# Patient Record
Sex: FEMALE | Race: WHITE | NOT HISPANIC OR LATINO | Employment: UNEMPLOYED | URBAN - METROPOLITAN AREA
[De-identification: names, ages, dates, MRNs, and addresses within clinical notes are randomized per-mention and may not be internally consistent; named-entity substitution may affect disease eponyms.]

---

## 2017-02-06 RX ORDER — CITALOPRAM 20 MG/1
TABLET ORAL
Qty: 30 TABLET | Refills: 0 | Status: SHIPPED | OUTPATIENT
Start: 2017-02-06 | End: 2017-03-06 | Stop reason: SDUPTHER

## 2017-02-07 RX ORDER — CITALOPRAM 20 MG/1
TABLET ORAL
Qty: 15 TABLET | Refills: 0 | Status: SHIPPED | OUTPATIENT
Start: 2017-02-07 | End: 2017-03-10 | Stop reason: SDUPTHER

## 2017-03-03 ENCOUNTER — TELEPHONE (OUTPATIENT)
Dept: FAMILY MEDICINE CLINIC | Facility: CLINIC | Age: 47
End: 2017-03-03

## 2017-03-03 RX ORDER — OSELTAMIVIR PHOSPHATE 75 MG/1
75 CAPSULE ORAL 2 TIMES DAILY
Qty: 10 CAPSULE | Refills: 0 | Status: SHIPPED | OUTPATIENT
Start: 2017-03-03 | End: 2017-03-10

## 2017-03-03 NOTE — TELEPHONE ENCOUNTER
----- Message from Fang Meyers sent at 3/3/2017 12:56 PM EST -----  [phone]  [facility] [address]  [place]  DAUGHTER WAS SEEN BY PED. SHE HAS THE FLU AND SHE WANTS TO KNOW IF SHE CAN GET TAMIFLU FOR HERSELF         Script sent per Dr. BAIRD, pt informed

## 2017-03-06 RX ORDER — CITALOPRAM 20 MG/1
TABLET ORAL
Qty: 15 TABLET | Refills: 0 | Status: SHIPPED | OUTPATIENT
Start: 2017-03-06 | End: 2017-03-10

## 2017-03-10 ENCOUNTER — OFFICE VISIT (OUTPATIENT)
Dept: FAMILY MEDICINE CLINIC | Facility: CLINIC | Age: 47
End: 2017-03-10

## 2017-03-10 VITALS
OXYGEN SATURATION: 98 % | HEART RATE: 67 BPM | DIASTOLIC BLOOD PRESSURE: 78 MMHG | WEIGHT: 158 LBS | SYSTOLIC BLOOD PRESSURE: 122 MMHG | HEIGHT: 69 IN | TEMPERATURE: 98.1 F | BODY MASS INDEX: 23.4 KG/M2

## 2017-03-10 DIAGNOSIS — F32.9 REACTIVE DEPRESSION: ICD-10-CM

## 2017-03-10 DIAGNOSIS — R07.89 OTHER CHEST PAIN: ICD-10-CM

## 2017-03-10 DIAGNOSIS — J30.1 SEASONAL ALLERGIC RHINITIS DUE TO POLLEN: Primary | ICD-10-CM

## 2017-03-10 PROCEDURE — 99213 OFFICE O/P EST LOW 20 MIN: CPT | Performed by: FAMILY MEDICINE

## 2017-03-10 RX ORDER — ALBUTEROL SULFATE 90 UG/1
2 AEROSOL, METERED RESPIRATORY (INHALATION) EVERY 4 HOURS PRN
Qty: 18 G | Refills: 5 | Status: ON HOLD | OUTPATIENT
Start: 2017-03-10 | End: 2018-01-05

## 2017-03-10 RX ORDER — MONTELUKAST SODIUM 10 MG/1
10 TABLET ORAL DAILY
Qty: 30 TABLET | Refills: 5 | Status: SHIPPED | OUTPATIENT
Start: 2017-03-10 | End: 2017-10-25 | Stop reason: SDUPTHER

## 2017-03-10 RX ORDER — CITALOPRAM 20 MG/1
20 TABLET ORAL DAILY
Qty: 30 TABLET | Refills: 5 | Status: SHIPPED | OUTPATIENT
Start: 2017-03-10 | End: 2017-03-27 | Stop reason: SDUPTHER

## 2017-03-10 NOTE — PROGRESS NOTES
"  Chief Complaint   Patient presents with   • Depression     follow up,pt is doing wel,l no complaint       Subjective.../HPI  Patient present today with allergies and depression. Doing well on citalopram.    I have reviewed the patient's medical history in detail and updated the computerized patient record.    Family History   Problem Relation Age of Onset   • Depression Mother    • Diabetes Mother    • Heart disease Mother      +of mi at 68   • Hyperlipidemia Mother    • Hypertension Mother    • Heart disease Father      ptca x 5 and mi at 59   • Hyperlipidemia Father    • Hypertension Father    • Kidney disease Brother    • Birth defects Maternal Grandmother    • Heart disease Maternal Grandmother      multiple mi   • Birth defects Maternal Grandfather    • Heart disease Maternal Grandfather      + mi at 68   • Colon cancer Maternal Grandfather    • Heart disease Paternal Grandmother      weak heart   • Asthma Paternal Grandmother    • COPD Paternal Grandmother    • Kidney failure Paternal Grandfather        Social History     Social History   • Marital status:      Spouse name: N/A   • Number of children: N/A   • Years of education: N/A     Occupational History   • Not on file.     Social History Main Topics   • Smoking status: Never Smoker   • Smokeless tobacco: Never Used   • Alcohol use No   • Drug use: No   • Sexual activity: Defer     Other Topics Concern   • Not on file     Social History Narrative       Review of Systems:   Review of Systems   All other systems reviewed and are negative.      Objective:  Vital Signs     Vitals:    03/10/17 1220   BP: 122/78   BP Location: Right arm   Patient Position: Sitting   Pulse: 67   Temp: 98.1 °F (36.7 °C)   TempSrc: Oral   SpO2: 98%   Weight: 158 lb (71.7 kg)   Height: 69\" (175.3 cm)     Physical Exam   Constitutional: She is oriented to person, place, and time. She appears well-developed and well-nourished.   HENT:   Head: Normocephalic.   Eyes: Pupils are " equal, round, and reactive to light.   Neck: Normal range of motion.   Cardiovascular: Normal rate and regular rhythm.    Pulmonary/Chest: Effort normal.   Abdominal: Soft.   Musculoskeletal: Normal range of motion.   Neurological: She is alert and oriented to person, place, and time.   Skin: Skin is warm and dry.        Results Review:      REVIEWED AND DISCUSSED LAB RESULTS WITH PATIENT and REVIEWED AND DISCUSSED CLINICAL RESULTS WITH PATIENT                          Current Outpatient Prescriptions:   •  albuterol (VENTOLIN HFA) 108 (90 BASE) MCG/ACT inhaler, Inhale 2 puffs Every 4 (Four) Hours As Needed for Wheezing., Disp: 18 g, Rfl: 5  •  cetirizine (ZyrTEC) 10 MG tablet, Take 10 mg by mouth daily., Disp: , Rfl:   •  citalopram (CeleXA) 20 MG tablet, Take 1 tablet by mouth Daily., Disp: 30 tablet, Rfl: 5  •  MINASTRIN 24 FE 1-20 MG-MCG(24) chewable tablet, every night., Disp: , Rfl:   •  montelukast (SINGULAIR) 10 MG tablet, Take 1 tablet by mouth Daily., Disp: 30 tablet, Rfl: 5    Procedures    Assessment/Plan     Diagnoses and all orders for this visit:    Seasonal allergic rhinitis due to pollen  -     CBC & Differential  -     Comprehensive Metabolic Panel    Reactive depression  -     CBC & Differential  -     Comprehensive Metabolic Panel  -     Hemoglobin A1c  -     Lipid Panel With LDL / HDL Ratio  -     Thyroid Panel With TSH    Other chest pain  -     Comprehensive Metabolic Panel  -     Hemoglobin A1c  -     Lipid Panel With LDL / HDL Ratio  -     Thyroid Panel With TSH    Other orders  -     albuterol (VENTOLIN HFA) 108 (90 BASE) MCG/ACT inhaler; Inhale 2 puffs Every 4 (Four) Hours As Needed for Wheezing.  -     citalopram (CeleXA) 20 MG tablet; Take 1 tablet by mouth Daily.  -     montelukast (SINGULAIR) 10 MG tablet; Take 1 tablet by mouth Daily.         Benoit Mukherjee Jr., MD  03/10/17  12:59 PM

## 2017-03-11 LAB
ALBUMIN SERPL-MCNC: 3.9 G/DL (ref 3.5–5.2)
ALBUMIN/GLOB SERPL: 1.4 G/DL
ALP SERPL-CCNC: 65 U/L (ref 39–117)
ALT SERPL-CCNC: 17 U/L (ref 1–33)
AST SERPL-CCNC: 16 U/L (ref 1–32)
BASOPHILS # BLD AUTO: 0.04 10*3/MM3 (ref 0–0.2)
BASOPHILS NFR BLD AUTO: 0.5 % (ref 0–1.5)
BILIRUB SERPL-MCNC: 0.4 MG/DL (ref 0.1–1.2)
BUN SERPL-MCNC: 21 MG/DL (ref 6–20)
BUN/CREAT SERPL: 29.6 (ref 7–25)
CALCIUM SERPL-MCNC: 9.6 MG/DL (ref 8.6–10.5)
CHLORIDE SERPL-SCNC: 101 MMOL/L (ref 98–107)
CHOLEST SERPL-MCNC: 259 MG/DL (ref 0–200)
CO2 SERPL-SCNC: 25.5 MMOL/L (ref 22–29)
CREAT SERPL-MCNC: 0.71 MG/DL (ref 0.57–1)
EOSINOPHIL # BLD AUTO: 0.46 10*3/MM3 (ref 0–0.7)
EOSINOPHIL NFR BLD AUTO: 6 % (ref 0.3–6.2)
ERYTHROCYTE [DISTWIDTH] IN BLOOD BY AUTOMATED COUNT: 12.3 % (ref 11.7–13)
FT4I SERPL CALC-MCNC: 1.8 (ref 1.2–4.9)
GLOBULIN SER CALC-MCNC: 2.8 GM/DL
GLUCOSE SERPL-MCNC: 81 MG/DL (ref 65–99)
HBA1C MFR BLD: 4.87 % (ref 4.8–5.6)
HCT VFR BLD AUTO: 41.3 % (ref 35.6–45.5)
HDLC SERPL-MCNC: 48 MG/DL (ref 40–60)
HGB BLD-MCNC: 13.9 G/DL (ref 11.9–15.5)
IMM GRANULOCYTES # BLD: 0 10*3/MM3 (ref 0–0.03)
IMM GRANULOCYTES NFR BLD: 0 % (ref 0–0.5)
LDLC SERPL CALC-MCNC: 188 MG/DL (ref 0–100)
LDLC/HDLC SERPL: 3.91 {RATIO}
LYMPHOCYTES # BLD AUTO: 2.01 10*3/MM3 (ref 0.9–4.8)
LYMPHOCYTES NFR BLD AUTO: 26.1 % (ref 19.6–45.3)
MCH RBC QN AUTO: 32.3 PG (ref 26.9–32)
MCHC RBC AUTO-ENTMCNC: 33.7 G/DL (ref 32.4–36.3)
MCV RBC AUTO: 96 FL (ref 80.5–98.2)
MONOCYTES # BLD AUTO: 0.53 10*3/MM3 (ref 0.2–1.2)
MONOCYTES NFR BLD AUTO: 6.9 % (ref 5–12)
NEUTROPHILS # BLD AUTO: 4.67 10*3/MM3 (ref 1.9–8.1)
NEUTROPHILS NFR BLD AUTO: 60.5 % (ref 42.7–76)
PLATELET # BLD AUTO: 315 10*3/MM3 (ref 140–500)
POTASSIUM SERPL-SCNC: 4.2 MMOL/L (ref 3.5–5.2)
PROT SERPL-MCNC: 6.7 G/DL (ref 6–8.5)
RBC # BLD AUTO: 4.3 10*6/MM3 (ref 3.9–5.2)
SODIUM SERPL-SCNC: 142 MMOL/L (ref 136–145)
T3RU NFR SERPL: 23 % (ref 24–39)
T4 SERPL-MCNC: 7.9 UG/DL (ref 4.5–12)
TRIGL SERPL-MCNC: 117 MG/DL (ref 0–150)
TSH SERPL DL<=0.005 MIU/L-ACNC: 1.82 UIU/ML (ref 0.45–4.5)
VLDLC SERPL CALC-MCNC: 23.4 MG/DL (ref 5–40)
WBC # BLD AUTO: 7.71 10*3/MM3 (ref 4.5–10.7)

## 2017-03-23 RX ORDER — CITALOPRAM 20 MG/1
TABLET ORAL
Qty: 15 TABLET | Refills: 0 | Status: SHIPPED | OUTPATIENT
Start: 2017-03-23 | End: 2017-04-07 | Stop reason: SDUPTHER

## 2017-03-27 RX ORDER — CITALOPRAM 20 MG/1
20 TABLET ORAL DAILY
Qty: 30 TABLET | Refills: 5 | Status: SHIPPED | OUTPATIENT
Start: 2017-03-27 | End: 2017-10-08 | Stop reason: SDUPTHER

## 2017-03-31 ENCOUNTER — TELEPHONE (OUTPATIENT)
Dept: FAMILY MEDICINE CLINIC | Facility: CLINIC | Age: 47
End: 2017-03-31

## 2017-04-03 DIAGNOSIS — E78.01 FAMILIAL HYPERCHOLESTEROLEMIA: Primary | ICD-10-CM

## 2017-04-03 RX ORDER — ATORVASTATIN CALCIUM 10 MG/1
10 TABLET, FILM COATED ORAL DAILY
Qty: 30 TABLET | Refills: 5 | Status: SHIPPED | OUTPATIENT
Start: 2017-04-03 | End: 2017-10-19 | Stop reason: ALTCHOICE

## 2017-04-03 NOTE — PROGRESS NOTES
Discussed lab tests with pt . Has strong family hx of heart disease. Has lost weight and runny and still elevated lipids. Will start on lipitor. Do lab tests in 6/12 weeks and q 6 months

## 2017-04-07 RX ORDER — CITALOPRAM 20 MG/1
TABLET ORAL
Qty: 15 TABLET | Refills: 0 | Status: SHIPPED | OUTPATIENT
Start: 2017-04-07 | End: 2017-10-19 | Stop reason: SDUPTHER

## 2017-09-06 ENCOUNTER — APPOINTMENT (OUTPATIENT)
Dept: WOMENS IMAGING | Facility: HOSPITAL | Age: 47
End: 2017-09-06

## 2017-10-09 RX ORDER — CITALOPRAM 20 MG/1
TABLET ORAL
Qty: 30 TABLET | Refills: 4 | Status: SHIPPED | OUTPATIENT
Start: 2017-10-09 | End: 2017-10-19 | Stop reason: SDUPTHER

## 2017-10-19 ENCOUNTER — APPOINTMENT (OUTPATIENT)
Dept: PREADMISSION TESTING | Facility: HOSPITAL | Age: 47
End: 2017-10-19

## 2017-10-19 VITALS
DIASTOLIC BLOOD PRESSURE: 79 MMHG | HEIGHT: 68 IN | RESPIRATION RATE: 16 BRPM | SYSTOLIC BLOOD PRESSURE: 130 MMHG | BODY MASS INDEX: 28.64 KG/M2 | WEIGHT: 189 LBS | HEART RATE: 74 BPM | OXYGEN SATURATION: 98 % | TEMPERATURE: 98.3 F

## 2017-10-19 LAB
ANION GAP SERPL CALCULATED.3IONS-SCNC: 13.5 MMOL/L
BILIRUB UR QL STRIP: NEGATIVE
BUN BLD-MCNC: 10 MG/DL (ref 6–20)
BUN/CREAT SERPL: 12.5 (ref 7–25)
CALCIUM SPEC-SCNC: 9.3 MG/DL (ref 8.6–10.5)
CHLORIDE SERPL-SCNC: 101 MMOL/L (ref 98–107)
CLARITY UR: ABNORMAL
CO2 SERPL-SCNC: 26.5 MMOL/L (ref 22–29)
COLOR UR: YELLOW
CREAT BLD-MCNC: 0.8 MG/DL (ref 0.57–1)
DEPRECATED RDW RBC AUTO: 41.7 FL (ref 37–54)
ERYTHROCYTE [DISTWIDTH] IN BLOOD BY AUTOMATED COUNT: 12.3 % (ref 11.7–13)
GFR SERPL CREATININE-BSD FRML MDRD: 76 ML/MIN/1.73
GLUCOSE BLD-MCNC: 91 MG/DL (ref 65–99)
GLUCOSE UR STRIP-MCNC: NEGATIVE MG/DL
HCG SERPL QL: NEGATIVE
HCT VFR BLD AUTO: 42.3 % (ref 35.6–45.5)
HGB BLD-MCNC: 14.7 G/DL (ref 11.9–15.5)
HGB UR QL STRIP.AUTO: NEGATIVE
KETONES UR QL STRIP: NEGATIVE
LEUKOCYTE ESTERASE UR QL STRIP.AUTO: ABNORMAL
MCH RBC QN AUTO: 32.2 PG (ref 26.9–32)
MCHC RBC AUTO-ENTMCNC: 34.8 G/DL (ref 32.4–36.3)
MCV RBC AUTO: 92.8 FL (ref 80.5–98.2)
NITRITE UR QL STRIP: NEGATIVE
PH UR STRIP.AUTO: 6.5 [PH] (ref 5–8)
PLATELET # BLD AUTO: 312 10*3/MM3 (ref 140–500)
PMV BLD AUTO: 9.4 FL (ref 6–12)
POTASSIUM BLD-SCNC: 4.2 MMOL/L (ref 3.5–5.2)
PROT UR QL STRIP: NEGATIVE
RBC # BLD AUTO: 4.56 10*6/MM3 (ref 3.9–5.2)
SODIUM BLD-SCNC: 141 MMOL/L (ref 136–145)
SP GR UR STRIP: 1.02 (ref 1–1.03)
UROBILINOGEN UR QL STRIP: ABNORMAL
WBC NRBC COR # BLD: 7.84 10*3/MM3 (ref 4.5–10.7)

## 2017-10-19 PROCEDURE — 81003 URINALYSIS AUTO W/O SCOPE: CPT | Performed by: OBSTETRICS & GYNECOLOGY

## 2017-10-19 PROCEDURE — 80048 BASIC METABOLIC PNL TOTAL CA: CPT | Performed by: OBSTETRICS & GYNECOLOGY

## 2017-10-19 PROCEDURE — 84703 CHORIONIC GONADOTROPIN ASSAY: CPT | Performed by: OBSTETRICS & GYNECOLOGY

## 2017-10-19 PROCEDURE — 36415 COLL VENOUS BLD VENIPUNCTURE: CPT

## 2017-10-19 PROCEDURE — 85027 COMPLETE CBC AUTOMATED: CPT | Performed by: OBSTETRICS & GYNECOLOGY

## 2017-10-19 PROCEDURE — 93010 ELECTROCARDIOGRAM REPORT: CPT | Performed by: INTERNAL MEDICINE

## 2017-10-19 PROCEDURE — 93005 ELECTROCARDIOGRAM TRACING: CPT

## 2017-10-19 RX ORDER — CITALOPRAM 20 MG/1
20 TABLET ORAL DAILY
COMMUNITY

## 2017-10-19 NOTE — DISCHARGE INSTRUCTIONS
Take the following medications the morning of surgery with a small sip of water:    ALBUTEROL      General Instructions:  • Do not eat solid food after midnight the night before surgery.  • You may drink clear liquids day of surgery but must stop at least one hour before your hospital arrival time @ 0700 AM STOP DRINKING!!!  • It is beneficial for you to have a clear drink that contains carbohydrates the day of surgery.  We suggest a 12 ounce bottle of Gatorade or Powerade for non-diabetic patients or a 12 ounce bottle of G2 or Powerade Zero for diabetic patients. (Pediatric patients, are not advised to drink a 12 ounce carbohydrate drink)    Clear liquids are liquids you can see through.  Nothing red in color.     Plain water                               Sports drinks  Sodas                                   Gelatin (Jell-O)  Fruit juices without pulp such as white grape juice and apple juice  Popsicles that contain no fruit or yogurt  Tea or coffee (no cream or milk added)  Gatorade / Powerade  G2 / Powerade Zero     • Patients who avoid smoking, chewing tobacco and alcohol for 4 weeks prior to surgery have a reduced risk of post-operative complications.  Quit smoking as many days before surgery as you can.  • Do not smoke, use chewing tobacco or drink alcohol the day of surgery.   • Bring any papers given to you in the doctor’s office.  • Wear clean comfortable clothes and socks.  • Do not wear contact lenses or make-up.  Bring a case for your glasses.   • Bring crutches or walker if applicable.  • Remove all piercings.  Leave jewelry and any other valuables at home.  • The Pre-Admission Testing nurse will instruct you to bring medications if unable to obtain an accurate list in Pre-Admission Testing.        Preventing a Surgical Site Infection:  • For 2 to 3 days before surgery, avoid shaving with a razor because the razor can irritate skin and make it easier to develop an infection.  • The night prior to  surgery sleep in a clean bed with clean clothing.  Do not allow pets to sleep with you.  • Shower on the morning of surgery using a fresh bar of anti-bacterial soap (such as Dial) and clean washcloth.  Dry with a clean towel and dress in clean clothing.  • Ask your surgeon if you will be receiving antibiotics prior to surgery.  • Make sure you, your family, and all healthcare providers clean their hands with soap and water or an alcohol based hand  before caring for you or your wound.    Day of surgery: 11- ARRIVE @ 0800 AM REPORT TO THE MAIN OR   Upon arrival, a Pre-op nurse and Anesthesiologist will review your health history, obtain vital signs, and answer questions you may have.  The only belongings needed at this time will be your home medications.  If you are staying overnight your family can leave the rest of your belongings in the car and bring them to your room later.  A Pre-op nurse will start an IV and you may receive medication in preparation for surgery, including something to help you relax.  Your family will be able to see you in the Pre-op area.  While you are in surgery your family should notify the waiting room  if they leave the waiting room area and provide a contact phone number.    Please be aware that surgery does come with discomfort.  We want to make every effort to control your discomfort so please discuss any uncontrolled symptoms with your nurse.   Your doctor will most likely have prescribed pain medications.      If you are going home after surgery you will receive individualized written care instructions before being discharged.  A responsible adult must drive you to and from the hospital on the day of your surgery and stay with you for 24 hours.    If you are staying overnight following surgery, you will be transported to your hospital room following the recovery period.  [facility] has all private rooms.    If you have any questions please  call Pre-Admission Testing at [phone].  Deductibles and co-payments are collected on the day of service. Please be prepared to pay the required co-pay, deductible or deposit on the day of service as defined by your plan.

## 2017-10-25 RX ORDER — MONTELUKAST SODIUM 10 MG/1
TABLET ORAL
Qty: 30 TABLET | Refills: 4 | Status: SHIPPED | OUTPATIENT
Start: 2017-10-25

## 2017-11-03 ENCOUNTER — ANESTHESIA EVENT (OUTPATIENT)
Dept: PERIOP | Facility: HOSPITAL | Age: 47
End: 2017-11-03

## 2017-11-03 ENCOUNTER — HOSPITAL ENCOUNTER (OUTPATIENT)
Facility: HOSPITAL | Age: 47
Setting detail: HOSPITAL OUTPATIENT SURGERY
Discharge: HOME | End: 2017-11-03
Attending: OBSTETRICS & GYNECOLOGY | Admitting: OBSTETRICS & GYNECOLOGY

## 2017-11-03 ENCOUNTER — ANESTHESIA (OUTPATIENT)
Dept: PERIOP | Facility: HOSPITAL | Age: 47
End: 2017-11-03

## 2017-11-03 VITALS
DIASTOLIC BLOOD PRESSURE: 72 MMHG | WEIGHT: 189 LBS | RESPIRATION RATE: 16 BRPM | TEMPERATURE: 98 F | HEART RATE: 63 BPM | HEIGHT: 68 IN | BODY MASS INDEX: 28.64 KG/M2 | OXYGEN SATURATION: 97 % | SYSTOLIC BLOOD PRESSURE: 120 MMHG

## 2017-11-03 DIAGNOSIS — N83.209 OVARIAN CYST: ICD-10-CM

## 2017-11-03 PROBLEM — N83.201 RIGHT OVARIAN CYST: Status: ACTIVE | Noted: 2017-11-03

## 2017-11-03 LAB
B-HCG UR QL: NEGATIVE
INTERNAL NEGATIVE CONTROL: NEGATIVE
INTERNAL POSITIVE CONTROL: POSITIVE
Lab: NORMAL

## 2017-11-03 PROCEDURE — 25010000002 MIDAZOLAM PER 1 MG: Performed by: ANESTHESIOLOGY

## 2017-11-03 PROCEDURE — 25010000002 PROPOFOL 10 MG/ML EMULSION: Performed by: NURSE ANESTHETIST, CERTIFIED REGISTERED

## 2017-11-03 PROCEDURE — 25010000002 PROMETHAZINE PER 50 MG: Performed by: NURSE ANESTHETIST, CERTIFIED REGISTERED

## 2017-11-03 PROCEDURE — 25010000003 CEFAZOLIN IN DEXTROSE 2-4 GM/100ML-% SOLUTION: Performed by: OBSTETRICS & GYNECOLOGY

## 2017-11-03 PROCEDURE — 88307 TISSUE EXAM BY PATHOLOGIST: CPT | Performed by: OBSTETRICS & GYNECOLOGY

## 2017-11-03 PROCEDURE — 25010000002 ONDANSETRON PER 1 MG: Performed by: NURSE ANESTHETIST, CERTIFIED REGISTERED

## 2017-11-03 PROCEDURE — 25010000002 FENTANYL CITRATE (PF) 100 MCG/2ML SOLUTION: Performed by: NURSE ANESTHETIST, CERTIFIED REGISTERED

## 2017-11-03 PROCEDURE — 25010000002 DEXAMETHASONE PER 1 MG: Performed by: NURSE ANESTHETIST, CERTIFIED REGISTERED

## 2017-11-03 PROCEDURE — 25010000002 NEOSTIGMINE PER 0.5 MG: Performed by: NURSE ANESTHETIST, CERTIFIED REGISTERED

## 2017-11-03 PROCEDURE — 25010000002 HYDROMORPHONE PER 4 MG: Performed by: NURSE ANESTHETIST, CERTIFIED REGISTERED

## 2017-11-03 RX ORDER — FENTANYL CITRATE 50 UG/ML
50 INJECTION, SOLUTION INTRAMUSCULAR; INTRAVENOUS
Status: DISCONTINUED | OUTPATIENT
Start: 2017-11-03 | End: 2017-11-03 | Stop reason: HOSPADM

## 2017-11-03 RX ORDER — HYDROCODONE BITARTRATE AND ACETAMINOPHEN 7.5; 325 MG/1; MG/1
1 TABLET ORAL ONCE AS NEEDED
Status: COMPLETED | OUTPATIENT
Start: 2017-11-03 | End: 2017-11-03

## 2017-11-03 RX ORDER — MIDAZOLAM HYDROCHLORIDE 1 MG/ML
2 INJECTION INTRAMUSCULAR; INTRAVENOUS
Status: DISCONTINUED | OUTPATIENT
Start: 2017-11-03 | End: 2017-11-03 | Stop reason: HOSPADM

## 2017-11-03 RX ORDER — LIDOCAINE HYDROCHLORIDE 20 MG/ML
INJECTION, SOLUTION INFILTRATION; PERINEURAL AS NEEDED
Status: DISCONTINUED | OUTPATIENT
Start: 2017-11-03 | End: 2017-11-03 | Stop reason: SURG

## 2017-11-03 RX ORDER — ONDANSETRON 2 MG/ML
INJECTION INTRAMUSCULAR; INTRAVENOUS AS NEEDED
Status: DISCONTINUED | OUTPATIENT
Start: 2017-11-03 | End: 2017-11-03 | Stop reason: SURG

## 2017-11-03 RX ORDER — CEFAZOLIN SODIUM 2 G/100ML
2 INJECTION, SOLUTION INTRAVENOUS ONCE
Status: COMPLETED | OUTPATIENT
Start: 2017-11-03 | End: 2017-11-03

## 2017-11-03 RX ORDER — OXYCODONE AND ACETAMINOPHEN 7.5; 325 MG/1; MG/1
1 TABLET ORAL ONCE AS NEEDED
Status: DISCONTINUED | OUTPATIENT
Start: 2017-11-03 | End: 2017-11-03 | Stop reason: HOSPADM

## 2017-11-03 RX ORDER — SODIUM CHLORIDE, SODIUM LACTATE, POTASSIUM CHLORIDE, CALCIUM CHLORIDE 600; 310; 30; 20 MG/100ML; MG/100ML; MG/100ML; MG/100ML
9 INJECTION, SOLUTION INTRAVENOUS CONTINUOUS PRN
Status: DISCONTINUED | OUTPATIENT
Start: 2017-11-03 | End: 2017-11-03 | Stop reason: HOSPADM

## 2017-11-03 RX ORDER — LABETALOL HYDROCHLORIDE 5 MG/ML
5 INJECTION, SOLUTION INTRAVENOUS
Status: DISCONTINUED | OUTPATIENT
Start: 2017-11-03 | End: 2017-11-03 | Stop reason: HOSPADM

## 2017-11-03 RX ORDER — MIDAZOLAM HYDROCHLORIDE 1 MG/ML
1 INJECTION INTRAMUSCULAR; INTRAVENOUS
Status: DISCONTINUED | OUTPATIENT
Start: 2017-11-03 | End: 2017-11-03 | Stop reason: HOSPADM

## 2017-11-03 RX ORDER — PREDNISONE 10 MG/1
10 TABLET ORAL DAILY
COMMUNITY
Start: 2017-10-30 | End: 2017-11-11

## 2017-11-03 RX ORDER — HYDROMORPHONE HYDROCHLORIDE 1 MG/ML
0.5 INJECTION, SOLUTION INTRAMUSCULAR; INTRAVENOUS; SUBCUTANEOUS
Status: DISCONTINUED | OUTPATIENT
Start: 2017-11-03 | End: 2017-11-03 | Stop reason: HOSPADM

## 2017-11-03 RX ORDER — FAMOTIDINE 10 MG/ML
20 INJECTION, SOLUTION INTRAVENOUS
Status: DISCONTINUED | OUTPATIENT
Start: 2017-11-03 | End: 2017-11-03 | Stop reason: HOSPADM

## 2017-11-03 RX ORDER — GLYCOPYRROLATE 0.2 MG/ML
INJECTION INTRAMUSCULAR; INTRAVENOUS AS NEEDED
Status: DISCONTINUED | OUTPATIENT
Start: 2017-11-03 | End: 2017-11-03 | Stop reason: SURG

## 2017-11-03 RX ORDER — SODIUM CHLORIDE 9 MG/ML
INJECTION, SOLUTION INTRAVENOUS AS NEEDED
Status: DISCONTINUED | OUTPATIENT
Start: 2017-11-03 | End: 2017-11-03 | Stop reason: HOSPADM

## 2017-11-03 RX ORDER — NALOXONE HCL 0.4 MG/ML
0.2 VIAL (ML) INJECTION AS NEEDED
Status: DISCONTINUED | OUTPATIENT
Start: 2017-11-03 | End: 2017-11-03 | Stop reason: HOSPADM

## 2017-11-03 RX ORDER — PROMETHAZINE HYDROCHLORIDE 25 MG/ML
12.5 INJECTION, SOLUTION INTRAMUSCULAR; INTRAVENOUS ONCE AS NEEDED
Status: COMPLETED | OUTPATIENT
Start: 2017-11-03 | End: 2017-11-03

## 2017-11-03 RX ORDER — FLUMAZENIL 0.1 MG/ML
0.2 INJECTION INTRAVENOUS AS NEEDED
Status: DISCONTINUED | OUTPATIENT
Start: 2017-11-03 | End: 2017-11-03 | Stop reason: HOSPADM

## 2017-11-03 RX ORDER — PROMETHAZINE HYDROCHLORIDE 25 MG/1
25 TABLET ORAL ONCE AS NEEDED
Status: COMPLETED | OUTPATIENT
Start: 2017-11-03 | End: 2017-11-03

## 2017-11-03 RX ORDER — ROCURONIUM BROMIDE 10 MG/ML
INJECTION, SOLUTION INTRAVENOUS AS NEEDED
Status: DISCONTINUED | OUTPATIENT
Start: 2017-11-03 | End: 2017-11-03 | Stop reason: SURG

## 2017-11-03 RX ORDER — EPHEDRINE SULFATE 50 MG/ML
5 INJECTION, SOLUTION INTRAVENOUS ONCE AS NEEDED
Status: DISCONTINUED | OUTPATIENT
Start: 2017-11-03 | End: 2017-11-03 | Stop reason: HOSPADM

## 2017-11-03 RX ORDER — PROPOFOL 10 MG/ML
VIAL (ML) INTRAVENOUS AS NEEDED
Status: DISCONTINUED | OUTPATIENT
Start: 2017-11-03 | End: 2017-11-03 | Stop reason: SURG

## 2017-11-03 RX ORDER — FENTANYL CITRATE 50 UG/ML
INJECTION, SOLUTION INTRAMUSCULAR; INTRAVENOUS AS NEEDED
Status: DISCONTINUED | OUTPATIENT
Start: 2017-11-03 | End: 2017-11-03 | Stop reason: SURG

## 2017-11-03 RX ORDER — PROMETHAZINE HYDROCHLORIDE 25 MG/1
12.5 TABLET ORAL ONCE AS NEEDED
Status: DISCONTINUED | OUTPATIENT
Start: 2017-11-03 | End: 2017-11-03 | Stop reason: HOSPADM

## 2017-11-03 RX ORDER — DEXAMETHASONE SODIUM PHOSPHATE 10 MG/ML
INJECTION INTRAMUSCULAR; INTRAVENOUS AS NEEDED
Status: DISCONTINUED | OUTPATIENT
Start: 2017-11-03 | End: 2017-11-03 | Stop reason: SURG

## 2017-11-03 RX ORDER — DIPHENHYDRAMINE HYDROCHLORIDE 50 MG/ML
12.5 INJECTION INTRAMUSCULAR; INTRAVENOUS
Status: DISCONTINUED | OUTPATIENT
Start: 2017-11-03 | End: 2017-11-03 | Stop reason: HOSPADM

## 2017-11-03 RX ORDER — HYDRALAZINE HYDROCHLORIDE 20 MG/ML
5 INJECTION INTRAMUSCULAR; INTRAVENOUS
Status: DISCONTINUED | OUTPATIENT
Start: 2017-11-03 | End: 2017-11-03 | Stop reason: HOSPADM

## 2017-11-03 RX ORDER — PROMETHAZINE HYDROCHLORIDE 25 MG/1
25 SUPPOSITORY RECTAL ONCE AS NEEDED
Status: COMPLETED | OUTPATIENT
Start: 2017-11-03 | End: 2017-11-03

## 2017-11-03 RX ORDER — SODIUM CHLORIDE 0.9 % (FLUSH) 0.9 %
1-10 SYRINGE (ML) INJECTION AS NEEDED
Status: DISCONTINUED | OUTPATIENT
Start: 2017-11-03 | End: 2017-11-03 | Stop reason: HOSPADM

## 2017-11-03 RX ORDER — AMOXICILLIN AND CLAVULANATE POTASSIUM 875; 125 MG/1; MG/1
1 TABLET, FILM COATED ORAL 2 TIMES DAILY
Status: ON HOLD | COMMUNITY
End: 2018-01-05

## 2017-11-03 RX ORDER — ONDANSETRON 2 MG/ML
4 INJECTION INTRAMUSCULAR; INTRAVENOUS ONCE AS NEEDED
Status: COMPLETED | OUTPATIENT
Start: 2017-11-03 | End: 2017-11-03

## 2017-11-03 RX ADMIN — NEOSTIGMINE METHYLSULFATE 5 MG: 1 INJECTION INTRAMUSCULAR; INTRAVENOUS; SUBCUTANEOUS at 11:19

## 2017-11-03 RX ADMIN — DEXAMETHASONE SODIUM PHOSPHATE 4 MG: 10 INJECTION INTRAMUSCULAR; INTRAVENOUS at 10:50

## 2017-11-03 RX ADMIN — PROPOFOL 200 MG: 10 INJECTION, EMULSION INTRAVENOUS at 10:12

## 2017-11-03 RX ADMIN — FENTANYL CITRATE 50 MCG: 50 INJECTION INTRAMUSCULAR; INTRAVENOUS at 11:54

## 2017-11-03 RX ADMIN — ONDANSETRON 4 MG: 2 INJECTION INTRAMUSCULAR; INTRAVENOUS at 15:05

## 2017-11-03 RX ADMIN — FAMOTIDINE 20 MG: 10 INJECTION INTRAVENOUS at 09:45

## 2017-11-03 RX ADMIN — FENTANYL CITRATE 50 MCG: 50 INJECTION INTRAMUSCULAR; INTRAVENOUS at 11:42

## 2017-11-03 RX ADMIN — ONDANSETRON 4 MG: 2 INJECTION INTRAMUSCULAR; INTRAVENOUS at 11:07

## 2017-11-03 RX ADMIN — PROMETHAZINE HYDROCHLORIDE 12.5 MG: 25 INJECTION INTRAMUSCULAR; INTRAVENOUS at 12:19

## 2017-11-03 RX ADMIN — SODIUM CHLORIDE, POTASSIUM CHLORIDE, SODIUM LACTATE AND CALCIUM CHLORIDE 9 ML/HR: 600; 310; 30; 20 INJECTION, SOLUTION INTRAVENOUS at 08:43

## 2017-11-03 RX ADMIN — MIDAZOLAM 1 MG: 1 INJECTION INTRAMUSCULAR; INTRAVENOUS at 09:45

## 2017-11-03 RX ADMIN — HYDROMORPHONE HYDROCHLORIDE 0.5 MG: 1 INJECTION, SOLUTION INTRAMUSCULAR; INTRAVENOUS; SUBCUTANEOUS at 13:57

## 2017-11-03 RX ADMIN — ROCURONIUM BROMIDE 40 MG: 10 INJECTION INTRAVENOUS at 10:12

## 2017-11-03 RX ADMIN — LIDOCAINE HYDROCHLORIDE 100 MG: 20 INJECTION, SOLUTION INFILTRATION; PERINEURAL at 10:12

## 2017-11-03 RX ADMIN — HYDROMORPHONE HYDROCHLORIDE 0.5 MG: 1 INJECTION, SOLUTION INTRAMUSCULAR; INTRAVENOUS; SUBCUTANEOUS at 12:33

## 2017-11-03 RX ADMIN — FENTANYL CITRATE 50 MCG: 50 INJECTION INTRAMUSCULAR; INTRAVENOUS at 11:20

## 2017-11-03 RX ADMIN — HYDROCODONE BITARTRATE AND ACETAMINOPHEN 1 TABLET: 7.5; 325 TABLET ORAL at 12:13

## 2017-11-03 RX ADMIN — FENTANYL CITRATE 50 MCG: 50 INJECTION INTRAMUSCULAR; INTRAVENOUS at 10:12

## 2017-11-03 RX ADMIN — GLYCOPYRROLATE 0.8 MG: 0.2 INJECTION INTRAMUSCULAR; INTRAVENOUS at 11:19

## 2017-11-03 RX ADMIN — CEFAZOLIN SODIUM 2 G: 2 INJECTION, SOLUTION INTRAVENOUS at 10:12

## 2017-11-03 NOTE — PLAN OF CARE
Problem: Patient Care Overview (Adult)  Goal: Plan of Care Review  Outcome: Outcome(s) achieved Date Met:  11/03/17 11/03/17 7935   Coping/Psychosocial Response Interventions   Plan Of Care Reviewed With patient;spouse   Patient Care Overview   Progress improving   Outcome Evaluation   Outcome Summary/Follow up Plan ready for discharge

## 2017-11-03 NOTE — OP NOTE
PREOPERATIVE DIAGNOSIS:  Right ovarian cyst     POSTOPERATIVE DIAGNOSIS:  Same    PROCEDURE PERFORMED:  Laparoscopy and RSO    SURGEON: Claudine Barron MD    ASSISTANT:  Joshua    ANESTHESIA:  Gen. endotracheal      ESTIMATED BLOOD LOSS:  10 cc    FINDINGS:  Large right ovarian cyst otherwise normal anatomy    PATHOLOGY SPECIMEN:  Right ovary with cyst and right fimbria    DESCRIPTION OF PROCEDURE:  The patient was taken to the operating room with the IV running was placed on the supine position and general anesthesia was given without any problem.  Was then placed in this modified dorsolithotomy position she was prepped and draped in a normal sterile fashion.  A speculum was placed in the vagina and the anterior lip of the cervix was grasped with a single-tooth tenaculum.  [place] was placed and used for mobilization of the uterus.  A small incision was made at the umbilicus and the Veress needle was placed without any problem.  Placement was checked and the Veress needle was then removed after the CO2 was allowed to enter the abdominal cavity.  A 10 mm trocar was placed in the same site and the scope was inserted and the anatomy was noted above.  A 5 mm trocar was then placed on the right side and a 10 mm trocar on the left side.  The right ovarian cyst was mobilized with the grasper and remove it once a using the Harmonic scalpel.  The Endobag was then placed in the abdominal cavity and the mass and the mass was placed in the Endobag.  The 10 mm trocar was removed and the Endobag was placed close to the incision and then the cyst was aspirated.  The bag was then removed through the 10 mm incision without any problem.  The trocar was then replaced the area was visualized irrigated and find to be hemostatic.  The trocar with the remove the CO2 was allowed to escape and the patient was placed in the supine position.  The 10 mm incision where a close closing the fascia first using the 0 Vicryl and then a the 5 mm  subsequent closed in  subcuticular fashion using 3-0 Vicryl.  Steri-Strip was then applied.  The single-tooth tenaculum and the acorn was removed from the vagina.  The patient tolerated procedure well.  There were no complications.  Patient was then awakened from the anesthesia and transferred to recovery room in good condition.

## 2017-11-03 NOTE — PLAN OF CARE
Problem: Patient Care Overview (Adult)  Goal: Discharge Needs Assessment  Outcome: Outcome(s) achieved Date Met:  11/03/17 11/03/17 3242   Discharge Needs Assessment   Concerns To Be Addressed denies needs/concerns at this time

## 2017-11-03 NOTE — ANESTHESIA PREPROCEDURE EVALUATION
Anesthesia Evaluation     Patient summary reviewed   NPO Solid Status: > 8 hours       Airway   Mallampati: II  Neck ROM: full  no difficulty expected  Dental - normal exam     Pulmonary    (+) asthma,   Cardiovascular     ECG reviewed  Rhythm: regular        Neuro/Psych  GI/Hepatic/Renal/Endo      Musculoskeletal     Abdominal    Substance History      OB/GYN          Other                                        Anesthesia Plan    ASA 2     general     intravenous induction   Anesthetic plan and risks discussed with patient.

## 2017-11-03 NOTE — H&P
Preop H&P    This patient is a 47 years old she has a diagnosis of a right ovarian cyst of about 6 cm and that she is admitted today for a laparoscopy with removal of the right ovary.   Complete H&P is included in the chart   There is no changes in a complete history and physical of this patient  The consent was reviewed also this morning and the patient has no questions

## 2017-11-03 NOTE — PLAN OF CARE
Problem: Patient Care Overview (Adult)  Goal: Plan of Care Review  Outcome: Outcome(s) achieved Date Met:  11/03/17 11/03/17 3561   Outcome Evaluation   Outcome Summary/Follow up Plan ready for discharge

## 2017-11-03 NOTE — ANESTHESIA POSTPROCEDURE EVALUATION
Patient: Amirah Godoy    Procedure Summary     Date Anesthesia Start Anesthesia Stop Room / Location    11/03/17 1009 1127  [place] [facility] [place] [facility]       Procedure Diagnosis Surgeon Provider    LAPAROSCOPIC RIGHT OOPHERECTOMY  (Right Abdomen) No diagnosis on file. MD Johnny Dan MD          Anesthesia Type: general  Last vitals  BP   139/80 (11/03/17 1435)   Temp   36.7 °C (98 °F) (11/03/17 1335)   Pulse   66 (11/03/17 1450)   Resp   16 (11/03/17 1450)     SpO2   96 % (11/03/17 1450)     Post Anesthesia Care and Evaluation    Patient location during evaluation: PACU  Patient participation: complete - patient participated  Level of consciousness: awake  Pain score: 2  Pain management: adequate  Airway patency: patent  Anesthetic complications: No anesthetic complications  PONV Status: none  Cardiovascular status: acceptable  Respiratory status: acceptable  Hydration status: acceptable

## 2017-11-03 NOTE — PLAN OF CARE
Problem: Patient Care Overview (Adult)  Goal: Plan of Care Review    11/03/17 1540   Coping/Psychosocial Response Interventions   Plan Of Care Reviewed With patient;spouse   Patient Care Overview   Progress improving   Outcome Evaluation   Outcome Summary/Follow up Plan ready for discharge

## 2017-11-03 NOTE — ANESTHESIA PROCEDURE NOTES
Airway  Urgency: elective    Airway not difficult    General Information and Staff    Patient location during procedure: OR  Anesthesiologist: XANDER HALL  CRNA: TRAVON CELAYA    Indications and Patient Condition  Indications for airway management: airway protection    Preoxygenated: yes  MILS not maintained throughout  Mask difficulty assessment: 1 - vent by mask    Final Airway Details  Final airway type: endotracheal airway      Successful airway: ETT  Cuffed: yes   Successful intubation technique: direct laryngoscopy  Facilitating devices/methods: intubating stylet  Endotracheal tube insertion site: oral  Blade: [place]  Blade size: #2  ETT size: 7.0 mm  Cormack-Lehane Classification: grade I - full view of glottis  Placement verified by: chest auscultation and capnometry   Cuff volume (mL): 8  Measured from: lips  Number of attempts at approach: 1    Additional Comments  Ett placed easily, appears atraumatic, dentition intact

## 2017-11-03 NOTE — PLAN OF CARE
Problem: Patient Care Overview (Adult)  Goal: Discharge Needs Assessment  Outcome: Outcome(s) achieved Date Met:  11/03/17 11/03/17 7615   Discharge Needs Assessment   Concerns To Be Addressed denies needs/concerns at this time

## 2017-11-03 NOTE — PLAN OF CARE
Problem: Patient Care Overview (Adult)  Goal: Adult Individualization and Mutuality  Outcome: Outcome(s) achieved Date Met:  11/03/17

## 2017-11-03 NOTE — PLAN OF CARE
Problem: Perioperative Period (Adult)  Goal: Signs and Symptoms of Listed Potential Problems Will be Absent or Manageable (Perioperative Period)  Outcome: Outcome(s) achieved Date Met:  11/03/17 11/03/17 0557   Perioperative Period   Problems Assessed (Perioperative Period) all   Problems Present (Perioperative Period) none

## 2017-11-06 LAB
CYTO UR: NORMAL
LAB AP CASE REPORT: NORMAL
Lab: NORMAL
PATH REPORT.FINAL DX SPEC: NORMAL
PATH REPORT.GROSS SPEC: NORMAL

## 2018-01-05 ENCOUNTER — APPOINTMENT (OUTPATIENT)
Dept: GENERAL RADIOLOGY | Facility: HOSPITAL | Age: 48
End: 2018-01-05

## 2018-01-05 ENCOUNTER — HOSPITAL ENCOUNTER (INPATIENT)
Facility: HOSPITAL | Age: 48
LOS: 11 days | Discharge: HOME | End: 2018-01-16
Attending: HOSPITALIST | Admitting: HOSPITALIST

## 2018-01-05 DIAGNOSIS — T17.500A MUCUS PLUGGING OF BRONCHI: ICD-10-CM

## 2018-01-05 PROBLEM — R53.1 WEAKNESS: Status: ACTIVE | Noted: 2018-01-05

## 2018-01-05 PROBLEM — J45.909 ASTHMA: Status: ACTIVE | Noted: 2018-01-05

## 2018-01-05 PROBLEM — J45.901 ASTHMA EXACERBATION: Status: ACTIVE | Noted: 2018-01-05

## 2018-01-05 PROBLEM — J45.901 ASTHMA WITH EXACERBATION: Status: ACTIVE | Noted: 2018-01-05

## 2018-01-05 PROBLEM — R05.9 COUGH: Status: ACTIVE | Noted: 2018-01-05

## 2018-01-05 LAB
ALBUMIN SERPL-MCNC: 3.7 G/DL (ref 3.5–5.2)
ALBUMIN/GLOB SERPL: 1.1 G/DL
ALP SERPL-CCNC: 83 U/L (ref 39–117)
ALT SERPL W P-5'-P-CCNC: 13 U/L (ref 1–33)
ANION GAP SERPL CALCULATED.3IONS-SCNC: 8.8 MMOL/L
AST SERPL-CCNC: 14 U/L (ref 1–32)
B PERT DNA SPEC QL NAA+PROBE: NOT DETECTED
BASOPHILS # BLD AUTO: 0.03 10*3/MM3 (ref 0–0.2)
BASOPHILS NFR BLD AUTO: 0.3 % (ref 0–1.5)
BILIRUB SERPL-MCNC: 0.4 MG/DL (ref 0.1–1.2)
BUN BLD-MCNC: 11 MG/DL (ref 6–20)
BUN/CREAT SERPL: 14.7 (ref 7–25)
C PNEUM DNA NPH QL NAA+NON-PROBE: NOT DETECTED
CALCIUM SPEC-SCNC: 8.6 MG/DL (ref 8.6–10.5)
CHLORIDE SERPL-SCNC: 104 MMOL/L (ref 98–107)
CO2 SERPL-SCNC: 27.2 MMOL/L (ref 22–29)
CREAT BLD-MCNC: 0.75 MG/DL (ref 0.57–1)
D-LACTATE SERPL-SCNC: 1.3 MMOL/L (ref 0.5–2)
DEPRECATED RDW RBC AUTO: 42.2 FL (ref 37–54)
EOSINOPHIL # BLD AUTO: 0.12 10*3/MM3 (ref 0–0.7)
EOSINOPHIL NFR BLD AUTO: 1.4 % (ref 0.3–6.2)
ERYTHROCYTE [DISTWIDTH] IN BLOOD BY AUTOMATED COUNT: 12.5 % (ref 11.7–13)
FLUAV H1 2009 PAND RNA NPH QL NAA+PROBE: NOT DETECTED
FLUAV H1 HA GENE NPH QL NAA+PROBE: NOT DETECTED
FLUAV H3 RNA NPH QL NAA+PROBE: DETECTED
FLUAV SUBTYP SPEC NAA+PROBE: NOT DETECTED
FLUBV RNA ISLT QL NAA+PROBE: NOT DETECTED
GFR SERPL CREATININE-BSD FRML MDRD: 82 ML/MIN/1.73
GLOBULIN UR ELPH-MCNC: 3.4 GM/DL
GLUCOSE BLD-MCNC: 99 MG/DL (ref 65–99)
HADV DNA SPEC NAA+PROBE: NOT DETECTED
HCOV 229E RNA SPEC QL NAA+PROBE: NOT DETECTED
HCOV HKU1 RNA SPEC QL NAA+PROBE: NOT DETECTED
HCOV NL63 RNA SPEC QL NAA+PROBE: NOT DETECTED
HCOV OC43 RNA SPEC QL NAA+PROBE: NOT DETECTED
HCT VFR BLD AUTO: 40.6 % (ref 35.6–45.5)
HGB BLD-MCNC: 13.7 G/DL (ref 11.9–15.5)
HMPV RNA NPH QL NAA+NON-PROBE: NOT DETECTED
HPIV1 RNA SPEC QL NAA+PROBE: NOT DETECTED
HPIV2 RNA SPEC QL NAA+PROBE: NOT DETECTED
HPIV3 RNA NPH QL NAA+PROBE: NOT DETECTED
HPIV4 P GENE NPH QL NAA+PROBE: NOT DETECTED
IMM GRANULOCYTES # BLD: 0.07 10*3/MM3 (ref 0–0.03)
IMM GRANULOCYTES NFR BLD: 0.8 % (ref 0–0.5)
LYMPHOCYTES # BLD AUTO: 2.01 10*3/MM3 (ref 0.9–4.8)
LYMPHOCYTES NFR BLD AUTO: 22.9 % (ref 19.6–45.3)
M PNEUMO IGG SER IA-ACNC: NOT DETECTED
MCH RBC QN AUTO: 31.4 PG (ref 26.9–32)
MCHC RBC AUTO-ENTMCNC: 33.7 G/DL (ref 32.4–36.3)
MCV RBC AUTO: 92.9 FL (ref 80.5–98.2)
MONOCYTES # BLD AUTO: 0.48 10*3/MM3 (ref 0.2–1.2)
MONOCYTES NFR BLD AUTO: 5.5 % (ref 5–12)
NEUTROPHILS # BLD AUTO: 6.08 10*3/MM3 (ref 1.9–8.1)
NEUTROPHILS NFR BLD AUTO: 69.1 % (ref 42.7–76)
PLATELET # BLD AUTO: 310 10*3/MM3 (ref 140–500)
PMV BLD AUTO: 9.6 FL (ref 6–12)
POTASSIUM BLD-SCNC: 3.9 MMOL/L (ref 3.5–5.2)
PROT SERPL-MCNC: 7.1 G/DL (ref 6–8.5)
RBC # BLD AUTO: 4.37 10*6/MM3 (ref 3.9–5.2)
RHINOVIRUS RNA SPEC NAA+PROBE: NOT DETECTED
RSV RNA NPH QL NAA+NON-PROBE: NOT DETECTED
SODIUM BLD-SCNC: 140 MMOL/L (ref 136–145)
WBC NRBC COR # BLD: 8.79 10*3/MM3 (ref 4.5–10.7)

## 2018-01-05 PROCEDURE — 25010000002 ENOXAPARIN PER 10 MG: Performed by: HOSPITALIST

## 2018-01-05 PROCEDURE — 87798 DETECT AGENT NOS DNA AMP: CPT | Performed by: HOSPITALIST

## 2018-01-05 PROCEDURE — 87581 M.PNEUMON DNA AMP PROBE: CPT | Performed by: HOSPITALIST

## 2018-01-05 PROCEDURE — 80053 COMPREHEN METABOLIC PANEL: CPT | Performed by: HOSPITALIST

## 2018-01-05 PROCEDURE — 85025 COMPLETE CBC W/AUTO DIFF WBC: CPT | Performed by: HOSPITALIST

## 2018-01-05 PROCEDURE — 71046 X-RAY EXAM CHEST 2 VIEWS: CPT

## 2018-01-05 PROCEDURE — 83605 ASSAY OF LACTIC ACID: CPT | Performed by: HOSPITALIST

## 2018-01-05 PROCEDURE — 87633 RESP VIRUS 12-25 TARGETS: CPT | Performed by: HOSPITALIST

## 2018-01-05 PROCEDURE — 25010000002 METHYLPREDNISOLONE PER 125 MG: Performed by: HOSPITALIST

## 2018-01-05 PROCEDURE — 94799 UNLISTED PULMONARY SVC/PX: CPT

## 2018-01-05 PROCEDURE — 87486 CHLMYD PNEUM DNA AMP PROBE: CPT | Performed by: HOSPITALIST

## 2018-01-05 PROCEDURE — 82785 ASSAY OF IGE: CPT | Performed by: INTERNAL MEDICINE

## 2018-01-05 RX ORDER — METHYLPREDNISOLONE SODIUM SUCCINATE 125 MG/2ML
60 INJECTION, POWDER, LYOPHILIZED, FOR SOLUTION INTRAMUSCULAR; INTRAVENOUS EVERY 8 HOURS
Status: DISCONTINUED | OUTPATIENT
Start: 2018-01-05 | End: 2018-01-06

## 2018-01-05 RX ORDER — MONTELUKAST SODIUM 10 MG/1
10 TABLET ORAL DAILY
Status: DISCONTINUED | OUTPATIENT
Start: 2018-01-05 | End: 2018-01-16 | Stop reason: HOSPADM

## 2018-01-05 RX ORDER — BUDESONIDE 0.5 MG/2ML
0.5 INHALANT ORAL
Status: DISCONTINUED | OUTPATIENT
Start: 2018-01-05 | End: 2018-01-13

## 2018-01-05 RX ORDER — CITALOPRAM 40 MG/1
40 TABLET ORAL DAILY
Status: DISCONTINUED | OUTPATIENT
Start: 2018-01-05 | End: 2018-01-16 | Stop reason: HOSPADM

## 2018-01-05 RX ORDER — OSELTAMIVIR PHOSPHATE 75 MG/1
75 CAPSULE ORAL EVERY 12 HOURS SCHEDULED
Status: COMPLETED | OUTPATIENT
Start: 2018-01-05 | End: 2018-01-10

## 2018-01-05 RX ORDER — IPRATROPIUM BROMIDE AND ALBUTEROL SULFATE 2.5; .5 MG/3ML; MG/3ML
3 SOLUTION RESPIRATORY (INHALATION)
Status: DISCONTINUED | OUTPATIENT
Start: 2018-01-05 | End: 2018-01-05

## 2018-01-05 RX ORDER — SODIUM CHLORIDE 9 MG/ML
75 INJECTION, SOLUTION INTRAVENOUS CONTINUOUS
Status: DISCONTINUED | OUTPATIENT
Start: 2018-01-05 | End: 2018-01-06

## 2018-01-05 RX ORDER — SODIUM CHLORIDE 0.9 % (FLUSH) 0.9 %
1-10 SYRINGE (ML) INJECTION AS NEEDED
Status: DISCONTINUED | OUTPATIENT
Start: 2018-01-05 | End: 2018-01-16 | Stop reason: HOSPADM

## 2018-01-05 RX ORDER — CETIRIZINE HYDROCHLORIDE 10 MG/1
10 TABLET ORAL DAILY
Status: DISCONTINUED | OUTPATIENT
Start: 2018-01-05 | End: 2018-01-16 | Stop reason: HOSPADM

## 2018-01-05 RX ORDER — IPRATROPIUM BROMIDE AND ALBUTEROL SULFATE 2.5; .5 MG/3ML; MG/3ML
3 SOLUTION RESPIRATORY (INHALATION)
Status: DISCONTINUED | OUTPATIENT
Start: 2018-01-05 | End: 2018-01-12

## 2018-01-05 RX ADMIN — METHYLPREDNISOLONE SODIUM SUCCINATE 60 MG: 125 INJECTION, POWDER, FOR SOLUTION INTRAMUSCULAR; INTRAVENOUS at 20:52

## 2018-01-05 RX ADMIN — ENOXAPARIN SODIUM 40 MG: 40 INJECTION SUBCUTANEOUS at 16:06

## 2018-01-05 RX ADMIN — IPRATROPIUM BROMIDE AND ALBUTEROL SULFATE 3 ML: .5; 3 SOLUTION RESPIRATORY (INHALATION) at 15:49

## 2018-01-05 RX ADMIN — SODIUM CHLORIDE 75 ML/HR: 9 INJECTION, SOLUTION INTRAVENOUS at 14:44

## 2018-01-05 RX ADMIN — BUDESONIDE 0.5 MG: 0.5 INHALANT RESPIRATORY (INHALATION) at 15:49

## 2018-01-05 RX ADMIN — BUDESONIDE 0.5 MG: 0.5 INHALANT RESPIRATORY (INHALATION) at 22:23

## 2018-01-05 RX ADMIN — OSELTAMIVIR PHOSPHATE 75 MG: 75 CAPSULE ORAL at 20:52

## 2018-01-05 RX ADMIN — METHYLPREDNISOLONE SODIUM SUCCINATE 60 MG: 125 INJECTION, POWDER, FOR SOLUTION INTRAMUSCULAR; INTRAVENOUS at 14:43

## 2018-01-05 RX ADMIN — HYDROCODONE POLISTIREX AND CHLORPHENIRAMINE POLISTIREX 2.5 ML: 10; 8 SUSPENSION, EXTENDED RELEASE ORAL at 21:03

## 2018-01-05 RX ADMIN — IPRATROPIUM BROMIDE AND ALBUTEROL SULFATE 3 ML: .5; 3 SOLUTION RESPIRATORY (INHALATION) at 22:23

## 2018-01-05 NOTE — PROGRESS NOTES
Pharmacy consult for Lovenox dosing    Amirah Godoy is a 47 y.o. female 91.1 kg (200 lb 12.8 oz).  Pharmacy consulted to dose for DVT prophylaxis per Dr. Putnam's request.  Pharmacy dosing since: 1/5     Estimated Creatinine Clearance: 107.1 mL/min (by C-G formula based on Cr of 0.75).  Creatinine   Date Value Ref Range Status   01/05/2018 0.75 0.57 - 1.00 mg/dL Final     Platelets   Date Value Ref Range Status   01/05/2018 310 140 - 500 10*3/mm3 Final     Lab Results   Component Value Date    HGB 13.7 01/05/2018    HGB 14.7 10/19/2017    HGB 13.9 03/10/2017     No results found for: INR    PLAN:  1. Will initiate Lovenox 40 mg subQ  Q24H. Thank you for the consult, pharmacy will continue to monitor and adjust as needed.      Arely Aguiar, Pharm.D.  01/05/18 2:28 PM

## 2018-01-05 NOTE — CONSULTS
Patient Identification:  Amirah Godoy  47 y.o.  female  1970  [id number]          LOS 0    Requesting physician: Dr Putnam    Reason for Consultation:  Asthma exacerbation    History of Present Illness:   Thank you for this pulmonary consult.  47-year-old female with a history of asthma ever since childhood.  She has not been seen by asthma specialist previously and is cared for by her primary care doctor.  She is on Symbicort and Spiriva in addition to short-acting beta agonist as needed.  She's been needing her rescue inhaler much more frequently lately.  She has had for exacerbations of her asthma in the last 9 months.  She has required several rounds of prednisone.  She's had several unscheduled doctor visits.  She has not required previous admission to the hospital and has not been on the ventilator.  She complains of severe shortness of breath with wheezing and cough.  Cough is nonproductive.  She has chest tightness associated with this.  Dyspnea is worse with exertion.  She just recently completed a round of Augmentin.  She had some flulike symptoms about a week ago but this has improved.  Her  was also ill with similar symptoms.  She was admitted with severe exacerbation of severe persistent asthma.  We have been consult it to help with management of her asthma exacerbation.    Past Medical History:  Past Medical History:   Diagnosis Date   • Allergic    • Asthma    • Depression    • History of atrial fibrillation     NO CURRENT MEDICATION   • PVC (premature ventricular contraction)    • Seasonal allergies    • Sinus infection     STARTED ON ANTIBIOTICS ON 10/30/17   • SVT (supraventricular tachycardia)     NO CURRENT MEDICATIONS       Past Surgical History:  Past Surgical History:   Procedure Laterality Date   • ASD AND VSD REPAIR     • CARDIAC ABLATION      DR. NADEEM SCHUMACHER --   •  SECTION  2000   • DIAGNOSTIC LAPAROSCOPY Right 11/3/2017     Procedure: LAPAROSCOPIC RIGHT OOPHERECTOMY ;  Surgeon: Claudine Barron MD;  Location: [facility];  Service:    • OVARIAN CYST REMOVAL  11/2017   • WISDOM TOOTH EXTRACTION          Home Meds:  Prescriptions Prior to Admission   Medication Sig Dispense Refill Last Dose   • cetirizine (ZyrTEC) 10 MG tablet Take 10 mg by mouth daily.   11/2/2017 at 0800   • citalopram (CeleXA) 20 MG tablet Take 40 mg by mouth Daily.   11/2/2017 at 0800   • MINASTRIN 24 FE 1-20 MG-MCG(24) chewable tablet Chew 1 tablet Every Night.   11/2/2017 at 2100   • montelukast (SINGULAIR) 10 MG tablet TAKE ONE TABLET BY MOUTH DAILY 30 tablet 4 11/2/2017 at 0800   • tiotropium (SPIRIVA HANDIHALER) 18 MCG per inhalation capsule Place 1 capsule into inhaler and inhale Daily.            Allergies:  Allergies   Allergen Reactions   • Demerol [Meperidine] Hives   • Sulfa Antibiotics GI Intolerance       Social History:   Social History     Social History   • Marital status:      Spouse name: N/A   • Number of children: N/A   • Years of education: N/A     Occupational History   • Not on file.     Social History Main Topics   • Smoking status: Never Smoker   • Smokeless tobacco: Never Used   • Alcohol use No   • Drug use: No   • Sexual activity: Defer     Other Topics Concern   • Not on file     Social History Narrative       Family History:  Family History   Problem Relation Age of Onset   • Depression Mother    • Diabetes Mother    • Heart disease Mother      +of mi at 68   • Hyperlipidemia Mother    • Hypertension Mother    • Heart disease Father      ptca x 5 and mi at 59   • Hyperlipidemia Father    • Hypertension Father    • Kidney disease Brother    • Birth defects Maternal Grandmother    • Heart disease Maternal Grandmother      multiple mi   • Birth defects Maternal Grandfather    • Heart disease Maternal Grandfather      + mi at 68   • Colon cancer Maternal Grandfather    • Heart disease Paternal Grandmother      weak heart   • Asthma  "Paternal Grandmother    • COPD Paternal Grandmother    • Kidney failure Paternal Grandfather    • Malig Hyperthermia Neg Hx        Review of Systems:  Denies fevers or chills  Denies nausea or vomiting  No new vision or hearing changes  No chest pain  Positive shortness of breath with wheezing and cough.  Positive chest tightness  No diarrhea, hematemesis or hematochezia, no dysuria or frequency  No musculoskeletal complaints  No heat or cold intolerance  No skin rashes  No dizziness or confusion.  No seizure activity  No new anxiety or depression  12 system review of systems performed and all else negative    Objective:    PHYSICAL EXAM:    /96 (BP Location: Right arm, Patient Position: Lying)  Pulse 96  Temp 97.9 °F (36.6 °C) (Oral)   Resp 16  Ht 172.7 cm (68\")  Wt 91.1 kg (200 lb 12.8 oz)  LMP  (Approximate)  SpO2 98%  BMI 30.53 kg/m2 Body mass index is 30.53 kg/(m^2). 98% 91.1 kg (200 lb 12.8 oz)    GENERAL APPEARANCE:   · Well developed  · Well nourished  · No acute distress   EYES:    · PERRL                                                                           · Conjunctiva normal  · Sclera non-icteric.  HENT:   · Atraumatic, normocephalic  · External ears and nose normal  · Moist mucus membranes and no ulcers  NECK:  · Thyroid not enlarged  · Trachea midline   RESPIRATORY:    · Mildly labored breathing   · Bilateral breath sounds  · No rales.  Diffuse bilateral wheezing  · No dullness  CARDIOVASCULAR:    · RRR  · Normal S1, S2  · No murmur  · Lower extremity edema: none    GI:   · Bowel sounds normal  · Abdomen soft , non-distended, non-tender  · No abdominal masses.    MUSCULOSKELETAL:  · Normal movement of extremities  · No tenderness, no deformities  · No clubbing or cyanosis   Skin:    · No visible rashes  · No palpable nodules  PSYCHIATRIC:  · Speech and behavior appropriate  · Normal mood and affect  · Oriented to person, place and time  NEUROLOGIC:      Lab Review:      Lab Results "   Component Value Date    WBC 8.79 01/05/2018    HGB 13.7 01/05/2018    HCT 40.6 01/05/2018    MCV 92.9 01/05/2018     01/05/2018            Lab Results   Component Value Date    CREATININE 0.75 01/05/2018    BUN 11 01/05/2018     01/05/2018    K 3.9 01/05/2018     01/05/2018    CO2 27.2 01/05/2018        Lab Results   Component Value Date    TROPONINT <0.010 07/15/2016                Imaging reviewed  chest X-ray: Shows signs of hyperinflation.  No infiltrates       Assessment:  Severe persistent asthma with acute exacerbation  Recent Viral-like illness    Recommendations:  Check respiratory viral panel.  Nebulized bronchodilators and nebulized inhaled steroids.  IV systemic steroids for asthma exacerbation.  Would consider evaluation for potential benefit with Xolair will check IgE level.  Does not have significant eosinophil count on CBC.  With persistent severe asthma symptoms and recurrent exacerbations requiring multiple rounds of steroids in the last several months if does not meet criteria for Xolair would consider evaluation for bronchial thermoplasty.  I will follow up with pt in office after discharge as well.      Thank you for allowing me to participate in the care of this patient.  I will continue to follow along with you.      Jorge L Whitehead MD  [facility], [facility]  Pulmonary and Critical Care Medicine    1/5/2018  4:14 PM

## 2018-01-05 NOTE — H&P
[facility]    Patient Identification:  Name: Amirah Godoy  Age: 47 y.o.  Sex: female  :  1970  MRN: [id number]         Primary Care Physician: Nadeem Kwan Jr., MD    Chief complaint:  Asthma exacerbation    Subjective   Patient is a 47 y.o. female has been treated off and on for asthma flareup for the past month or so. She usually gets symptoms every year around this time. This time she has had a cough, nonproductive, shortness of breath, wheezing. She is very weak and has no energy. She is not getting better and saw her primary care physician and was directly admitted. She just completed a round of Augmentin. Received steroid injection a couple days ago. She states breathing treatments are not helping. About a week or so ago she had flulike symptoms. Aching off. Her  also was ill with the same illness but is improved and better however she is not. Her appetite is okay and she was tolerating by mouth. She has not had any weight loss. She states she may have gained some weight from steroids. She has had asthma since a child. She has seasonal allergies. She had a right ovarian cyst removed a couple months ago and she thinks she was starting to get sick around that time.    Past Medical History:   Diagnosis Date   • Allergic    • Asthma    • Depression    • History of atrial fibrillation     NO CURRENT MEDICATION   • PVC (premature ventricular contraction)    • Seasonal allergies    • Sinus infection     STARTED ON ANTIBIOTICS ON 10/30/17   • SVT (supraventricular tachycardia)     NO CURRENT MEDICATIONS     Past Surgical History:   Procedure Laterality Date   • ASD AND VSD REPAIR     • CARDIAC ABLATION      DR. NADEEM SCHUMACHER --   •  SECTION  2000   • DIAGNOSTIC LAPAROSCOPY Right 11/3/2017    Procedure: LAPAROSCOPIC RIGHT OOPHERECTOMY ;  Surgeon: Claudine Barron MD;  Location: [facility];  Service:    •  OVARIAN CYST REMOVAL  11/2017   • WISDOM TOOTH EXTRACTION       Current medications reviewed.    Family History   Problem Relation Age of Onset   • Depression Mother    • Diabetes Mother    • Heart disease Mother      +of mi at 68   • Hyperlipidemia Mother    • Hypertension Mother    • Heart disease Father      ptca x 5 and mi at 59   • Hyperlipidemia Father    • Hypertension Father    • Kidney disease Brother    • Birth defects Maternal Grandmother    • Heart disease Maternal Grandmother      multiple mi   • Birth defects Maternal Grandfather    • Heart disease Maternal Grandfather      + mi at 68   • Colon cancer Maternal Grandfather    • Heart disease Paternal Grandmother      weak heart   • Asthma Paternal Grandmother    • COPD Paternal Grandmother    • Kidney failure Paternal Grandfather    • Malig Hyperthermia Neg Hx      Social History   Substance Use Topics   • Smoking status: Never Smoker   • Smokeless tobacco: Never Used   • Alcohol use No     Review of Systems   Constitutional: Positive for activity change. Negative for appetite change and unexpected weight change.   HENT: Negative.    Eyes: Negative.    Respiratory: Positive for cough, shortness of breath and wheezing.    Cardiovascular: Negative.    Gastrointestinal: Negative.  Negative for diarrhea, nausea and vomiting.   Endocrine: Negative.    Genitourinary: Negative.    Musculoskeletal: Negative.    Skin: Negative.    Allergic/Immunologic: Negative.    Neurological: Positive for weakness (generalized).   Hematological: Negative.    Psychiatric/Behavioral: Negative.       Objective      Vital Signs  Temp:  [97.9 °F (36.6 °C)] 97.9 °F (36.6 °C)  Heart Rate:  [96] 96  Resp:  [16] 16  BP: (137)/(96) 137/96  Body mass index is 30.53 kg/(m^2).    Physical Exam   Constitutional: She is oriented to person, place, and time. She appears well-developed and well-nourished.  Non-toxic appearance. She does not have a sickly appearance. She does not appear ill. No  distress.   HENT:   Head: Normocephalic and atraumatic.   Eyes: Conjunctivae and EOM are normal.   Neck: Neck supple. No tracheal deviation present.   Cardiovascular: Normal rate, regular rhythm and intact distal pulses.    Pulses:       Radial pulses are 2+ on the right side, and 2+ on the left side.        Dorsalis pedis pulses are 2+ on the right side, and 2+ on the left side.   Pulmonary/Chest: Effort normal. No respiratory distress. She has wheezes. She has rhonchi. She has no rales.   Abdominal: Soft. She exhibits no distension. There is no tenderness. There is no rebound and no guarding.   Musculoskeletal: She exhibits no edema.   Lymphadenopathy:     She has no cervical adenopathy.   Neurological: She is alert and oriented to person, place, and time.   Skin: Skin is warm and dry.   Psychiatric: She has a normal mood and affect. Her behavior is normal.     Assessment/Plan   Active Hospital Problems (** Indicates Principal Problem)    Diagnosis Date Noted   • **Asthma with exacerbation [J45.901] 01/05/2018   • Cough [R05] 01/05/2018   • Weakness [R53.1] 01/05/2018   • History of atrial fibrillation [Z86.79] 01/01/2011      Resolved Hospital Problems    Diagnosis Date Noted Date Resolved   No resolved problems to display.     · 47 y.o. female with asthma exacerbation  · IV steroids, nebulized breathing treatments  · Check respiratory panel, chest x-ray, labs  · She just completed Augmentin  · Discussed with patient's primary care physician Dr. Kwan, will obtain pulmonology evaluation  · Lovenox for DVT prophylaxis  · PT  ·  is healthcare surrogate  · Discussed findings and recommendations with patient/. They were given the opportunity to ask questions. Further management as patient's clinical course evolves.    Sam Putnam MD  01/05/18  12:48 PM

## 2018-01-05 NOTE — PLAN OF CARE
Problem: Patient Care Overview (Adult)  Goal: Plan of Care Review  Outcome: Ongoing (interventions implemented as appropriate)   01/05/18 1230   Coping/Psychosocial Response Interventions   Plan Of Care Reviewed With patient   Patient Care Overview   Progress no change   Outcome Evaluation   Outcome Summary/Follow up Plan direct admit. dr hung paged for orders. Patient alert and orientated, on room air.        Problem: Asthma (Adult)  Goal: Signs and Symptoms of Listed Potential Problems Will be Absent or Manageable (Asthma)  Outcome: Ongoing (interventions implemented as appropriate)

## 2018-01-06 LAB
ANION GAP SERPL CALCULATED.3IONS-SCNC: 11 MMOL/L
BUN BLD-MCNC: 11 MG/DL (ref 6–20)
BUN/CREAT SERPL: 17.2 (ref 7–25)
CALCIUM SPEC-SCNC: 8.3 MG/DL (ref 8.6–10.5)
CHLORIDE SERPL-SCNC: 100 MMOL/L (ref 98–107)
CO2 SERPL-SCNC: 24 MMOL/L (ref 22–29)
CREAT BLD-MCNC: 0.64 MG/DL (ref 0.57–1)
DEPRECATED RDW RBC AUTO: 42.5 FL (ref 37–54)
ERYTHROCYTE [DISTWIDTH] IN BLOOD BY AUTOMATED COUNT: 12.5 % (ref 11.7–13)
GFR SERPL CREATININE-BSD FRML MDRD: 99 ML/MIN/1.73
GLUCOSE BLD-MCNC: 167 MG/DL (ref 65–99)
HBA1C MFR BLD: 5.1 % (ref 4.8–5.6)
HCT VFR BLD AUTO: 38.7 % (ref 35.6–45.5)
HGB BLD-MCNC: 12.8 G/DL (ref 11.9–15.5)
MCH RBC QN AUTO: 31.1 PG (ref 26.9–32)
MCHC RBC AUTO-ENTMCNC: 33.1 G/DL (ref 32.4–36.3)
MCV RBC AUTO: 94.2 FL (ref 80.5–98.2)
PLATELET # BLD AUTO: 296 10*3/MM3 (ref 140–500)
PMV BLD AUTO: 9.6 FL (ref 6–12)
POTASSIUM BLD-SCNC: 4 MMOL/L (ref 3.5–5.2)
RBC # BLD AUTO: 4.11 10*6/MM3 (ref 3.9–5.2)
SODIUM BLD-SCNC: 135 MMOL/L (ref 136–145)
WBC NRBC COR # BLD: 11.95 10*3/MM3 (ref 4.5–10.7)

## 2018-01-06 PROCEDURE — 80048 BASIC METABOLIC PNL TOTAL CA: CPT | Performed by: HOSPITALIST

## 2018-01-06 PROCEDURE — 83036 HEMOGLOBIN GLYCOSYLATED A1C: CPT | Performed by: HOSPITALIST

## 2018-01-06 PROCEDURE — 94799 UNLISTED PULMONARY SVC/PX: CPT

## 2018-01-06 PROCEDURE — 85027 COMPLETE CBC AUTOMATED: CPT | Performed by: HOSPITALIST

## 2018-01-06 PROCEDURE — 25010000002 METHYLPREDNISOLONE PER 125 MG: Performed by: HOSPITALIST

## 2018-01-06 PROCEDURE — 25010000002 ENOXAPARIN PER 10 MG: Performed by: HOSPITALIST

## 2018-01-06 RX ORDER — METHYLPREDNISOLONE SODIUM SUCCINATE 125 MG/2ML
60 INJECTION, POWDER, LYOPHILIZED, FOR SOLUTION INTRAMUSCULAR; INTRAVENOUS EVERY 12 HOURS
Status: DISCONTINUED | OUTPATIENT
Start: 2018-01-07 | End: 2018-01-09

## 2018-01-06 RX ORDER — ZOLPIDEM TARTRATE 5 MG/1
5 TABLET ORAL NIGHTLY PRN
Status: DISPENSED | OUTPATIENT
Start: 2018-01-06 | End: 2018-01-16

## 2018-01-06 RX ADMIN — OSELTAMIVIR PHOSPHATE 75 MG: 75 CAPSULE ORAL at 09:40

## 2018-01-06 RX ADMIN — ENOXAPARIN SODIUM 40 MG: 40 INJECTION SUBCUTANEOUS at 17:39

## 2018-01-06 RX ADMIN — CETIRIZINE HYDROCHLORIDE 10 MG: 10 TABLET, FILM COATED ORAL at 09:40

## 2018-01-06 RX ADMIN — MONTELUKAST 10 MG: 10 TABLET, FILM COATED ORAL at 09:41

## 2018-01-06 RX ADMIN — IPRATROPIUM BROMIDE AND ALBUTEROL SULFATE 3 ML: .5; 3 SOLUTION RESPIRATORY (INHALATION) at 19:05

## 2018-01-06 RX ADMIN — IPRATROPIUM BROMIDE AND ALBUTEROL SULFATE 3 ML: .5; 3 SOLUTION RESPIRATORY (INHALATION) at 08:35

## 2018-01-06 RX ADMIN — METHYLPREDNISOLONE SODIUM SUCCINATE 60 MG: 125 INJECTION, POWDER, FOR SOLUTION INTRAMUSCULAR; INTRAVENOUS at 06:02

## 2018-01-06 RX ADMIN — BUDESONIDE 0.5 MG: 0.5 INHALANT RESPIRATORY (INHALATION) at 19:06

## 2018-01-06 RX ADMIN — METHYLPREDNISOLONE SODIUM SUCCINATE 60 MG: 125 INJECTION, POWDER, FOR SOLUTION INTRAMUSCULAR; INTRAVENOUS at 13:48

## 2018-01-06 RX ADMIN — IPRATROPIUM BROMIDE AND ALBUTEROL SULFATE 3 ML: .5; 3 SOLUTION RESPIRATORY (INHALATION) at 15:57

## 2018-01-06 RX ADMIN — IPRATROPIUM BROMIDE AND ALBUTEROL SULFATE 3 ML: .5; 3 SOLUTION RESPIRATORY (INHALATION) at 23:06

## 2018-01-06 RX ADMIN — CITALOPRAM 40 MG: 40 TABLET, FILM COATED ORAL at 09:40

## 2018-01-06 RX ADMIN — OSELTAMIVIR PHOSPHATE 75 MG: 75 CAPSULE ORAL at 21:18

## 2018-01-06 RX ADMIN — IPRATROPIUM BROMIDE AND ALBUTEROL SULFATE 3 ML: .5; 3 SOLUTION RESPIRATORY (INHALATION) at 11:06

## 2018-01-06 RX ADMIN — BUDESONIDE 0.5 MG: 0.5 INHALANT RESPIRATORY (INHALATION) at 08:34

## 2018-01-06 RX ADMIN — SODIUM CHLORIDE 75 ML/HR: 9 INJECTION, SOLUTION INTRAVENOUS at 15:55

## 2018-01-06 RX ADMIN — ZOLPIDEM TARTRATE 5 MG: 5 TABLET ORAL at 21:18

## 2018-01-06 RX ADMIN — IPRATROPIUM BROMIDE AND ALBUTEROL SULFATE 3 ML: .5; 3 SOLUTION RESPIRATORY (INHALATION) at 03:05

## 2018-01-06 RX ADMIN — SODIUM CHLORIDE 75 ML/HR: 9 INJECTION, SOLUTION INTRAVENOUS at 02:35

## 2018-01-06 NOTE — PLAN OF CARE
Problem: Patient Care Overview (Adult)  Goal: Plan of Care Review  Outcome: Ongoing (interventions implemented as appropriate)   01/06/18 0506   Coping/Psychosocial Response Interventions   Plan Of Care Reviewed With patient   Patient Care Overview   Progress no change   Outcome Evaluation   Outcome Summary/Follow up Plan VSS. Patient placed on 1L oxygen via nasal cannula due to destating. Patient restless with cough throughout the night. Tussionex given as ordered. Tamiflu started due to positive influenza result. IV fluids going as ordered. Will continue to monitor.      Goal: Adult Individualization and Mutuality  Outcome: Ongoing (interventions implemented as appropriate)    Goal: Discharge Needs Assessment  Outcome: Ongoing (interventions implemented as appropriate)      Problem: Asthma (Adult)  Goal: Signs and Symptoms of Listed Potential Problems Will be Absent or Manageable (Asthma)  Outcome: Ongoing (interventions implemented as appropriate)      Problem: Pain, Acute (Adult)  Goal: Identify Related Risk Factors and Signs and Symptoms  Outcome: Ongoing (interventions implemented as appropriate)    Goal: Acceptable Pain Control/Comfort Level  Outcome: Ongoing (interventions implemented as appropriate)

## 2018-01-06 NOTE — SIGNIFICANT NOTE
01/06/18 1359   Rehab Treatment   Discipline physical therapist   Rehab Evaluation   Evaluation Not Performed patient/family declined evaluation  (Pt independent with all activities, does not require PT)

## 2018-01-06 NOTE — PROGRESS NOTES
[facility]     LOS: 1 day     Name: Amirah Godoy  Age: 47 y.o.  Sex: female  :  1970  MRN: [id number]         Primary Care Physician: Ruben Kwan Jr., MD    Diet Regular    Subjective   Feels a little better. Still with cough, non-productive. Trouble sleeping on steroids.    Objective   Temp:  [98.1 °F (36.7 °C)-99.8 °F (37.7 °C)] 99.8 °F (37.7 °C)  Heart Rate:  [] 98  Resp:  [16-18] 16  BP: (127-133)/(69-81) 129/77  SpO2:  [90 %-97 %] 94 %  on  Flow (L/min):  [1] 1;   O2 Device: room air  Body mass index is 31.02 kg/(m^2).    Physical Exam   Constitutional: She is oriented to person, place, and time. No distress.   Cardiovascular: Normal rate and regular rhythm.    Pulmonary/Chest: Effort normal. No respiratory distress. She has decreased breath sounds. She has wheezes.   Abdominal: Soft. There is no tenderness.   Neurological: She is alert and oriented to person, place, and time.     Reviewed medications and new clinical results    budesonide 0.5 mg Nebulization BID - RT   cetirizine 10 mg Oral Daily   citalopram 40 mg Oral Daily   enoxaparin 40 mg Subcutaneous Q24H   ipratropium-albuterol 3 mL Nebulization Q4H - RT   methylPREDNISolone sodium succinate 60 mg Intravenous Q8H   montelukast 10 mg Oral Daily   oseltamivir 75 mg Oral Q12H     Pharmacy to Dose enoxaparin (LOVENOX)     sodium chloride 75 mL/hr Last Rate: 75 mL/hr (18 1555)     Results from last 7 days  Lab Units 18  0636 18  1317   WBC 10*3/mm3 11.95* 8.79   HEMOGLOBIN g/dL 12.8 13.7   PLATELETS 10*3/mm3 296 310     Results from last 7 days  Lab Units 18  0636 18  1317   SODIUM mmol/L 135* 140   POTASSIUM mmol/L 4.0 3.9   CHLORIDE mmol/L 100 104   CO2 mmol/L 24.0 27.2   BUN mg/dL 11 11   CREATININE mg/dL 0.64 0.75   CALCIUM mg/dL 8.3* 8.6   GLUCOSE mg/dL 167* 99     Lab Results   Component Value Date    ANIONGAP 11.0 2018     Estimated  Creatinine Clearance: 126.4 mL/min (by C-G formula based on Cr of 0.64).    Assessment/Plan   Active Hospital Problems (** Indicates Principal Problem)    Diagnosis Date Noted   • **Asthma with exacerbation [J45.901] 01/05/2018   • Cough [R05] 01/05/2018   • Weakness [R53.1] 01/05/2018   • History of atrial fibrillation [Z86.79] 01/01/2011      Resolved Hospital Problems    Diagnosis Date Noted Date Resolved   No resolved problems to display.     · Appreciate pulm  · Supportive care  · Steroids, nebs  · ambien for sleep  · Hyperglycemia, leukocytosis from steroids    Sam Putnam MD   01/06/18  4:44 PM

## 2018-01-06 NOTE — PLAN OF CARE
Problem: Patient Care Overview (Adult)  Goal: Plan of Care Review  Outcome: Ongoing (interventions implemented as appropriate)   01/06/18 2407   Coping/Psychosocial Response Interventions   Plan Of Care Reviewed With patient   Patient Care Overview   Progress no change   Outcome Evaluation   Outcome Summary/Follow up Plan VSS, on RA most of the day. IVFs d/c'd. continue to monitor.        Problem: Asthma (Adult)  Goal: Signs and Symptoms of Listed Potential Problems Will be Absent or Manageable (Asthma)  Outcome: Ongoing (interventions implemented as appropriate)      Problem: Pain, Acute (Adult)  Goal: Identify Related Risk Factors and Signs and Symptoms  Outcome: Outcome(s) achieved Date Met: 01/06/18    Goal: Acceptable Pain Control/Comfort Level  Outcome: Ongoing (interventions implemented as appropriate)

## 2018-01-07 ENCOUNTER — APPOINTMENT (OUTPATIENT)
Dept: CT IMAGING | Facility: HOSPITAL | Age: 48
End: 2018-01-07

## 2018-01-07 PROBLEM — R00.0 TACHYCARDIA: Status: ACTIVE | Noted: 2018-01-07

## 2018-01-07 LAB
TOTAL IGE SMQN RAST: 213 IU/ML (ref 0–100)
TROPONIN T SERPL-MCNC: <0.01 NG/ML (ref 0–0.03)

## 2018-01-07 PROCEDURE — 94799 UNLISTED PULMONARY SVC/PX: CPT

## 2018-01-07 PROCEDURE — 71275 CT ANGIOGRAPHY CHEST: CPT

## 2018-01-07 PROCEDURE — 25010000002 METHYLPREDNISOLONE PER 40 MG: Performed by: HOSPITALIST

## 2018-01-07 PROCEDURE — 0 IOPAMIDOL PER 1 ML: Performed by: HOSPITALIST

## 2018-01-07 PROCEDURE — 25010000002 METHYLPREDNISOLONE PER 125 MG: Performed by: INTERNAL MEDICINE

## 2018-01-07 PROCEDURE — 93010 ELECTROCARDIOGRAM REPORT: CPT | Performed by: INTERNAL MEDICINE

## 2018-01-07 PROCEDURE — 25010000002 ENOXAPARIN PER 10 MG: Performed by: HOSPITALIST

## 2018-01-07 PROCEDURE — 93005 ELECTROCARDIOGRAM TRACING: CPT | Performed by: HOSPITALIST

## 2018-01-07 PROCEDURE — 84484 ASSAY OF TROPONIN QUANT: CPT | Performed by: HOSPITALIST

## 2018-01-07 RX ORDER — IPRATROPIUM BROMIDE AND ALBUTEROL SULFATE 2.5; .5 MG/3ML; MG/3ML
3 SOLUTION RESPIRATORY (INHALATION)
Status: DISCONTINUED | OUTPATIENT
Start: 2018-01-07 | End: 2018-01-16 | Stop reason: HOSPADM

## 2018-01-07 RX ORDER — METHYLPREDNISOLONE SODIUM SUCCINATE 40 MG/ML
40 INJECTION, POWDER, LYOPHILIZED, FOR SOLUTION INTRAMUSCULAR; INTRAVENOUS ONCE
Status: DISCONTINUED | OUTPATIENT
Start: 2018-01-07 | End: 2018-01-07

## 2018-01-07 RX ORDER — ALPRAZOLAM 0.25 MG/1
0.25 TABLET ORAL 2 TIMES DAILY PRN
Status: DISCONTINUED | OUTPATIENT
Start: 2018-01-07 | End: 2018-01-16 | Stop reason: HOSPADM

## 2018-01-07 RX ORDER — METHYLPREDNISOLONE SODIUM SUCCINATE 40 MG/ML
20 INJECTION, POWDER, LYOPHILIZED, FOR SOLUTION INTRAMUSCULAR; INTRAVENOUS ONCE
Status: COMPLETED | OUTPATIENT
Start: 2018-01-07 | End: 2018-01-07

## 2018-01-07 RX ORDER — SODIUM CHLORIDE FOR INHALATION 7 %
4 VIAL, NEBULIZER (ML) INHALATION
Status: DISCONTINUED | OUTPATIENT
Start: 2018-01-07 | End: 2018-01-13

## 2018-01-07 RX ADMIN — IPRATROPIUM BROMIDE AND ALBUTEROL SULFATE 3 ML: .5; 3 SOLUTION RESPIRATORY (INHALATION) at 12:34

## 2018-01-07 RX ADMIN — METHYLPREDNISOLONE SODIUM SUCCINATE 60 MG: 125 INJECTION, POWDER, FOR SOLUTION INTRAMUSCULAR; INTRAVENOUS at 01:06

## 2018-01-07 RX ADMIN — SODIUM CHLORIDE SOLN NEBU 7% 4 ML: 7 NEBU SOLN at 20:41

## 2018-01-07 RX ADMIN — METHYLPREDNISOLONE SODIUM SUCCINATE 20 MG: 40 INJECTION, POWDER, FOR SOLUTION INTRAMUSCULAR; INTRAVENOUS at 14:39

## 2018-01-07 RX ADMIN — BUDESONIDE 0.5 MG: 0.5 INHALANT RESPIRATORY (INHALATION) at 20:41

## 2018-01-07 RX ADMIN — ENOXAPARIN SODIUM 40 MG: 40 INJECTION SUBCUTANEOUS at 14:40

## 2018-01-07 RX ADMIN — IOPAMIDOL 90 ML: 755 INJECTION, SOLUTION INTRAVENOUS at 15:51

## 2018-01-07 RX ADMIN — ALPRAZOLAM 0.25 MG: 0.25 TABLET ORAL at 15:35

## 2018-01-07 RX ADMIN — IPRATROPIUM BROMIDE AND ALBUTEROL SULFATE 3 ML: .5; 3 SOLUTION RESPIRATORY (INHALATION) at 23:42

## 2018-01-07 RX ADMIN — OSELTAMIVIR PHOSPHATE 75 MG: 75 CAPSULE ORAL at 08:47

## 2018-01-07 RX ADMIN — IPRATROPIUM BROMIDE AND ALBUTEROL SULFATE 3 ML: .5; 3 SOLUTION RESPIRATORY (INHALATION) at 20:41

## 2018-01-07 RX ADMIN — IPRATROPIUM BROMIDE AND ALBUTEROL SULFATE 3 ML: .5; 3 SOLUTION RESPIRATORY (INHALATION) at 08:31

## 2018-01-07 RX ADMIN — ZOLPIDEM TARTRATE 5 MG: 5 TABLET ORAL at 20:51

## 2018-01-07 RX ADMIN — IPRATROPIUM BROMIDE AND ALBUTEROL SULFATE 3 ML: .5; 3 SOLUTION RESPIRATORY (INHALATION) at 15:17

## 2018-01-07 RX ADMIN — IPRATROPIUM BROMIDE AND ALBUTEROL SULFATE 3 ML: .5; 3 SOLUTION RESPIRATORY (INHALATION) at 03:12

## 2018-01-07 RX ADMIN — CITALOPRAM 40 MG: 40 TABLET, FILM COATED ORAL at 08:47

## 2018-01-07 RX ADMIN — METHYLPREDNISOLONE SODIUM SUCCINATE 60 MG: 125 INJECTION, POWDER, FOR SOLUTION INTRAMUSCULAR; INTRAVENOUS at 13:33

## 2018-01-07 RX ADMIN — MONTELUKAST 10 MG: 10 TABLET, FILM COATED ORAL at 08:47

## 2018-01-07 RX ADMIN — OSELTAMIVIR PHOSPHATE 75 MG: 75 CAPSULE ORAL at 20:51

## 2018-01-07 RX ADMIN — HYDROCODONE POLISTIREX AND CHLORPHENIRAMINE POLISTIREX 5 ML: 10; 8 SUSPENSION, EXTENDED RELEASE ORAL at 08:47

## 2018-01-07 RX ADMIN — CETIRIZINE HYDROCHLORIDE 10 MG: 10 TABLET, FILM COATED ORAL at 08:47

## 2018-01-07 RX ADMIN — BUDESONIDE 0.5 MG: 0.5 INHALANT RESPIRATORY (INHALATION) at 08:31

## 2018-01-07 NOTE — PLAN OF CARE
"Problem: Patient Care Overview (Adult)  Goal: Plan of Care Review  Outcome: Ongoing (interventions implemented as appropriate)   01/07/18 8570   Coping/Psychosocial Response Interventions   Plan Of Care Reviewed With patient   Patient Care Overview   Progress no change   Outcome Evaluation   Outcome Summary/Follow up Plan HR 100s-120s, up to 130s-140s intermittently. EKG normal, will check troponin Q6h x2. likely r/t resp issues. pt c/o SOA, \"tight\", \"not being able to get anything up\". CTA negative for PE, pl eff, PTX. extra 20mg IV solumedrol given. pt states that helped. RT tx's also added. Xanax BID PRN, 1x dose given prior to CTA. continue to monitor closely.          "

## 2018-01-07 NOTE — PROGRESS NOTES
"  PROGRESS NOTE   LOS: 1 day   Patient Care Team:  Ruben Kwan Jr., MD as PCP - General (Family Medicine)    Chief Complaint: History of asthma with worsening symptom     Interval History: Admitted with asthma exacerbation, turn out to have positive influenza infection.  Patient is on nebulized medication he is on IV steroid he is on Tamiflu and is on oxygen with frequent bronchodilator therapy.  No suspected fever with earlier low-grade temperatures.  Compensating on room air at this point    REVIEW OF SYSTEMS:   CARDIOVASCULAR: No chest pain, chest pressure or chest discomfort. No palpitations or edema.   RESPIRATORY: Wheezing and chest tightness and shortness of breath but able to come off the oxygen.   GASTROINTESTINAL: No anorexia, nausea, vomiting or diarrhea. No abdominal pain or blood.   HEMATOLOGIC: No bleeding or bruising.     Ventilator/Non-Invasive Ventilation Settings     None            Vital Signs  Temp:  [98.1 °F (36.7 °C)-99.8 °F (37.7 °C)] 98.6 °F (37 °C)  Heart Rate:  [] 98  Resp:  [16-18] 16  BP: (127-131)/(69-81) 129/77  SpO2:  [90 %-97 %] 94 %  on  Flow (L/min):  [1] 1 O2 Device: room air    Intake/Output Summary (Last 24 hours) at 01/06/18 1900  Last data filed at 01/06/18 1739   Gross per 24 hour   Intake             1950 ml   Output                0 ml   Net             1950 ml     Flowsheet Rows         First Filed Value    Admission Height  172.7 cm (68\") Documented at 01/05/2018 1215    Admission Weight  91.1 kg (200 lb 12.8 oz) Documented at 01/05/2018 1215        Body mass index is 31.02 kg/(m^2).  Last 3 weights    01/05/18  1215 01/06/18  0500   Weight: 91.1 kg (200 lb 12.8 oz) 92.5 kg (204 lb)       Physical Exam:  GEN:  No acute distress, alert, cooperative, well developed   EYES:   Sclera clear. No icterus. PERRL. Normal EOM  ENT:   External ears/nose normal, no oral lesions, no thrush, mucous membranes moist  NECK:  Supple, midline trachea, no JVD  LUNGS: Normal chest on " inspection, Significant wheezing with prolongation of the expiratory phase and loud rhonchi with no crackles.   CV:  Regular rhythm and rate. Normal S1/S2. No murmurs, gallops, or rubs noted.  ABD:  Soft, non-tender and non-distended. Normal bowel sounds. No guarding  EXT:  Moves all extremities well. No cyanosis. No redness. No edema.   Skin: dry, intact, no bleeding    Results Review:        Results from last 7 days  Lab Units 01/06/18  0636 01/05/18  1317   SODIUM mmol/L 135* 140   POTASSIUM mmol/L 4.0 3.9   CHLORIDE mmol/L 100 104   CO2 mmol/L 24.0 27.2   BUN mg/dL 11 11   CREATININE mg/dL 0.64 0.75   CALCIUM mg/dL 8.3* 8.6   AST (SGOT) U/L  --  14   ALT (SGPT) U/L  --  13   ANION GAP mmol/L 11.0 8.8   ALBUMIN g/dL  --  3.70                   Results from last 7 days  Lab Units 01/06/18  0636 01/05/18  1317   WBC 10*3/mm3 11.95* 8.79   HEMOGLOBIN g/dL 12.8 13.7   HEMATOCRIT % 38.7 40.6   PLATELETS 10*3/mm3 296 310   MCV fL 94.2 92.9   NEUTROPHIL % %  --  69.1   LYMPHOCYTE % %  --  22.9   MONOCYTES % %  --  5.5   EOSINOPHIL % %  --  1.4   BASOPHIL % %  --  0.3   IMM GRAN % %  --  0.8*                       Results from last 7 days  Lab Units 01/06/18  1724   HEMOGLOBIN A1C % 5.10     No results found for: POCGLU    Results from last 7 days  Lab Units 01/05/18  1317   LACTATE mmol/L 1.3               Results from last 7 days  Lab Units 01/05/18  1605   ADENOVIRUS DETECTION BY PCR  Not Detected   CORONAVIRUS 229E  Not Detected   CORONAVIRUS HKU1  Not Detected   CORONAVIRUS NL63  Not Detected   CORONAVIRUS OC43  Not Detected   HUMAN METAPNEUMOVIRUS  Not Detected   HUMAN RHINOVIRUS/ENTEROVIRUS  Not Detected   INFLUENZA B PCR  Not Detected   PARAINFLUENZA 1  Not Detected   PARAINFLUENZA VIRUS 2  Not Detected   PARAINFLUENZA VIRUS 3  Not Detected   PARAINFLUENZA VIRUS 4  Not Detected   BORDETELLA PERTUSSIS PCR  Not Detected   CHLAMYDOPHILA PNEUMONIAE PCR  Not Detected   MYCOPLAMA PNEUMO PCR  Not Detected   INFLUENZA A  PCR  Not Detected   INFLUENZA A H3  Detected*   INFLUENZA A H1  Not Detected   RSV, PCR  Not Detected           Imaging:   Imaging Results (all)     Procedure Component Value Units Date/Time    XR Chest PA & Lateral [[id number]] Collected:  01/05/18 1558     Updated:  01/05/18 1611    Narrative:       PA AND LATERAL CHEST     HISTORY: Wheezing and shortness of air.     COMPARISON: CT angiogram of chest 07/20/2016. There are no previous  chest x-rays for comparison.     FINDINGS: Cardiomediastinal silhouette is within normal limits. There is  a calcified granuloma in left lower lobe. Calcified left hilar lymph  nodes are present. There is no evidence for pulmonary edema, pleural  effusion or infiltrate.       Impression:       No acute disease.      This report was finalized on 1/5/2018 4:08 PM by Dr. Himanshu Penaloza MD.             I reviewed the patient's new clinical results.  I personally viewed and interpreted the patient's imaging results:See is no cardiac megaly and no pleural effusion no masses.  Calcified left hilar granuloma        Medication Review:     budesonide 0.5 mg Nebulization BID - RT   cetirizine 10 mg Oral Daily   citalopram 40 mg Oral Daily   enoxaparin 40 mg Subcutaneous Q24H   ipratropium-albuterol 3 mL Nebulization Q4H - RT   methylPREDNISolone sodium succinate 60 mg Intravenous Q8H   montelukast 10 mg Oral Daily   oseltamivir 75 mg Oral Q12H         Pharmacy to Dose enoxaparin (LOVENOX)        ASSESSMENT:   1. Severe persistent asthma with acute exacerbation  2. Influenza A H3 infection  3. Acute hypoxemic respiratory failure, improved, currently on room air    PLAN:  Decrease IV steroid frequency but continue with the 60 mg every 12, continue Tamiflu continue bronchodilator, we'll consider further stepdown on the steroid and possible oral agents tomorrow if she continues to improve.  Patient is better she is still wheezy and she is still having tightness but she was able to come off the  oxygen which is a good sign  Discussed with     Disposition:     Scott Dobson MD  01/06/18  7:00 PM        EMR Dragon/Transcription:   Much of this encounter note is an electronic transcription/translation of spoken language to printed text. The electronic translation of spoken language may permit erroneous, or at times, nonsensical words or phrases to be inadvertently transcribed; Although I have reviewed the note for such errors, some may still exist.

## 2018-01-07 NOTE — PROGRESS NOTES
[facility]     LOS: 2 days     Name: Amirah Godoy  Age: 47 y.o.  Sex: female  :  1970  MRN: [id number]         Primary Care Physician: Ruben Kwan Jr., MD    Diet Regular    Subjective   More SOA and tachycardic.    Objective   Temp:  [98.2 °F (36.8 °C)-99.1 °F (37.3 °C)] 98.2 °F (36.8 °C)  Heart Rate:  [] 110  Resp:  [16-20] 20  BP: (128-136)/(72-92) 128/72  SpO2:  [93 %-97 %] 94 %  on   ;   O2 Device: room air  Body mass index is 31.08 kg/(m^2).    Physical Exam   Constitutional: She is oriented to person, place, and time. No distress.   Cardiovascular: Normal rate and regular rhythm.    Pulmonary/Chest: Effort normal. No respiratory distress. She has decreased breath sounds. She has wheezes.   Abdominal: Soft. There is no tenderness.   Musculoskeletal: She exhibits no edema.   Neurological: She is alert and oriented to person, place, and time.   Skin: Skin is warm and dry.     Reviewed medications and new clinical results    budesonide 0.5 mg Nebulization BID - RT   cetirizine 10 mg Oral Daily   citalopram 40 mg Oral Daily   enoxaparin 40 mg Subcutaneous Q24H   ipratropium-albuterol 3 mL Nebulization Q4H - RT   methylPREDNISolone sodium succinate 60 mg Intravenous Q12H   montelukast 10 mg Oral Daily   oseltamivir 75 mg Oral Q12H   sodium chloride 4 mL Nebulization BID - RT       Pharmacy to Dose enoxaparin (LOVENOX)        Results from last 7 days  Lab Units 18  0636 18  1317   WBC 10*3/mm3 11.95* 8.79   HEMOGLOBIN g/dL 12.8 13.7   PLATELETS 10*3/mm3 296 310       Results from last 7 days  Lab Units 18  0636 18  1317   SODIUM mmol/L 135* 140   POTASSIUM mmol/L 4.0 3.9   CHLORIDE mmol/L 100 104   CO2 mmol/L 24.0 27.2   BUN mg/dL 11 11   CREATININE mg/dL 0.64 0.75   CALCIUM mg/dL 8.3* 8.6   GLUCOSE mg/dL 167* 99     Lab Results   Component Value Date    ANIONGAP 11.0 2018     Estimated Creatinine Clearance:  126.6 mL/min (by C-G formula based on Cr of 0.64).      Assessment/Plan   Active Hospital Problems (** Indicates Principal Problem)    Diagnosis Date Noted   • **Asthma with exacerbation [J45.901] 01/05/2018   • Tachycardia [R00.0] 01/07/2018   • Cough [R05] 01/05/2018   • Weakness [R53.1] 01/05/2018   • History of atrial fibrillation [Z86.79] 01/01/2011      Resolved Hospital Problems    Diagnosis Date Noted Date Resolved   No resolved problems to display.     · Supportive care  · Steroids, nebs  · Extra one time solumedrol  · CTA chest. She is on lovenox for DVT prophylaxis   · Tachycardia probably due to pulm issues  · Hyperglycemia, leukocytosis from steroids    Sam Putnam MD   01/07/18  2:30 PM

## 2018-01-07 NOTE — PLAN OF CARE
Problem: Patient Care Overview (Adult)  Goal: Plan of Care Review  Outcome: Ongoing (interventions implemented as appropriate)   01/07/18 0523   Coping/Psychosocial Response Interventions   Plan Of Care Reviewed With patient   Patient Care Overview   Progress improving   Outcome Evaluation   Outcome Summary/Follow up Plan VSS. Has been on RA throughout the night. Ambien given to help with sleep. Patient was able to sleep some during the night. Possible d/c soon. Will continue to monitor.      Goal: Adult Individualization and Mutuality  Outcome: Ongoing (interventions implemented as appropriate)    Goal: Discharge Needs Assessment  Outcome: Ongoing (interventions implemented as appropriate)      Problem: Asthma (Adult)  Goal: Signs and Symptoms of Listed Potential Problems Will be Absent or Manageable (Asthma)  Outcome: Ongoing (interventions implemented as appropriate)      Problem: Pain, Acute (Adult)  Goal: Acceptable Pain Control/Comfort Level  Outcome: Ongoing (interventions implemented as appropriate)

## 2018-01-08 ENCOUNTER — APPOINTMENT (OUTPATIENT)
Dept: GENERAL RADIOLOGY | Facility: HOSPITAL | Age: 48
End: 2018-01-08

## 2018-01-08 LAB
ANION GAP SERPL CALCULATED.3IONS-SCNC: 16.5 MMOL/L
BUN BLD-MCNC: 18 MG/DL (ref 6–20)
BUN/CREAT SERPL: 27.7 (ref 7–25)
CALCIUM SPEC-SCNC: 8.6 MG/DL (ref 8.6–10.5)
CHLORIDE SERPL-SCNC: 101 MMOL/L (ref 98–107)
CO2 SERPL-SCNC: 21.5 MMOL/L (ref 22–29)
CREAT BLD-MCNC: 0.65 MG/DL (ref 0.57–1)
DEPRECATED RDW RBC AUTO: 44.4 FL (ref 37–54)
ERYTHROCYTE [DISTWIDTH] IN BLOOD BY AUTOMATED COUNT: 13 % (ref 11.7–13)
GFR SERPL CREATININE-BSD FRML MDRD: 97 ML/MIN/1.73
GLUCOSE BLD-MCNC: 115 MG/DL (ref 65–99)
HCT VFR BLD AUTO: 38.2 % (ref 35.6–45.5)
HGB BLD-MCNC: 12.8 G/DL (ref 11.9–15.5)
MCH RBC QN AUTO: 31.4 PG (ref 26.9–32)
MCHC RBC AUTO-ENTMCNC: 33.5 G/DL (ref 32.4–36.3)
MCV RBC AUTO: 93.9 FL (ref 80.5–98.2)
PLATELET # BLD AUTO: 376 10*3/MM3 (ref 140–500)
PMV BLD AUTO: 9.6 FL (ref 6–12)
POTASSIUM BLD-SCNC: 4 MMOL/L (ref 3.5–5.2)
PROCALCITONIN SERPL-MCNC: 0.03 NG/ML (ref 0.1–0.25)
RBC # BLD AUTO: 4.07 10*6/MM3 (ref 3.9–5.2)
SODIUM BLD-SCNC: 139 MMOL/L (ref 136–145)
TROPONIN T SERPL-MCNC: <0.01 NG/ML (ref 0–0.03)
WBC NRBC COR # BLD: 23.09 10*3/MM3 (ref 4.5–10.7)

## 2018-01-08 PROCEDURE — 85027 COMPLETE CBC AUTOMATED: CPT | Performed by: HOSPITALIST

## 2018-01-08 PROCEDURE — 84145 PROCALCITONIN (PCT): CPT | Performed by: NURSE PRACTITIONER

## 2018-01-08 PROCEDURE — 25010000002 METHYLPREDNISOLONE PER 125 MG: Performed by: INTERNAL MEDICINE

## 2018-01-08 PROCEDURE — 94799 UNLISTED PULMONARY SVC/PX: CPT

## 2018-01-08 PROCEDURE — 84484 ASSAY OF TROPONIN QUANT: CPT | Performed by: HOSPITALIST

## 2018-01-08 PROCEDURE — 71045 X-RAY EXAM CHEST 1 VIEW: CPT

## 2018-01-08 PROCEDURE — 25010000002 ENOXAPARIN PER 10 MG: Performed by: HOSPITALIST

## 2018-01-08 PROCEDURE — 80048 BASIC METABOLIC PNL TOTAL CA: CPT | Performed by: HOSPITALIST

## 2018-01-08 RX ORDER — ACETAMINOPHEN 325 MG/1
650 TABLET ORAL EVERY 6 HOURS PRN
Status: DISCONTINUED | OUTPATIENT
Start: 2018-01-08 | End: 2018-01-16 | Stop reason: HOSPADM

## 2018-01-08 RX ADMIN — BUDESONIDE 0.5 MG: 0.5 INHALANT RESPIRATORY (INHALATION) at 20:19

## 2018-01-08 RX ADMIN — CETIRIZINE HYDROCHLORIDE 10 MG: 10 TABLET, FILM COATED ORAL at 08:29

## 2018-01-08 RX ADMIN — IPRATROPIUM BROMIDE AND ALBUTEROL SULFATE 3 ML: .5; 3 SOLUTION RESPIRATORY (INHALATION) at 23:42

## 2018-01-08 RX ADMIN — BUDESONIDE 0.5 MG: 0.5 INHALANT RESPIRATORY (INHALATION) at 07:50

## 2018-01-08 RX ADMIN — IPRATROPIUM BROMIDE AND ALBUTEROL SULFATE 3 ML: .5; 3 SOLUTION RESPIRATORY (INHALATION) at 07:50

## 2018-01-08 RX ADMIN — METHYLPREDNISOLONE SODIUM SUCCINATE 60 MG: 125 INJECTION, POWDER, FOR SOLUTION INTRAMUSCULAR; INTRAVENOUS at 02:26

## 2018-01-08 RX ADMIN — IPRATROPIUM BROMIDE AND ALBUTEROL SULFATE 3 ML: .5; 3 SOLUTION RESPIRATORY (INHALATION) at 20:19

## 2018-01-08 RX ADMIN — IPRATROPIUM BROMIDE AND ALBUTEROL SULFATE 3 ML: .5; 3 SOLUTION RESPIRATORY (INHALATION) at 11:39

## 2018-01-08 RX ADMIN — SODIUM CHLORIDE SOLN NEBU 7% 4 ML: 7 NEBU SOLN at 07:50

## 2018-01-08 RX ADMIN — OSELTAMIVIR PHOSPHATE 75 MG: 75 CAPSULE ORAL at 20:58

## 2018-01-08 RX ADMIN — METHYLPREDNISOLONE SODIUM SUCCINATE 60 MG: 125 INJECTION, POWDER, FOR SOLUTION INTRAMUSCULAR; INTRAVENOUS at 15:06

## 2018-01-08 RX ADMIN — SODIUM CHLORIDE SOLN NEBU 7% 4 ML: 7 NEBU SOLN at 20:19

## 2018-01-08 RX ADMIN — ENOXAPARIN SODIUM 40 MG: 40 INJECTION SUBCUTANEOUS at 15:06

## 2018-01-08 RX ADMIN — MONTELUKAST 10 MG: 10 TABLET, FILM COATED ORAL at 08:29

## 2018-01-08 RX ADMIN — IPRATROPIUM BROMIDE AND ALBUTEROL SULFATE 3 ML: .5; 3 SOLUTION RESPIRATORY (INHALATION) at 15:26

## 2018-01-08 RX ADMIN — CITALOPRAM 40 MG: 40 TABLET, FILM COATED ORAL at 08:29

## 2018-01-08 RX ADMIN — ZOLPIDEM TARTRATE 5 MG: 5 TABLET ORAL at 21:14

## 2018-01-08 RX ADMIN — OSELTAMIVIR PHOSPHATE 75 MG: 75 CAPSULE ORAL at 08:29

## 2018-01-08 NOTE — PLAN OF CARE
Problem: Patient Care Overview (Adult)  Goal: Plan of Care Review  Outcome: Ongoing (interventions implemented as appropriate)   01/08/18 1431   Coping/Psychosocial Response Interventions   Plan Of Care Reviewed With patient   Patient Care Overview   Progress improving   Outcome Evaluation   Outcome Summary/Follow up Plan vss, hr elevated to 120's post breathing treatment, c/o dry cough, will continue to monitor     Goal: Adult Individualization and Mutuality  Outcome: Ongoing (interventions implemented as appropriate)    Goal: Discharge Needs Assessment  Outcome: Ongoing (interventions implemented as appropriate)      Problem: Asthma (Adult)  Goal: Signs and Symptoms of Listed Potential Problems Will be Absent or Manageable (Asthma)  Outcome: Ongoing (interventions implemented as appropriate)      Problem: Pain, Acute (Adult)  Goal: Acceptable Pain Control/Comfort Level  Outcome: Ongoing (interventions implemented as appropriate)      Problem: Infection, Risk/Actual (Adult)  Goal: Infection Prevention/Resolution  Outcome: Ongoing (interventions implemented as appropriate)

## 2018-01-08 NOTE — PROGRESS NOTES
Discharge Planning Assessment  [facility]     Patient Name: Amirah Godoy  MRN: [id number]  Today's Date: 1/8/2018    Admit Date: 1/5/2018          Discharge Needs Assessment       01/08/18 1639    Living Environment    Lives With child(regina), dependent;spouse    Living Arrangements house    Home Accessibility stairs to enter home    Number of Stairs to Enter Home 4    Stair Railings at Home none    Type of Financial/Environmental Concern none    Transportation Available family or friend will provide;car    Living Environment    Provides Primary Care For child(regina)    Quality Of Family Relationships supportive;helpful;involved    Able to Return to Prior Living Arrangements yes    Discharge Needs Assessment    Concerns To Be Addressed discharge planning concerns    Concerns Comments new nebulizer    Readmission Within The Last 30 Days no previous admission in last 30 days    Anticipated Changes Related to Illness none    Equipment Currently Used at Home nebulizer    Equipment Needed After Discharge nebulizer    Discharge Disposition still a patient            Discharge Plan       01/08/18 1636    Case Management/Social Work Plan    Plan Home with spouse, requests new Nebulizer to be ordered from [place]    Patient/Family In Agreement With Plan yes    Additional Comments CCP spoke with patient at bedside, introduced self and explained CCP role. Verified information and pts pharmacy is [facility]. Pt denies any problems with medications. Pt states she is independent prior to hospitalization. Pt states she has an old nebulizer and needs a new one. CCP listed DME providers and pt requested to be ordered from [place]. Note left for MD. Pt denies any HH or SNF hx. Pt states her family will drive her home and she denies any additional dc concerns. Ryan HYLTON/CCP        Discharge Placement     No information found                Demographic Summary       01/08/18 1638    Referral Information    Admission Type inpatient     Arrived From admitted as an inpatient    Referral Source admission list    Reason For Consult discharge planning    Record Reviewed medical record    Contact Information    Permission Granted to Share Information With family/designee    Primary Care Physician Information    Name Dr. Ruben Kwan Jr.            Functional Status       01/08/18 4562    Functional Status Current    Ambulation 0-->independent    Transferring 0-->independent    Toileting 0-->independent    Bathing 0-->independent    Dressing 0-->independent    Eating 0-->independent    Communication 0-->understands/communicates without difficulty    Swallowing (if score 2 or more for any item, consult Rehab Services) 0-->swallows foods/liquids without difficulty    Change in Functional Status Since Onset of Current Illness/Injury no    Functional Status Prior    Ambulation 0-->independent    Transferring 0-->independent    Toileting 0-->independent    Bathing 0-->independent    Dressing 0-->independent    Eating 0-->independent    Communication 0-->understands/communicates without difficulty    Swallowing 0-->swallows foods/liquids without difficulty    IADL    Medications independent    Meal Preparation independent    Housekeeping independent    Laundry independent    Shopping independent    Oral Care independent    Activity Tolerance    Current Activity Limitations none    Usual Activity Tolerance good    Current Activity Tolerance moderate    Cognitive/Perceptual/Developmental    Current Mental Status/Cognitive Functioning no deficits noted    Recent Changes in Mental Status/Cognitive Functioning no changes    Employment/Financial    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

## 2018-01-08 NOTE — PROGRESS NOTES
"  PROGRESS NOTE   LOS: 2 days   Patient Care Team:  Ruben Kwan Jr., MD as PCP - General (Family Medicine)    Chief Complaint: History of asthma with worsening symptom     Interval History: Admitted with asthma exacerbation, turn out to have positive influenza infection.  Patient is on nebulized medication he is on IV steroid he is on Tamiflu and is on oxygen with frequent bronchodilator therapy.  She feels slightly worse compared to yesterday with significant tightness and wheezing and cough.  No associated fever or chills there is no hemoptysis or significant productive secretions.    REVIEW OF SYSTEMS:   CARDIOVASCULAR: No chest pain, chest pressure or chest discomfort. No palpitations or edema.   RESPIRATORY: Wheezing and chest tightness and shortness of breath but able to come off the oxygen.   GASTROINTESTINAL: No anorexia, nausea, vomiting or diarrhea. No abdominal pain or blood.   HEMATOLOGIC: No bleeding or bruising.     Ventilator/Non-Invasive Ventilation Settings     None            Vital Signs  Temp:  [98.2 °F (36.8 °C)-99.1 °F (37.3 °C)] 98.2 °F (36.8 °C)  Heart Rate:  [] 106  Resp:  [16-20] 20  BP: (128-136)/(72-92) 134/78  SpO2:  [93 %-96 %] 95 %  on    O2 Device: room air  No intake or output data in the 24 hours ending 01/07/18 2046  Flowsheet Rows         First Filed Value    Admission Height  172.7 cm (68\") Documented at 01/05/2018 1215    Admission Weight  91.1 kg (200 lb 12.8 oz) Documented at 01/05/2018 1215        Body mass index is 31.08 kg/(m^2).  Last 3 weights    01/05/18  1215 01/06/18  0500 01/07/18  0500   Weight: 91.1 kg (200 lb 12.8 oz) 92.5 kg (204 lb) 92.7 kg (204 lb 6.4 oz)       Physical Exam:  GEN:  No acute distress, alert, cooperative, well developed   EYES:   Sclera clear. No icterus. PERRL. Normal EOM  ENT:   External ears/nose normal, no oral lesions, no thrush, mucous membranes moist  NECK:  Supple, midline trachea, no JVD  LUNGS: Normal chest on inspection, " Significant wheezing with prolongation of the expiratory phase and loud rhonchi with no crackles.   CV:  Regular rhythm and rate. Normal S1/S2. No murmurs, gallops, or rubs noted.  ABD:  Soft, non-tender and non-distended. Normal bowel sounds. No guarding  EXT:  Moves all extremities well. No cyanosis. No redness. No edema.   Skin: dry, intact, no bleeding    Results Review:        Results from last 7 days  Lab Units 01/06/18  0636 01/05/18  1317   SODIUM mmol/L 135* 140   POTASSIUM mmol/L 4.0 3.9   CHLORIDE mmol/L 100 104   CO2 mmol/L 24.0 27.2   BUN mg/dL 11 11   CREATININE mg/dL 0.64 0.75   CALCIUM mg/dL 8.3* 8.6   AST (SGOT) U/L  --  14   ALT (SGPT) U/L  --  13   ANION GAP mmol/L 11.0 8.8   ALBUMIN g/dL  --  3.70       Results from last 7 days  Lab Units 01/07/18  1748   TROPONIN T ng/mL <0.010               Results from last 7 days  Lab Units 01/06/18  0636 01/05/18  1317   WBC 10*3/mm3 11.95* 8.79   HEMOGLOBIN g/dL 12.8 13.7   HEMATOCRIT % 38.7 40.6   PLATELETS 10*3/mm3 296 310   MCV fL 94.2 92.9   NEUTROPHIL % %  --  69.1   LYMPHOCYTE % %  --  22.9   MONOCYTES % %  --  5.5   EOSINOPHIL % %  --  1.4   BASOPHIL % %  --  0.3   IMM GRAN % %  --  0.8*                       Results from last 7 days  Lab Units 01/06/18  1724   HEMOGLOBIN A1C % 5.10     No results found for: POCGLU    Results from last 7 days  Lab Units 01/05/18  1317   LACTATE mmol/L 1.3               Results from last 7 days  Lab Units 01/05/18  1605   ADENOVIRUS DETECTION BY PCR  Not Detected   CORONAVIRUS 229E  Not Detected   CORONAVIRUS HKU1  Not Detected   CORONAVIRUS NL63  Not Detected   CORONAVIRUS OC43  Not Detected   HUMAN METAPNEUMOVIRUS  Not Detected   HUMAN RHINOVIRUS/ENTEROVIRUS  Not Detected   INFLUENZA B PCR  Not Detected   PARAINFLUENZA 1  Not Detected   PARAINFLUENZA VIRUS 2  Not Detected   PARAINFLUENZA VIRUS 3  Not Detected   PARAINFLUENZA VIRUS 4  Not Detected   BORDETELLA PERTUSSIS PCR  Not Detected   CHLAMYDOPHILA PNEUMONIAE  PCR  Not Detected   MYCOPLAMA PNEUMO PCR  Not Detected   INFLUENZA A PCR  Not Detected   INFLUENZA A H3  Detected*   INFLUENZA A H1  Not Detected   RSV, PCR  Not Detected           Imaging:   Imaging Results (all)     Procedure Component Value Units Date/Time    XR Chest PA & Lateral [[id number]] Collected:  01/05/18 1558     Updated:  01/05/18 1611    Narrative:       PA AND LATERAL CHEST     HISTORY: Wheezing and shortness of air.     COMPARISON: CT angiogram of chest 07/20/2016. There are no previous  chest x-rays for comparison.     FINDINGS: Cardiomediastinal silhouette is within normal limits. There is  a calcified granuloma in left lower lobe. Calcified left hilar lymph  nodes are present. There is no evidence for pulmonary edema, pleural  effusion or infiltrate.       Impression:       No acute disease.      This report was finalized on 1/5/2018 4:08 PM by Dr. Himanshu Penaloza MD.             I reviewed the patient's new clinical results.  I personally viewed and interpreted the patient's imaging results:See is no cardiac megaly and no pleural effusion no masses.  Calcified left hilar granuloma        Medication Review:     budesonide 0.5 mg Nebulization BID - RT   cetirizine 10 mg Oral Daily   citalopram 40 mg Oral Daily   enoxaparin 40 mg Subcutaneous Q24H   ipratropium-albuterol 3 mL Nebulization Q4H - RT   methylPREDNISolone sodium succinate 60 mg Intravenous Q12H   montelukast 10 mg Oral Daily   oseltamivir 75 mg Oral Q12H   sodium chloride 4 mL Nebulization BID - RT         Pharmacy to Dose enoxaparin (LOVENOX)        ASSESSMENT:   1. Severe persistent asthma with acute exacerbation  2. Influenza A H3 infection  3. Acute hypoxemic respiratory failure, improved, currently on room air    PLAN:  Given the lack of any improvement with actually subjective worsening I will hold on any further taper on the steroids today and we will recheck another x-ray in the morning to rule out any superimposed infection.   We'll also check a pro-calcitonin level to further assess any superimposed bacterial process.  Meanwhile continue with the bronchodilator and the with the steroid and we'll reassess in the morning for further recommendation    Disposition: Home once on oral regimen with ongoing constant improvement    Scott Dobson MD  01/07/18  8:46 PM        EMR Dragon/Transcription:   Much of this encounter note is an electronic transcription/translation of spoken language to printed text. The electronic translation of spoken language may permit erroneous, or at times, nonsensical words or phrases to be inadvertently transcribed; Although I have reviewed the note for such errors, some may still exist.

## 2018-01-08 NOTE — PROGRESS NOTES
"  PROGRESS NOTE   LOS: 3 days   Patient Care Team:  Ruben Kwan Jr., MD as PCP - General (Family Medicine)    Chief Complaint: History of asthma with worsening symptom     Interval History: Admitted on 1/5/18 with complaints of worsening shortness of breath and persistent cough and symptoms over the past month or so. She was found to have an asthma exacerbation with positive influenza infection. Procalcitonin was negative. She was treated with bronchodilators, Tamiflu and steroids. She was put on nebulized form of home inhaler therapy.  She does have agitation and anxiety that is worse on the steroid and was given some xanax to help with symptoms. She did have worsening cough and was using Tussionex.     REVIEW OF SYSTEMS:   CARDIOVASCULAR: No chest pain, chest pressure. Palpitations with increased HR today. no edema.   RESPIRATORY: Wheezing and chest tightness. Shortness of breath with dyspnea on minimal exertion. Tolerating without oxygen. Cough with no sputum production   GASTROINTESTINAL: No anorexia, nausea, vomiting or diarrhea. No abdominal pain or blood.   HEMATOLOGIC: No bleeding or bruising.   Increased anxiety and agitation, felt xanax knocked her out.    Ventilator/Non-Invasive Ventilation Settings     None        Vital Signs  Temp:  [97.2 °F (36.2 °C)-99.2 °F (37.3 °C)] 97.2 °F (36.2 °C)  Heart Rate:  [] 108  Resp:  [16-20] 20  BP: (127-145)/(79-90) 142/90  SpO2:  [91 %-98 %] 98 %  on    O2 Device: room air    Intake/Output Summary (Last 24 hours) at 01/08/18 2025  Last data filed at 01/08/18 1822   Gross per 24 hour   Intake              240 ml   Output                0 ml   Net              240 ml     Flowsheet Rows         First Filed Value    Admission Height  172.7 cm (68\") Documented at 01/05/2018 1215    Admission Weight  91.1 kg (200 lb 12.8 oz) Documented at 01/05/2018 1215        Body mass index is 31.08 kg/(m^2).  Last 3 weights    01/05/18  1215 01/06/18  0500 01/07/18  0500   Weight: " 91.1 kg (200 lb 12.8 oz) 92.5 kg (204 lb) 92.7 kg (204 lb 6.4 oz)       Physical Exam:  GEN:  No acute distress, alert, cooperative, well developed, on room air   EYES:   Sclera clear. No icterus. PERRL.  ENT:   External ears/nose normal, no oral lesions, no thrush, mucous membranes moist  NECK:  Supple, midline trachea, no JVD  LUNGS: Normal chest on inspection, Significant wheezing with prolongation of the expiratory phase and loud rhonchi with no crackles.   CV:  Regular rhythm and slightly tachycardic. Normal S1/S2. No murmurs, gallops, or rubs noted.  ABD:  Soft, non-tender and non-distended. Normal bowel sounds. No guarding  EXT:  Moves all extremities well. No cyanosis. No redness. No edema.   Skin: dry, intact, no bleeding    Results Review:        Results from last 7 days  Lab Units 01/08/18  0634 01/06/18  0636 01/05/18  1317   SODIUM mmol/L 139 135* 140   POTASSIUM mmol/L 4.0 4.0 3.9   CHLORIDE mmol/L 101 100 104   CO2 mmol/L 21.5* 24.0 27.2   BUN mg/dL 18 11 11   CREATININE mg/dL 0.65 0.64 0.75   CALCIUM mg/dL 8.6 8.3* 8.6   AST (SGOT) U/L  --   --  14   ALT (SGPT) U/L  --   --  13   ANION GAP mmol/L 16.5 11.0 8.8   ALBUMIN g/dL  --   --  3.70       Results from last 7 days  Lab Units 01/08/18  0000 01/07/18  1748   TROPONIN T ng/mL <0.010 <0.010               Results from last 7 days  Lab Units 01/08/18  0634 01/06/18  0636 01/05/18  1317   WBC 10*3/mm3 23.09* 11.95* 8.79   HEMOGLOBIN g/dL 12.8 12.8 13.7   HEMATOCRIT % 38.2 38.7 40.6   PLATELETS 10*3/mm3 376 296 310   MCV fL 93.9 94.2 92.9   NEUTROPHIL % %  --   --  69.1   LYMPHOCYTE % %  --   --  22.9   MONOCYTES % %  --   --  5.5   EOSINOPHIL % %  --   --  1.4   BASOPHIL % %  --   --  0.3   IMM GRAN % %  --   --  0.8*                       Results from last 7 days  Lab Units 01/06/18  1724   HEMOGLOBIN A1C % 5.10     No results found for: POCGLU    Results from last 7 days  Lab Units 01/08/18  1704 01/05/18  1317   PROCALCITONIN ng/mL 0.03*  --     LACTATE mmol/L  --  1.3               Results from last 7 days  Lab Units 01/05/18  1605   ADENOVIRUS DETECTION BY PCR  Not Detected   CORONAVIRUS 229E  Not Detected   CORONAVIRUS HKU1  Not Detected   CORONAVIRUS NL63  Not Detected   CORONAVIRUS OC43  Not Detected   HUMAN METAPNEUMOVIRUS  Not Detected   HUMAN RHINOVIRUS/ENTEROVIRUS  Not Detected   INFLUENZA B PCR  Not Detected   PARAINFLUENZA 1  Not Detected   PARAINFLUENZA VIRUS 2  Not Detected   PARAINFLUENZA VIRUS 3  Not Detected   PARAINFLUENZA VIRUS 4  Not Detected   BORDETELLA PERTUSSIS PCR  Not Detected   CHLAMYDOPHILA PNEUMONIAE PCR  Not Detected   MYCOPLAMA PNEUMO PCR  Not Detected   INFLUENZA A PCR  Not Detected   INFLUENZA A H3  Detected*   INFLUENZA A H1  Not Detected   RSV, PCR  Not Detected           Imaging:   Imaging Results (all)     Procedure Component Value Units Date/Time    XR Chest PA & Lateral [[id number]] Collected:  01/05/18 1558     Updated:  01/05/18 1611    Narrative:       PA AND LATERAL CHEST     HISTORY: Wheezing and shortness of air.     COMPARISON: CT angiogram of chest 07/20/2016. There are no previous  chest x-rays for comparison.     FINDINGS: Cardiomediastinal silhouette is within normal limits. There is  a calcified granuloma in left lower lobe. Calcified left hilar lymph  nodes are present. There is no evidence for pulmonary edema, pleural  effusion or infiltrate.       Impression:       No acute disease.      This report was finalized on 1/5/2018 4:08 PM by Dr. Himanshu Penaloza MD.       CT Angiogram Chest With & Without Contrast [[id number]] Collected:  01/07/18 1630     Updated:  01/07/18 1641    Narrative:       CT ANGIOGRAM CHEST W WO CONTRAST-     INDICATIONS: Short of breath, tachycardia, possible pulmonary embolism.   Radiation dose reduction techniques were utilized, including automated  exposure control and exposure modulation based on body size.     TECHNIQUE: CT ANGIOGRAPHY OF THE CHEST WITH  THREE-DIMENSIONAL  RECONSTRUCTIONS.     COMPARISON: 07/28/2016     FINDINGS:        No pulmonary embolism. No aortic dissection.           The heart size is normal without pericardial effusion. A few small  subcentimeter short axis mediastinal lymph nodes are seen that are not  significant by size criteria.     The airways appear clear.     No pleural effusion or pneumothorax.     The lungs show no focal pulmonary consolidation or mass. Old  granulomatous disease is present. Linear likely atelectasis or scar in  the right lower lobe.     Upper abdominal structures show indeterminate liver lesion at the  hepatic dome, 1.9 cm on image 114 of series 2, CT density of 46,  indeterminate on the basis of this exam, but does not appear  significantly changed.        No acute fracture is identified.       Impression:             1. No pulmonary embolism. Small likely atelectasis or scar in the right  lower lobe.  2. Chronic liver lesion, does not appear significantly changed.     This report was finalized on 1/7/2018 4:38 PM by Dr. Silvano Otoole MD.       XR Chest 1 View [[id number]] Collected:  01/08/18 0817     Updated:  01/08/18 1127    Narrative:       CLINICAL HISTORY: 47-year-old female follow-up respiratory failure with  history of asthma. Influenza.     PORTABLE AP SEMIERECT CHEST DATED 01/08/2018 AT 0604 HOURS     FINDINGS: When compared to the recent prior PA and lateral chest  radiographs of 01/05/2018 and CT chest exam of 01/07/2018, the lungs  remain well expanded and apparently free of dense collapse or  consolidation. The degree of expansion of the lungs is slightly  diminished compared to the CT chest exam and there may be a trace of  strandy atelectasis in the left lower lung zone on the current exam. The  costophrenic angles are sharp. The heart remains top normal caliber. No  pneumothorax or acute change in bony thorax is seen. Monitoring lead  wires are present.     CONCLUSION: Minimally diminished  expansion of the lungs with trace  strandy atelectasis in the left lower lung zone. No dense segmental or  lobar collapse or consolidation is seen in the lungs. Follow-up  recommended.     This report was finalized on 1/8/2018 11:23 AM by Dr. Urbano Moore MD.               I reviewed the patient's new clinical results.  I personally viewed and interpreted the patient's imaging results:See is no cardiac megaly and no pleural effusion no masses.  Calcified left hilar granuloma        Medication Review:     budesonide 0.5 mg Nebulization BID - RT   cetirizine 10 mg Oral Daily   citalopram 40 mg Oral Daily   enoxaparin 40 mg Subcutaneous Q24H   ipratropium-albuterol 3 mL Nebulization Q4H - RT   methylPREDNISolone sodium succinate 60 mg Intravenous Q12H   montelukast 10 mg Oral Daily   oseltamivir 75 mg Oral Q12H   sodium chloride 4 mL Nebulization BID - RT         Pharmacy to Dose enoxaparin (LOVENOX)        ASSESSMENT:   1. Severe persistent asthma with acute exacerbation  2. Influenza A H3 infection  3. Acute hypoxemic respiratory failure, improved, currently on room air  4. Leukocytosis likely steroid induced    PLAN:  Given the lack of any improvement with actually subjective worsening I will hold on any further taper on the steroids today.  CT had showed a small area of atelectasis in RLLL and todays CXR showed some small areas of developed atelectasis in LLL.   WBC was elevated today compared to yesterday and may be steroid induced as procal was negative. However will continue to monitor and consider repeat CXR/procal if any worsening symptoms  Ok to shower  Continue PRN Xanax and encouraged to try just 1/2 pill to see if less sedating.   Encouraged pulmonary hygiene measures with TCDB, flutter, bronchodilators and incentive spirometer.  Encouraged patient to use cough syrup elizabeth at night so she can rest.   On Tamiflu for a few more days, but added tylenol to help with aches and pains  Continue steroids at same  dose today and consider weaning as improves    Disposition: Home once on oral regimen with ongoing constant improvement    Scott Dobson MD  01/08/18  8:25 PM        EMR Dragon/Transcription:   Much of this encounter note is an electronic transcription/translation of spoken language to printed text. The electronic translation of spoken language may permit erroneous, or at times, nonsensical words or phrases to be inadvertently transcribed; Although I have reviewed the note for such errors, some may still exist.

## 2018-01-08 NOTE — PLAN OF CARE
Problem: Patient Care Overview (Adult)  Goal: Plan of Care Review  Outcome: Ongoing (interventions implemented as appropriate)   01/08/18 0458   Coping/Psychosocial Response Interventions   Plan Of Care Reviewed With patient   Patient Care Overview   Progress no change   Outcome Evaluation   Outcome Summary/Follow up Plan VSS. Patient remained on room air during the night. Plan is for repeat chest x ray in the morning. Will continue to monitor.      Goal: Adult Individualization and Mutuality  Outcome: Ongoing (interventions implemented as appropriate)    Goal: Discharge Needs Assessment  Outcome: Ongoing (interventions implemented as appropriate)      Problem: Asthma (Adult)  Goal: Signs and Symptoms of Listed Potential Problems Will be Absent or Manageable (Asthma)  Outcome: Ongoing (interventions implemented as appropriate)      Problem: Pain, Acute (Adult)  Goal: Acceptable Pain Control/Comfort Level  Outcome: Ongoing (interventions implemented as appropriate)      Problem: Infection, Risk/Actual (Adult)  Goal: Identify Related Risk Factors and Signs and Symptoms  Outcome: Outcome(s) achieved Date Met: 01/08/18    Goal: Infection Prevention/Resolution  Outcome: Ongoing (interventions implemented as appropriate)

## 2018-01-08 NOTE — PROGRESS NOTES
[facility]     LOS: 3 days     Name: Amirah Godoy  Age: 47 y.o.  Sex: female  :  1970  MRN: [id number]         Primary Care Physician: Ruben Kwan Jr., MD    Diet Regular    Subjective   Feels about the same today.    Objective   Temp:  [97.7 °F (36.5 °C)-99.2 °F (37.3 °C)] 99.2 °F (37.3 °C)  Heart Rate:  [] 93  Resp:  [16-20] 16  BP: (127-145)/(78-82) 135/82  SpO2:  [91 %-96 %] 96 %  on   ;   O2 Device: room air  Body mass index is 31.08 kg/(m^2).    Physical Exam   Constitutional: She is oriented to person, place, and time. No distress.   Cardiovascular: Normal rate and regular rhythm.    Pulmonary/Chest: Effort normal. No respiratory distress. She has decreased breath sounds. She has wheezes. She has rhonchi.   Abdominal: Soft. There is no tenderness.   Musculoskeletal: She exhibits no edema.   Neurological: She is alert and oriented to person, place, and time.   Skin: Skin is warm and dry.     Reviewed medications and new clinical results    budesonide 0.5 mg Nebulization BID - RT   cetirizine 10 mg Oral Daily   citalopram 40 mg Oral Daily   enoxaparin 40 mg Subcutaneous Q24H   ipratropium-albuterol 3 mL Nebulization Q4H - RT   methylPREDNISolone sodium succinate 60 mg Intravenous Q12H   montelukast 10 mg Oral Daily   oseltamivir 75 mg Oral Q12H   sodium chloride 4 mL Nebulization BID - RT       Pharmacy to Dose enoxaparin (LOVENOX)        Results from last 7 days  Lab Units 18  0634 18  0636 18  1317   WBC 10*3/mm3 23.09* 11.95* 8.79   HEMOGLOBIN g/dL 12.8 12.8 13.7   PLATELETS 10*3/mm3 376 296 310       Results from last 7 days  Lab Units 18  0634 18  0636 18  1317   SODIUM mmol/L 139 135* 140   POTASSIUM mmol/L 4.0 4.0 3.9   CHLORIDE mmol/L 101 100 104   CO2 mmol/L 21.5* 24.0 27.2   BUN mg/dL 18 11 11   CREATININE mg/dL 0.65 0.64 0.75   CALCIUM mg/dL 8.6 8.3* 8.6   GLUCOSE mg/dL 115* 167* 99     Lab  Results   Component Value Date    ANIONGAP 16.5 01/08/2018     Estimated Creatinine Clearance: 124.6 mL/min (by C-G formula based on Cr of 0.65).    Assessment/Plan   Active Hospital Problems (** Indicates Principal Problem)    Diagnosis Date Noted   • **Asthma with exacerbation [J45.901] 01/05/2018   • Tachycardia [R00.0] 01/07/2018   • Cough [R05] 01/05/2018   • Weakness [R53.1] 01/05/2018   • History of atrial fibrillation [Z86.79] 01/01/2011      Resolved Hospital Problems    Diagnosis Date Noted Date Resolved   No resolved problems to display.     · Supportive care  · Steroids, nebs  · CTA chest no PE.  · Hyperglycemia, leukocytosis from steroids    Sam Putnam MD   01/08/18  3:18 PM

## 2018-01-09 PROBLEM — J10.1 INFLUENZA A: Status: ACTIVE | Noted: 2018-01-09

## 2018-01-09 LAB
ANION GAP SERPL CALCULATED.3IONS-SCNC: 16.2 MMOL/L
BUN BLD-MCNC: 20 MG/DL (ref 6–20)
BUN/CREAT SERPL: 27.4 (ref 7–25)
CALCIUM SPEC-SCNC: 8 MG/DL (ref 8.6–10.5)
CHLORIDE SERPL-SCNC: 102 MMOL/L (ref 98–107)
CO2 SERPL-SCNC: 21.8 MMOL/L (ref 22–29)
CREAT BLD-MCNC: 0.73 MG/DL (ref 0.57–1)
DEPRECATED RDW RBC AUTO: 45.3 FL (ref 37–54)
ERYTHROCYTE [DISTWIDTH] IN BLOOD BY AUTOMATED COUNT: 13.1 % (ref 11.7–13)
GFR SERPL CREATININE-BSD FRML MDRD: 85 ML/MIN/1.73
GLUCOSE BLD-MCNC: 128 MG/DL (ref 65–99)
HCT VFR BLD AUTO: 39.2 % (ref 35.6–45.5)
HGB BLD-MCNC: 12.8 G/DL (ref 11.9–15.5)
LYMPHOCYTES # BLD MANUAL: 1.26 10*3/MM3 (ref 0.9–4.8)
LYMPHOCYTES NFR BLD MANUAL: 2 % (ref 5–12)
LYMPHOCYTES NFR BLD MANUAL: 7 % (ref 19.6–45.3)
MCH RBC QN AUTO: 31.3 PG (ref 26.9–32)
MCHC RBC AUTO-ENTMCNC: 32.7 G/DL (ref 32.4–36.3)
MCV RBC AUTO: 95.8 FL (ref 80.5–98.2)
MONOCYTES # BLD AUTO: 0.36 10*3/MM3 (ref 0.2–1.2)
NEUTROPHILS # BLD AUTO: 16.44 10*3/MM3 (ref 1.9–8.1)
NEUTROPHILS NFR BLD MANUAL: 91 % (ref 42.7–76)
NEUTS BAND NFR BLD MANUAL: 0 % (ref 0–5)
NRBC SPEC MANUAL: 2 /100 WBC (ref 0–0)
PLAT MORPH BLD: NORMAL
PLATELET # BLD AUTO: 330 10*3/MM3 (ref 140–500)
PMV BLD AUTO: 9.5 FL (ref 6–12)
POTASSIUM BLD-SCNC: 4 MMOL/L (ref 3.5–5.2)
RBC # BLD AUTO: 4.09 10*6/MM3 (ref 3.9–5.2)
RBC MORPH BLD: NORMAL
SCAN SLIDE: NORMAL
SODIUM BLD-SCNC: 140 MMOL/L (ref 136–145)
WBC MORPH BLD: NORMAL
WBC NRBC COR # BLD: 18.07 10*3/MM3 (ref 4.5–10.7)

## 2018-01-09 PROCEDURE — 25010000002 METHYLPREDNISOLONE PER 125 MG: Performed by: NURSE PRACTITIONER

## 2018-01-09 PROCEDURE — 94799 UNLISTED PULMONARY SVC/PX: CPT

## 2018-01-09 PROCEDURE — 25010000002 METHYLPREDNISOLONE PER 125 MG: Performed by: INTERNAL MEDICINE

## 2018-01-09 PROCEDURE — 85025 COMPLETE CBC W/AUTO DIFF WBC: CPT | Performed by: NURSE PRACTITIONER

## 2018-01-09 PROCEDURE — 80048 BASIC METABOLIC PNL TOTAL CA: CPT | Performed by: HOSPITALIST

## 2018-01-09 PROCEDURE — 25010000002 ENOXAPARIN PER 10 MG: Performed by: HOSPITALIST

## 2018-01-09 PROCEDURE — 85007 BL SMEAR W/DIFF WBC COUNT: CPT | Performed by: NURSE PRACTITIONER

## 2018-01-09 RX ORDER — PREDNISONE 20 MG/1
20 TABLET ORAL
Status: DISCONTINUED | OUTPATIENT
Start: 2018-01-10 | End: 2018-01-16 | Stop reason: HOSPADM

## 2018-01-09 RX ORDER — METHYLPREDNISOLONE SODIUM SUCCINATE 40 MG/ML
40 INJECTION, POWDER, LYOPHILIZED, FOR SOLUTION INTRAMUSCULAR; INTRAVENOUS EVERY 12 HOURS
Status: DISCONTINUED | OUTPATIENT
Start: 2018-01-09 | End: 2018-01-09

## 2018-01-09 RX ADMIN — HYDROCODONE POLISTIREX AND CHLORPHENIRAMINE POLISTIREX 5 ML: 10; 8 SUSPENSION, EXTENDED RELEASE ORAL at 16:05

## 2018-01-09 RX ADMIN — ZOLPIDEM TARTRATE 5 MG: 5 TABLET ORAL at 22:20

## 2018-01-09 RX ADMIN — OSELTAMIVIR PHOSPHATE 75 MG: 75 CAPSULE ORAL at 08:48

## 2018-01-09 RX ADMIN — CITALOPRAM 40 MG: 40 TABLET, FILM COATED ORAL at 08:48

## 2018-01-09 RX ADMIN — MONTELUKAST 10 MG: 10 TABLET, FILM COATED ORAL at 08:48

## 2018-01-09 RX ADMIN — ENOXAPARIN SODIUM 40 MG: 40 INJECTION SUBCUTANEOUS at 16:09

## 2018-01-09 RX ADMIN — METHYLPREDNISOLONE SODIUM SUCCINATE 40 MG: 125 INJECTION, POWDER, FOR SOLUTION INTRAMUSCULAR; INTRAVENOUS at 16:09

## 2018-01-09 RX ADMIN — IPRATROPIUM BROMIDE AND ALBUTEROL SULFATE 3 ML: .5; 3 SOLUTION RESPIRATORY (INHALATION) at 21:06

## 2018-01-09 RX ADMIN — IPRATROPIUM BROMIDE AND ALBUTEROL SULFATE 3 ML: .5; 3 SOLUTION RESPIRATORY (INHALATION) at 11:13

## 2018-01-09 RX ADMIN — BUDESONIDE 0.5 MG: 0.5 INHALANT RESPIRATORY (INHALATION) at 21:06

## 2018-01-09 RX ADMIN — OSELTAMIVIR PHOSPHATE 75 MG: 75 CAPSULE ORAL at 22:15

## 2018-01-09 RX ADMIN — CETIRIZINE HYDROCHLORIDE 10 MG: 10 TABLET, FILM COATED ORAL at 08:48

## 2018-01-09 RX ADMIN — METHYLPREDNISOLONE SODIUM SUCCINATE 60 MG: 125 INJECTION, POWDER, FOR SOLUTION INTRAMUSCULAR; INTRAVENOUS at 02:10

## 2018-01-09 RX ADMIN — IPRATROPIUM BROMIDE AND ALBUTEROL SULFATE 3 ML: .5; 3 SOLUTION RESPIRATORY (INHALATION) at 15:53

## 2018-01-09 RX ADMIN — BUDESONIDE 0.5 MG: 0.5 INHALANT RESPIRATORY (INHALATION) at 07:29

## 2018-01-09 RX ADMIN — IPRATROPIUM BROMIDE AND ALBUTEROL SULFATE 3 ML: .5; 3 SOLUTION RESPIRATORY (INHALATION) at 07:29

## 2018-01-09 RX ADMIN — ALPRAZOLAM 0.25 MG: 0.25 TABLET ORAL at 08:49

## 2018-01-09 NOTE — PLAN OF CARE
Problem: Patient Care Overview (Adult)  Goal: Plan of Care Review  Outcome: Ongoing (interventions implemented as appropriate)   01/09/18 0600   Coping/Psychosocial Response Interventions   Plan Of Care Reviewed With patient   Patient Care Overview   Progress improving   Outcome Evaluation   Outcome Summary/Follow up Plan Pt was medicated as ordered & tolerated well, she was on room air all night with oxygen sat. wnl, vss, no distress noticed, I will continue to monitor.     Goal: Adult Individualization and Mutuality  Outcome: Ongoing (interventions implemented as appropriate)    Goal: Discharge Needs Assessment  Outcome: Ongoing (interventions implemented as appropriate)      Problem: Asthma (Adult)  Goal: Signs and Symptoms of Listed Potential Problems Will be Absent or Manageable (Asthma)  Outcome: Ongoing (interventions implemented as appropriate)      Problem: Pain, Acute (Adult)  Goal: Acceptable Pain Control/Comfort Level  Outcome: Ongoing (interventions implemented as appropriate)      Problem: Infection, Risk/Actual (Adult)  Goal: Infection Prevention/Resolution  Outcome: Ongoing (interventions implemented as appropriate)

## 2018-01-09 NOTE — PROGRESS NOTES
[facility]     LOS: 4 days     Name: Amirah Godoy  Age: 47 y.o.  Sex: female  :  1970  MRN: [id number]         Primary Care Physician: Ruben Kwan Jr., MD    Diet Regular    Subjective   Coughing. tussionex is helping. Feels weak overall.    Objective   Temp:  [97.2 °F (36.2 °C)-98.3 °F (36.8 °C)] 98.3 °F (36.8 °C)  Heart Rate:  [] 105  Resp:  [16-20] 18  BP: (138-157)/(75-97) 142/92  SpO2:  [92 %-98 %] 94 %  on   ;   O2 Device: room air  Body mass index is 32.65 kg/(m^2).    Physical Exam   Constitutional: She is oriented to person, place, and time. No distress.   Cardiovascular: Normal rate and regular rhythm.    Pulmonary/Chest: Effort normal. No respiratory distress. She has decreased breath sounds. She has wheezes. She has rhonchi.   Abdominal: Soft. There is no tenderness.   Musculoskeletal: She exhibits no edema.   Neurological: She is alert and oriented to person, place, and time.   Skin: Skin is warm and dry.     Reviewed medications and new clinical results    budesonide 0.5 mg Nebulization BID - RT   cetirizine 10 mg Oral Daily   citalopram 40 mg Oral Daily   enoxaparin 40 mg Subcutaneous Q24H   ipratropium-albuterol 3 mL Nebulization Q4H - RT   methylPREDNISolone sodium succinate 40 mg Intravenous Q12H   montelukast 10 mg Oral Daily   oseltamivir 75 mg Oral Q12H   sodium chloride 4 mL Nebulization BID - RT       Pharmacy to Dose enoxaparin (LOVENOX)        Results from last 7 days  Lab Units 18  0539 18  0634 18  0636 18  1317   WBC 10*3/mm3 18.07* 23.09* 11.95* 8.79   HEMOGLOBIN g/dL 12.8 12.8 12.8 13.7   PLATELETS 10*3/mm3 330 376 296 310       Results from last 7 days  Lab Units 18  0539 18  0634 18  0636 18  1317   SODIUM mmol/L 140 139 135* 140   POTASSIUM mmol/L 4.0 4.0 4.0 3.9   CHLORIDE mmol/L 102 101 100 104   CO2 mmol/L 21.8* 21.5* 24.0 27.2   BUN mg/dL 20 18 11 11    CREATININE mg/dL 0.73 0.65 0.64 0.75   CALCIUM mg/dL 8.0* 8.6 8.3* 8.6   GLUCOSE mg/dL 128* 115* 167* 99     Lab Results   Component Value Date    ANIONGAP 16.2 01/09/2018     Estimated Creatinine Clearance: 113.8 mL/min (by C-G formula based on Cr of 0.73).    Assessment/Plan   Active Hospital Problems (** Indicates Principal Problem)    Diagnosis Date Noted   • **Asthma with exacerbation [J45.901] 01/05/2018   • Influenza A [J10.1] 01/09/2018   • Tachycardia [R00.0] 01/07/2018   • Cough [R05] 01/05/2018   • Weakness [R53.1] 01/05/2018   • History of atrial fibrillation [Z86.79] 01/01/2011      Resolved Hospital Problems    Diagnosis Date Noted Date Resolved   No resolved problems to display.     · Supportive care  · Steroids, nebs  · Hyperglycemia, leukocytosis from steroids    Sam Putnam MD   01/09/18  6:12 PM

## 2018-01-09 NOTE — PROGRESS NOTES
"  PROGRESS NOTE   LOS: 4 days   Patient Care Team:  Ruben Kwan Jr., MD as PCP - General (Family Medicine)    Chief Complaint: History of asthma with worsening symptom     Interval History: Admitted on 1/5/18 with complaints of worsening shortness of breath and persistent cough and symptoms over the past month or so. She was found to have an asthma exacerbation with positive influenza infection. Procalcitonin was negative. She was treated with bronchodilators, Tamiflu and steroids. She was put on nebulized form of home inhaler therapy.  She does have agitation and anxiety that is worse on the steroid and was given some xanax to help with symptoms. She did have worsening cough and was using Tussionex. IgE level was elevated and may benefit from Xolair treatments as outpatient.     REVIEW OF SYSTEMS:   CARDIOVASCULAR: No chest pain, chest pressure. Palpitations with increased HR. no edema.   RESPIRATORY: Wheezing and chest tightness improved. Shortness of breath with dyspnea on minimal exertion. Tolerating without oxygen. Cough with no sputum production- better  GASTROINTESTINAL: No anorexia, nausea, vomiting or diarrhea. No abdominal pain or blood.   HEMATOLOGIC: No bleeding or bruising.   Increased anxiety and agitation. Insomnia, but was able to finally go to sleep at 2 am     Ventilator/Non-Invasive Ventilation Settings     None        Vital Signs  Temp:  [97.2 °F (36.2 °C)-98.8 °F (37.1 °C)] 98.8 °F (37.1 °C)  Heart Rate:  [] 87  Resp:  [16-20] 18  BP: (138-157)/(75-97) 146/96  SpO2:  [92 %-98 %] 93 %  on    O2 Device: room air    Intake/Output Summary (Last 24 hours) at 01/09/18 1956  Last data filed at 01/09/18 1337   Gross per 24 hour   Intake              480 ml   Output                0 ml   Net              480 ml     Flowsheet Rows         First Filed Value    Admission Height  172.7 cm (68\") Documented at 01/05/2018 1215    Admission Weight  91.1 kg (200 lb 12.8 oz) Documented at 01/05/2018 1215    "     Body mass index is 32.65 kg/(m^2).  Last 3 weights    01/06/18  0500 01/07/18  0500 01/09/18  0558   Weight: 92.5 kg (204 lb) 92.7 kg (204 lb 6.4 oz) 97.4 kg (214 lb 11.2 oz)       Physical Exam:  GEN:  No acute distress, alert, cooperative, well developed, on room air   EYES:   Sclera clear. No icterus. PERRL.  ENT:   External ears/nose normal, no oral lesions, no thrush, mucous membranes moist  NECK:  Supple, midline trachea, no JVD  LUNGS: Normal chest on inspection, no wheezes with improved elongation of expiratory phase, loud coarse rhonchi no crackles.   CV:  Regular rhythm and slightly tachycardic. Normal S1/S2. No murmurs, gallops, or rubs noted.  ABD:  Soft, non-tender and non-distended. Normal bowel sounds. No guarding  EXT:  Moves all extremities well. No cyanosis. No redness. No edema.   Skin: dry, intact, no bleeding    Results Review:        Results from last 7 days  Lab Units 01/09/18  0539 01/08/18  0634 01/06/18  0636 01/05/18  1317   SODIUM mmol/L 140 139 135* 140   POTASSIUM mmol/L 4.0 4.0 4.0 3.9   CHLORIDE mmol/L 102 101 100 104   CO2 mmol/L 21.8* 21.5* 24.0 27.2   BUN mg/dL 20 18 11 11   CREATININE mg/dL 0.73 0.65 0.64 0.75   CALCIUM mg/dL 8.0* 8.6 8.3* 8.6   AST (SGOT) U/L  --   --   --  14   ALT (SGPT) U/L  --   --   --  13   ANION GAP mmol/L 16.2 16.5 11.0 8.8   ALBUMIN g/dL  --   --   --  3.70   Results for STANFORD IYER (MRN [id number]) as of 1/9/2018 19:57   Ref. Range 1/5/2018 17:15   IgE Latest Ref Range: 0 - 100 IU/mL 213 (H)       Results from last 7 days  Lab Units 01/08/18  0000 01/07/18  1748   TROPONIN T ng/mL <0.010 <0.010               Results from last 7 days  Lab Units 01/09/18  0539 01/08/18  0634 01/06/18  0636 01/05/18  1317   WBC 10*3/mm3 18.07* 23.09* 11.95* 8.79   HEMOGLOBIN g/dL 12.8 12.8 12.8 13.7   HEMATOCRIT % 39.2 38.2 38.7 40.6   PLATELETS 10*3/mm3 330 376 296 310   MCV fL 95.8 93.9 94.2 92.9   NEUTROPHIL % %  --   --   --  69.1   LYMPHOCYTE % %  --   --    --  22.9   MONOCYTES % %  --   --   --  5.5   EOSINOPHIL % %  --   --   --  1.4   BASOPHIL % %  --   --   --  0.3   IMM GRAN % %  --   --   --  0.8*                       Results from last 7 days  Lab Units 01/06/18  1724   HEMOGLOBIN A1C % 5.10     No results found for: POCGLU    Results from last 7 days  Lab Units 01/08/18  1704 01/05/18  1317   PROCALCITONIN ng/mL 0.03*  --    LACTATE mmol/L  --  1.3               Results from last 7 days  Lab Units 01/05/18  1605   ADENOVIRUS DETECTION BY PCR  Not Detected   CORONAVIRUS 229E  Not Detected   CORONAVIRUS HKU1  Not Detected   CORONAVIRUS NL63  Not Detected   CORONAVIRUS OC43  Not Detected   HUMAN METAPNEUMOVIRUS  Not Detected   HUMAN RHINOVIRUS/ENTEROVIRUS  Not Detected   INFLUENZA B PCR  Not Detected   PARAINFLUENZA 1  Not Detected   PARAINFLUENZA VIRUS 2  Not Detected   PARAINFLUENZA VIRUS 3  Not Detected   PARAINFLUENZA VIRUS 4  Not Detected   BORDETELLA PERTUSSIS PCR  Not Detected   CHLAMYDOPHILA PNEUMONIAE PCR  Not Detected   MYCOPLAMA PNEUMO PCR  Not Detected   INFLUENZA A PCR  Not Detected   INFLUENZA A H3  Detected*   INFLUENZA A H1  Not Detected   RSV, PCR  Not Detected           Imaging:   Imaging Results (all)     Procedure Component Value Units Date/Time    XR Chest PA & Lateral [[id number]] Collected:  01/05/18 1558     Updated:  01/05/18 1611    Narrative:       PA AND LATERAL CHEST     HISTORY: Wheezing and shortness of air.     COMPARISON: CT angiogram of chest 07/20/2016. There are no previous  chest x-rays for comparison.     FINDINGS: Cardiomediastinal silhouette is within normal limits. There is  a calcified granuloma in left lower lobe. Calcified left hilar lymph  nodes are present. There is no evidence for pulmonary edema, pleural  effusion or infiltrate.       Impression:       No acute disease.      This report was finalized on 1/5/2018 4:08 PM by Dr. Himanshu Penaloza MD.       CT Angiogram Chest With & Without Contrast [[id number]]  Collected:  01/07/18 1630     Updated:  01/07/18 1641    Narrative:       CT ANGIOGRAM CHEST W WO CONTRAST-     INDICATIONS: Short of breath, tachycardia, possible pulmonary embolism.   Radiation dose reduction techniques were utilized, including automated  exposure control and exposure modulation based on body size.     TECHNIQUE: CT ANGIOGRAPHY OF THE CHEST WITH THREE-DIMENSIONAL  RECONSTRUCTIONS.     COMPARISON: 07/28/2016     FINDINGS:        No pulmonary embolism. No aortic dissection.           The heart size is normal without pericardial effusion. A few small  subcentimeter short axis mediastinal lymph nodes are seen that are not  significant by size criteria.     The airways appear clear.     No pleural effusion or pneumothorax.     The lungs show no focal pulmonary consolidation or mass. Old  granulomatous disease is present. Linear likely atelectasis or scar in  the right lower lobe.     Upper abdominal structures show indeterminate liver lesion at the  hepatic dome, 1.9 cm on image 114 of series 2, CT density of 46,  indeterminate on the basis of this exam, but does not appear  significantly changed.        No acute fracture is identified.       Impression:             1. No pulmonary embolism. Small likely atelectasis or scar in the right  lower lobe.  2. Chronic liver lesion, does not appear significantly changed.     This report was finalized on 1/7/2018 4:38 PM by Dr. Silvano Otoole MD.       XR Chest 1 View [[id number]] Collected:  01/08/18 0817     Updated:  01/08/18 1127    Narrative:       CLINICAL HISTORY: 47-year-old female follow-up respiratory failure with  history of asthma. Influenza.     PORTABLE AP SEMIERECT CHEST DATED 01/08/2018 AT 0604 HOURS     FINDINGS: When compared to the recent prior PA and lateral chest  radiographs of 01/05/2018 and CT chest exam of 01/07/2018, the lungs  remain well expanded and apparently free of dense collapse or  consolidation. The degree of expansion of  the lungs is slightly  diminished compared to the CT chest exam and there may be a trace of  strandy atelectasis in the left lower lung zone on the current exam. The  costophrenic angles are sharp. The heart remains top normal caliber. No  pneumothorax or acute change in bony thorax is seen. Monitoring lead  wires are present.     CONCLUSION: Minimally diminished expansion of the lungs with trace  strandy atelectasis in the left lower lung zone. No dense segmental or  lobar collapse or consolidation is seen in the lungs. Follow-up  recommended.     This report was finalized on 1/8/2018 11:23 AM by Dr. Urbano Moore MD.               I reviewed the patient's new clinical results.  I personally viewed and interpreted the patient's imaging results:See is no cardiac megaly and no pleural effusion no masses.  Calcified left hilar granuloma        Medication Review:     budesonide 0.5 mg Nebulization BID - RT   cetirizine 10 mg Oral Daily   citalopram 40 mg Oral Daily   enoxaparin 40 mg Subcutaneous Q24H   ipratropium-albuterol 3 mL Nebulization Q4H - RT   methylPREDNISolone sodium succinate 40 mg Intravenous Q12H   montelukast 10 mg Oral Daily   oseltamivir 75 mg Oral Q12H   sodium chloride 4 mL Nebulization BID - RT         Pharmacy to Dose enoxaparin (LOVENOX)        ASSESSMENT:   1. Severe persistent asthma with acute exacerbation  2. Influenza A H3 infection   3. Acute hypoxemic respiratory failure, improved, currently on room air  4. Leukocytosis likely steroid induced  5. Elevated IgE levels  6. Insomnia      PLAN:  Continue ambien and xanax to help with insomnia.   Change tussionex to Codeine/Phenergan to control the dry cough  WBC improved today and will continue to monitor.   She did have an elevated IgE level and may be a candidate for Xolair treatments and will refer to allergist at discharge. Given the slow recovery and the prolongued hospital stay despite current measures, will talk to the pharmacy in am and  try to get the first dose of Xolair while here.  Encouraged pulmonary hygiene measures with TCDB, flutter, bronchodilators and incentive spirometer.  Encouraged patient to use cough syrup elizabeth at night so she can rest.   Continue course of tamiflu  Will reduce dose of steroids today and consider oral in am if wheezing still stable.     Disposition: Home once on oral regimen with ongoing constant improvement    Scott Dobson MD  01/09/18  7:56 PM        EMR Dragon/Transcription:   Much of this encounter note is an electronic transcription/translation of spoken language to printed text. The electronic translation of spoken language may permit erroneous, or at times, nonsensical words or phrases to be inadvertently transcribed; Although I have reviewed the note for such errors, some may still exist.

## 2018-01-10 ENCOUNTER — APPOINTMENT (OUTPATIENT)
Dept: GENERAL RADIOLOGY | Facility: HOSPITAL | Age: 48
End: 2018-01-10

## 2018-01-10 LAB
DEPRECATED RDW RBC AUTO: 44.4 FL (ref 37–54)
ERYTHROCYTE [DISTWIDTH] IN BLOOD BY AUTOMATED COUNT: 13 % (ref 11.7–13)
HCT VFR BLD AUTO: 40.8 % (ref 35.6–45.5)
HGB BLD-MCNC: 13.3 G/DL (ref 11.9–15.5)
MCH RBC QN AUTO: 31 PG (ref 26.9–32)
MCHC RBC AUTO-ENTMCNC: 32.6 G/DL (ref 32.4–36.3)
MCV RBC AUTO: 95.1 FL (ref 80.5–98.2)
PLATELET # BLD AUTO: 334 10*3/MM3 (ref 140–500)
PMV BLD AUTO: 9.4 FL (ref 6–12)
RBC # BLD AUTO: 4.29 10*6/MM3 (ref 3.9–5.2)
WBC NRBC COR # BLD: 19.17 10*3/MM3 (ref 4.5–10.7)

## 2018-01-10 PROCEDURE — 94799 UNLISTED PULMONARY SVC/PX: CPT

## 2018-01-10 PROCEDURE — 25010000002 ENOXAPARIN PER 10 MG: Performed by: HOSPITALIST

## 2018-01-10 PROCEDURE — 63710000001 PREDNISONE PER 1 MG: Performed by: INTERNAL MEDICINE

## 2018-01-10 PROCEDURE — 71045 X-RAY EXAM CHEST 1 VIEW: CPT

## 2018-01-10 PROCEDURE — 85027 COMPLETE CBC AUTOMATED: CPT | Performed by: HOSPITALIST

## 2018-01-10 RX ORDER — PANTOPRAZOLE SODIUM 20 MG/1
20 TABLET, DELAYED RELEASE ORAL
Status: DISCONTINUED | OUTPATIENT
Start: 2018-01-11 | End: 2018-01-10

## 2018-01-10 RX ORDER — PROMETHAZINE HYDROCHLORIDE AND CODEINE PHOSPHATE 6.25; 1 MG/5ML; MG/5ML
5 SYRUP ORAL EVERY 4 HOURS PRN
Status: DISCONTINUED | OUTPATIENT
Start: 2018-01-10 | End: 2018-01-16 | Stop reason: HOSPADM

## 2018-01-10 RX ORDER — BENZONATATE 100 MG/1
100 CAPSULE ORAL 3 TIMES DAILY PRN
Status: DISCONTINUED | OUTPATIENT
Start: 2018-01-10 | End: 2018-01-16 | Stop reason: HOSPADM

## 2018-01-10 RX ORDER — PANTOPRAZOLE SODIUM 40 MG/1
40 TABLET, DELAYED RELEASE ORAL
Status: DISCONTINUED | OUTPATIENT
Start: 2018-01-11 | End: 2018-01-16 | Stop reason: HOSPADM

## 2018-01-10 RX ORDER — MAGNESIUM HYDROXIDE/ALUMINUM HYDROXICE/SIMETHICONE 120; 1200; 1200 MG/30ML; MG/30ML; MG/30ML
20 SUSPENSION ORAL EVERY 6 HOURS PRN
Status: DISCONTINUED | OUTPATIENT
Start: 2018-01-10 | End: 2018-01-16 | Stop reason: HOSPADM

## 2018-01-10 RX ADMIN — PROMETHAZINE HYDROCHLORIDE AND CODEINE PHOSPHATE 5 ML: 6.25; 1 SOLUTION ORAL at 12:07

## 2018-01-10 RX ADMIN — CETIRIZINE HYDROCHLORIDE 10 MG: 10 TABLET, FILM COATED ORAL at 09:35

## 2018-01-10 RX ADMIN — ZOLPIDEM TARTRATE 5 MG: 5 TABLET ORAL at 22:26

## 2018-01-10 RX ADMIN — MONTELUKAST 10 MG: 10 TABLET, FILM COATED ORAL at 09:35

## 2018-01-10 RX ADMIN — PREDNISONE 20 MG: 20 TABLET ORAL at 09:35

## 2018-01-10 RX ADMIN — IPRATROPIUM BROMIDE AND ALBUTEROL SULFATE 3 ML: .5; 3 SOLUTION RESPIRATORY (INHALATION) at 11:32

## 2018-01-10 RX ADMIN — IPRATROPIUM BROMIDE AND ALBUTEROL SULFATE 3 ML: .5; 3 SOLUTION RESPIRATORY (INHALATION) at 22:04

## 2018-01-10 RX ADMIN — ACETAMINOPHEN 650 MG: 325 TABLET ORAL at 15:08

## 2018-01-10 RX ADMIN — BUDESONIDE 0.5 MG: 0.5 INHALANT RESPIRATORY (INHALATION) at 22:04

## 2018-01-10 RX ADMIN — CITALOPRAM 40 MG: 40 TABLET, FILM COATED ORAL at 09:35

## 2018-01-10 RX ADMIN — ALPRAZOLAM 0.25 MG: 0.25 TABLET ORAL at 09:51

## 2018-01-10 RX ADMIN — PREDNISONE 20 MG: 20 TABLET ORAL at 12:01

## 2018-01-10 RX ADMIN — BUDESONIDE 0.5 MG: 0.5 INHALANT RESPIRATORY (INHALATION) at 07:09

## 2018-01-10 RX ADMIN — IPRATROPIUM BROMIDE AND ALBUTEROL SULFATE 3 ML: .5; 3 SOLUTION RESPIRATORY (INHALATION) at 07:09

## 2018-01-10 RX ADMIN — IPRATROPIUM BROMIDE AND ALBUTEROL SULFATE 3 ML: .5; 3 SOLUTION RESPIRATORY (INHALATION) at 00:17

## 2018-01-10 RX ADMIN — OSELTAMIVIR PHOSPHATE 75 MG: 75 CAPSULE ORAL at 09:35

## 2018-01-10 RX ADMIN — IPRATROPIUM BROMIDE AND ALBUTEROL SULFATE 3 ML: .5; 3 SOLUTION RESPIRATORY (INHALATION) at 14:51

## 2018-01-10 RX ADMIN — PREDNISONE 20 MG: 20 TABLET ORAL at 17:18

## 2018-01-10 RX ADMIN — ENOXAPARIN SODIUM 40 MG: 40 INJECTION SUBCUTANEOUS at 16:08

## 2018-01-10 NOTE — PLAN OF CARE
Problem: Patient Care Overview (Adult)  Goal: Plan of Care Review  Outcome: Ongoing (interventions implemented as appropriate)   01/10/18 8095   Coping/Psychosocial Response Interventions   Plan Of Care Reviewed With patient   Patient Care Overview   Progress improving   Outcome Evaluation   Outcome Summary/Follow up Plan VSS. Started on PO steroids. CXray ordered. pt states she is having more dificulty breathing today. Will continue to monitor.     Goal: Adult Individualization and Mutuality  Outcome: Ongoing (interventions implemented as appropriate)      Problem: Asthma (Adult)  Goal: Signs and Symptoms of Listed Potential Problems Will be Absent or Manageable (Asthma)  Outcome: Ongoing (interventions implemented as appropriate)      Problem: Pain, Acute (Adult)  Goal: Acceptable Pain Control/Comfort Level  Outcome: Ongoing (interventions implemented as appropriate)      Problem: Infection, Risk/Actual (Adult)  Goal: Infection Prevention/Resolution  Outcome: Ongoing (interventions implemented as appropriate)

## 2018-01-10 NOTE — SIGNIFICANT NOTE
01/10/18 0844   Rehab Treatment   Discipline physical therapist   Rehab Evaluation   Evaluation Not Performed other (see comments)  (PT consult received. Pt denies difficulty with mobility or need for acute PT services. States she has been ambulating independently in room and in hallways. Formal PT eval not warranted; will sign off. )

## 2018-01-10 NOTE — PLAN OF CARE
Problem: Patient Care Overview (Adult)  Goal: Plan of Care Review  Outcome: Ongoing (interventions implemented as appropriate)   01/10/18 0139   Coping/Psychosocial Response Interventions   Plan Of Care Reviewed With patient   Patient Care Overview   Progress progress toward functional goals is gradual   Outcome Evaluation   Outcome Summary/Follow up Plan Improving, I will continue to monitor.     Goal: Adult Individualization and Mutuality  Outcome: Ongoing (interventions implemented as appropriate)    Goal: Discharge Needs Assessment  Outcome: Ongoing (interventions implemented as appropriate)      Problem: Asthma (Adult)  Goal: Signs and Symptoms of Listed Potential Problems Will be Absent or Manageable (Asthma)  Outcome: Ongoing (interventions implemented as appropriate)      Problem: Pain, Acute (Adult)  Goal: Acceptable Pain Control/Comfort Level  Outcome: Ongoing (interventions implemented as appropriate)      Problem: Infection, Risk/Actual (Adult)  Goal: Infection Prevention/Resolution  Outcome: Ongoing (interventions implemented as appropriate)

## 2018-01-10 NOTE — PROGRESS NOTES
" LOS: 5 days     Name: Amirah Godoy  Age: 47 y.o.  Sex: female  :  1970  MRN: [id number]         Primary Care Physician: Ruben Kwan Jr., MD    Subjective   Subjective  Still feels poorly.  Has a nonproductive cough.  Gets short of breath with exertion.    Objective   Vital Signs  Temp:  [98.4 °F (36.9 °C)-98.8 °F (37.1 °C)] 98.4 °F (36.9 °C)  Heart Rate:  [] 85  Resp:  [18-20] 20  BP: (114-158)/(73-96) 158/91  Body mass index is 32.65 kg/(m^2).    Objective:  General Appearance:  Comfortable and in no acute distress (Ill-appearing).    Vital signs: (most recent): Blood pressure 158/91, pulse 85, temperature 98.4 °F (36.9 °C), temperature source Oral, resp. rate 20, height 172.7 cm (68\"), weight 97.4 kg (214 lb 11.2 oz), SpO2 91 %.    Lungs:  Normal respiratory rate and normal effort.  There are wheezes, rhonchi and decreased breath sounds.    Heart: Normal rate.  Regular rhythm.    Abdomen: Abdomen is soft.  Bowel sounds are normal.   There is no abdominal tenderness.     Extremities: There is no local swelling or dependent edema.    Neurological: Patient is alert and oriented to person, place and time.    Skin:  Warm and dry.              Results Review:       I reviewed the patient's new clinical results.      Results from last 7 days  Lab Units 01/10/18  0721 18  0539 18  0634 18  0636 18  1317   WBC 10*3/mm3 19.17* 18.07* 23.09* 11.95* 8.79   HEMOGLOBIN g/dL 13.3 12.8 12.8 12.8 13.7   PLATELETS 10*3/mm3 334 330 376 296 310       Results from last 7 days  Lab Units 18  0539 18  0634 18  0636 18  1317   SODIUM mmol/L 140 139 135* 140   POTASSIUM mmol/L 4.0 4.0 4.0 3.9   CHLORIDE mmol/L 102 101 100 104   CO2 mmol/L 21.8* 21.5* 24.0 27.2   BUN mg/dL 20 18 11 11   CREATININE mg/dL 0.73 0.65 0.64 0.75   CALCIUM mg/dL 8.0* 8.6 8.3* 8.6   GLUCOSE mg/dL 128* 115* 167* 99                 Scheduled Meds:     budesonide 0.5 mg Nebulization BID - RT "   cetirizine 10 mg Oral Daily   citalopram 40 mg Oral Daily   enoxaparin 40 mg Subcutaneous Q24H   ipratropium-albuterol 3 mL Nebulization Q4H - RT   montelukast 10 mg Oral Daily   [START ON 1/11/2018] pantoprazole 20 mg Oral Q AM   predniSONE 20 mg Oral TID With Meals   sodium chloride 4 mL Nebulization BID - RT     PRN Meds:   •  acetaminophen  •  ALPRAZolam  •  aluminum-magnesium hydroxide-simethicone  •  benzonatate  •  ipratropium-albuterol  •  Pharmacy to Dose enoxaparin (LOVENOX)  •  promethazine-codeine  •  sodium chloride  •  zolpidem  Continuous Infusions:    Pharmacy to Dose enoxaparin (LOVENOX)        Assessment/Plan   Principal Problem:    Asthma with exacerbation  Active Problems:    History of atrial fibrillation    Cough    Weakness    Tachycardia    Influenza A      Assessment & Plan    - Continue supportive care, steroids, nebulizers for treatment of influenza with acute exacerbation of asthma.  - Glucose is controlled  - Leukocytosis likely secondary to steroids but she will have another chest x-ray today  - Appreciate input from pulmonology    Dejuan Spencer MD  [facility]  01/10/18  1:42 PM

## 2018-01-10 NOTE — PROGRESS NOTES
"  PROGRESS NOTE   LOS: 5 days   Patient Care Team:  Ruben Kwan Jr., MD as PCP - General (Family Medicine)    Chief Complaint: History of asthma with worsening symptom     Interval History: Admitted on 1/5/18 with complaints of worsening shortness of breath and persistent cough and symptoms over the past month or so. She was found to have an asthma exacerbation with positive influenza infection. Procalcitonin was negative. She was treated with bronchodilators, Tamiflu and steroids. She was put on nebulized form of home inhaler therapy.  She does have agitation and anxiety that is worse on the steroid and was given some xanax to help with symptoms. She did have worsening cough and was using Tussionex. IgE level was elevated and may benefit from Xolair treatments as outpatient.     REVIEW OF SYSTEMS:   CARDIOVASCULAR: No chest pain, chest pressure. Palpitations with increased HR. no edema.   RESPIRATORY: Wheezing and chest tightness improved. Shortness of breath with dyspnea on minimal exertion. Tolerating without oxygen. Cough with no sputum production- worse today  GASTROINTESTINAL: No anorexia, nausea, vomiting or diarrhea. No abdominal pain or blood. Complains of heartburn  HEMATOLOGIC: No bleeding or bruising.   Improving anxiety and agitation, hot flashes    Ventilator/Non-Invasive Ventilation Settings     None        Vital Signs  Temp:  [98.4 °F (36.9 °C)-99.3 °F (37.4 °C)] 99.3 °F (37.4 °C)  Heart Rate:  [] 111  Resp:  [18-20] 20  BP: (114-158)/(73-96) 141/96  SpO2:  [91 %-96 %] 95 %  on    O2 Device: room air  No intake or output data in the 24 hours ending 01/10/18 1900  Flowsheet Rows         First Filed Value    Admission Height  172.7 cm (68\") Documented at 01/05/2018 1215    Admission Weight  91.1 kg (200 lb 12.8 oz) Documented at 01/05/2018 1215        Body mass index is 32.65 kg/(m^2).  Last 3 weights    01/06/18  0500 01/07/18  0500 01/09/18  0558   Weight: 92.5 kg (204 lb) 92.7 kg (204 lb 6.4 " oz) 97.4 kg (214 lb 11.2 oz)       Physical Exam:  GEN:  No acute distress, alert, cooperative, well developed, on room air   EYES:   Sclera clear. No icterus. PERRL.  ENT:   External ears/nose normal, no oral lesions, no thrush, mucous membranes moist  NECK:  Supple, midline trachea, no JVD  LUNGS: Normal chest on inspection, no wheezes. loud coarse rhonchi. Crackles mostly in LLL.   CV:  Regular rhythm and slightly tachycardic. Normal S1/S2. No murmurs, gallops, or rubs noted.  ABD:  Soft, non-tender and non-distended. Normal bowel sounds. No guarding  EXT:  Moves all extremities well. No cyanosis. No redness. No edema.   Skin: dry, intact, no bleeding    Results Review:        Results from last 7 days  Lab Units 01/09/18  0539 01/08/18  0634 01/06/18  0636 01/05/18  1317   SODIUM mmol/L 140 139 135* 140   POTASSIUM mmol/L 4.0 4.0 4.0 3.9   CHLORIDE mmol/L 102 101 100 104   CO2 mmol/L 21.8* 21.5* 24.0 27.2   BUN mg/dL 20 18 11 11   CREATININE mg/dL 0.73 0.65 0.64 0.75   CALCIUM mg/dL 8.0* 8.6 8.3* 8.6   AST (SGOT) U/L  --   --   --  14   ALT (SGPT) U/L  --   --   --  13   ANION GAP mmol/L 16.2 16.5 11.0 8.8   ALBUMIN g/dL  --   --   --  3.70   Results for STANFORD IYER (MRN [id number]) as of 1/9/2018 19:57   Ref. Range 1/5/2018 17:15   IgE Latest Ref Range: 0 - 100 IU/mL 213 (H)       Results from last 7 days  Lab Units 01/08/18  0000 01/07/18  1748   TROPONIN T ng/mL <0.010 <0.010               Results from last 7 days  Lab Units 01/10/18  0721 01/09/18  0539 01/08/18  0634 01/06/18  0636 01/05/18  1317   WBC 10*3/mm3 19.17* 18.07* 23.09* 11.95* 8.79   HEMOGLOBIN g/dL 13.3 12.8 12.8 12.8 13.7   HEMATOCRIT % 40.8 39.2 38.2 38.7 40.6   PLATELETS 10*3/mm3 334 330 376 296 310   MCV fL 95.1 95.8 93.9 94.2 92.9   NEUTROPHIL % %  --   --   --   --  69.1   LYMPHOCYTE % %  --   --   --   --  22.9   MONOCYTES % %  --   --   --   --  5.5   EOSINOPHIL % %  --   --   --   --  1.4   BASOPHIL % %  --   --   --   --  0.3    IMM GRAN % %  --   --   --   --  0.8*                       Results from last 7 days  Lab Units 01/06/18  1724   HEMOGLOBIN A1C % 5.10     No results found for: POCGLU    Results from last 7 days  Lab Units 01/08/18  1704 01/05/18  1317   PROCALCITONIN ng/mL 0.03*  --    LACTATE mmol/L  --  1.3               Results from last 7 days  Lab Units 01/05/18  1605   ADENOVIRUS DETECTION BY PCR  Not Detected   CORONAVIRUS 229E  Not Detected   CORONAVIRUS HKU1  Not Detected   CORONAVIRUS NL63  Not Detected   CORONAVIRUS OC43  Not Detected   HUMAN METAPNEUMOVIRUS  Not Detected   HUMAN RHINOVIRUS/ENTEROVIRUS  Not Detected   INFLUENZA B PCR  Not Detected   PARAINFLUENZA 1  Not Detected   PARAINFLUENZA VIRUS 2  Not Detected   PARAINFLUENZA VIRUS 3  Not Detected   PARAINFLUENZA VIRUS 4  Not Detected   BORDETELLA PERTUSSIS PCR  Not Detected   CHLAMYDOPHILA PNEUMONIAE PCR  Not Detected   MYCOPLAMA PNEUMO PCR  Not Detected   INFLUENZA A PCR  Not Detected   INFLUENZA A H3  Detected*   INFLUENZA A H1  Not Detected   RSV, PCR  Not Detected           Imaging:   Imaging Results (all)     Procedure Component Value Units Date/Time    XR Chest PA & Lateral [[id number]] Collected:  01/05/18 1558     Updated:  01/05/18 1611    Narrative:       PA AND LATERAL CHEST     HISTORY: Wheezing and shortness of air.     COMPARISON: CT angiogram of chest 07/20/2016. There are no previous  chest x-rays for comparison.     FINDINGS: Cardiomediastinal silhouette is within normal limits. There is  a calcified granuloma in left lower lobe. Calcified left hilar lymph  nodes are present. There is no evidence for pulmonary edema, pleural  effusion or infiltrate.       Impression:       No acute disease.      This report was finalized on 1/5/2018 4:08 PM by Dr. Himanshu Penaloza MD.       CT Angiogram Chest With & Without Contrast [[id number]] Collected:  01/07/18 1630     Updated:  01/07/18 1641    Narrative:       CT ANGIOGRAM CHEST W WO CONTRAST-      INDICATIONS: Short of breath, tachycardia, possible pulmonary embolism.   Radiation dose reduction techniques were utilized, including automated  exposure control and exposure modulation based on body size.     TECHNIQUE: CT ANGIOGRAPHY OF THE CHEST WITH THREE-DIMENSIONAL  RECONSTRUCTIONS.     COMPARISON: 07/28/2016     FINDINGS:        No pulmonary embolism. No aortic dissection.           The heart size is normal without pericardial effusion. A few small  subcentimeter short axis mediastinal lymph nodes are seen that are not  significant by size criteria.     The airways appear clear.     No pleural effusion or pneumothorax.     The lungs show no focal pulmonary consolidation or mass. Old  granulomatous disease is present. Linear likely atelectasis or scar in  the right lower lobe.     Upper abdominal structures show indeterminate liver lesion at the  hepatic dome, 1.9 cm on image 114 of series 2, CT density of 46,  indeterminate on the basis of this exam, but does not appear  significantly changed.        No acute fracture is identified.       Impression:             1. No pulmonary embolism. Small likely atelectasis or scar in the right  lower lobe.  2. Chronic liver lesion, does not appear significantly changed.     This report was finalized on 1/7/2018 4:38 PM by Dr. Silvano Otoole MD.       XR Chest 1 View [[id number]] Collected:  01/08/18 0817     Updated:  01/08/18 1127    Narrative:       CLINICAL HISTORY: 47-year-old female follow-up respiratory failure with  history of asthma. Influenza.     PORTABLE AP SEMIERECT CHEST DATED 01/08/2018 AT 0604 HOURS     FINDINGS: When compared to the recent prior PA and lateral chest  radiographs of 01/05/2018 and CT chest exam of 01/07/2018, the lungs  remain well expanded and apparently free of dense collapse or  consolidation. The degree of expansion of the lungs is slightly  diminished compared to the CT chest exam and there may be a trace of  strandy  atelectasis in the left lower lung zone on the current exam. The  costophrenic angles are sharp. The heart remains top normal caliber. No  pneumothorax or acute change in bony thorax is seen. Monitoring lead  wires are present.     CONCLUSION: Minimally diminished expansion of the lungs with trace  strandy atelectasis in the left lower lung zone. No dense segmental or  lobar collapse or consolidation is seen in the lungs. Follow-up  recommended.     This report was finalized on 1/8/2018 11:23 AM by Dr. Urbano Moore MD.           CXR repeat on 1/10/2018 was normal despite the clinical decline     I reviewed the patient's new clinical results.  I personally viewed and interpreted the patient's imaging results:See is no cardiac megaly and no pleural effusion no masses.  Calcified left hilar granuloma        Medication Review:     budesonide 0.5 mg Nebulization BID - RT   cetirizine 10 mg Oral Daily   citalopram 40 mg Oral Daily   enoxaparin 40 mg Subcutaneous Q24H   ipratropium-albuterol 3 mL Nebulization Q4H - RT   montelukast 10 mg Oral Daily   [START ON 1/11/2018] pantoprazole 20 mg Oral Q AM   predniSONE 20 mg Oral TID With Meals   sodium chloride 4 mL Nebulization BID - RT         Pharmacy to Dose enoxaparin (LOVENOX)        ASSESSMENT:   1. Severe persistent asthma with acute exacerbation  2. Influenza A H3 infection   3. Acute hypoxemic respiratory failure, improved, currently on room air  4. Leukocytosis likely steroid induced  5. Elevated IgE levels  6. Insomnia- improving  7. Anxiety on zoloft at home  8. Heartburn   9. Worsening cough     PLAN:  Continue ambien and xanax to help with insomnia.   Change tussionex to Codeine/Phenergan to control the dry cough and add tessalon Perles as well.   Add Protonix and Maalox for heart burn  CXR was normal, will proceed with bronchoscopy in am  She did have an elevated IgE level and may be a candidate for Xolair treatments and will try to get set up with infusion  center to start treatment. CCP to help set this up and see if can get a treatment while inpatient.   Encouraged pulmonary hygiene measures with TCDB, flutter, bronchodilators and incentive spirometer.  completed course of tamiflu  Continue steroids and wean as improves with a slow taper    Disposition: Home once on oral regimen with ongoing constant improvement    Scott Dobson MD  01/10/18  7:00 PM      EMR Dragon/Transcription:   Much of this encounter note is an electronic transcription/translation of spoken language to printed text. The electronic translation of spoken language may permit erroneous, or at times, nonsensical words or phrases to be inadvertently transcribed; Although I have reviewed the note for such errors, some may still exist.

## 2018-01-11 ENCOUNTER — ANESTHESIA EVENT (OUTPATIENT)
Dept: GASTROENTEROLOGY | Facility: HOSPITAL | Age: 48
End: 2018-01-11

## 2018-01-11 ENCOUNTER — ANESTHESIA (OUTPATIENT)
Dept: GASTROENTEROLOGY | Facility: HOSPITAL | Age: 48
End: 2018-01-11

## 2018-01-11 LAB
ANION GAP SERPL CALCULATED.3IONS-SCNC: 13.7 MMOL/L
B-HCG UR QL: NEGATIVE
BUN BLD-MCNC: 16 MG/DL (ref 6–20)
BUN/CREAT SERPL: 23.9 (ref 7–25)
CALCIUM SPEC-SCNC: 8 MG/DL (ref 8.6–10.5)
CHLORIDE SERPL-SCNC: 99 MMOL/L (ref 98–107)
CO2 SERPL-SCNC: 25.3 MMOL/L (ref 22–29)
CREAT BLD-MCNC: 0.67 MG/DL (ref 0.57–1)
DEPRECATED RDW RBC AUTO: 45.1 FL (ref 37–54)
ERYTHROCYTE [DISTWIDTH] IN BLOOD BY AUTOMATED COUNT: 13.3 % (ref 11.7–13)
GFR SERPL CREATININE-BSD FRML MDRD: 94 ML/MIN/1.73
GLUCOSE BLD-MCNC: 99 MG/DL (ref 65–99)
HCT VFR BLD AUTO: 38.6 % (ref 35.6–45.5)
HGB BLD-MCNC: 12.8 G/DL (ref 11.9–15.5)
INTERNAL NEGATIVE CONTROL: NEGATIVE
INTERNAL POSITIVE CONTROL: POSITIVE
LYMPHOCYTES # BLD MANUAL: 1.27 10*3/MM3 (ref 0.9–4.8)
LYMPHOCYTES NFR BLD MANUAL: 6 % (ref 5–12)
LYMPHOCYTES NFR BLD MANUAL: 8 % (ref 19.6–45.3)
Lab: NORMAL
MCH RBC QN AUTO: 31.3 PG (ref 26.9–32)
MCHC RBC AUTO-ENTMCNC: 33.2 G/DL (ref 32.4–36.3)
MCV RBC AUTO: 94.4 FL (ref 80.5–98.2)
METAMYELOCYTES NFR BLD MANUAL: 5 % (ref 0–0)
MONOCYTES # BLD AUTO: 0.96 10*3/MM3 (ref 0.2–1.2)
MYELOCYTES NFR BLD MANUAL: 1 % (ref 0–0)
NEUTROPHILS # BLD AUTO: 12.74 10*3/MM3 (ref 1.9–8.1)
NEUTROPHILS NFR BLD MANUAL: 80 % (ref 42.7–76)
PLAT MORPH BLD: NORMAL
PLATELET # BLD AUTO: 339 10*3/MM3 (ref 140–500)
PMV BLD AUTO: 9.3 FL (ref 6–12)
POTASSIUM BLD-SCNC: 3.8 MMOL/L (ref 3.5–5.2)
RBC # BLD AUTO: 4.09 10*6/MM3 (ref 3.9–5.2)
RBC MORPH BLD: NORMAL
SCAN SLIDE: NORMAL
SODIUM BLD-SCNC: 138 MMOL/L (ref 136–145)
WBC MORPH BLD: NORMAL
WBC NRBC COR # BLD: 15.93 10*3/MM3 (ref 4.5–10.7)

## 2018-01-11 PROCEDURE — 87102 FUNGUS ISOLATION CULTURE: CPT | Performed by: INTERNAL MEDICINE

## 2018-01-11 PROCEDURE — 94799 UNLISTED PULMONARY SVC/PX: CPT

## 2018-01-11 PROCEDURE — 0BJ08ZZ INSPECTION OF TRACHEOBRONCHIAL TREE, VIA NATURAL OR ARTIFICIAL OPENING ENDOSCOPIC: ICD-10-PCS | Performed by: INTERNAL MEDICINE

## 2018-01-11 PROCEDURE — 25010000002 ENOXAPARIN PER 10 MG: Performed by: HOSPITALIST

## 2018-01-11 PROCEDURE — 87206 SMEAR FLUORESCENT/ACID STAI: CPT | Performed by: INTERNAL MEDICINE

## 2018-01-11 PROCEDURE — 63710000001 PREDNISONE PER 1 MG: Performed by: INTERNAL MEDICINE

## 2018-01-11 PROCEDURE — 25010000002 PROPOFOL 10 MG/ML EMULSION: Performed by: NURSE ANESTHETIST, CERTIFIED REGISTERED

## 2018-01-11 PROCEDURE — 80048 BASIC METABOLIC PNL TOTAL CA: CPT | Performed by: INTERNAL MEDICINE

## 2018-01-11 PROCEDURE — 85025 COMPLETE CBC W/AUTO DIFF WBC: CPT | Performed by: INTERNAL MEDICINE

## 2018-01-11 PROCEDURE — 87070 CULTURE OTHR SPECIMN AEROBIC: CPT | Performed by: INTERNAL MEDICINE

## 2018-01-11 PROCEDURE — 3E1F88Z IRRIGATION OF RESPIRATORY TRACT USING IRRIGATING SUBSTANCE, VIA NATURAL OR ARTIFICIAL OPENING ENDOSCOPIC: ICD-10-PCS | Performed by: INTERNAL MEDICINE

## 2018-01-11 PROCEDURE — 87205 SMEAR GRAM STAIN: CPT | Performed by: INTERNAL MEDICINE

## 2018-01-11 PROCEDURE — 25010000002 MIDAZOLAM PER 1 MG: Performed by: NURSE ANESTHETIST, CERTIFIED REGISTERED

## 2018-01-11 PROCEDURE — 85007 BL SMEAR W/DIFF WBC COUNT: CPT | Performed by: INTERNAL MEDICINE

## 2018-01-11 PROCEDURE — 87116 MYCOBACTERIA CULTURE: CPT | Performed by: INTERNAL MEDICINE

## 2018-01-11 RX ORDER — LIDOCAINE HYDROCHLORIDE 10 MG/ML
INJECTION, SOLUTION EPIDURAL; INFILTRATION; INTRACAUDAL; PERINEURAL AS NEEDED
Status: DISCONTINUED | OUTPATIENT
Start: 2018-01-11 | End: 2018-01-11 | Stop reason: HOSPADM

## 2018-01-11 RX ORDER — SODIUM CHLORIDE, SODIUM LACTATE, POTASSIUM CHLORIDE, CALCIUM CHLORIDE 600; 310; 30; 20 MG/100ML; MG/100ML; MG/100ML; MG/100ML
30 INJECTION, SOLUTION INTRAVENOUS CONTINUOUS PRN
Status: DISCONTINUED | OUTPATIENT
Start: 2018-01-11 | End: 2018-01-16 | Stop reason: HOSPADM

## 2018-01-11 RX ORDER — PROPOFOL 10 MG/ML
VIAL (ML) INTRAVENOUS AS NEEDED
Status: DISCONTINUED | OUTPATIENT
Start: 2018-01-11 | End: 2018-01-11 | Stop reason: SURG

## 2018-01-11 RX ORDER — LIDOCAINE HYDROCHLORIDE 20 MG/ML
INJECTION, SOLUTION INFILTRATION; PERINEURAL AS NEEDED
Status: DISCONTINUED | OUTPATIENT
Start: 2018-01-11 | End: 2018-01-11 | Stop reason: SURG

## 2018-01-11 RX ORDER — MIDAZOLAM HYDROCHLORIDE 1 MG/ML
INJECTION INTRAMUSCULAR; INTRAVENOUS AS NEEDED
Status: DISCONTINUED | OUTPATIENT
Start: 2018-01-11 | End: 2018-01-11 | Stop reason: SURG

## 2018-01-11 RX ADMIN — IPRATROPIUM BROMIDE AND ALBUTEROL SULFATE 3 ML: .5; 3 SOLUTION RESPIRATORY (INHALATION) at 11:43

## 2018-01-11 RX ADMIN — ZOLPIDEM TARTRATE 5 MG: 5 TABLET ORAL at 21:35

## 2018-01-11 RX ADMIN — SODIUM CHLORIDE, POTASSIUM CHLORIDE, SODIUM LACTATE AND CALCIUM CHLORIDE 30 ML/HR: 600; 310; 30; 20 INJECTION, SOLUTION INTRAVENOUS at 13:56

## 2018-01-11 RX ADMIN — LIDOCAINE HYDROCHLORIDE 40 MG: 20 INJECTION, SOLUTION INFILTRATION; PERINEURAL at 14:17

## 2018-01-11 RX ADMIN — BUDESONIDE 0.5 MG: 0.5 INHALANT RESPIRATORY (INHALATION) at 20:02

## 2018-01-11 RX ADMIN — PREDNISONE 20 MG: 20 TABLET ORAL at 17:52

## 2018-01-11 RX ADMIN — Medication 2 MG: at 13:55

## 2018-01-11 RX ADMIN — PROPOFOL 200 MG: 10 INJECTION, EMULSION INTRAVENOUS at 14:17

## 2018-01-11 RX ADMIN — IPRATROPIUM BROMIDE AND ALBUTEROL SULFATE 3 ML: .5; 3 SOLUTION RESPIRATORY (INHALATION) at 20:00

## 2018-01-11 RX ADMIN — BUDESONIDE 0.5 MG: 0.5 INHALANT RESPIRATORY (INHALATION) at 08:04

## 2018-01-11 RX ADMIN — IPRATROPIUM BROMIDE AND ALBUTEROL SULFATE 3 ML: .5; 3 SOLUTION RESPIRATORY (INHALATION) at 08:04

## 2018-01-11 RX ADMIN — ENOXAPARIN SODIUM 40 MG: 40 INJECTION SUBCUTANEOUS at 17:52

## 2018-01-11 NOTE — OP NOTE
Bronchoscopy Procedure Note    Procedure:  1. Bronchoscopy, Diagnostic    Pre-Operative Diagnosis:    Post-Operative Diagnosis: Same    Indication:    Anesthesia: Monitored Anesthesia Care (MAC)    Procedure Details: Patient was consented for the procedure with all risk and benefit of the procedure explained in detail.  Patient was given the opportunity to ask questions and all concerns were answered.  The bronchocope was inserted into the main airway via the LMA. An anatomical survey was done of the main airways and the subsegmental bronchus.  The findings are reported above.  A bronchialalveolar lavage was performed using aliquots of normal saline instilled into the airways then aspirated back.    Findings:  · Normal vocal cord movement with phonation and respiration with no evidence to suggest vocal cord paralysis  · Patient has dynamic airway that collapses with only at peak cough and not pathologically abnormal  · Patient has thin whitish secretion in both lung segment slightly more on the right but no mucous plug or thick secretion  · Patient did washing from both lung fields will be sent for cultures overall unimpressively normal bronchoscopy was scattered thin secretions    Estimated Blood Loss:  None           Specimens:  Washing from both lung fields                Complications:  None; patient tolerated the procedure well.           Disposition: PACU - hemodynamically stable.    Discussed with the  in details  No further changes and the treatment plan will continue with the steroid and bronchodilator and the pulmonary hygiene  Patient is to continue with the cough suppressant given the lack of any significant secretions  Patient tolerated the procedure well.    Scott Dobson MD  1/11/2018  2:52 PM

## 2018-01-11 NOTE — PLAN OF CARE
Problem: Patient Care Overview (Adult)  Goal: Plan of Care Review  Outcome: Ongoing (interventions implemented as appropriate)   01/11/18 8861   Coping/Psychosocial Response Interventions   Plan Of Care Reviewed With patient;spouse;daughter   Patient Care Overview   Progress improving   Outcome Evaluation   Outcome Summary/Follow up Plan VSS. PAtient went for Bronchoscopy today. Sleeping well. Will continue to monitor.       Problem: Asthma (Adult)  Goal: Signs and Symptoms of Listed Potential Problems Will be Absent or Manageable (Asthma)  Outcome: Ongoing (interventions implemented as appropriate)      Problem: Pain, Acute (Adult)  Goal: Acceptable Pain Control/Comfort Level  Outcome: Ongoing (interventions implemented as appropriate)      Problem: Infection, Risk/Actual (Adult)  Goal: Infection Prevention/Resolution  Outcome: Ongoing (interventions implemented as appropriate)

## 2018-01-11 NOTE — PLAN OF CARE
Problem: Patient Care Overview (Adult)  Goal: Plan of Care Review  Outcome: Ongoing (interventions implemented as appropriate)   01/11/18 0526   Coping/Psychosocial Response Interventions   Plan Of Care Reviewed With patient   Patient Care Overview   Progress improving   Outcome Evaluation   Outcome Summary/Follow up Plan Pt slept well with ambien 5mg po as hs, vss, no c/o at this time,no respiratory distress noticed, I will continue to monitor.     Goal: Adult Individualization and Mutuality  Outcome: Ongoing (interventions implemented as appropriate)    Goal: Discharge Needs Assessment  Outcome: Ongoing (interventions implemented as appropriate)      Problem: Asthma (Adult)  Goal: Signs and Symptoms of Listed Potential Problems Will be Absent or Manageable (Asthma)  Outcome: Ongoing (interventions implemented as appropriate)      Problem: Pain, Acute (Adult)  Goal: Acceptable Pain Control/Comfort Level  Outcome: Ongoing (interventions implemented as appropriate)      Problem: Infection, Risk/Actual (Adult)  Goal: Infection Prevention/Resolution  Outcome: Ongoing (interventions implemented as appropriate)

## 2018-01-11 NOTE — PROGRESS NOTES
Continued Stay Note  [facility]     Patient Name: Amirah Godoy  MRN: [id number]  Today's Date: 1/11/2018    Admit Date: 1/5/2018          Discharge Plan       01/11/18 1133    Case Management/Social Work Plan    Plan Home    Additional Comments CCP notified Myra Pinoon YORDAN about Xolair medication and appeal if MD requests. CCP also requested RX for outpt setup. Lillian SANCHEZ working on arranging outpt infusions. Ryan HYLTON/CCP              Discharge Codes     None            Fátima Katz, RN

## 2018-01-11 NOTE — PAYOR COMM NOTE
"Amirah Godoy (47 y.o. Female)     PLEASE SEE ATTACHED CLINICAL REVIEW. PT. WAS ADMITTED TO [facility] ON 18 TO A MONITORED TELEM FLOOR.    REF#[id number]    PLEASE CALL   OR  [phone] WITH  INPT AUTH AND DAYS APPROVED.     THANK YOU    APOLLO VAUGHAN LPN, CCP    Date of Birth Social Security Number Address Home Phone MRN    1970  [address] [place] [address] [phone] [id number]    [employer] Marital Status          Syn[employer]        Admission Date Admission Type Admitting Provider Attending Provider Department, Room/Bed    18 Urgent Sam Putnam MD [place], Dejuan Mcmahan MD [address], 4/1    Discharge Date Discharge Disposition Discharge Destination                      Attending Provider: Dejuan Spencer MD     Allergies:  Demerol [Meperidine], Sulfa Antibiotics    Isolation:  Droplet   Infection:  Influenza (18)   Code Status:  FULL    Ht:  172.7 cm (68\")   Wt:  96.4 kg (212 lb 9.6 oz)    Admission Cmt:  ANTHEM BLUE CROSS   Principal Problem:  Asthma with exacerbation [J45.901]                 Active Insurance as of 2018     Primary Coverage     Payor Plan Insurance Group Employer/Plan Group    ANTHEM BLUE CROSS ANTH BLUE CROSS BLUE [facility] PPO [id number]     Payor Plan Address Payor Plan Phone Number Effective From Effective To    [address] [phone] 2018     [place] [address]       Subscriber Name Subscriber Birth Date Member ID       AMIRAH GODOY 1970 [id number]                 Emergency Contacts      (Rel.) Home Phone Work Phone Mobile Phone    Zeke Godoy (Spouse) [phone] -- [phone]               History & Physical      Sam Putnam MD at 2018 12:35 PM                              [facility]    Patient Identification:  Name: Amirah Godoy  Age: 47 y.o.  Sex: female  :  1970  MRN: [id number]       "   Primary Care Physician: Nadeem Kwan Jr., MD    Chief complaint:  Asthma exacerbation    Subjective   Patient is a 47 y.o. female has been treated off and on for asthma flareup for the past month or so. She usually gets symptoms every year around this time. This time she has had a cough, nonproductive, shortness of breath, wheezing. She is very weak and has no energy. She is not getting better and saw her primary care physician and was directly admitted. She just completed a round of Augmentin. Received steroid injection a couple days ago. She states breathing treatments are not helping. About a week or so ago she had flulike symptoms. Aching off. Her  also was ill with the same illness but is improved and better however she is not. Her appetite is okay and she was tolerating by mouth. She has not had any weight loss. She states she may have gained some weight from steroids. She has had asthma since a child. She has seasonal allergies. She had a right ovarian cyst removed a couple months ago and she thinks she was starting to get sick around that time.    Past Medical History:   Diagnosis Date   • Allergic    • Asthma    • Depression    • History of atrial fibrillation     NO CURRENT MEDICATION   • PVC (premature ventricular contraction)    • Seasonal allergies    • Sinus infection     STARTED ON ANTIBIOTICS ON 10/30/17   • SVT (supraventricular tachycardia)     NO CURRENT MEDICATIONS     Past Surgical History:   Procedure Laterality Date   • ASD AND VSD REPAIR     • CARDIAC ABLATION      DR. NADEEM SCHUMACHER --   •  SECTION  2000   • DIAGNOSTIC LAPAROSCOPY Right 11/3/2017    Procedure: LAPAROSCOPIC RIGHT OOPHERECTOMY ;  Surgeon: Claudine Barron MD;  Location: [facility];  Service:    • OVARIAN CYST REMOVAL  2017   • WISDOM TOOTH EXTRACTION       Current medications reviewed.    Family History   Problem Relation Age of Onset   • Depression Mother    • Diabetes Mother     • Heart disease Mother      +of mi at 68   • Hyperlipidemia Mother    • Hypertension Mother    • Heart disease Father      ptca x 5 and mi at 59   • Hyperlipidemia Father    • Hypertension Father    • Kidney disease Brother    • Birth defects Maternal Grandmother    • Heart disease Maternal Grandmother      multiple mi   • Birth defects Maternal Grandfather    • Heart disease Maternal Grandfather      + mi at 68   • Colon cancer Maternal Grandfather    • Heart disease Paternal Grandmother      weak heart   • Asthma Paternal Grandmother    • COPD Paternal Grandmother    • Kidney failure Paternal Grandfather    • Malig Hyperthermia Neg Hx      Social History   Substance Use Topics   • Smoking status: Never Smoker   • Smokeless tobacco: Never Used   • Alcohol use No     Review of Systems   Constitutional: Positive for activity change. Negative for appetite change and unexpected weight change.   HENT: Negative.    Eyes: Negative.    Respiratory: Positive for cough, shortness of breath and wheezing.    Cardiovascular: Negative.    Gastrointestinal: Negative.  Negative for diarrhea, nausea and vomiting.   Endocrine: Negative.    Genitourinary: Negative.    Musculoskeletal: Negative.    Skin: Negative.    Allergic/Immunologic: Negative.    Neurological: Positive for weakness (generalized).   Hematological: Negative.    Psychiatric/Behavioral: Negative.       Objective      Vital Signs  Temp:  [97.9 °F (36.6 °C)] 97.9 °F (36.6 °C)  Heart Rate:  [96] 96  Resp:  [16] 16  BP: (137)/(96) 137/96  Body mass index is 30.53 kg/(m^2).    Physical Exam   Constitutional: She is oriented to person, place, and time. She appears well-developed and well-nourished.  Non-toxic appearance. She does not have a sickly appearance. She does not appear ill. No distress.   HENT:   Head: Normocephalic and atraumatic.   Eyes: Conjunctivae and EOM are normal.   Neck: Neck supple. No tracheal deviation present.   Cardiovascular: Normal rate, regular  rhythm and intact distal pulses.    Pulses:       Radial pulses are 2+ on the right side, and 2+ on the left side.        Dorsalis pedis pulses are 2+ on the right side, and 2+ on the left side.   Pulmonary/Chest: Effort normal. No respiratory distress. She has wheezes. She has rhonchi. She has no rales.   Abdominal: Soft. She exhibits no distension. There is no tenderness. There is no rebound and no guarding.   Musculoskeletal: She exhibits no edema.   Lymphadenopathy:     She has no cervical adenopathy.   Neurological: She is alert and oriented to person, place, and time.   Skin: Skin is warm and dry.   Psychiatric: She has a normal mood and affect. Her behavior is normal.     Assessment/Plan   Active Hospital Problems (** Indicates Principal Problem)    Diagnosis Date Noted   • **Asthma with exacerbation [J45.901] 2018   • Cough [R05] 2018   • Weakness [R53.1] 2018   • History of atrial fibrillation [Z86.79] 2011      Resolved Hospital Problems    Diagnosis Date Noted Date Resolved   No resolved problems to display.     · 47 y.o. female with asthma exacerbation  · IV steroids, nebulized breathing treatments  · Check respiratory panel, chest x-ray, labs  · She just completed Augmentin  · Discussed with patient's primary care physician Dr. Kwan, will obtain pulmonology evaluation  · Lovenox for DVT prophylaxis  · PT  ·  is healthcare surrogate  · Discussed findings and recommendations with patient/. They were given the opportunity to ask questions. Further management as patient's clinical course evolves.    Sam Putnam MD  18  12:48 PM       Electronically signed by Sam Putnam MD at 2018 12:50 PM             Physician Progress Notes (all)      Sam Putnam MD at 2018  4:44 PM  Version 1 of 1                             [facility]     LOS: 1 day     Name: Amirah Godoy  Age: 47 y.o.  Sex: female  :   1970  MRN: [id number]         Primary Care Physician: Ruben Kwan Jr., MD    Diet Regular    Subjective   Feels a little better. Still with cough, non-productive. Trouble sleeping on steroids.    Objective   Temp:  [98.1 °F (36.7 °C)-99.8 °F (37.7 °C)] 99.8 °F (37.7 °C)  Heart Rate:  [] 98  Resp:  [16-18] 16  BP: (127-133)/(69-81) 129/77  SpO2:  [90 %-97 %] 94 %  on  Flow (L/min):  [1] 1;   O2 Device: room air  Body mass index is 31.02 kg/(m^2).    Physical Exam   Constitutional: She is oriented to person, place, and time. No distress.   Cardiovascular: Normal rate and regular rhythm.    Pulmonary/Chest: Effort normal. No respiratory distress. She has decreased breath sounds. She has wheezes.   Abdominal: Soft. There is no tenderness.   Neurological: She is alert and oriented to person, place, and time.     Reviewed medications and new clinical results    budesonide 0.5 mg Nebulization BID - RT   cetirizine 10 mg Oral Daily   citalopram 40 mg Oral Daily   enoxaparin 40 mg Subcutaneous Q24H   ipratropium-albuterol 3 mL Nebulization Q4H - RT   methylPREDNISolone sodium succinate 60 mg Intravenous Q8H   montelukast 10 mg Oral Daily   oseltamivir 75 mg Oral Q12H     Pharmacy to Dose enoxaparin (LOVENOX)     sodium chloride 75 mL/hr Last Rate: 75 mL/hr (01/06/18 1555)     Results from last 7 days  Lab Units 01/06/18  0636 01/05/18  1317   WBC 10*3/mm3 11.95* 8.79   HEMOGLOBIN g/dL 12.8 13.7   PLATELETS 10*3/mm3 296 310     Results from last 7 days  Lab Units 01/06/18  0636 01/05/18  1317   SODIUM mmol/L 135* 140   POTASSIUM mmol/L 4.0 3.9   CHLORIDE mmol/L 100 104   CO2 mmol/L 24.0 27.2   BUN mg/dL 11 11   CREATININE mg/dL 0.64 0.75   CALCIUM mg/dL 8.3* 8.6   GLUCOSE mg/dL 167* 99     Lab Results   Component Value Date    ANIONGAP 11.0 01/06/2018     Estimated Creatinine Clearance: 126.4 mL/min (by C-G formula based on Cr of 0.64).    Assessment/Plan   Active Hospital Problems (** Indicates Principal  Problem)    Diagnosis Date Noted   • **Asthma with exacerbation [J45.901] 01/05/2018   • Cough [R05] 01/05/2018   • Weakness [R53.1] 01/05/2018   • History of atrial fibrillation [Z86.79] 01/01/2011      Resolved Hospital Problems    Diagnosis Date Noted Date Resolved   No resolved problems to display.     · Appreciate pulm  · Supportive care  · Steroids, nebs  · ambien for sleep  · Hyperglycemia, leukocytosis from steroids    Sam Putnam MD   01/06/18  4:44 PM       Electronically signed by Sam Putnam MD at 1/6/2018  4:52 PM      Scott Dobson MD at 1/6/2018  7:00 PM  Version 1 of 1           PROGRESS NOTE   LOS: 1 day   Patient Care Team:  Ruben Kwan Jr., MD as PCP - General (Family Medicine)    Chief Complaint: History of asthma with worsening symptom     Interval History: Admitted with asthma exacerbation, turn out to have positive influenza infection.  Patient is on nebulized medication he is on IV steroid he is on Tamiflu and is on oxygen with frequent bronchodilator therapy.  No suspected fever with earlier low-grade temperatures.  Compensating on room air at this point    REVIEW OF SYSTEMS:   CARDIOVASCULAR: No chest pain, chest pressure or chest discomfort. No palpitations or edema.   RESPIRATORY: Wheezing and chest tightness and shortness of breath but able to come off the oxygen.   GASTROINTESTINAL: No anorexia, nausea, vomiting or diarrhea. No abdominal pain or blood.   HEMATOLOGIC: No bleeding or bruising.     Ventilator/Non-Invasive Ventilation Settings     None            Vital Signs  Temp:  [98.1 °F (36.7 °C)-99.8 °F (37.7 °C)] 98.6 °F (37 °C)  Heart Rate:  [] 98  Resp:  [16-18] 16  BP: (127-131)/(69-81) 129/77  SpO2:  [90 %-97 %] 94 %  on  Flow (L/min):  [1] 1 O2 Device: room air    Intake/Output Summary (Last 24 hours) at 01/06/18 1900  Last data filed at 01/06/18 1739   Gross per 24 hour   Intake             1950 ml   Output                0 ml   Net             1950 ml  "    Flowsheet Rows         First Filed Value    Admission Height  172.7 cm (68\") Documented at 01/05/2018 1215    Admission Weight  91.1 kg (200 lb 12.8 oz) Documented at 01/05/2018 1215        Body mass index is 31.02 kg/(m^2).  Last 3 weights    01/05/18  1215 01/06/18  0500   Weight: 91.1 kg (200 lb 12.8 oz) 92.5 kg (204 lb)       Physical Exam:  GEN:  No acute distress, alert, cooperative, well developed   EYES:   Sclera clear. No icterus. PERRL. Normal EOM  ENT:   External ears/nose normal, no oral lesions, no thrush, mucous membranes moist  NECK:  Supple, midline trachea, no JVD  LUNGS: Normal chest on inspection, Significant wheezing with prolongation of the expiratory phase and loud rhonchi with no crackles.   CV:  Regular rhythm and rate. Normal S1/S2. No murmurs, gallops, or rubs noted.  ABD:  Soft, non-tender and non-distended. Normal bowel sounds. No guarding  EXT:  Moves all extremities well. No cyanosis. No redness. No edema.   Skin: dry, intact, no bleeding  Imaging:   Imaging Results (all)     Procedure Component Value Units Date/Time    XR Chest PA & Lateral [[id number]] Collected:  01/05/18 1558     Updated:  01/05/18 1611    Narrative:       PA AND LATERAL CHEST     HISTORY: Wheezing and shortness of air.     COMPARISON: CT angiogram of chest 07/20/2016. There are no previous  chest x-rays for comparison.     FINDINGS: Cardiomediastinal silhouette is within normal limits. There is  a calcified granuloma in left lower lobe. Calcified left hilar lymph  nodes are present. There is no evidence for pulmonary edema, pleural  effusion or infiltrate.       Impression:       No acute disease.      This report was finalized on 1/5/2018 4:08 PM by Dr. Hmianshu Penaloza MD.          I reviewed the patient's new clinical results.  I personally viewed and interpreted the patient's imaging results:See is no cardiac megaly and no pleural effusion no masses.  Calcified left hilar granuloma        Medication Review: "     budesonide 0.5 mg Nebulization BID - RT   cetirizine 10 mg Oral Daily   citalopram 40 mg Oral Daily   enoxaparin 40 mg Subcutaneous Q24H   ipratropium-albuterol 3 mL Nebulization Q4H - RT   methylPREDNISolone sodium succinate 60 mg Intravenous Q8H   montelukast 10 mg Oral Daily   oseltamivir 75 mg Oral Q12H         Pharmacy to Dose enoxaparin (LOVENOX)        ASSESSMENT:   6. Severe persistent asthma with acute exacerbation  7. Influenza A H3 infection  8. Acute hypoxemic respiratory failure, improved, currently on room air    PLAN:  Decrease IV steroid frequency but continue with the 60 mg every 12, continue Tamiflu continue bronchodilator, we'll consider further stepdown on the steroid and possible oral agents tomorrow if she continues to improve.  Patient is better she is still wheezy and she is still having tightness but she was able to come off the oxygen which is a good sign  Discussed with     Disposition:     Scott Dobson MD  18  7:00 PM        EMR Dragon/Transcription:   Much of this encounter note is an electronic transcription/translation of spoken language to printed text. The electronic translation of spoken language may permit erroneous, or at times, nonsensical words or phrases to be inadvertently transcribed; Although I have reviewed the note for such errors, some may still exist.      Electronically signed by Scott Dobson MD at 2018  7:11 PM      Sam Putnam MD at 2018  2:22 PM  Version 1 of 1                             [facility]     LOS: 2 days     Name: Amirah Godoy  Age: 47 y.o.  Sex: female  :  1970  MRN: [id number]         Primary Care Physician: Ruben Kwan Jr., MD    Diet Regular    Subjective   More SOA and tachycardic.    Objective   Temp:  [98.2 °F (36.8 °C)-99.1 °F (37.3 °C)] 98.2 °F (36.8 °C)  Heart Rate:  [] 110  Resp:  [16-20] 20  BP: (128-136)/(72-92) 128/72  SpO2:  [93 %-97 %] 94 %  on   ;   O2  Device: room air  Body mass index is 31.08 kg/(m^2).    Physical Exam   Constitutional: She is oriented to person, place, and time. No distress.   Cardiovascular: Normal rate and regular rhythm.    Pulmonary/Chest: Effort normal. No respiratory distress. She has decreased breath sounds. She has wheezes.   Abdominal: Soft. There is no tenderness.   Musculoskeletal: She exhibits no edema.   Neurological: She is alert and oriented to person, place, and time.   Skin: Skin is warm and dry.     Reviewed medications and new clinical results    budesonide 0.5 mg Nebulization BID - RT   cetirizine 10 mg Oral Daily   citalopram 40 mg Oral Daily   enoxaparin 40 mg Subcutaneous Q24H   ipratropium-albuterol 3 mL Nebulization Q4H - RT   methylPREDNISolone sodium succinate 60 mg Intravenous Q12H   montelukast 10 mg Oral Daily   oseltamivir 75 mg Oral Q12H   sodium chloride 4 mL Nebulization BID - RT       Pharmacy to Dose enoxaparin (LOVENOX)        Assessment/Plan   Active Hospital Problems (** Indicates Principal Problem)    Diagnosis Date Noted   • **Asthma with exacerbation [J45.901] 01/05/2018   • Tachycardia [R00.0] 01/07/2018   • Cough [R05] 01/05/2018   • Weakness [R53.1] 01/05/2018   • History of atrial fibrillation [Z86.79] 01/01/2011      Resolved Hospital Problems    Diagnosis Date Noted Date Resolved   No resolved problems to display.     · Supportive care  · Steroids, nebs  · Extra one time solumedrol  · CTA chest. She is on lovenox for DVT prophylaxis   · Tachycardia probably due to pulm issues  · Hyperglycemia, leukocytosis from steroids    Sam Putnam MD   01/07/18  2:30 PM       Electronically signed by Sam Putnam MD at 1/7/2018  2:30 PM      Scott Dobson MD at 1/7/2018  8:46 PM  Version 1 of 1           PROGRESS NOTE   LOS: 2 days   Patient Care Team:  Ruben Kwan Jr., MD as PCP - General (Family Medicine)    Chief Complaint: History of asthma with worsening symptom     Interval History: Admitted  "with asthma exacerbation, turn out to have positive influenza infection.  Patient is on nebulized medication he is on IV steroid he is on Tamiflu and is on oxygen with frequent bronchodilator therapy.  She feels slightly worse compared to yesterday with significant tightness and wheezing and cough.  No associated fever or chills there is no hemoptysis or significant productive secretions.    REVIEW OF SYSTEMS:   CARDIOVASCULAR: No chest pain, chest pressure or chest discomfort. No palpitations or edema.   RESPIRATORY: Wheezing and chest tightness and shortness of breath but able to come off the oxygen.   GASTROINTESTINAL: No anorexia, nausea, vomiting or diarrhea. No abdominal pain or blood.   HEMATOLOGIC: No bleeding or bruising.     Ventilator/Non-Invasive Ventilation Settings     None            Vital Signs  Temp:  [98.2 °F (36.8 °C)-99.1 °F (37.3 °C)] 98.2 °F (36.8 °C)  Heart Rate:  [] 106  Resp:  [16-20] 20  BP: (128-136)/(72-92) 134/78  SpO2:  [93 %-96 %] 95 %  on    O2 Device: room air  No intake or output data in the 24 hours ending 01/07/18 2046  Flowsheet Rows         First Filed Value    Admission Height  172.7 cm (68\") Documented at 01/05/2018 1215    Admission Weight  91.1 kg (200 lb 12.8 oz) Documented at 01/05/2018 1215        Body mass index is 31.08 kg/(m^2).  Last 3 weights    01/05/18  1215 01/06/18  0500 01/07/18  0500   Weight: 91.1 kg (200 lb 12.8 oz) 92.5 kg (204 lb) 92.7 kg (204 lb 6.4 oz)       Physical Exam:  GEN:  No acute distress, alert, cooperative, well developed   EYES:   Sclera clear. No icterus. PERRL. Normal EOM  ENT:   External ears/nose normal, no oral lesions, no thrush, mucous membranes moist  NECK:  Supple, midline trachea, no JVD  LUNGS: Normal chest on inspection, Significant wheezing with prolongation of the expiratory phase and loud rhonchi with no crackles.   CV:  Regular rhythm and rate. Normal S1/S2. No murmurs, gallops, or rubs noted.  ABD:  Soft, non-tender and " non-distended. Normal bowel sounds. No guarding  EXT:  Moves all extremities well. No cyanosis. No redness. No edema.   Skin: dry, intact, no bleeding    I reviewed the patient's new clinical results.  I personally viewed and interpreted the patient's imaging results:See is no cardiac megaly and no pleural effusion no masses.  Calcified left hilar granuloma        Medication Review:     budesonide 0.5 mg Nebulization BID - RT   cetirizine 10 mg Oral Daily   citalopram 40 mg Oral Daily   enoxaparin 40 mg Subcutaneous Q24H   ipratropium-albuterol 3 mL Nebulization Q4H - RT   methylPREDNISolone sodium succinate 60 mg Intravenous Q12H   montelukast 10 mg Oral Daily   oseltamivir 75 mg Oral Q12H   sodium chloride 4 mL Nebulization BID - RT         Pharmacy to Dose enoxaparin (LOVENOX)        ASSESSMENT:   15. Severe persistent asthma with acute exacerbation  16. Influenza A H3 infection  17. Acute hypoxemic respiratory failure, improved, currently on room air    PLAN:  Given the lack of any improvement with actually subjective worsening I will hold on any further taper on the steroids today and we will recheck another x-ray in the morning to rule out any superimposed infection.  We'll also check a pro-calcitonin level to further assess any superimposed bacterial process.  Meanwhile continue with the bronchodilator and the with the steroid and we'll reassess in the morning for further recommendation    Disposition: Home once on oral regimen with ongoing constant improvement    Scott Dobson MD  01/07/18  8:46 PM        EMR Dragon/Transcription:   Much of this encounter note is an electronic transcription/translation of spoken language to printed text. The electronic translation of spoken language may permit erroneous, or at times, nonsensical words or phrases to be inadvertently transcribed; Although I have reviewed the note for such errors, some may still exist.      Electronically signed by Scott Dobson MD at  2018  8:51 PM      Sam Putnam MD at 2018  3:18 PM  Version 1 of 1                             [facility]     LOS: 3 days     Name: Amirah Godoy  Age: 47 y.o.  Sex: female  :  1970  MRN: [id number]         Primary Care Physician: Ruben Kwan Jr., MD    Diet Regular    Subjective   Feels about the same today.    Objective   Temp:  [97.7 °F (36.5 °C)-99.2 °F (37.3 °C)] 99.2 °F (37.3 °C)  Heart Rate:  [] 93  Resp:  [16-20] 16  BP: (127-145)/(78-82) 135/82  SpO2:  [91 %-96 %] 96 %  on   ;   O2 Device: room air  Body mass index is 31.08 kg/(m^2).    Physical Exam   Constitutional: She is oriented to person, place, and time. No distress.   Cardiovascular: Normal rate and regular rhythm.    Pulmonary/Chest: Effort normal. No respiratory distress. She has decreased breath sounds. She has wheezes. She has rhonchi.   Abdominal: Soft. There is no tenderness.   Musculoskeletal: She exhibits no edema.   Neurological: She is alert and oriented to person, place, and time.   Skin: Skin is warm and dry.     Reviewed medications and new clinical results    budesonide 0.5 mg Nebulization BID - RT   cetirizine 10 mg Oral Daily   citalopram 40 mg Oral Daily   enoxaparin 40 mg Subcutaneous Q24H   ipratropium-albuterol 3 mL Nebulization Q4H - RT   methylPREDNISolone sodium succinate 60 mg Intravenous Q12H   montelukast 10 mg Oral Daily   oseltamivir 75 mg Oral Q12H   sodium chloride 4 mL Nebulization BID - RT       Pharmacy to Dose enoxaparin (LOVENOX)        Results from last 7 days  Lab Units 18  0634 18  0636 18  1317   WBC 10*3/mm3 23.09* 11.95* 8.79   HEMOGLOBIN g/dL 12.8 12.8 13.7   PLATELETS 10*3/mm3 376 296 310       Results from last 7 days  Lab Units 18  0634 18  0636 18  1317   SODIUM mmol/L 139 135* 140   POTASSIUM mmol/L 4.0 4.0 3.9   CHLORIDE mmol/L 101 100 104   CO2 mmol/L 21.5* 24.0 27.2   BUN mg/dL 18 11 11    CREATININE mg/dL 0.65 0.64 0.75   CALCIUM mg/dL 8.6 8.3* 8.6   GLUCOSE mg/dL 115* 167* 99     Lab Results   Component Value Date    ANIONGAP 16.5 01/08/2018     Estimated Creatinine Clearance: 124.6 mL/min (by C-G formula based on Cr of 0.65).    Assessment/Plan   Active Hospital Problems (** Indicates Principal Problem)    Diagnosis Date Noted   • **Asthma with exacerbation [J45.901] 01/05/2018   • Tachycardia [R00.0] 01/07/2018   • Cough [R05] 01/05/2018   • Weakness [R53.1] 01/05/2018   • History of atrial fibrillation [Z86.79] 01/01/2011      Resolved Hospital Problems    Diagnosis Date Noted Date Resolved   No resolved problems to display.     · Supportive care  · Steroids, nebs  · CTA chest no PE.  · Hyperglycemia, leukocytosis from steroids    Sam Putnam MD   01/08/18  3:18 PM       Electronically signed by Sam Putnam MD at 1/8/2018  3:21 PM      Scott Dobson MD at 1/8/2018  5:21 PM  Version 1 of 1           PROGRESS NOTE   LOS: 3 days   Patient Care Team:  Ruben Kwan Jr., MD as PCP - General (Family Medicine)    Chief Complaint: History of asthma with worsening symptom     Interval History: Admitted on 1/5/18 with complaints of worsening shortness of breath and persistent cough and symptoms over the past month or so. She was found to have an asthma exacerbation with positive influenza infection. Procalcitonin was negative. She was treated with bronchodilators, Tamiflu and steroids. She was put on nebulized form of home inhaler therapy.  She does have agitation and anxiety that is worse on the steroid and was given some xanax to help with symptoms. She did have worsening cough and was using Tussionex.     REVIEW OF SYSTEMS:   CARDIOVASCULAR: No chest pain, chest pressure. Palpitations with increased HR today. no edema.   RESPIRATORY: Wheezing and chest tightness. Shortness of breath with dyspnea on minimal exertion. Tolerating without oxygen. Cough with no sputum production  "  GASTROINTESTINAL: No anorexia, nausea, vomiting or diarrhea. No abdominal pain or blood.   HEMATOLOGIC: No bleeding or bruising.   Increased anxiety and agitation, felt xanax knocked her out.    Ventilator/Non-Invasive Ventilation Settings     None        Vital Signs  Temp:  [97.2 °F (36.2 °C)-99.2 °F (37.3 °C)] 97.2 °F (36.2 °C)  Heart Rate:  [] 108  Resp:  [16-20] 20  BP: (127-145)/(79-90) 142/90  SpO2:  [91 %-98 %] 98 %  on    O2 Device: room air    Intake/Output Summary (Last 24 hours) at 01/08/18 2025  Last data filed at 01/08/18 1822   Gross per 24 hour   Intake              240 ml   Output                0 ml   Net              240 ml     Flowsheet Rows         First Filed Value    Admission Height  172.7 cm (68\") Documented at 01/05/2018 1215    Admission Weight  91.1 kg (200 lb 12.8 oz) Documented at 01/05/2018 1215        Body mass index is 31.08 kg/(m^2).  Last 3 weights    01/05/18  1215 01/06/18  0500 01/07/18  0500   Weight: 91.1 kg (200 lb 12.8 oz) 92.5 kg (204 lb) 92.7 kg (204 lb 6.4 oz)       Physical Exam:  GEN:  No acute distress, alert, cooperative, well developed, on room air   EYES:   Sclera clear. No icterus. PERRL.  ENT:   External ears/nose normal, no oral lesions, no thrush, mucous membranes moist  NECK:  Supple, midline trachea, no JVD  LUNGS: Normal chest on inspection, Significant wheezing with prolongation of the expiratory phase and loud rhonchi with no crackles.   CV:  Regular rhythm and slightly tachycardic. Normal S1/S2. No murmurs, gallops, or rubs noted.  ABD:  Soft, non-tender and non-distended. Normal bowel sounds. No guarding  EXT:  Moves all extremities well. No cyanosis. No redness. No edema.   Skin: dry, intact, no bleeding    Updated:  01/07/18 8381    Narrative:       CT ANGIOGRAM CHEST W WO CONTRAST-     INDICATIONS: Short of breath, tachycardia, possible pulmonary embolism.   Radiation dose reduction techniques were utilized, including automated  exposure control " and exposure modulation based on body size.     TECHNIQUE: CT ANGIOGRAPHY OF THE CHEST WITH THREE-DIMENSIONAL  RECONSTRUCTIONS.     COMPARISON: 07/28/2016     FINDINGS:        No pulmonary embolism. No aortic dissection.           The heart size is normal without pericardial effusion. A few small  subcentimeter short axis mediastinal lymph nodes are seen that are not  significant by size criteria.     The airways appear clear.     No pleural effusion or pneumothorax.     The lungs show no focal pulmonary consolidation or mass. Old  granulomatous disease is present. Linear likely atelectasis or scar in  the right lower lobe.     Upper abdominal structures show indeterminate liver lesion at the  hepatic dome, 1.9 cm on image 114 of series 2, CT density of 46,  indeterminate on the basis of this exam, but does not appear  significantly changed.        No acute fracture is identified.       Impression:             1. No pulmonary embolism. Small likely atelectasis or scar in the right  lower lobe.  2. Chronic liver lesion, does not appear significantly changed.     This report was finalized on 1/7/2018 4:38 PM by Dr. Silvano Otoole MD.       XR Chest 1 View [[id number]] Collected:  01/08/18 0817     Updated:  01/08/18 1127    Narrative:       CLINICAL HISTORY: 47-year-old female follow-up respiratory failure with  history of asthma. Influenza.     PORTABLE AP SEMIERECT CHEST DATED 01/08/2018 AT 0604 HOURS     FINDINGS: When compared to the recent prior PA and lateral chest  radiographs of 01/05/2018 and CT chest exam of 01/07/2018, the lungs  remain well expanded and apparently free of dense collapse or  consolidation. The degree of expansion of the lungs is slightly  diminished compared to the CT chest exam and there may be a trace of  strandy atelectasis in the left lower lung zone on the current exam. The  costophrenic angles are sharp. The heart remains top normal caliber. No  pneumothorax or acute change in  bony thorax is seen. Monitoring lead  wires are present.     CONCLUSION: Minimally diminished expansion of the lungs with trace  strandy atelectasis in the left lower lung zone. No dense segmental or  lobar collapse or consolidation is seen in the lungs. Follow-up  recommended.     Thi            I reviewed the patient's new clinical results.  I personally viewed and interpreted the patient's imaging results:See is no cardiac megaly and no pleural effusion no masses.  Calcified left hilar granuloma        Medication Review:     budesonide 0.5 mg Nebulization BID - RT   cetirizine 10 mg Oral Daily   citalopram 40 mg Oral Daily   enoxaparin 40 mg Subcutaneous Q24H   ipratropium-albuterol 3 mL Nebulization Q4H - RT   methylPREDNISolone sodium succinate 60 mg Intravenous Q12H   montelukast 10 mg Oral Daily   oseltamivir 75 mg Oral Q12H   sodium chloride 4 mL Nebulization BID - RT         Pharmacy to Dose enoxaparin (LOVENOX)        ASSESSMENT:   22. Severe persistent asthma with acute exacerbation  23. Influenza A H3 infection  24. Acute hypoxemic respiratory failure, improved, currently on room air  25. Leukocytosis likely steroid induced    PLAN:  Given the lack of any improvement with actually subjective worsening I will hold on any further taper on the steroids today.  CT had showed a small area of atelectasis in RLLL and todays CXR showed some small areas of developed atelectasis in LLL.   WBC was elevated today compared to yesterday and may be steroid induced as procal was negative. However will continue to monitor and consider repeat CXR/procal if any worsening symptoms  Ok to shower  Continue PRN Xanax and encouraged to try just 1/2 pill to see if less sedating.   Encouraged pulmonary hygiene measures with TCDB, flutter, bronchodilators and incentive spirometer.  Encouraged patient to use cough syrup elizabeth at night so she can rest.   On Tamiflu for a few more days, but added tylenol to help with aches and  pains  Continue steroids at same dose today and consider weaning as improves    Disposition: Home once on oral regimen with ongoing constant improvement    Scott Dobson MD  01/08/18  8:25 PM        EMR Dragon/Transcription:   Much of this encounter note is an electronic transcription/translation of spoken language to printed text. The electronic translation of spoken language may permit erroneous, or at times, nonsensical words or phrases to be inadvertently transcribed; Although I have reviewed the note for such errors, some may still exist.      Electronically signed by Scott Dobson MD at 1/8/2018  8:35 PM      Scott Dobson MD at 1/9/2018  9:43 AM  Version 1 of 1           PROGRESS NOTE   LOS: 4 days   Patient Care Team:  Ruben Kwan Jr., MD as PCP - General (Family Medicine)    Chief Complaint: History of asthma with worsening symptom     Interval History: Admitted on 1/5/18 with complaints of worsening shortness of breath and persistent cough and symptoms over the past month or so. She was found to have an asthma exacerbation with positive influenza infection. Procalcitonin was negative. She was treated with bronchodilators, Tamiflu and steroids. She was put on nebulized form of home inhaler therapy.  She does have agitation and anxiety that is worse on the steroid and was given some xanax to help with symptoms. She did have worsening cough and was using Tussionex. IgE level was elevated and may benefit from Xolair treatments as outpatient.     REVIEW OF SYSTEMS:   CARDIOVASCULAR: No chest pain, chest pressure. Palpitations with increased HR. no edema.   RESPIRATORY: Wheezing and chest tightness improved. Shortness of breath with dyspnea on minimal exertion. Tolerating without oxygen. Cough with no sputum production- better  GASTROINTESTINAL: No anorexia, nausea, vomiting or diarrhea. No abdominal pain or blood.   HEMATOLOGIC: No bleeding or bruising.   Increased anxiety and agitation. Insomnia, but was  "able to finally go to sleep at 2 am     Ventilator/Non-Invasive Ventilation Settings     None        Vital Signs  Temp:  [97.2 °F (36.2 °C)-98.8 °F (37.1 °C)] 98.8 °F (37.1 °C)  Heart Rate:  [] 87  Resp:  [16-20] 18  BP: (138-157)/(75-97) 146/96  SpO2:  [92 %-98 %] 93 %  on    O2 Device: room air    Intake/Output Summary (Last 24 hours) at 01/09/18 1956  Last data filed at 01/09/18 1337   Gross per 24 hour   Intake              480 ml   Output                0 ml   Net              480 ml     Flowsheet Rows         First Filed Value    Admission Height  172.7 cm (68\") Documented at 01/05/2018 1215    Admission Weight  91.1 kg (200 lb 12.8 oz) Documented at 01/05/2018 1215        Body mass index is 32.65 kg/(m^2).  Last 3 weights    01/06/18  0500 01/07/18  0500 01/09/18  0558   Weight: 92.5 kg (204 lb) 92.7 kg (204 lb 6.4 oz) 97.4 kg (214 lb 11.2 oz)       Physical Exam:  GEN:  No acute distress, alert, cooperative, well developed, on room air   EYES:   Sclera clear. No icterus. PERRL.  ENT:   External ears/nose normal, no oral lesions, no thrush, mucous membranes moist  NECK:  Supple, midline trachea, no JVD  LUNGS: Normal chest on inspection, no wheezes with improved elongation of expiratory phase, loud coarse rhonchi no crackles.   CV:  Regular rhythm and slightly tachycardic. Normal S1/S2. No murmurs, gallops, or rubs noted.  ABD:  Soft, non-tender and non-distended. Normal bowel sounds. No guarding  EXT:  Moves all extremities well. No cyanosis. No redness. No edema.   Skin: dry, intact, no bleeding    Results Review:        Results from last 7 days  Lab Units 01/09/18  0539 01/08/18  0634 01/06/18  0636 01/05/18  1317   SODIUM mmol/L 140 139 135* 140   POTASSIUM mmol/L 4.0 4.0 4.0 3.9   CHLORIDE mmol/L 102 101 100 104   CO2 mmol/L 21.8* 21.5* 24.0 27.2   BUN mg/dL 20 18 11 11   CREATININE mg/dL 0.73 0.65 0.64 0.75   CALCIUM mg/dL 8.0* 8.6 8.3* 8.6   AST (SGOT) U/L  --   --   --  14   ALT (SGPT) U/L  --   " --   --  13   ANION GAP mmol/L 16.2 16.5 11.0 8.8   ALBUMIN g/dL  --   --   --  3.70   Results for STANFORD IYER (MRN [id number]) as of 1/9/2018 19:57   Ref. Range 1/5/2018 17:15   IgE Latest Ref Range: 0 - 100 IU/mL 213 (H)       Results from last 7 days  Lab Units 01/08/18  0000 01/07/18  1748   TROPONIN T ng/mL <0.010 <0.010               Results from last 7 days  Lab Units 01/09/18  0539 01/08/18  0634 01/06/18  0636 01/05/18  1317   WBC 10*3/mm3 18.07* 23.09* 11.95* 8.79   HEMOGLOBIN g/dL 12.8 12.8 12.8 13.7   HEMATOCRIT % 39.2 38.2 38.7 40.6   PLATELETS 10*3/mm3 330 376 296 310   MCV fL 95.8 93.9 94.2 92.9   NEUTROPHIL % %  --   --   --  69.1   LYMPHOCYTE % %  --   --   --  22.9   MONOCYTES % %  --   --   --  5.5   EOSINOPHIL % %  --   --   --  1.4   BASOPHIL % %  --   --   --  0.3   IMM GRAN % %  --   --   --  0.8*                       Results from last 7 days  Lab Units 01/06/18  1724   HEMOGLOBIN A1C % 5.10     No results found for: POCGLU    Results from last 7 days  Lab Units 01/08/18  1704 01/05/18  1317   PROCALCITONIN ng/mL 0.03*  --    LACTATE mmol/L  --  1.3               I reviewed the patient's new clinical results.  I personally viewed and interpreted the patient's imaging results:See is no cardiac megaly and no pleural effusion no masses.  Calcified left hilar granuloma        Medication Review:     budesonide 0.5 mg Nebulization BID - RT   cetirizine 10 mg Oral Daily   citalopram 40 mg Oral Daily   enoxaparin 40 mg Subcutaneous Q24H   ipratropium-albuterol 3 mL Nebulization Q4H - RT   methylPREDNISolone sodium succinate 40 mg Intravenous Q12H   montelukast 10 mg Oral Daily   oseltamivir 75 mg Oral Q12H   sodium chloride 4 mL Nebulization BID - RT         Pharmacy to Dose enoxaparin (LOVENOX)        ASSESSMENT:   26. Severe persistent asthma with acute exacerbation  27. Influenza A H3 infection   28. Acute hypoxemic respiratory failure, improved, currently on room air  29. Leukocytosis likely  steroid induced  30. Elevated IgE levels  31. Insomnia      PLAN:  Continue ambien and xanax to help with insomnia.   Change tussionex to Codeine/Phenergan to control the dry cough  WBC improved today and will continue to monitor.   She did have an elevated IgE level and may be a candidate for Xolair treatments and will refer to allergist at discharge. Given the slow recovery and the prolongued hospital stay despite current measures, will talk to the pharmacy in am and try to get the first dose of Xolair while here.  Encouraged pulmonary hygiene measures with TCDB, flutter, bronchodilators and incentive spirometer.  Encouraged patient to use cough syrup elizabeth at night so she can rest.   Continue course of tamiflu  Will reduce dose of steroids today and consider oral in am if wheezing still stable.     Disposition: Home once on oral regimen with ongoing constant improvement    Scott Dobson MD  18  7:56 PM        EMR Dragon/Transcription:   Much of this encounter note is an electronic transcription/translation of spoken language to printed text. The electronic translation of spoken language may permit erroneous, or at times, nonsensical words or phrases to be inadvertently transcribed; Although I have reviewed the note for such errors, some may still exist.      Electronically signed by Scott Dobson MD at 2018  8:07 PM      [facility] Edgar Putnam MD at 2018  6:11 PM  Version 1 of 1                             [facility]               [facility]     LOS: 4 days     Name: Amirah Godoy  Age: 47 y.o.  Sex: female  :  1970  MRN: [id number]         Primary Care Physician: Ruben Kwan Jr., MD    Diet Regular    Subjective   Coughing. tussionex is helping. Feels weak overall.    Objective   Temp:  [97.2 °F (36.2 °C)-98.3 °F (36.8 °C)] 98.3 °F (36.8 °C)  Heart Rate:  [] 105  Resp:  [16-20] 18  BP: (138-157)/(75-97) 142/92  SpO2:  [92 %-98 %] 94 %  on   ;   O2 Device: room air  Body mass  index is 32.65 kg/(m^2).    Physical Exam   Constitutional: She is oriented to person, place, and time. No distress.   Cardiovascular: Normal rate and regular rhythm.    Pulmonary/Chest: Effort normal. No respiratory distress. She has decreased breath sounds. She has wheezes. She has rhonchi.   Abdominal: Soft. There is no tenderness.   Musculoskeletal: She exhibits no edema.   Neurological: She is alert and oriented to person, place, and time.   Skin: Skin is warm and dry.     Reviewed medications and new clinical results    budesonide 0.5 mg Nebulization BID - RT   cetirizine 10 mg Oral Daily   citalopram 40 mg Oral Daily   enoxaparin 40 mg Subcutaneous Q24H   ipratropium-albuterol 3 mL Nebulization Q4H - RT   methylPREDNISolone sodium succinate 40 mg Intravenous Q12H   montelukast 10 mg Oral Daily   oseltamivir 75 mg Oral Q12H   sodium chloride 4 mL Nebulization BID - RT       Pharmacy to Dose enoxaparin (LOVENOX)      Assessment/Plan   Active Hospital Problems (** Indicates Principal Problem)    Diagnosis Date Noted   • **Asthma with exacerbation [J45.901] 01/05/2018   • Influenza A [J10.1] 01/09/2018   • Tachycardia [R00.0] 01/07/2018   • Cough [R05] 01/05/2018   • Weakness [R53.1] 01/05/2018   • History of atrial fibrillation [Z86.79] 01/01/2011      Resolved Hospital Problems    Diagnosis Date Noted Date Resolved   No resolved problems to display.     · Supportive care  · Steroids, nebs  · Hyperglycemia, leukocytosis from steroids    Sam Putnam MD   01/09/18  6:12 PM       Electronically signed by Sam Putnam MD at 1/9/2018  6:13 PM      Scott Dobson MD at 1/10/2018 11:37 AM  Version 1 of 1           PROGRESS NOTE   LOS: 5 days   Patient Care Team:  Ruben Kwan Jr., MD as PCP - General (Family Medicine)    Chief Complaint: History of asthma with worsening symptom     Interval History: Admitted on 1/5/18 with complaints of worsening shortness of breath and persistent cough and symptoms over the  "past month or so. She was found to have an asthma exacerbation with positive influenza infection. Procalcitonin was negative. She was treated with bronchodilators, Tamiflu and steroids. She was put on nebulized form of home inhaler therapy.  She does have agitation and anxiety that is worse on the steroid and was given some xanax to help with symptoms. She did have worsening cough and was using Tussionex. IgE level was elevated and may benefit from Xolair treatments as outpatient.     REVIEW OF SYSTEMS:   CARDIOVASCULAR: No chest pain, chest pressure. Palpitations with increased HR. no edema.   RESPIRATORY: Wheezing and chest tightness improved. Shortness of breath with dyspnea on minimal exertion. Tolerating without oxygen. Cough with no sputum production- worse today  GASTROINTESTINAL: No anorexia, nausea, vomiting or diarrhea. No abdominal pain or blood. Complains of heartburn  HEMATOLOGIC: No bleeding or bruising.   Improving anxiety and agitation, hot flashes    Ventilator/Non-Invasive Ventilation Settings     None        Vital Signs  Temp:  [98.4 °F (36.9 °C)-99.3 °F (37.4 °C)] 99.3 °F (37.4 °C)  Heart Rate:  [] 111  Resp:  [18-20] 20  BP: (114-158)/(73-96) 141/96  SpO2:  [91 %-96 %] 95 %  on    O2 Device: room air  No intake or output data in the 24 hours ending 01/10/18 1900  Flowsheet Rows         First Filed Value    Admission Height  172.7 cm (68\") Documented at 01/05/2018 1215    Admission Weight  91.1 kg (200 lb 12.8 oz) Documented at 01/05/2018 1215        Body mass index is 32.65 kg/(m^2).  Last 3 weights    01/06/18  0500 01/07/18  0500 01/09/18  0558   Weight: 92.5 kg (204 lb) 92.7 kg (204 lb 6.4 oz) 97.4 kg (214 lb 11.2 oz)       Physical Exam:  GEN:  No acute distress, alert, cooperative, well developed, on room air   EYES:   Sclera clear. No icterus. PERRL.  ENT:   External ears/nose normal, no oral lesions, no thrush, mucous membranes moist  NECK:  Supple, midline trachea, no JVD  LUNGS: " Normal chest on inspection, no wheezes. loud coarse rhonchi. Crackles mostly in LLL.   CV:  Regular rhythm and slightly tachycardic. Normal S1/S2. No murmurs, gallops, or rubs noted.  ABD:  Soft, non-tender and non-distended. Normal bowel sounds. No guarding  EXT:  Moves all extremities well. No cyanosis. No redness. No edema.   Skin: dry, intact, no bleeding  Results from last 7 days  Lab Units 01/10/18  0721 01/09/18  0539 01/08/18  0634 01/06/18  0636 01/05/18  1317   WBC 10*3/mm3 19.17* 18.07* 23.09* 11.95* 8.79   HEMOGLOBIN g/dL 13.3 12.8 12.8 12.8 13.7   HEMATOCRIT % 40.8 39.2 38.2 38.7 40.6   PLATELETS 10*3/mm3 334 330 376 296 310   MCV fL 95.1 95.8 93.9 94.2 92.9   NEUTROPHIL % %  --   --   --   --  69.1   LYMPHOCYTE % %  --   --   --   --  22.9   MONOCYTES % %  --   --   --   --  5.5   EOSINOPHIL % %  --   --   --   --  1.4   BASOPHIL % %  --   --   --   --  0.3   IMM GRAN % %  --   --   --   --  0.8*                     CXR repeat on 1/10/2018 was normal despite the clinical decline     I reviewed the patient's new clinical results.  I personally viewed and interpreted the patient's imaging results:See is no cardiac megaly and no pleural effusion no masses.  Calcified left hilar granuloma        Medication Review:     budesonide 0.5 mg Nebulization BID - RT   cetirizine 10 mg Oral Daily   citalopram 40 mg Oral Daily   enoxaparin 40 mg Subcutaneous Q24H   ipratropium-albuterol 3 mL Nebulization Q4H - RT   montelukast 10 mg Oral Daily   [START ON 1/11/2018] pantoprazole 20 mg Oral Q AM   predniSONE 20 mg Oral TID With Meals   sodium chloride 4 mL Nebulization BID - RT         Pharmacy to Dose enoxaparin (LOVENOX)        ASSESSMENT:   35. Severe persistent asthma with acute exacerbation  36. Influenza A H3 infection   37. Acute hypoxemic respiratory failure, improved, currently on room air  38. Leukocytosis likely steroid induced  39. Elevated IgE levels  40. Insomnia- improving  41. Anxiety on zoloft at  home  42. Heartburn   43. Worsening cough     PLAN:  Continue ambien and xanax to help with insomnia.   Change tussionex to Codeine/Phenergan to control the dry cough and add tessalon Perles as well.   Add Protonix and Maalox for heart burn  CXR was normal, will proceed with bronchoscopy in am  She did have an elevated IgE level and may be a candidate for Xolair treatments and will try to get set up with infusion center to start treatment. CCP to help set this up and see if can get a treatment while inpatient.   Encouraged pulmonary hygiene measures with TCDB, flutter, bronchodilators and incentive spirometer.  completed course of tamiflu  Continue steroids and wean as improves with a slow taper    Disposition: Home once on oral regimen with ongoing constant improvement    Scott Dobson MD  01/10/18  7:00 PM      EMR Dragon/Transcription:   Much of this encounter note is an electronic transcription/translation of spoken language to printed text. The electronic translation of spoken language may permit erroneous, or at times, nonsensical words or phrases to be inadvertently transcribed; Although I have reviewed the note for such errors, some may still exist.      Electronically signed by Scott Dobson MD at 1/10/2018  7:01 PM      Dejuan Spencer MD at 1/10/2018  1:42 PM  Version 1 of 1          LOS: 5 days     Name: Amirah Godoy  Age: 47 y.o.  Sex: female  :  1970  MRN: [id number]         Primary Care Physician: Ruben Kwan Jr., MD    Subjective   Subjective  Still feels poorly.  Has a nonproductive cough.  Gets short of breath with exertion.    Objective   Vital Signs  Temp:  [98.4 °F (36.9 °C)-98.8 °F (37.1 °C)] 98.4 °F (36.9 °C)  Heart Rate:  [] 85  Resp:  [18-20] 20  BP: (114-158)/(73-96) 158/91  Body mass index is 32.65 kg/(m^2).    Objective:  General Appearance:  Comfortable and in no acute distress (Ill-appearing).    Vital signs: (most recent): Blood pressure 158/91, pulse 85,  "temperature 98.4 °F (36.9 °C), temperature source Oral, resp. rate 20, height 172.7 cm (68\"), weight 97.4 kg (214 lb 11.2 oz), SpO2 91 %.    Lungs:  Normal respiratory rate and normal effort.  There are wheezes, rhonchi and decreased breath sounds.    Heart: Normal rate.  Regular rhythm.    Abdomen: Abdomen is soft.  Bowel sounds are normal.   There is no abdominal tenderness.     Extremities: There is no local swelling or dependent edema.    Neurological: Patient is alert and oriented to person, place and time.    Skin:  Warm and dry.              Results Review:       I reviewed the patient's new clinical results.      Results from last 7 days  Lab Units 01/10/18  0721 01/09/18  0539 01/08/18  0634 01/06/18  0636 01/05/18  1317   WBC 10*3/mm3 19.17* 18.07* 23.09* 11.95* 8.79   HEMOGLOBIN g/dL 13.3 12.8 12.8 12.8 13.7   PLATELETS 10*3/mm3 334 330 376 296 310       Results from last 7 days  Lab Units 01/09/18  0539 01/08/18  0634 01/06/18  0636 01/05/18  1317   SODIUM mmol/L 140 139 135* 140   POTASSIUM mmol/L 4.0 4.0 4.0 3.9   CHLORIDE mmol/L 102 101 100 104   CO2 mmol/L 21.8* 21.5* 24.0 27.2   BUN mg/dL 20 18 11 11   CREATININE mg/dL 0.73 0.65 0.64 0.75   CALCIUM mg/dL 8.0* 8.6 8.3* 8.6   GLUCOSE mg/dL 128* 115* 167* 99                 Scheduled Meds:     budesonide 0.5 mg Nebulization BID - RT   cetirizine 10 mg Oral Daily   citalopram 40 mg Oral Daily   enoxaparin 40 mg Subcutaneous Q24H   ipratropium-albuterol 3 mL Nebulization Q4H - RT   montelukast 10 mg Oral Daily   [START ON 1/11/2018] pantoprazole 20 mg Oral Q AM   predniSONE 20 mg Oral TID With Meals   sodium chloride 4 mL Nebulization BID - RT     PRN Meds:   •  acetaminophen  •  ALPRAZolam  •  aluminum-magnesium hydroxide-simethicone  •  benzonatate  •  ipratropium-albuterol  •  Pharmacy to Dose enoxaparin (LOVENOX)  •  promethazine-codeine  •  sodium chloride  •  zolpidem  Continuous Infusions:    Pharmacy to Dose enoxaparin (LOVENOX)  "       Assessment/Plan   Principal Problem:    Asthma with exacerbation  Active Problems:    History of atrial fibrillation    Cough    Weakness    Tachycardia    Influenza A      Assessment & Plan    - Continue supportive care, steroids, nebulizers for treatment of influenza with acute exacerbation of asthma.  - Glucose is controlled  - Leukocytosis likely secondary to steroids but she will have another chest x-ray today  - Appreciate input from pulmonology    Dejuan Spencer MD  [facility]  01/10/18  1:42 PM       Electronically signed by Dejuan Spencer MD at 1/10/2018  1:45 PM           Consult Notes (all)      Jorge L Whitehead MD at 1/5/2018  4:14 PM  Version 1 of 1     Consult Orders:    1. Inpatient Consult to Pulmonology [[id number]] ordered by Sam Putnam MD at 01/05/18 1248                      Patient Identification:  Amirah Godoy  47 y.o.  female  1970  [id number]          LOS 0    Requesting physician: Dr Putnam    Reason for Consultation:  Asthma exacerbation    History of Present Illness:   Thank you for this pulmonary consult.  47-year-old female with a history of asthma ever since childhood.  She has not been seen by asthma specialist previously and is cared for by her primary care doctor.  She is on Symbicort and Spiriva in addition to short-acting beta agonist as needed.  She's been needing her rescue inhaler much more frequently lately.  She has had for exacerbations of her asthma in the last 9 months.  She has required several rounds of prednisone.  She's had several unscheduled doctor visits.  She has not required previous admission to the hospital and has not been on the ventilator.  She complains of severe shortness of breath with wheezing and cough.  Cough is nonproductive.  She has chest tightness associated with this.  Dyspnea is worse with exertion.  She just recently completed a round of Augmentin.  She had some flulike symptoms  about a week ago but this has improved.  Her  was also ill with similar symptoms.  She was admitted with severe exacerbation of severe persistent asthma.  We have been consult it to help with management of her asthma exacerbation.    Past Medical History:  Past Medical History:   Diagnosis Date   • Allergic    • Asthma    • Depression    • History of atrial fibrillation     NO CURRENT MEDICATION   • PVC (premature ventricular contraction)    • Seasonal allergies    • Sinus infection     STARTED ON ANTIBIOTICS ON 10/30/17   • SVT (supraventricular tachycardia)     NO CURRENT MEDICATIONS       Past Surgical History:  Past Surgical History:   Procedure Laterality Date   • ASD AND VSD REPAIR     • CARDIAC ABLATION      DR. NADEEM SCHUMACHER --   •  SECTION  2000   • DIAGNOSTIC LAPAROSCOPY Right 11/3/2017    Procedure: LAPAROSCOPIC RIGHT OOPHERECTOMY ;  Surgeon: Claudine Barron MD;  Location: [facility];  Service:    • OVARIAN CYST REMOVAL  2017   • WISDOM TOOTH EXTRACTION          Home Meds:  Prescriptions Prior to Admission   Medication Sig Dispense Refill Last Dose   • cetirizine (ZyrTEC) 10 MG tablet Take 10 mg by mouth daily.   2017 at 0800   • citalopram (CeleXA) 20 MG tablet Take 40 mg by mouth Daily.   2017 at 0800   • MINASTRIN 24 FE 1-20 MG-MCG(24) chewable tablet Chew 1 tablet Every Night.   2017 at 2100   • montelukast (SINGULAIR) 10 MG tablet TAKE ONE TABLET BY MOUTH DAILY 30 tablet 4 2017 at 0800   • tiotropium (SPIRIVA HANDIHALER) 18 MCG per inhalation capsule Place 1 capsule into inhaler and inhale Daily.            Allergies:  Allergies   Allergen Reactions   • Demerol [Meperidine] Hives   • Sulfa Antibiotics GI Intolerance       Social History:   Social History     Social History   • Marital status:      Spouse name: N/A   • Number of children: N/A   • Years of education: N/A     Occupational History   • Not on file.     Social  "History Main Topics   • Smoking status: Never Smoker   • Smokeless tobacco: Never Used   • Alcohol use No   • Drug use: No   • Sexual activity: Defer     Other Topics Concern   • Not on file     Social History Narrative       Family History:  Family History   Problem Relation Age of Onset   • Depression Mother    • Diabetes Mother    • Heart disease Mother      +of mi at 68   • Hyperlipidemia Mother    • Hypertension Mother    • Heart disease Father      ptca x 5 and mi at 59   • Hyperlipidemia Father    • Hypertension Father    • Kidney disease Brother    • Birth defects Maternal Grandmother    • Heart disease Maternal Grandmother      multiple mi   • Birth defects Maternal Grandfather    • Heart disease Maternal Grandfather      + mi at 68   • Colon cancer Maternal Grandfather    • Heart disease Paternal Grandmother      weak heart   • Asthma Paternal Grandmother    • COPD Paternal Grandmother    • Kidney failure Paternal Grandfather    • Malig Hyperthermia Neg Hx        Review of Systems:  Denies fevers or chills  Denies nausea or vomiting  No new vision or hearing changes  No chest pain  Positive shortness of breath with wheezing and cough.  Positive chest tightness  No diarrhea, hematemesis or hematochezia, no dysuria or frequency  No musculoskeletal complaints  No heat or cold intolerance  No skin rashes  No dizziness or confusion.  No seizure activity  No new anxiety or depression  12 system review of systems performed and all else negative    Objective:    PHYSICAL EXAM:    /96 (BP Location: Right arm, Patient Position: Lying)  Pulse 96  Temp 97.9 °F (36.6 °C) (Oral)   Resp 16  Ht 172.7 cm (68\")  Wt 91.1 kg (200 lb 12.8 oz)  LMP  (Approximate)  SpO2 98%  BMI 30.53 kg/m2 Body mass index is 30.53 kg/(m^2). 98% 91.1 kg (200 lb 12.8 oz)    GENERAL APPEARANCE:   · Well developed  · Well nourished  · No acute distress   EYES:    · PERRL                                                                  "          · Conjunctiva normal  · Sclera non-icteric.  HENT:   · Atraumatic, normocephalic  · External ears and nose normal  · Moist mucus membranes and no ulcers  NECK:  · Thyroid not enlarged  · Trachea midline   RESPIRATORY:    · Mildly labored breathing   · Bilateral breath sounds  · No rales.  Diffuse bilateral wheezing  · No dullness  CARDIOVASCULAR:    · RRR  · Normal S1, S2  · No murmur  · Lower extremity edema: none    GI:   · Bowel sounds normal  · Abdomen soft , non-distended, non-tender  · No abdominal masses.    MUSCULOSKELETAL:  · Normal movement of extremities  · No tenderness, no deformities  · No clubbing or cyanosis   Skin:    · No visible rashes  · No palpable nodules  PSYCHIATRIC:  · Speech and behavior appropriate  · Normal mood and affect  · Oriented to person, place and time  NEUROLOGIC:           Imaging reviewed  chest X-ray: Shows signs of hyperinflation.  No infiltrates       Assessment:  Severe persistent asthma with acute exacerbation  Recent Viral-like illness    Recommendations:  Check respiratory viral panel.  Nebulized bronchodilators and nebulized inhaled steroids.  IV systemic steroids for asthma exacerbation.  Would consider evaluation for potential benefit with Xolair will check IgE level.  Does not have significant eosinophil count on CBC.  With persistent severe asthma symptoms and recurrent exacerbations requiring multiple rounds of steroids in the last several months if does not meet criteria for Xolair would consider evaluation for bronchial thermoplasty.  I will follow up with pt in office after discharge as well.      Thank you for allowing me to participate in the care of this patient.  I will continue to follow along with you.      Jorge L Whitehead MD  [facility], [facility]  Pulmonary and Critical Care Medicine    1/5/2018  4:14 PM     Electronically signed by Jorge L Whitehead MD at 1/5/2018  4:21 PM

## 2018-01-11 NOTE — ANESTHESIA PROCEDURE NOTES
Airway  Urgency: elective    Date/Time: 1/11/2018 2:20 PM  Airway not difficult    General Information and Staff    Patient location during procedure: OR  Anesthesiologist: ANDREZ URIOSTEGUI  CRNA: IMELDA REYNA    Indications and Patient Condition  Indications for airway management: airway protection    Preoxygenated: yes  Mask difficulty assessment: 1 - vent by mask    Final Airway Details  Final airway type: supraglottic airway      Successful airway: classic  Size 4    Number of attempts at approach: 1    Additional Comments  Pre 02, sivi, easy bvm, lma placed easily, appears atraumatic, teeth unchanged

## 2018-01-11 NOTE — PROGRESS NOTES
Continued Stay Note  [facility]     Patient Name: Amirah Godoy  MRN: [id number]  Today's Date: 1/11/2018    Admit Date: 1/5/2018          Discharge Plan       01/11/18 1354    Case Management/Social Work Plan    Plan Home    Patient/Family In Agreement With Plan yes    Additional Comments CCP spoke with Gerson in financial department regarding cost of Xolair 225 mg. IV every 2 weeks. CCP was informed it would cost about 752.45 dollars (this cost does not include any copays or procedures) until patient meets her out of pocket of $2,250.00. CCP attempted to discussed with patient; patient was being transported to have a Bronch. CCP will follow up with patient. Lillian MAGANA       01/11/18 1131    Case Management/Social Work Plan    Plan Home    Additional Comments CCP notified Myra FARR about Xolair medication and appeal if MD requests. CCP also requested RX for outpt setup. Lillian SANCHEZ working on arranging outpt infusions. Ryan HYLTON/CCP              Discharge Codes     None            Jacki Guerrero, MYLAW

## 2018-01-11 NOTE — PROGRESS NOTES
" LOS: 6 days     Name: Amirah Godoy  Age: 47 y.o.  Sex: female  :  1970  MRN: [id number]         Primary Care Physician: Ruben Kwan Jr., MD    Subjective   Subjective  Feels the same.  No new complaints.  Remains short of breath with wheezing and coarse breath sounds.  Will go for bronchoscopy today.    Objective   Vital Signs  Temp:  [98.2 °F (36.8 °C)-99.3 °F (37.4 °C)] 98.7 °F (37.1 °C)  Heart Rate:  [] 77  Resp:  [16-20] 20  BP: (141-167)/(93-96) 152/93  Body mass index is 32.33 kg/(m^2).    Objective:  General Appearance:  Comfortable and in no acute distress.    Vital signs: (most recent): Blood pressure 152/93, pulse 77, temperature 98.7 °F (37.1 °C), temperature source Oral, resp. rate 20, height 172.7 cm (68\"), weight 96.4 kg (212 lb 9.6 oz), SpO2 95 %.    Lungs:  Normal respiratory rate and normal effort.  There are wheezes, rhonchi and decreased breath sounds.    Heart: Normal rate.  Regular rhythm.    Abdomen: Abdomen is soft.  Bowel sounds are normal.   There is no abdominal tenderness.     Extremities: There is no local swelling or dependent edema.    Neurological: Patient is alert and oriented to person, place and time.    Skin:  Warm and dry.              Results Review:       I reviewed the patient's new clinical results.      Results from last 7 days  Lab Units 01/11/18  0325 01/10/18  0721 18  0539 18  0634 18  0636 18  1317   WBC 10*3/mm3 15.93* 19.17* 18.07* 23.09* 11.95* 8.79   HEMOGLOBIN g/dL 12.8 13.3 12.8 12.8 12.8 13.7   PLATELETS 10*3/mm3 339 334 330 376 296 310       Results from last 7 days  Lab Units 18  03218  0539 18  0634 18  0636 18  1317   SODIUM mmol/L 138 140 139 135* 140   POTASSIUM mmol/L 3.8 4.0 4.0 4.0 3.9   CHLORIDE mmol/L 99 102 101 100 104   CO2 mmol/L 25.3 21.8* 21.5* 24.0 27.2   BUN mg/dL 16 20 18 11 11   CREATININE mg/dL 0.67 0.73 0.65 0.64 0.75   CALCIUM mg/dL 8.0* 8.0* 8.6 8.3* 8.6   GLUCOSE " mg/dL 99 128* 115* 167* 99         Scheduled Meds:     budesonide 0.5 mg Nebulization BID - RT   cetirizine 10 mg Oral Daily   citalopram 40 mg Oral Daily   enoxaparin 40 mg Subcutaneous Q24H   ipratropium-albuterol 3 mL Nebulization Q4H - RT   montelukast 10 mg Oral Daily   pantoprazole 40 mg Oral Q AM   predniSONE 20 mg Oral TID With Meals   sodium chloride 4 mL Nebulization BID - RT     PRN Meds:   •  acetaminophen  •  ALPRAZolam  •  aluminum-magnesium hydroxide-simethicone  •  benzonatate  •  ipratropium-albuterol  •  Pharmacy to Dose enoxaparin (LOVENOX)  •  promethazine-codeine  •  sodium chloride  •  zolpidem  Continuous Infusions:    Pharmacy to Dose enoxaparin (LOVENOX)        Assessment/Plan   Principal Problem:    Asthma with exacerbation  Active Problems:    History of atrial fibrillation    Cough    Weakness    Tachycardia    Influenza A      Assessment & Plan    - Continue supportive care, steroids, nebulizers for treatment of influenza with acute exacerbation of asthma.  Tamiflu is completed  - Glucose is controlled  - Leukocytosis likely secondary to steroids  - Repeat chest x-ray unremarkable.  She will go for bronchoscopy today per pulmonology recommendations.  - Attempting to get her set up for Xolair treatments  - Appreciate input from pulmonology    Dejuan Spencer MD  [facility]  01/11/18  12:18 PM

## 2018-01-11 NOTE — ANESTHESIA POSTPROCEDURE EVALUATION
"Patient: Amirah Godoy    Procedure Summary     Date Anesthesia Start Anesthesia Stop Room / Location    01/11/18 1411 1440  [facility]       Procedure Diagnosis Surgeon Provider    BRONCHOSCOPY (N/A Bronchus) No diagnosis on file. MD Chadd Bolaños MD          Anesthesia Type: MAC  Last vitals  BP   147/96 (01/11/18 1437)   Temp   37.1 °C (98.7 °F) (01/11/18 1352)   Pulse   107 (01/11/18 1437)   Resp   18 (01/11/18 1437)     SpO2   99 % (01/11/18 1437)     Post Anesthesia Care and Evaluation    Patient location during evaluation: PACU  Patient participation: complete - patient participated  Level of consciousness: awake and alert  Pain management: adequate  Airway patency: patent  Anesthetic complications: No anesthetic complications    Cardiovascular status: acceptable  Respiratory status: acceptable  Hydration status: acceptable    Comments: /96 (BP Location: Left arm, Patient Position: Sitting)  Pulse 107  Temp 37.1 °C (98.7 °F) (Oral)   Resp 18  Ht 172.7 cm (68\")  Wt 96.4 kg (212 lb 9.6 oz)  LMP  (Approximate)  SpO2 99%  BMI 32.33 kg/m2      "

## 2018-01-11 NOTE — PROGRESS NOTES
PROGRESS NOTE   LOS: 6 days   Patient Care Team:  Ruben Kwan Jr., MD as PCP - General (Family Medicine)    Chief Complaint: History of asthma with worsening symptom     Interval History: Admitted on 1/5/18 with complaints of worsening shortness of breath and persistent cough and symptoms over the past month or so. She was found to have an asthma exacerbation with positive influenza infection. Procalcitonin was negative. She was treated with bronchodilators, Tamiflu and steroids. She was put on nebulized form of home inhaler therapy.  She does have agitation and anxiety that is worse on the steroid and was given some xanax to help with symptoms. She did have worsening cough and was using Tussionex. IgE level was elevated and may benefit from Xolair treatments as outpatient. Attempted to get Xolair inpatient for one dose, but was not approved.     REVIEW OF SYSTEMS:   CARDIOVASCULAR: No chest pain, chest pressure. Palpitations- better no edema.   RESPIRATORY: Wheezing and chest tightness improved. Shortness of breath with dyspnea on minimal exertion. Tolerating without oxygen. Cough with no sputum production- worse today  GASTROINTESTINAL: No anorexia, nausea, vomiting or diarrhea. No abdominal pain or blood. Complains of heartburn, but better after eating a bland diet  HEMATOLOGIC: No bleeding or bruising.   Improving anxiety and agitation, hot flashes  Would like to go home     Ventilator/Non-Invasive Ventilation Settings     None        Vital Signs  Temp:  [98.2 °F (36.8 °C)-99.3 °F (37.4 °C)] 98.7 °F (37.1 °C)  Heart Rate:  [] 107  Resp:  [16-20] 18  BP: (140-167)/() 147/96  SpO2:  [93 %-99 %] 99 %  on  Flow (L/min):  [0.5-2] 2 O2 Device: nasal cannula    Intake/Output Summary (Last 24 hours) at 01/11/18 1452  Last data filed at 01/11/18 1430   Gross per 24 hour   Intake              200 ml   Output                0 ml   Net              200 ml     Flowsheet Rows         First Filed Value     "Admission Height  172.7 cm (68\") Documented at 01/05/2018 1215    Admission Weight  91.1 kg (200 lb 12.8 oz) Documented at 01/05/2018 1215        Body mass index is 32.33 kg/(m^2).  Last 3 weights    01/07/18  0500 01/09/18  0558 01/11/18  0500   Weight: 92.7 kg (204 lb 6.4 oz) 97.4 kg (214 lb 11.2 oz) 96.4 kg (212 lb 9.6 oz)       Physical Exam:  GEN:  No acute distress, alert, cooperative, well developed, on room air   EYES:   Sclera clear. No icterus. PERRL.  ENT:   External ears/nose normal, no oral lesions, no thrush, mucous membranes moist  NECK:  Supple, midline trachea, no JVD  LUNGS: Normal chest on inspection, no wheezes. loud coarse rhonchi. Crackles BLL worse on the left.   CV:  Regular rhythm and normal rate. Normal S1/S2. No murmurs, gallops, or rubs noted.  ABD:  Soft, non-tender and non-distended. Normal bowel sounds. No guarding  EXT:  Moves all extremities well. No cyanosis. No redness. No edema.   Skin: dry, intact, no bleeding    Results Review:        Results from last 7 days  Lab Units 01/11/18  0325 01/09/18  0539 01/08/18  0634 01/06/18  0636 01/05/18  1317   SODIUM mmol/L 138 140 139 135* 140   POTASSIUM mmol/L 3.8 4.0 4.0 4.0 3.9   CHLORIDE mmol/L 99 102 101 100 104   CO2 mmol/L 25.3 21.8* 21.5* 24.0 27.2   BUN mg/dL 16 20 18 11 11   CREATININE mg/dL 0.67 0.73 0.65 0.64 0.75   CALCIUM mg/dL 8.0* 8.0* 8.6 8.3* 8.6   AST (SGOT) U/L  --   --   --   --  14   ALT (SGPT) U/L  --   --   --   --  13   ANION GAP mmol/L 13.7 16.2 16.5 11.0 8.8   ALBUMIN g/dL  --   --   --   --  3.70   Results for STANFORD IYER (MRN [id number]) as of 1/9/2018 19:57   Ref. Range 1/5/2018 17:15   IgE Latest Ref Range: 0 - 100 IU/mL 213 (H)       Results from last 7 days  Lab Units 01/08/18  0000 01/07/18  1748   TROPONIN T ng/mL <0.010 <0.010               Results from last 7 days  Lab Units 01/11/18  0325 01/10/18  0721 01/09/18  0539 01/08/18  0634 01/06/18  0636 01/05/18  1317   WBC 10*3/mm3 15.93* 19.17* 18.07* " 23.09* 11.95* 8.79   HEMOGLOBIN g/dL 12.8 13.3 12.8 12.8 12.8 13.7   HEMATOCRIT % 38.6 40.8 39.2 38.2 38.7 40.6   PLATELETS 10*3/mm3 339 334 330 376 296 310   MCV fL 94.4 95.1 95.8 93.9 94.2 92.9   NEUTROPHIL % %  --   --   --   --   --  69.1   LYMPHOCYTE % %  --   --   --   --   --  22.9   MONOCYTES % %  --   --   --   --   --  5.5   EOSINOPHIL % %  --   --   --   --   --  1.4   BASOPHIL % %  --   --   --   --   --  0.3   IMM GRAN % %  --   --   --   --   --  0.8*                       Results from last 7 days  Lab Units 01/06/18  1724   HEMOGLOBIN A1C % 5.10     No results found for: POCGLU    Results from last 7 days  Lab Units 01/08/18  1704 01/05/18  1317   PROCALCITONIN ng/mL 0.03*  --    LACTATE mmol/L  --  1.3               Results from last 7 days  Lab Units 01/05/18  1605   ADENOVIRUS DETECTION BY PCR  Not Detected   CORONAVIRUS 229E  Not Detected   CORONAVIRUS HKU1  Not Detected   CORONAVIRUS NL63  Not Detected   CORONAVIRUS OC43  Not Detected   HUMAN METAPNEUMOVIRUS  Not Detected   HUMAN RHINOVIRUS/ENTEROVIRUS  Not Detected   INFLUENZA B PCR  Not Detected   PARAINFLUENZA 1  Not Detected   PARAINFLUENZA VIRUS 2  Not Detected   PARAINFLUENZA VIRUS 3  Not Detected   PARAINFLUENZA VIRUS 4  Not Detected   BORDETELLA PERTUSSIS PCR  Not Detected   CHLAMYDOPHILA PNEUMONIAE PCR  Not Detected   MYCOPLAMA PNEUMO PCR  Not Detected   INFLUENZA A PCR  Not Detected   INFLUENZA A H3  Detected*   INFLUENZA A H1  Not Detected   RSV, PCR  Not Detected           Imaging:   Imaging Results (all)     Procedure Component Value Units Date/Time    XR Chest PA & Lateral [[id number]] Collected:  01/05/18 1558     Updated:  01/05/18 1611    Narrative:       PA AND LATERAL CHEST     HISTORY: Wheezing and shortness of air.     COMPARISON: CT angiogram of chest 07/20/2016. There are no previous  chest x-rays for comparison.     FINDINGS: Cardiomediastinal silhouette is within normal limits. There is  a calcified granuloma in left lower  lobe. Calcified left hilar lymph  nodes are present. There is no evidence for pulmonary edema, pleural  effusion or infiltrate.       Impression:       No acute disease.      This report was finalized on 1/5/2018 4:08 PM by Dr. Himanshu Penaloza MD.       CT Angiogram Chest With & Without Contrast [[id number]] Collected:  01/07/18 1630     Updated:  01/07/18 1641    Narrative:       CT ANGIOGRAM CHEST W WO CONTRAST-     INDICATIONS: Short of breath, tachycardia, possible pulmonary embolism.   Radiation dose reduction techniques were utilized, including automated  exposure control and exposure modulation based on body size.     TECHNIQUE: CT ANGIOGRAPHY OF THE CHEST WITH THREE-DIMENSIONAL  RECONSTRUCTIONS.     COMPARISON: 07/28/2016     FINDINGS:        No pulmonary embolism. No aortic dissection.           The heart size is normal without pericardial effusion. A few small  subcentimeter short axis mediastinal lymph nodes are seen that are not  significant by size criteria.     The airways appear clear.     No pleural effusion or pneumothorax.     The lungs show no focal pulmonary consolidation or mass. Old  granulomatous disease is present. Linear likely atelectasis or scar in  the right lower lobe.     Upper abdominal structures show indeterminate liver lesion at the  hepatic dome, 1.9 cm on image 114 of series 2, CT density of 46,  indeterminate on the basis of this exam, but does not appear  significantly changed.        No acute fracture is identified.       Impression:             1. No pulmonary embolism. Small likely atelectasis or scar in the right  lower lobe.  2. Chronic liver lesion, does not appear significantly changed.     This report was finalized on 1/7/2018 4:38 PM by Dr. Silvano Otoole MD.       XR Chest 1 View [[id number]] Collected:  01/08/18 0817     Updated:  01/08/18 1127    Narrative:       CLINICAL HISTORY: 47-year-old female follow-up respiratory failure with  history of asthma.  Influenza.     PORTABLE AP SEMIERECT CHEST DATED 01/08/2018 AT 0604 HOURS     FINDINGS: When compared to the recent prior PA and lateral chest  radiographs of 01/05/2018 and CT chest exam of 01/07/2018, the lungs  remain well expanded and apparently free of dense collapse or  consolidation. The degree of expansion of the lungs is slightly  diminished compared to the CT chest exam and there may be a trace of  strandy atelectasis in the left lower lung zone on the current exam. The  costophrenic angles are sharp. The heart remains top normal caliber. No  pneumothorax or acute change in bony thorax is seen. Monitoring lead  wires are present.     CONCLUSION: Minimally diminished expansion of the lungs with trace  strandy atelectasis in the left lower lung zone. No dense segmental or  lobar collapse or consolidation is seen in the lungs. Follow-up  recommended.     This report was finalized on 1/8/2018 11:23 AM by Dr. Urbano Moore MD.           CXR repeat on 1/10/2018 was normal despite the clinical decline     I reviewed the patient's new clinical results.  I personally viewed and interpreted the patient's imaging results:See is no cardiac megaly and no pleural effusion no masses.  Calcified left hilar granuloma        Medication Review:     budesonide 0.5 mg Nebulization BID - RT   cetirizine 10 mg Oral Daily   citalopram 40 mg Oral Daily   enoxaparin 40 mg Subcutaneous Q24H   ipratropium-albuterol 3 mL Nebulization Q4H - RT   montelukast 10 mg Oral Daily   pantoprazole 40 mg Oral Q AM   predniSONE 20 mg Oral TID With Meals   sodium chloride 4 mL Nebulization BID - RT         lactated ringers 30 mL/hr Last Rate: 30 mL/hr (01/11/18 1356)   Pharmacy to Dose enoxaparin (LOVENOX)         ASSESSMENT:   1. Severe persistent asthma with acute exacerbation  2. Influenza A H3 infection   3. Acute hypoxemic respiratory failure, improved, currently on room air  4. Leukocytosis likely steroid induced  5. Elevated IgE  levels  6. Insomnia- improving  7. Anxiety on zoloft at home  8. Heartburn   9. Worsening cough     PLAN:  NPO for bronchoscopy today to eval why she is not getting any better with worsening breath sounds and CXR stable.   She did have an elevated IgE level and may be a candidate for Xolair treatments and will try to get set up with infusion center to start treatment. CCP to help set this up and see if can get a treatment while inpatient.   Attempted to get one dose of Xolair in patient and was denied.   Encouraged pulmonary hygiene measures with TCDB, flutter, bronchodilators and incentive spirometer.  completed course of tamiflu  Continue steroids and wean as improves with a slow taper- same dose today    Disposition: Home once on oral regimen with ongoing constant improvement    Scott Dobson MD  01/11/18  2:52 PM      EMR Dragon/Transcription:   Much of this encounter note is an electronic transcription/translation of spoken language to printed text. The electronic translation of spoken language may permit erroneous, or at times, nonsensical words or phrases to be inadvertently transcribed; Although I have reviewed the note for such errors, some may still exist.

## 2018-01-12 LAB
ANION GAP SERPL CALCULATED.3IONS-SCNC: 12.5 MMOL/L
BASOPHILS # BLD AUTO: 0.02 10*3/MM3 (ref 0–0.2)
BASOPHILS NFR BLD AUTO: 0.1 % (ref 0–1.5)
BUN BLD-MCNC: 15 MG/DL (ref 6–20)
BUN/CREAT SERPL: 19.7 (ref 7–25)
CALCIUM SPEC-SCNC: 8.3 MG/DL (ref 8.6–10.5)
CHLORIDE SERPL-SCNC: 94 MMOL/L (ref 98–107)
CO2 SERPL-SCNC: 29.5 MMOL/L (ref 22–29)
CREAT BLD-MCNC: 0.76 MG/DL (ref 0.57–1)
DEPRECATED RDW RBC AUTO: 45.1 FL (ref 37–54)
EOSINOPHIL # BLD AUTO: 0.03 10*3/MM3 (ref 0–0.7)
EOSINOPHIL NFR BLD AUTO: 0.2 % (ref 0.3–6.2)
ERYTHROCYTE [DISTWIDTH] IN BLOOD BY AUTOMATED COUNT: 13.2 % (ref 11.7–13)
GFR SERPL CREATININE-BSD FRML MDRD: 81 ML/MIN/1.73
GLUCOSE BLD-MCNC: 90 MG/DL (ref 65–99)
HCT VFR BLD AUTO: 44 % (ref 35.6–45.5)
HGB BLD-MCNC: 14.3 G/DL (ref 11.9–15.5)
IMM GRANULOCYTES # BLD: 0.53 10*3/MM3 (ref 0–0.03)
IMM GRANULOCYTES NFR BLD: 3.5 % (ref 0–0.5)
LYMPHOCYTES # BLD AUTO: 1.52 10*3/MM3 (ref 0.9–4.8)
LYMPHOCYTES NFR BLD AUTO: 10 % (ref 19.6–45.3)
MCH RBC QN AUTO: 31.4 PG (ref 26.9–32)
MCHC RBC AUTO-ENTMCNC: 32.5 G/DL (ref 32.4–36.3)
MCV RBC AUTO: 96.7 FL (ref 80.5–98.2)
MONOCYTES # BLD AUTO: 1.36 10*3/MM3 (ref 0.2–1.2)
MONOCYTES NFR BLD AUTO: 9 % (ref 5–12)
NEUTROPHILS # BLD AUTO: 11.69 10*3/MM3 (ref 1.9–8.1)
NEUTROPHILS NFR BLD AUTO: 77.2 % (ref 42.7–76)
PLATELET # BLD AUTO: 321 10*3/MM3 (ref 140–500)
PMV BLD AUTO: 9.2 FL (ref 6–12)
POTASSIUM BLD-SCNC: 3.9 MMOL/L (ref 3.5–5.2)
RBC # BLD AUTO: 4.55 10*6/MM3 (ref 3.9–5.2)
SODIUM BLD-SCNC: 136 MMOL/L (ref 136–145)
WBC NRBC COR # BLD: 15.15 10*3/MM3 (ref 4.5–10.7)

## 2018-01-12 PROCEDURE — 94799 UNLISTED PULMONARY SVC/PX: CPT

## 2018-01-12 PROCEDURE — 63710000001 PREDNISONE PER 1 MG: Performed by: INTERNAL MEDICINE

## 2018-01-12 PROCEDURE — 85025 COMPLETE CBC W/AUTO DIFF WBC: CPT | Performed by: INTERNAL MEDICINE

## 2018-01-12 PROCEDURE — 25010000002 ENOXAPARIN PER 10 MG: Performed by: HOSPITALIST

## 2018-01-12 PROCEDURE — 80048 BASIC METABOLIC PNL TOTAL CA: CPT | Performed by: INTERNAL MEDICINE

## 2018-01-12 RX ORDER — ALBUTEROL SULFATE 2.5 MG/3ML
2.5 SOLUTION RESPIRATORY (INHALATION) EVERY 4 HOURS PRN
Status: DISCONTINUED | OUTPATIENT
Start: 2018-01-12 | End: 2018-01-16 | Stop reason: HOSPADM

## 2018-01-12 RX ORDER — ARFORMOTEROL TARTRATE 15 UG/2ML
15 SOLUTION RESPIRATORY (INHALATION)
Status: DISCONTINUED | OUTPATIENT
Start: 2018-01-12 | End: 2018-01-13

## 2018-01-12 RX ADMIN — BUDESONIDE 0.5 MG: 0.5 INHALANT RESPIRATORY (INHALATION) at 08:02

## 2018-01-12 RX ADMIN — BENZONATATE 100 MG: 100 CAPSULE ORAL at 05:45

## 2018-01-12 RX ADMIN — PANTOPRAZOLE SODIUM 40 MG: 40 TABLET, DELAYED RELEASE ORAL at 05:45

## 2018-01-12 RX ADMIN — IPRATROPIUM BROMIDE AND ALBUTEROL SULFATE 3 ML: .5; 3 SOLUTION RESPIRATORY (INHALATION) at 08:02

## 2018-01-12 RX ADMIN — ARFORMOTEROL TARTRATE 15 MCG: 15 SOLUTION RESPIRATORY (INHALATION) at 20:48

## 2018-01-12 RX ADMIN — IPRATROPIUM BROMIDE AND ALBUTEROL SULFATE 3 ML: .5; 3 SOLUTION RESPIRATORY (INHALATION) at 12:20

## 2018-01-12 RX ADMIN — ACETAMINOPHEN 650 MG: 325 TABLET ORAL at 20:49

## 2018-01-12 RX ADMIN — BUDESONIDE 0.5 MG: 0.5 INHALANT RESPIRATORY (INHALATION) at 20:48

## 2018-01-12 RX ADMIN — MONTELUKAST 10 MG: 10 TABLET, FILM COATED ORAL at 08:57

## 2018-01-12 RX ADMIN — PREDNISONE 20 MG: 20 TABLET ORAL at 12:19

## 2018-01-12 RX ADMIN — CETIRIZINE HYDROCHLORIDE 10 MG: 10 TABLET, FILM COATED ORAL at 08:57

## 2018-01-12 RX ADMIN — PREDNISONE 20 MG: 20 TABLET ORAL at 17:34

## 2018-01-12 RX ADMIN — ENOXAPARIN SODIUM 40 MG: 40 INJECTION SUBCUTANEOUS at 14:04

## 2018-01-12 RX ADMIN — BENZONATATE 100 MG: 100 CAPSULE ORAL at 14:16

## 2018-01-12 RX ADMIN — PREDNISONE 20 MG: 20 TABLET ORAL at 08:57

## 2018-01-12 RX ADMIN — ZOLPIDEM TARTRATE 5 MG: 5 TABLET ORAL at 20:49

## 2018-01-12 RX ADMIN — ACETAMINOPHEN 650 MG: 325 TABLET ORAL at 14:16

## 2018-01-12 RX ADMIN — CITALOPRAM 40 MG: 40 TABLET, FILM COATED ORAL at 08:57

## 2018-01-12 NOTE — PLAN OF CARE
Problem: Patient Care Overview (Adult)  Goal: Plan of Care Review  Outcome: Ongoing (interventions implemented as appropriate)   01/12/18 1511   Coping/Psychosocial Response Interventions   Plan Of Care Reviewed With patient   Patient Care Overview   Progress no change   Outcome Evaluation   Outcome Summary/Follow up Plan Patient without report of pain or discomfort. Still experiencing expiratory wheezing. Prednisone po given per MD order. Attempting to get set up with Xolair treatment. Patient using IS . 1 L nasal canula in use. No resp distress. Will continue to monitor.       Problem: Asthma (Adult)  Goal: Signs and Symptoms of Listed Potential Problems Will be Absent or Manageable (Asthma)  Outcome: Ongoing (interventions implemented as appropriate)      Problem: Pain, Acute (Adult)  Goal: Acceptable Pain Control/Comfort Level  Outcome: Ongoing (interventions implemented as appropriate)      Problem: Infection, Risk/Actual (Adult)  Goal: Infection Prevention/Resolution  Outcome: Ongoing (interventions implemented as appropriate)

## 2018-01-12 NOTE — PROGRESS NOTES
" LOS: 7 days     Name: Amirah Godoy  Age: 47 y.o.  Sex: female  :  1970  MRN: [id number]         Primary Care Physician: Ruben Kwan Jr., MD    Subjective   Subjective  Feels about the same with continued cough and wheezing.    Objective   Vital Signs  Temp:  [98 °F (36.7 °C)-98.7 °F (37.1 °C)] 98.5 °F (36.9 °C)  Heart Rate:  [] 98  Resp:  [14-18] 16  BP: (127-157)/() 153/89  Body mass index is 32.33 kg/(m^2).    Objective:  General Appearance:  Comfortable and in no acute distress.    Vital signs: (most recent): Blood pressure 153/89, pulse 98, temperature 98.5 °F (36.9 °C), temperature source Oral, resp. rate 16, height 172.7 cm (68\"), weight 96.4 kg (212 lb 9.6 oz), SpO2 94 %.    Lungs:  Normal respiratory rate and normal effort.  There are wheezes, rhonchi and decreased breath sounds.    Heart: Normal rate.  Regular rhythm.    Abdomen: Abdomen is soft.  Bowel sounds are normal.   There is no abdominal tenderness.     Extremities: There is no local swelling or dependent edema.    Neurological: Patient is alert and oriented to person, place and time.    Skin:  Warm and dry.              Results Review:       I reviewed the patient's new clinical results.      Results from last 7 days  Lab Units 18  0534 18  0325 01/10/18  0721 18  0539 18  0634 18  0636 18  1317   WBC 10*3/mm3 15.15* 15.93* 19.17* 18.07* 23.09* 11.95* 8.79   HEMOGLOBIN g/dL 14.3 12.8 13.3 12.8 12.8 12.8 13.7   PLATELETS 10*3/mm3 321 339 334 330 376 296 310       Results from last 7 days  Lab Units 18  0534 18  0325 18  0539 18  0634 18  0636 18  1317   SODIUM mmol/L 136 138 140 139 135* 140   POTASSIUM mmol/L 3.9 3.8 4.0 4.0 4.0 3.9   CHLORIDE mmol/L 94* 99 102 101 100 104   CO2 mmol/L 29.5* 25.3 21.8* 21.5* 24.0 27.2   BUN mg/dL 15 16 20 18 11 11   CREATININE mg/dL 0.76 0.67 0.73 0.65 0.64 0.75   CALCIUM mg/dL 8.3* 8.0* 8.0* 8.6 8.3* 8.6   GLUCOSE " mg/dL 90 99 128* 115* 167* 99                 Scheduled Meds:     budesonide 0.5 mg Nebulization BID - RT   cetirizine 10 mg Oral Daily   citalopram 40 mg Oral Daily   enoxaparin 40 mg Subcutaneous Q24H   ipratropium-albuterol 3 mL Nebulization Q4H - RT   montelukast 10 mg Oral Daily   pantoprazole 40 mg Oral Q AM   predniSONE 20 mg Oral TID With Meals   sodium chloride 4 mL Nebulization BID - RT     PRN Meds:   •  acetaminophen  •  ALPRAZolam  •  aluminum-magnesium hydroxide-simethicone  •  benzonatate  •  ipratropium-albuterol  •  lactated ringers  •  Pharmacy to Dose enoxaparin (LOVENOX)  •  promethazine-codeine  •  sodium chloride  •  zolpidem  Continuous Infusions:    lactated ringers 30 mL/hr Last Rate: Stopped (01/11/18 1455)   Pharmacy to Dose enoxaparin (LOVENOX)         Assessment/Plan   Principal Problem:    Asthma with exacerbation  Active Problems:    History of atrial fibrillation    Cough    Weakness    Tachycardia    Influenza A      Assessment & Plan    - Continue supportive care, steroids, nebulizers for treatment of influenza with acute exacerbation of asthma.  Tamiflu is completed  - Glucose is controlled  - Leukocytosis likely secondary to steroids  - Repeat chest x-ray unremarkable.  She will go for bronchoscopy today per pulmonology recommendations.  - Attempting to get her set up for Xolair treatments  - Appreciate input from pulmonology    Dejuan Spencer MD  [facility]  01/12/18  12:48 PM

## 2018-01-12 NOTE — PROGRESS NOTES
PROGRESS NOTE   LOS: 7 days   Patient Care Team:  Ruben Kwan Jr., MD as PCP - General (Family Medicine)    Chief Complaint: History of asthma with worsening symptom     Interval History: Admitted on 1/5/18 with complaints of worsening shortness of breath and persistent cough and symptoms over the past month or so. She was found to have an asthma exacerbation with positive influenza infection. Procalcitonin was negative. She was treated with bronchodilators, Tamiflu and steroids. She was put on nebulized form of home inhaler therapy.  She does have agitation and anxiety that is worse on the steroid and was given some xanax to help with symptoms. She did have worsening cough and was using Tussionex. IgE level was elevated and may benefit from Xolair treatments as outpatient. Attempted to get Xolair inpatient for one dose, but was not approved.   Patient had a bronchoscopy on 1/11/2018 given the patient reported difficulty expectorating and 6 secretion however the lung was overall clean and normal with no abnormal finding and no vocal cord dysfunction noted.  He is afebrile she is on steroid she is on breathing treatment but denies any significant improvement for the last few days    REVIEW OF SYSTEMS:   Positive dyspnea and exertion positive shortness of breath, patient is still having difficulty expectorating but when she does expectorate it is thin secretion.  No Chills no hemoptysis    Ventilator/Non-Invasive Ventilation Settings     None        Vital Signs  Temp:  [98 °F (36.7 °C)-98.5 °F (36.9 °C)] 98.5 °F (36.9 °C)  Heart Rate:  [] 90  Resp:  [14-18] 16  BP: (127-157)/() 137/85  SpO2:  [91 %-99 %] 91 %  on  Flow (L/min):  [1-2] 1 O2 Device: nasal cannula    Intake/Output Summary (Last 24 hours) at 01/12/18 1418  Last data filed at 01/12/18 0800   Gross per 24 hour   Intake              765 ml   Output                0 ml   Net              765 ml     Flowsheet Rows         First Filed Value     "Admission Height  172.7 cm (68\") Documented at 01/05/2018 1215    Admission Weight  91.1 kg (200 lb 12.8 oz) Documented at 01/05/2018 1215        Body mass index is 32.33 kg/(m^2).  Last 3 weights    01/07/18  0500 01/09/18  0558 01/11/18  0500   Weight: 92.7 kg (204 lb 6.4 oz) 97.4 kg (214 lb 11.2 oz) 96.4 kg (212 lb 9.6 oz)       Physical Exam:  GEN:  No acute distress, alert, cooperative, well developed, on room air   EYES:   Sclera clear. No icterus. PERRL.  ENT:   External ears/nose normal, no oral lesions, no thrush, mucous membranes moist  NECK:  Supple, midline trachea, no JVD  LUNGS: Normal chest on inspection, Positive expiratory wheezes and rhonchi.  No crackles.   CV:  Regular rhythm and normal rate. Normal S1/S2. No murmurs, gallops, or rubs noted.  ABD:  Soft, non-tender and non-distended. Normal bowel sounds. No guarding  EXT:  Moves all extremities well. No cyanosis. No redness. No edema.   Skin: dry, intact, no bleeding    Results Review:        Results from last 7 days  Lab Units 01/12/18  0534 01/11/18  0325 01/09/18  0539 01/08/18  0634 01/06/18  0636   SODIUM mmol/L 136 138 140 139 135*   POTASSIUM mmol/L 3.9 3.8 4.0 4.0 4.0   CHLORIDE mmol/L 94* 99 102 101 100   CO2 mmol/L 29.5* 25.3 21.8* 21.5* 24.0   BUN mg/dL 15 16 20 18 11   CREATININE mg/dL 0.76 0.67 0.73 0.65 0.64   CALCIUM mg/dL 8.3* 8.0* 8.0* 8.6 8.3*   ANION GAP mmol/L 12.5 13.7 16.2 16.5 11.0   Results for STANFORD IYER (MRN [id number]) as of 1/9/2018 19:57   Ref. Range 1/5/2018 17:15   IgE Latest Ref Range: 0 - 100 IU/mL 213 (H)       Results from last 7 days  Lab Units 01/08/18  0000 01/07/18  1748   TROPONIN T ng/mL <0.010 <0.010               Results from last 7 days  Lab Units 01/12/18  0534 01/11/18  0325 01/10/18  0721 01/09/18  0539 01/08/18  0634 01/06/18  0636   WBC 10*3/mm3 15.15* 15.93* 19.17* 18.07* 23.09* 11.95*   HEMOGLOBIN g/dL 14.3 12.8 13.3 12.8 12.8 12.8   HEMATOCRIT % 44.0 38.6 40.8 39.2 38.2 38.7   PLATELETS " 10*3/mm3 321 339 334 330 376 296   MCV fL 96.7 94.4 95.1 95.8 93.9 94.2   NEUTROPHIL % % 77.2*  --   --   --   --   --    LYMPHOCYTE % % 10.0*  --   --   --   --   --    MONOCYTES % % 9.0  --   --   --   --   --    EOSINOPHIL % % 0.2*  --   --   --   --   --    BASOPHIL % % 0.1  --   --   --   --   --    IMM GRAN % % 3.5*  --   --   --   --   --                        Results from last 7 days  Lab Units 01/06/18  1724   HEMOGLOBIN A1C % 5.10     No results found for: POCGLU    Results from last 7 days  Lab Units 01/08/18  1704   PROCALCITONIN ng/mL 0.03*       Results from last 7 days  Lab Units 01/11/18  1426   RESPCX  Light growth (2+) Normal Respiratory Sadaf   GRAM STAIN RESULT  Many (4+) WBCs per low power field  Rare (1+) Epithelial cells per low power field  Few (2+) Gram positive cocci           Results from last 7 days  Lab Units 01/05/18  1605   ADENOVIRUS DETECTION BY PCR  Not Detected   CORONAVIRUS 229E  Not Detected   CORONAVIRUS HKU1  Not Detected   CORONAVIRUS NL63  Not Detected   CORONAVIRUS OC43  Not Detected   HUMAN METAPNEUMOVIRUS  Not Detected   HUMAN RHINOVIRUS/ENTEROVIRUS  Not Detected   INFLUENZA B PCR  Not Detected   PARAINFLUENZA 1  Not Detected   PARAINFLUENZA VIRUS 2  Not Detected   PARAINFLUENZA VIRUS 3  Not Detected   PARAINFLUENZA VIRUS 4  Not Detected   BORDETELLA PERTUSSIS PCR  Not Detected   CHLAMYDOPHILA PNEUMONIAE PCR  Not Detected   MYCOPLAMA PNEUMO PCR  Not Detected   INFLUENZA A PCR  Not Detected   INFLUENZA A H3  Detected*   INFLUENZA A H1  Not Detected   RSV, PCR  Not Detected           Imaging:   Imaging Results (all)     Procedure Component Value Units Date/Time    XR Chest PA & Lateral [[id number]] Collected:  01/05/18 1558     Updated:  01/05/18 1611    Narrative:       PA AND LATERAL CHEST     HISTORY: Wheezing and shortness of air.     COMPARISON: CT angiogram of chest 07/20/2016. There are no previous  chest x-rays for comparison.     FINDINGS: Cardiomediastinal  silhouette is within normal limits. There is  a calcified granuloma in left lower lobe. Calcified left hilar lymph  nodes are present. There is no evidence for pulmonary edema, pleural  effusion or infiltrate.       Impression:       No acute disease.      This report was finalized on 1/5/2018 4:08 PM by Dr. Himanshu Penaloza MD.       CT Angiogram Chest With & Without Contrast [[id number]] Collected:  01/07/18 1630     Updated:  01/07/18 1641    Narrative:       CT ANGIOGRAM CHEST W WO CONTRAST-     INDICATIONS: Short of breath, tachycardia, possible pulmonary embolism.   Radiation dose reduction techniques were utilized, including automated  exposure control and exposure modulation based on body size.     TECHNIQUE: CT ANGIOGRAPHY OF THE CHEST WITH THREE-DIMENSIONAL  RECONSTRUCTIONS.     COMPARISON: 07/28/2016     FINDINGS:        No pulmonary embolism. No aortic dissection.           The heart size is normal without pericardial effusion. A few small  subcentimeter short axis mediastinal lymph nodes are seen that are not  significant by size criteria.     The airways appear clear.     No pleural effusion or pneumothorax.     The lungs show no focal pulmonary consolidation or mass. Old  granulomatous disease is present. Linear likely atelectasis or scar in  the right lower lobe.     Upper abdominal structures show indeterminate liver lesion at the  hepatic dome, 1.9 cm on image 114 of series 2, CT density of 46,  indeterminate on the basis of this exam, but does not appear  significantly changed.        No acute fracture is identified.       Impression:             1. No pulmonary embolism. Small likely atelectasis or scar in the right  lower lobe.  2. Chronic liver lesion, does not appear significantly changed.     This report was finalized on 1/7/2018 4:38 PM by Dr. Silvano Otoole MD.       XR Chest 1 View [[id number]] Collected:  01/08/18 0817     Updated:  01/08/18 1127    Narrative:       CLINICAL HISTORY:  47-year-old female follow-up respiratory failure with  history of asthma. Influenza.     PORTABLE AP SEMIERECT CHEST DATED 01/08/2018 AT 0604 HOURS     FINDINGS: When compared to the recent prior PA and lateral chest  radiographs of 01/05/2018 and CT chest exam of 01/07/2018, the lungs  remain well expanded and apparently free of dense collapse or  consolidation. The degree of expansion of the lungs is slightly  diminished compared to the CT chest exam and there may be a trace of  strandy atelectasis in the left lower lung zone on the current exam. The  costophrenic angles are sharp. The heart remains top normal caliber. No  pneumothorax or acute change in bony thorax is seen. Monitoring lead  wires are present.     CONCLUSION: Minimally diminished expansion of the lungs with trace  strandy atelectasis in the left lower lung zone. No dense segmental or  lobar collapse or consolidation is seen in the lungs. Follow-up  recommended.     This report was finalized on 1/8/2018 11:23 AM by Dr. Urbano Moore MD.           CXR repeat on 1/10/2018 was normal despite the clinical decline     I reviewed the patient's new clinical results.  I personally viewed and interpreted the patient's imaging results:See is no cardiac megaly and no pleural effusion no masses.  Calcified left hilar granuloma        Medication Review:     budesonide 0.5 mg Nebulization BID - RT   cetirizine 10 mg Oral Daily   citalopram 40 mg Oral Daily   enoxaparin 40 mg Subcutaneous Q24H   ipratropium-albuterol 3 mL Nebulization Q4H - RT   montelukast 10 mg Oral Daily   pantoprazole 40 mg Oral Q AM   predniSONE 20 mg Oral TID With Meals   sodium chloride 4 mL Nebulization BID - RT         lactated ringers 30 mL/hr Last Rate: Stopped (01/11/18 3272)   Pharmacy to Dose enoxaparin (LOVENOX)         ASSESSMENT:   1. Severe persistent asthma with acute exacerbation  2. Influenza A H3 infection   3. Acute hypoxemic respiratory failure, improved, currently on room  air  4. Leukocytosis likely steroid induced  5. Elevated IgE levels  6. Insomnia- improving  7. Anxiety on zoloft at home  8. Heartburn   9. Worsening cough     PLAN:  Slow recovery with nondiagnostic bronchoscopy  Continue with the steroid continue with the bronchodilator  Start the long-acting regimen with Brovana and Spiriva and continue with the when necessary albuterol every 4 hours  Patient to be started on Xolair after discharge or before discharge if the specialist.  Process can be approved meanwhile continue with the pulmonary hygiene measures      Disposition: Home once on oral regimen with ongoing constant improvement    Scott Dobson MD  01/12/18  2:18 PM      EMR Dragon/Transcription:   Much of this encounter note is an electronic transcription/translation of spoken language to printed text. The electronic translation of spoken language may permit erroneous, or at times, nonsensical words or phrases to be inadvertently transcribed; Although I have reviewed the note for such errors, some may still exist.

## 2018-01-12 NOTE — PROGRESS NOTES
Continued Stay Note  [facility]     Patient Name: Amirah Godoy  MRN: [id number]  Today's Date: 1/12/2018    Admit Date: 1/5/2018          Discharge Plan       01/12/18 0903    Case Management/Social Work Plan    Plan Home- follow for home o2 need    Patient/Family In Agreement With Plan yes    Additional Comments CCP spoke with pt at bedside regarding cost of Xolair Infusion ($752.45, until she reaches her out of pocket of $2,250.00). CCP also explained called [facility] Dr. Van Argueta and their office is closed due to inclement weather today. CCP explained MD wanted her to have outpt follow up with Allergist. Pt requested CCP to call her  to discuss cost. CCP called pts spouse Catarino Godoy ([phone]) via outbound call, introduced self and explained CCP role. Explained MD requested CCP to check cost of outpt infusions for Xolair. Catarino verbalized understanding of cost and requests to discuss with MD about medication and treatment options. CCP will continue to follow. beto shane/denise              Discharge Codes     None            Fátima Katz, RN

## 2018-01-12 NOTE — PLAN OF CARE
Problem: Patient Care Overview (Adult)  Goal: Plan of Care Review  Outcome: Ongoing (interventions implemented as appropriate)   01/12/18 0500   Coping/Psychosocial Response Interventions   Plan Of Care Reviewed With patient   Patient Care Overview   Progress no change   Outcome Evaluation   Outcome Summary/Follow up Plan Continue on droplet isolation r/t Influenza.Pt had her last dose of Tamiflu yesterday.No pain or discomfort voiced.Pt on 1 L n/c.No resp distress noted.Will contiinue to monitor.     Goal: Adult Individualization and Mutuality  Outcome: Ongoing (interventions implemented as appropriate)      Problem: Asthma (Adult)  Goal: Signs and Symptoms of Listed Potential Problems Will be Absent or Manageable (Asthma)  Outcome: Ongoing (interventions implemented as appropriate)      Problem: Pain, Acute (Adult)  Goal: Acceptable Pain Control/Comfort Level  Outcome: Ongoing (interventions implemented as appropriate)      Problem: Infection, Risk/Actual (Adult)  Goal: Infection Prevention/Resolution  Outcome: Ongoing (interventions implemented as appropriate)

## 2018-01-13 LAB
ANION GAP SERPL CALCULATED.3IONS-SCNC: 9.4 MMOL/L
BACTERIA SPEC RESP CULT: NORMAL
BACTERIA SPEC RESP CULT: NORMAL
BASOPHILS # BLD AUTO: 0.01 10*3/MM3 (ref 0–0.2)
BASOPHILS NFR BLD AUTO: 0.1 % (ref 0–1.5)
BUN BLD-MCNC: 20 MG/DL (ref 6–20)
BUN/CREAT SERPL: 31.7 (ref 7–25)
CALCIUM SPEC-SCNC: 8.7 MG/DL (ref 8.6–10.5)
CHLORIDE SERPL-SCNC: 98 MMOL/L (ref 98–107)
CO2 SERPL-SCNC: 29.6 MMOL/L (ref 22–29)
CREAT BLD-MCNC: 0.63 MG/DL (ref 0.57–1)
DEPRECATED RDW RBC AUTO: 46.1 FL (ref 37–54)
EOSINOPHIL # BLD AUTO: 0.01 10*3/MM3 (ref 0–0.7)
EOSINOPHIL NFR BLD AUTO: 0.1 % (ref 0.3–6.2)
ERYTHROCYTE [DISTWIDTH] IN BLOOD BY AUTOMATED COUNT: 13.4 % (ref 11.7–13)
GFR SERPL CREATININE-BSD FRML MDRD: 100 ML/MIN/1.73
GLUCOSE BLD-MCNC: 128 MG/DL (ref 65–99)
GRAM STN SPEC: NORMAL
HCT VFR BLD AUTO: 39.7 % (ref 35.6–45.5)
HGB BLD-MCNC: 13 G/DL (ref 11.9–15.5)
IMM GRANULOCYTES # BLD: 0.29 10*3/MM3 (ref 0–0.03)
IMM GRANULOCYTES NFR BLD: 2 % (ref 0–0.5)
LYMPHOCYTES # BLD AUTO: 0.91 10*3/MM3 (ref 0.9–4.8)
LYMPHOCYTES NFR BLD AUTO: 6.2 % (ref 19.6–45.3)
MCH RBC QN AUTO: 31.4 PG (ref 26.9–32)
MCHC RBC AUTO-ENTMCNC: 32.7 G/DL (ref 32.4–36.3)
MCV RBC AUTO: 95.9 FL (ref 80.5–98.2)
MONOCYTES # BLD AUTO: 1.12 10*3/MM3 (ref 0.2–1.2)
MONOCYTES NFR BLD AUTO: 7.7 % (ref 5–12)
NEUTROPHILS # BLD AUTO: 12.29 10*3/MM3 (ref 1.9–8.1)
NEUTROPHILS NFR BLD AUTO: 83.9 % (ref 42.7–76)
PLATELET # BLD AUTO: 297 10*3/MM3 (ref 140–500)
PMV BLD AUTO: 9.1 FL (ref 6–12)
POTASSIUM BLD-SCNC: 4.3 MMOL/L (ref 3.5–5.2)
RBC # BLD AUTO: 4.14 10*6/MM3 (ref 3.9–5.2)
SODIUM BLD-SCNC: 137 MMOL/L (ref 136–145)
WBC NRBC COR # BLD: 14.63 10*3/MM3 (ref 4.5–10.7)

## 2018-01-13 PROCEDURE — 63710000001 PREDNISONE PER 1 MG: Performed by: INTERNAL MEDICINE

## 2018-01-13 PROCEDURE — 85025 COMPLETE CBC W/AUTO DIFF WBC: CPT | Performed by: INTERNAL MEDICINE

## 2018-01-13 PROCEDURE — 80048 BASIC METABOLIC PNL TOTAL CA: CPT | Performed by: INTERNAL MEDICINE

## 2018-01-13 PROCEDURE — 94640 AIRWAY INHALATION TREATMENT: CPT

## 2018-01-13 PROCEDURE — 94799 UNLISTED PULMONARY SVC/PX: CPT

## 2018-01-13 RX ORDER — IPRATROPIUM BROMIDE AND ALBUTEROL SULFATE 2.5; .5 MG/3ML; MG/3ML
3 SOLUTION RESPIRATORY (INHALATION)
Status: DISCONTINUED | OUTPATIENT
Start: 2018-01-13 | End: 2018-01-16 | Stop reason: HOSPADM

## 2018-01-13 RX ORDER — GUAIFENESIN 600 MG/1
1200 TABLET, EXTENDED RELEASE ORAL EVERY 12 HOURS SCHEDULED
Status: DISCONTINUED | OUTPATIENT
Start: 2018-01-13 | End: 2018-01-16 | Stop reason: HOSPADM

## 2018-01-13 RX ORDER — ACETYLCYSTEINE 200 MG/ML
4 SOLUTION ORAL; RESPIRATORY (INHALATION)
Status: DISCONTINUED | OUTPATIENT
Start: 2018-01-13 | End: 2018-01-16 | Stop reason: HOSPADM

## 2018-01-13 RX ADMIN — ARFORMOTEROL TARTRATE 15 MCG: 15 SOLUTION RESPIRATORY (INHALATION) at 08:56

## 2018-01-13 RX ADMIN — BENZONATATE 100 MG: 100 CAPSULE ORAL at 00:19

## 2018-01-13 RX ADMIN — BUDESONIDE 0.5 MG: 0.5 INHALANT RESPIRATORY (INHALATION) at 08:56

## 2018-01-13 RX ADMIN — PREDNISONE 20 MG: 20 TABLET ORAL at 09:12

## 2018-01-13 RX ADMIN — MONTELUKAST 10 MG: 10 TABLET, FILM COATED ORAL at 09:12

## 2018-01-13 RX ADMIN — CITALOPRAM 40 MG: 40 TABLET, FILM COATED ORAL at 09:12

## 2018-01-13 RX ADMIN — PREDNISONE 20 MG: 20 TABLET ORAL at 17:28

## 2018-01-13 RX ADMIN — BENZONATATE 100 MG: 100 CAPSULE ORAL at 09:12

## 2018-01-13 RX ADMIN — GUAIFENESIN 1200 MG: 600 TABLET, EXTENDED RELEASE ORAL at 21:01

## 2018-01-13 RX ADMIN — ZOLPIDEM TARTRATE 5 MG: 5 TABLET ORAL at 21:01

## 2018-01-13 RX ADMIN — TIOTROPIUM BROMIDE 1 CAPSULE: 18 CAPSULE ORAL; RESPIRATORY (INHALATION) at 09:03

## 2018-01-13 RX ADMIN — CETIRIZINE HYDROCHLORIDE 10 MG: 10 TABLET, FILM COATED ORAL at 09:12

## 2018-01-13 RX ADMIN — IPRATROPIUM BROMIDE AND ALBUTEROL SULFATE 3 ML: .5; 3 SOLUTION RESPIRATORY (INHALATION) at 19:13

## 2018-01-13 RX ADMIN — SODIUM CHLORIDE SOLN NEBU 7% 4 ML: 7 NEBU SOLN at 08:56

## 2018-01-13 RX ADMIN — PREDNISONE 20 MG: 20 TABLET ORAL at 12:36

## 2018-01-13 RX ADMIN — PANTOPRAZOLE SODIUM 40 MG: 40 TABLET, DELAYED RELEASE ORAL at 06:21

## 2018-01-13 NOTE — PROGRESS NOTES
[facility]  Phone: [phone]  Cas Fallon MD    Subjective:  LOS: 8    She is unable to tolerate hypertonic saline as she starts coughing incessantly with bronchospasm.  She does not feel a whole lot better.  She still has a congested cough.  She has pets at home including cats and dogs.  She has had them for 12-15 years.  She has had lifelong asthma.  She is a never smoker.    Objective   Vital Signs past 24hrs  BP range: BP: (127-155)/(83-99) 146/89  Pulse range: Heart Rate:  [] 102  Resp rate range: Resp:  [18-20] 18  Temp range: Temp (24hrs), Av.6 °F (37 °C), Min:98 °F (36.7 °C), Max:99.3 °F (37.4 °C)    O2 Device: room airFlow (L/min):  [1] 1  Oxygen range:SpO2:  [92 %-97 %] 94 %   91.6 kg (201 lb 14.4 oz); Body mass index is 30.7 kg/(m^2).  No intake or output data in the 24 hours ending 18 0969    Physical Exam   Constitutional: She appears well-developed.   Eyes: Conjunctivae are normal.   Neck: Normal range of motion. Neck supple.   Cardiovascular: Normal rate and regular rhythm.    No murmur heard.  Pulmonary/Chest: Effort normal. No respiratory distress. She has decreased breath sounds. She has wheezes. She has rhonchi (course expiratory rhonchi perhaps some please also). She has no rales.   Abdominal: Soft. Bowel sounds are normal. She exhibits no mass. There is no tenderness.   Musculoskeletal: She exhibits no edema.   Neurological: She is alert.   Skin: Skin is warm. No rash noted.     Results Review:    I have reviewed the laboratory and imaging data since the last note by [facility] physician.  My annotations are noted in assessment and plan.    Medication Review:  I have reviewed the current MAR.  My annotations are noted in assessment and plan.      lactated ringers 30 mL/hr Last Rate: Stopped (18 9675)   Pharmacy to Dose enoxaparin (LOVENOX)       Plan   PCCM Problems  Asthma exacerbation  Influenza A infection  Relevant Medical Diagnoses  Elevated IgE  Exposure to  pets at home    Plan of Treatment  Persistent symptoms and unable to tolerate hypertonic saline.  We will give her acetylcysteine if she can tolerate and if we can find any in the hospital.  Also trial Mucinex.  Bronchoscopy was unremarkable.  Give her a flutter valve to use.  Bicarbonate is elevated.  Consider a blood gas if she continues to feel poorly.  Check chest x-ray tomorrow again.  Do a PA lateral.    Cas Fallon MD  01/13/18  6:29 PM    Part of this note may be an electronic transcription/translation of spoken language to printed text using the Dragon Dictation System.

## 2018-01-13 NOTE — PLAN OF CARE
Problem: Patient Care Overview (Adult)  Goal: Plan of Care Review  Outcome: Ongoing (interventions implemented as appropriate)   01/13/18 0403 01/13/18 0855 01/13/18 1842   Coping/Psychosocial Response Interventions   Plan Of Care Reviewed With --  patient --    Patient Care Overview   Progress improving --  --    Outcome Evaluation   Outcome Summary/Follow up Plan --  --  Pt VSS this shift. Pt tolerating meds, no need for O2, resting well. Pt understands admission will last until new meds are precerted through insurance. Will continue to monitor.     Goal: Adult Individualization and Mutuality  Outcome: Ongoing (interventions implemented as appropriate)    Goal: Discharge Needs Assessment  Outcome: Ongoing (interventions implemented as appropriate)      Problem: Asthma (Adult)  Goal: Signs and Symptoms of Listed Potential Problems Will be Absent or Manageable (Asthma)  Outcome: Ongoing (interventions implemented as appropriate)      Problem: Pain, Acute (Adult)  Goal: Acceptable Pain Control/Comfort Level  Outcome: Ongoing (interventions implemented as appropriate)      Problem: Infection, Risk/Actual (Adult)  Goal: Infection Prevention/Resolution  Outcome: Ongoing (interventions implemented as appropriate)

## 2018-01-13 NOTE — PLAN OF CARE
Problem: Patient Care Overview (Adult)  Goal: Plan of Care Review  Outcome: Ongoing (interventions implemented as appropriate)   01/13/18 0403   Coping/Psychosocial Response Interventions   Plan Of Care Reviewed With patient   Patient Care Overview   Progress improving   Outcome Evaluation   Outcome Summary/Follow up Plan Pt given Tylenol for Temp of 99.3 No pain or discomfort voiced.Continue on isolation precaution r/t Flu.Will continue to monitor.     Goal: Adult Individualization and Mutuality  Outcome: Ongoing (interventions implemented as appropriate)      Problem: Asthma (Adult)  Goal: Signs and Symptoms of Listed Potential Problems Will be Absent or Manageable (Asthma)  Outcome: Ongoing (interventions implemented as appropriate)      Problem: Pain, Acute (Adult)  Goal: Acceptable Pain Control/Comfort Level  Outcome: Ongoing (interventions implemented as appropriate)      Problem: Infection, Risk/Actual (Adult)  Goal: Infection Prevention/Resolution  Outcome: Ongoing (interventions implemented as appropriate)

## 2018-01-13 NOTE — PROGRESS NOTES
" LOS: 8 days     Name: Amirah Godoy  Age: 47 y.o.  Sex: female  :  1970  MRN: [id number]         Primary Care Physician: Ruben Kwan Jr., MD    Subjective   Subjective  Feels a little better than yesterday but not much change.  Remains short of breath and coughing frequently.  Denies chest pain.    Objective   Vital Signs  Temp:  [98 °F (36.7 °C)-99.3 °F (37.4 °C)] 98 °F (36.7 °C)  Heart Rate:  [] 100  Resp:  [16-20] 18  BP: (127-155)/(83-99) 155/99  Body mass index is 30.7 kg/(m^2).    Objective:  General Appearance:  Comfortable and in no acute distress.    Vital signs: (most recent): Blood pressure 155/99, pulse 100, temperature 98 °F (36.7 °C), temperature source Oral, resp. rate 18, height 172.7 cm (68\"), weight 91.6 kg (201 lb 14.4 oz), SpO2 95 %.    Lungs:  Normal respiratory rate and normal effort.  There are wheezes, rhonchi and decreased breath sounds.    Heart: Normal rate.  Regular rhythm.    Abdomen: Abdomen is soft.  Bowel sounds are normal.   There is no abdominal tenderness.     Extremities: There is no local swelling or dependent edema.    Neurological: Patient is alert and oriented to person, place and time.    Skin:  Warm and dry.              Results Review:       I reviewed the patient's new clinical results.      Results from last 7 days  Lab Units 18  0547 18  0534 01/11/18  0325 01/10/18  0721 18  0539 18  0634   WBC 10*3/mm3 14.63* 15.15* 15.93* 19.17* 18.07* 23.09*   HEMOGLOBIN g/dL 13.0 14.3 12.8 13.3 12.8 12.8   PLATELETS 10*3/mm3 297 321 339 334 330 376       Results from last 7 days  Lab Units 18  0547 18  0534 18  0325 18  0539 18  0634   SODIUM mmol/L 137 136 138 140 139   POTASSIUM mmol/L 4.3 3.9 3.8 4.0 4.0   CHLORIDE mmol/L 98 94* 99 102 101   CO2 mmol/L 29.6* 29.5* 25.3 21.8* 21.5*   BUN mg/dL 20 15 16 20 18   CREATININE mg/dL 0.63 0.76 0.67 0.73 0.65   CALCIUM mg/dL 8.7 8.3* 8.0* 8.0* 8.6   GLUCOSE mg/dL " 128* 90 99 128* 115*                 Scheduled Meds:     arformoterol 15 mcg Nebulization BID - RT   budesonide 0.5 mg Nebulization BID - RT   cetirizine 10 mg Oral Daily   citalopram 40 mg Oral Daily   enoxaparin 40 mg Subcutaneous Q24H   montelukast 10 mg Oral Daily   pantoprazole 40 mg Oral Q AM   predniSONE 20 mg Oral TID With Meals   sodium chloride 4 mL Nebulization BID - RT   tiotropium 1 capsule Inhalation Daily - RT     PRN Meds:   •  acetaminophen  •  albuterol  •  ALPRAZolam  •  aluminum-magnesium hydroxide-simethicone  •  benzonatate  •  ipratropium-albuterol  •  lactated ringers  •  Pharmacy to Dose enoxaparin (LOVENOX)  •  promethazine-codeine  •  sodium chloride  •  zolpidem  Continuous Infusions:    lactated ringers 30 mL/hr Last Rate: Stopped (01/11/18 0155)   Pharmacy to Dose enoxaparin (LOVENOX)         Assessment/Plan   Principal Problem:    Asthma with exacerbation  Active Problems:    History of atrial fibrillation    Cough    Weakness    Tachycardia    Influenza A      Assessment & Plan    - Continue supportive care, steroids, nebulizers for treatment of influenza with acute exacerbation of asthma.  Tamiflu is completed.  Slow recovery.  - Glucose is controlled  - Leukocytosis likely secondary to steroids, improving  - Repeat chest x-ray unremarkable.    - Bronchoscopy is today was nondiagnostic  - Attempting to get her set up for Xolair treatments  - Appreciate input from pulmonology    Dejuan Spencer MD  [facility]  01/13/18  1:30 PM

## 2018-01-14 ENCOUNTER — APPOINTMENT (OUTPATIENT)
Dept: GENERAL RADIOLOGY | Facility: HOSPITAL | Age: 48
End: 2018-01-14

## 2018-01-14 PROCEDURE — 94799 UNLISTED PULMONARY SVC/PX: CPT

## 2018-01-14 PROCEDURE — 71046 X-RAY EXAM CHEST 2 VIEWS: CPT

## 2018-01-14 PROCEDURE — 25010000002 ENOXAPARIN PER 10 MG: Performed by: HOSPITALIST

## 2018-01-14 PROCEDURE — 63710000001 PREDNISONE PER 1 MG: Performed by: INTERNAL MEDICINE

## 2018-01-14 RX ORDER — FUROSEMIDE 10 MG/ML
20 INJECTION INTRAMUSCULAR; INTRAVENOUS ONCE
Status: DISCONTINUED | OUTPATIENT
Start: 2018-01-15 | End: 2018-01-15

## 2018-01-14 RX ORDER — POTASSIUM CHLORIDE 750 MG/1
20 CAPSULE, EXTENDED RELEASE ORAL ONCE
Status: COMPLETED | OUTPATIENT
Start: 2018-01-15 | End: 2018-01-15

## 2018-01-14 RX ADMIN — GUAIFENESIN 1200 MG: 600 TABLET, EXTENDED RELEASE ORAL at 08:32

## 2018-01-14 RX ADMIN — CETIRIZINE HYDROCHLORIDE 10 MG: 10 TABLET, FILM COATED ORAL at 08:32

## 2018-01-14 RX ADMIN — ACETYLCYSTEINE 4 ML: 200 SOLUTION ORAL; RESPIRATORY (INHALATION) at 15:47

## 2018-01-14 RX ADMIN — IPRATROPIUM BROMIDE AND ALBUTEROL SULFATE 3 ML: .5; 3 SOLUTION RESPIRATORY (INHALATION) at 21:19

## 2018-01-14 RX ADMIN — ZOLPIDEM TARTRATE 5 MG: 5 TABLET ORAL at 22:03

## 2018-01-14 RX ADMIN — ENOXAPARIN SODIUM 40 MG: 40 INJECTION SUBCUTANEOUS at 16:59

## 2018-01-14 RX ADMIN — PANTOPRAZOLE SODIUM 40 MG: 40 TABLET, DELAYED RELEASE ORAL at 07:01

## 2018-01-14 RX ADMIN — PREDNISONE 20 MG: 20 TABLET ORAL at 12:05

## 2018-01-14 RX ADMIN — ACETYLCYSTEINE 4 ML: 200 SOLUTION ORAL; RESPIRATORY (INHALATION) at 08:54

## 2018-01-14 RX ADMIN — ACETYLCYSTEINE 4 ML: 200 SOLUTION ORAL; RESPIRATORY (INHALATION) at 21:19

## 2018-01-14 RX ADMIN — PREDNISONE 20 MG: 20 TABLET ORAL at 08:32

## 2018-01-14 RX ADMIN — MONTELUKAST 10 MG: 10 TABLET, FILM COATED ORAL at 08:32

## 2018-01-14 RX ADMIN — ACETYLCYSTEINE 4 ML: 200 SOLUTION ORAL; RESPIRATORY (INHALATION) at 12:21

## 2018-01-14 RX ADMIN — IPRATROPIUM BROMIDE AND ALBUTEROL SULFATE 3 ML: .5; 3 SOLUTION RESPIRATORY (INHALATION) at 12:21

## 2018-01-14 RX ADMIN — IPRATROPIUM BROMIDE AND ALBUTEROL SULFATE 3 ML: .5; 3 SOLUTION RESPIRATORY (INHALATION) at 08:54

## 2018-01-14 RX ADMIN — PREDNISONE 20 MG: 20 TABLET ORAL at 17:00

## 2018-01-14 RX ADMIN — IPRATROPIUM BROMIDE AND ALBUTEROL SULFATE 3 ML: .5; 3 SOLUTION RESPIRATORY (INHALATION) at 15:47

## 2018-01-14 RX ADMIN — CITALOPRAM 40 MG: 40 TABLET, FILM COATED ORAL at 08:32

## 2018-01-14 RX ADMIN — GUAIFENESIN 1200 MG: 600 TABLET, EXTENDED RELEASE ORAL at 22:03

## 2018-01-14 NOTE — PROGRESS NOTES
[facility]     LOS: 9 days     Name: Amirah Godoy  Age: 47 y.o.  Sex: female  :  1970  MRN: [id number]         Primary Care Physician: Ruben Kwan Jr., MD    Diet Regular    Subjective   Overall better. She looks significantly better than when I last saw her.    Objective   Temp:  [98 °F (36.7 °C)-98.9 °F (37.2 °C)] 98.2 °F (36.8 °C)  Heart Rate:  [] 99  Resp:  [16-18] 16  BP: (131-144)/(83-87) 144/84  SpO2:  [92 %-98 %] 98 %  on   ;   O2 Device: room air  Body mass index is 31.19 kg/(m^2).    Physical Exam   Constitutional: She is oriented to person, place, and time. No distress.   Cardiovascular: Normal rate and regular rhythm.    Pulmonary/Chest: Effort normal. No respiratory distress. She has wheezes (left expiratory). She has rhonchi (mild on left).   Abdominal: Soft. There is no tenderness.   Neurological: She is alert and oriented to person, place, and time.     Reviewed medications and new clinical results    acetylcysteine 4 mL Nebulization 4x Daily - RT   cetirizine 10 mg Oral Daily   citalopram 40 mg Oral Daily   enoxaparin 40 mg Subcutaneous Q24H   guaiFENesin 1,200 mg Oral Q12H   ipratropium-albuterol 3 mL Nebulization 4x Daily - RT   montelukast 10 mg Oral Daily   pantoprazole 40 mg Oral Q AM   predniSONE 20 mg Oral TID With Meals     lactated ringers 30 mL/hr Last Rate: Stopped (18 0445)   Pharmacy to Dose enoxaparin (LOVENOX)       Results from last 7 days  Lab Units 18  0547 18  0534 18  0325 01/10/18  0721 18  0539 18  0634   WBC 10*3/mm3 14.63* 15.15* 15.93* 19.17* 18.07* 23.09*   HEMOGLOBIN g/dL 13.0 14.3 12.8 13.3 12.8 12.8   PLATELETS 10*3/mm3 297 321 339 334 330 376     Results from last 7 days  Lab Units 18  0547 18  0534 18  0325 18  0539 18  0634   SODIUM mmol/L 137 136 138 140 139   POTASSIUM mmol/L 4.3 3.9 3.8 4.0 4.0   CHLORIDE mmol/L 98 94* 99 102  101   CO2 mmol/L 29.6* 29.5* 25.3 21.8* 21.5*   BUN mg/dL 20 15 16 20 18   CREATININE mg/dL 0.63 0.76 0.67 0.73 0.65   CALCIUM mg/dL 8.7 8.3* 8.0* 8.0* 8.6   GLUCOSE mg/dL 128* 90 99 128* 115*     Lab Results   Component Value Date    ANIONGAP 9.4 01/13/2018     Estimated Creatinine Clearance: 128.7 mL/min (by C-G formula based on Cr of 0.63).    Assessment/Plan   Active Hospital Problems (** Indicates Principal Problem)    Diagnosis Date Noted   • **Asthma with exacerbation [J45.901] 01/05/2018   • Influenza A [J10.1] 01/09/2018   • Tachycardia [R00.0] 01/07/2018   • Cough [R05] 01/05/2018   • Weakness [R53.1] 01/05/2018   • History of atrial fibrillation [Z86.79] 01/01/2011      Resolved Hospital Problems    Diagnosis Date Noted Date Resolved   No resolved problems to display.     · S/P bronch 1/11/18  · Slow gradual improvement  · I discussed the patient's findings and my recommendations with patient and nursing staff.    Sam Putnam MD   01/14/18  4:00 PM

## 2018-01-14 NOTE — PLAN OF CARE
Problem: Patient Care Overview (Adult)  Goal: Plan of Care Review  Outcome: Ongoing (interventions implemented as appropriate)   01/14/18 0522   Coping/Psychosocial Response Interventions   Plan Of Care Reviewed With patient   Patient Care Overview   Progress improving   Outcome Evaluation   Outcome Summary/Follow up Plan Continue on isolation precaution r/t Flu.Pt completed Tamiflu.No pain or discomfort noted,Will continue to monitpor.     Goal: Adult Individualization and Mutuality  Outcome: Ongoing (interventions implemented as appropriate)      Problem: Asthma (Adult)  Goal: Signs and Symptoms of Listed Potential Problems Will be Absent or Manageable (Asthma)  Outcome: Ongoing (interventions implemented as appropriate)      Problem: Pain, Acute (Adult)  Goal: Acceptable Pain Control/Comfort Level  Outcome: Ongoing (interventions implemented as appropriate)      Problem: Infection, Risk/Actual (Adult)  Goal: Infection Prevention/Resolution  Outcome: Ongoing (interventions implemented as appropriate)

## 2018-01-14 NOTE — PLAN OF CARE
Problem: Patient Care Overview (Adult)  Goal: Plan of Care Review  Outcome: Ongoing (interventions implemented as appropriate)   01/14/18 0522 01/14/18 0828 01/14/18 1718   Coping/Psychosocial Response Interventions   Plan Of Care Reviewed With --  patient --    Patient Care Overview   Progress improving --  --    Outcome Evaluation   Outcome Summary/Follow up Plan --  --  Pt VSS this shift. No complaints. Pt IV removed as it was bothersome, Dr. OLI Putnam with A stated it is ok to leave out. Pt wishes to be discharged ASAP after XOLAIR subQ injections are approved by insurance. Will continue to monitor.     Goal: Adult Individualization and Mutuality  Outcome: Ongoing (interventions implemented as appropriate)    Goal: Discharge Needs Assessment  Outcome: Ongoing (interventions implemented as appropriate)      Problem: Asthma (Adult)  Goal: Signs and Symptoms of Listed Potential Problems Will be Absent or Manageable (Asthma)  Outcome: Ongoing (interventions implemented as appropriate)      Problem: Pain, Acute (Adult)  Goal: Acceptable Pain Control/Comfort Level  Outcome: Ongoing (interventions implemented as appropriate)      Problem: Infection, Risk/Actual (Adult)  Goal: Infection Prevention/Resolution  Outcome: Ongoing (interventions implemented as appropriate)

## 2018-01-15 ENCOUNTER — APPOINTMENT (OUTPATIENT)
Dept: GENERAL RADIOLOGY | Facility: HOSPITAL | Age: 48
End: 2018-01-15

## 2018-01-15 LAB
DEPRECATED RDW RBC AUTO: 46.4 FL (ref 37–54)
ERYTHROCYTE [DISTWIDTH] IN BLOOD BY AUTOMATED COUNT: 13.4 % (ref 11.7–13)
HCT VFR BLD AUTO: 39.9 % (ref 35.6–45.5)
HGB BLD-MCNC: 13 G/DL (ref 11.9–15.5)
MCH RBC QN AUTO: 31.4 PG (ref 26.9–32)
MCHC RBC AUTO-ENTMCNC: 32.6 G/DL (ref 32.4–36.3)
MCV RBC AUTO: 96.4 FL (ref 80.5–98.2)
PLATELET # BLD AUTO: 304 10*3/MM3 (ref 140–500)
PMV BLD AUTO: 9.2 FL (ref 6–12)
RBC # BLD AUTO: 4.14 10*6/MM3 (ref 3.9–5.2)
WBC NRBC COR # BLD: 20.44 10*3/MM3 (ref 4.5–10.7)

## 2018-01-15 PROCEDURE — 94799 UNLISTED PULMONARY SVC/PX: CPT

## 2018-01-15 PROCEDURE — 71046 X-RAY EXAM CHEST 2 VIEWS: CPT

## 2018-01-15 PROCEDURE — 25010000002 ENOXAPARIN PER 10 MG: Performed by: HOSPITALIST

## 2018-01-15 PROCEDURE — 85027 COMPLETE CBC AUTOMATED: CPT | Performed by: HOSPITALIST

## 2018-01-15 PROCEDURE — 63710000001 PREDNISONE PER 1 MG: Performed by: INTERNAL MEDICINE

## 2018-01-15 RX ORDER — FUROSEMIDE 20 MG/1
20 TABLET ORAL DAILY
Status: DISCONTINUED | OUTPATIENT
Start: 2018-01-15 | End: 2018-01-15

## 2018-01-15 RX ORDER — GUAIFENESIN 600 MG/1
1200 TABLET, EXTENDED RELEASE ORAL EVERY 12 HOURS SCHEDULED
Qty: 120 TABLET | Refills: 0 | Status: SHIPPED | OUTPATIENT
Start: 2018-01-15 | End: 2018-02-14

## 2018-01-15 RX ORDER — FUROSEMIDE 40 MG/1
40 TABLET ORAL DAILY
Status: DISCONTINUED | OUTPATIENT
Start: 2018-01-15 | End: 2018-01-15

## 2018-01-15 RX ORDER — ALBUTEROL SULFATE 2.5 MG/3ML
2.5 SOLUTION RESPIRATORY (INHALATION) EVERY 4 HOURS PRN
Qty: 150 VIAL | Refills: 12 | Status: SHIPPED | OUTPATIENT
Start: 2018-01-15 | End: 2018-02-14

## 2018-01-15 RX ORDER — PREDNISONE 10 MG/1
TABLET ORAL
Qty: 54 TABLET | Refills: 0 | Status: SHIPPED | OUTPATIENT
Start: 2018-01-15 | End: 2018-04-11 | Stop reason: ALTCHOICE

## 2018-01-15 RX ADMIN — GUAIFENESIN 1200 MG: 600 TABLET, EXTENDED RELEASE ORAL at 08:01

## 2018-01-15 RX ADMIN — ENOXAPARIN SODIUM 40 MG: 40 INJECTION SUBCUTANEOUS at 15:54

## 2018-01-15 RX ADMIN — MONTELUKAST 10 MG: 10 TABLET, FILM COATED ORAL at 08:01

## 2018-01-15 RX ADMIN — PREDNISONE 20 MG: 20 TABLET ORAL at 17:59

## 2018-01-15 RX ADMIN — CETIRIZINE HYDROCHLORIDE 10 MG: 10 TABLET, FILM COATED ORAL at 08:01

## 2018-01-15 RX ADMIN — ZOLPIDEM TARTRATE 5 MG: 5 TABLET ORAL at 21:51

## 2018-01-15 RX ADMIN — GUAIFENESIN 1200 MG: 600 TABLET, EXTENDED RELEASE ORAL at 20:58

## 2018-01-15 RX ADMIN — IPRATROPIUM BROMIDE AND ALBUTEROL SULFATE 3 ML: .5; 3 SOLUTION RESPIRATORY (INHALATION) at 21:28

## 2018-01-15 RX ADMIN — PREDNISONE 20 MG: 20 TABLET ORAL at 12:07

## 2018-01-15 RX ADMIN — IPRATROPIUM BROMIDE AND ALBUTEROL SULFATE 3 ML: .5; 3 SOLUTION RESPIRATORY (INHALATION) at 09:12

## 2018-01-15 RX ADMIN — IPRATROPIUM BROMIDE AND ALBUTEROL SULFATE 3 ML: .5; 3 SOLUTION RESPIRATORY (INHALATION) at 12:23

## 2018-01-15 RX ADMIN — FUROSEMIDE 40 MG: 40 TABLET ORAL at 08:30

## 2018-01-15 RX ADMIN — PANTOPRAZOLE SODIUM 40 MG: 40 TABLET, DELAYED RELEASE ORAL at 06:06

## 2018-01-15 RX ADMIN — PREDNISONE 20 MG: 20 TABLET ORAL at 08:01

## 2018-01-15 RX ADMIN — CITALOPRAM 40 MG: 40 TABLET, FILM COATED ORAL at 08:01

## 2018-01-15 RX ADMIN — ACETYLCYSTEINE 4 ML: 200 SOLUTION ORAL; RESPIRATORY (INHALATION) at 21:28

## 2018-01-15 RX ADMIN — POTASSIUM CHLORIDE 20 MEQ: 750 CAPSULE, EXTENDED RELEASE ORAL at 06:06

## 2018-01-15 NOTE — PROGRESS NOTES
[facility]  Phone: [phone]  Cas Fallon MD    Subjective:  LOS: 10    Improving.  Mucomyst causing some sore throat.  Lasix was not given earlier as no IV.  She will get by mouth dose now.    Objective   Vital Signs past 24hrs  BP range: BP: (140-157)/(84-96) 157/96  Pulse range: Heart Rate:  [] 79  Resp rate range: Resp:  [16-20] 20  Temp range: Temp (24hrs), Av.4 °F (36.9 °C), Min:98.1 °F (36.7 °C), Max:98.9 °F (37.2 °C)    O2 Device: room air   Oxygen range:SpO2:  [95 %-98 %] 98 %   91.2 kg (201 lb); Body mass index is 30.56 kg/(m^2).    Intake/Output Summary (Last 24 hours) at 01/15/18 0839  Last data filed at 18 1730   Gross per 24 hour   Intake              840 ml   Output                0 ml   Net              840 ml       Physical Exam   Constitutional: She appears well-developed.   Eyes: Conjunctivae are normal.   Neck: Normal range of motion. Neck supple.   Cardiovascular: Normal rate and regular rhythm.    No murmur heard.  Pulmonary/Chest: Effort normal. No respiratory distress. She has decreased breath sounds. She has wheezes (Mild coarse wheeze). She has rhonchi (Improved from yesterday). She has no rales.   Abdominal: Soft. Bowel sounds are normal. She exhibits no mass. There is no tenderness.   Musculoskeletal: She exhibits edema (mild).   Neurological: She is alert.   Skin: Skin is warm. No rash noted.     Results Review:    I have reviewed the laboratory and imaging data since the last note by [facility] physician.  My annotations are noted in assessment and plan.    Medication Review:  I have reviewed the current MAR.  My annotations are noted in assessment and plan.      lactated ringers 30 mL/hr Last Rate: Stopped (18 1455)   Pharmacy to Dose enoxaparin (LOVENOX)       Plan   PCCM Problems  Asthma exacerbation  Influenza A infection  Relevant Medical Diagnoses  Elevated IgE  Exposure to pets at home    Plan of Treatment  Improving on Mucinex and Mucomyst.       Can start to taper the prednisone.    Mild leg edema.  We will give 1 dose Lasix.    OK discharge home per us.  Use flutter valve.  Use nebulized albuterol 4 times a day to 6 times a day until wheezing improves and resolves.  Start Breo-200 inhaler on discharge.  Follow-up in pulmonary office in 1-2 months. Pulmonary orders entered.    Cas Fallon MD  01/15/18  8:39 AM    Part of this note may be an electronic transcription/translation of spoken language to printed text using the Dragon Dictation System.

## 2018-01-15 NOTE — PROGRESS NOTES
[facility]               ASSOCIATES     LOS: 10 days     Name: Amirah Godoy  Age: 47 y.o.  Sex: female  :  1970  MRN: [id number]         Primary Care Physician: Ruben Kwan Jr., MD    Diet Regular    Subjective   Better this morning. Not as well now. More SOA. Swelling this morning but better with lasix (mild leg edema)    Objective   Temp:  [98.1 °F (36.7 °C)-98.9 °F (37.2 °C)] 98.9 °F (37.2 °C)  Heart Rate:  [] 111  Resp:  [16-20] 20  BP: (140-157)/(84-96) 157/96  SpO2:  [95 %-98 %] 98 %  on   ;   O2 Device: room air  Body mass index is 30.56 kg/(m^2).    Physical Exam   Constitutional: She is oriented to person, place, and time. No distress.   Cardiovascular: Normal rate and regular rhythm.    Pulmonary/Chest: Effort normal. No respiratory distress. She has wheezes. She has rhonchi.   Abdominal: Soft. There is no tenderness.   Musculoskeletal: She exhibits no edema.   Neurological: She is alert and oriented to person, place, and time.   Skin: Skin is warm and dry.     Reviewed medications and new clinical results    acetylcysteine 4 mL Nebulization 4x Daily - RT   cetirizine 10 mg Oral Daily   citalopram 40 mg Oral Daily   enoxaparin 40 mg Subcutaneous Q24H   guaiFENesin 1,200 mg Oral Q12H   ipratropium-albuterol 3 mL Nebulization 4x Daily - RT   montelukast 10 mg Oral Daily   pantoprazole 40 mg Oral Q AM   predniSONE 20 mg Oral TID With Meals       lactated ringers 30 mL/hr Last Rate: Stopped (18 0262)   Pharmacy to Dose enoxaparin (LOVENOX)         Results from last 7 days  Lab Units 01/15/18  0609 18  0547 18  0534 18  0325 01/10/18  0721 18  0539   WBC 10*3/mm3 20.44* 14.63* 15.15* 15.93* 19.17* 18.07*   HEMOGLOBIN g/dL 13.0 13.0 14.3 12.8 13.3 12.8   PLATELETS 10*3/mm3 304 297 321 339 334 330       Results from last 7 days  Lab Units 18  0547 18  0534 18  0325 18  0539   SODIUM mmol/L 137 136 138 140    POTASSIUM mmol/L 4.3 3.9 3.8 4.0   CHLORIDE mmol/L 98 94* 99 102   CO2 mmol/L 29.6* 29.5* 25.3 21.8*   BUN mg/dL 20 15 16 20   CREATININE mg/dL 0.63 0.76 0.67 0.73   CALCIUM mg/dL 8.7 8.3* 8.0* 8.0*   GLUCOSE mg/dL 128* 90 99 128*     Lab Results   Component Value Date    ANIONGAP 9.4 01/13/2018     Estimated Creatinine Clearance: 127.6 mL/min (by C-G formula based on Cr of 0.63).    Assessment/Plan   Active Hospital Problems (** Indicates Principal Problem)    Diagnosis Date Noted   • **Asthma with exacerbation [J45.901] 01/05/2018   • Influenza A [J10.1] 01/09/2018   • Tachycardia [R00.0] 01/07/2018   • Cough [R05] 01/05/2018   • Weakness [R53.1] 01/05/2018   • History of atrial fibrillation [Z86.79] 01/01/2011      Resolved Hospital Problems    Diagnosis Date Noted Date Resolved   No resolved problems to display.     · S/P tamiflu  · S/P bronch 1/11/18  · Check CXR  · Monitoring WBC  · I discussed the patient's findings and my recommendations with patient.    Sam Putnam MD   01/15/18  1:25 PM

## 2018-01-15 NOTE — PROGRESS NOTES
[facility]  Phone: [phone]  Cas Fallon MD    Subjective:  LOS: 9    Improving.  Feels that the Mucinex and Mucomyst are helping.    Objective   Vital Signs past 24hrs  BP range: BP: (131-144)/(83-87) 144/84  Pulse range: Heart Rate:  [] 99  Resp rate range: Resp:  [16-18] 16  Temp range: Temp (24hrs), Av.5 °F (36.9 °C), Min:98 °F (36.7 °C), Max:98.9 °F (37.2 °C)    O2 Device: room air   Oxygen range:SpO2:  [92 %-98 %] 98 %   93 kg (205 lb 1.6 oz); Body mass index is 31.19 kg/(m^2).    Intake/Output Summary (Last 24 hours) at 18 1919  Last data filed at 18 1730   Gross per 24 hour   Intake             1080 ml   Output                0 ml   Net             1080 ml       Physical Exam   Constitutional: She appears well-developed.   Eyes: Conjunctivae are normal.   Neck: Normal range of motion. Neck supple.   Cardiovascular: Normal rate and regular rhythm.    No murmur heard.  Pulmonary/Chest: Effort normal. No respiratory distress. She has decreased breath sounds. She has wheezes (Mild coarse wheeze). She has rhonchi (Improved from yesterday). She has no rales.   Abdominal: Soft. Bowel sounds are normal. She exhibits no mass. There is no tenderness.   Musculoskeletal: She exhibits edema (mild).   Neurological: She is alert.   Skin: Skin is warm. No rash noted.     Results Review:    I have reviewed the laboratory and imaging data since the last note by [facility] physician.  My annotations are noted in assessment and plan.    Medication Review:  I have reviewed the current MAR.  My annotations are noted in assessment and plan.      lactated ringers 30 mL/hr Last Rate: Stopped (18 1455)   Pharmacy to Dose enoxaparin (LOVENOX)       Plan   PCCM Problems  Asthma exacerbation  Influenza A infection  Relevant Medical Diagnoses  Elevated IgE  Exposure to pets at home    Plan of Treatment  Improving on Mucinex and Mucomyst.  Continue same for now.  Use flutter valve.    Can start to  taper the prednisone starting tomorrow if she is improving.    A chest x-ray was unremarkable.    Mild leg edema.  We will give 1 dose Lasix but in the morning.    Potential discharge tomorrow but with nebs and adjustment of inhalers and obviously steroid taper.    Cas Fallon MD  01/14/18  7:19 PM    Part of this note may be an electronic transcription/translation of spoken language to printed text using the Dragon Dictation System.

## 2018-01-15 NOTE — PLAN OF CARE
Problem: Patient Care Overview (Adult)  Goal: Plan of Care Review  Outcome: Ongoing (interventions implemented as appropriate)   01/15/18 0528   Coping/Psychosocial Response Interventions   Plan Of Care Reviewed With patient   Patient Care Overview   Progress progress toward functional goals as expected   Outcome Evaluation   Outcome Summary/Follow up Plan A&Ox4. Rhonic bilateral with bases diminished. Hoping for DC this morning. Slept well thgoughout night.     Goal: Discharge Needs Assessment  Outcome: Ongoing (interventions implemented as appropriate)      Problem: Asthma (Adult)  Goal: Signs and Symptoms of Listed Potential Problems Will be Absent or Manageable (Asthma)  Outcome: Outcome(s) achieved Date Met: 01/15/18      Problem: Pain, Acute (Adult)  Goal: Acceptable Pain Control/Comfort Level  Outcome: Ongoing (interventions implemented as appropriate)      Problem: Infection, Risk/Actual (Adult)  Goal: Infection Prevention/Resolution  Outcome: Ongoing (interventions implemented as appropriate)

## 2018-01-15 NOTE — PLAN OF CARE
Problem: Patient Care Overview (Adult)  Goal: Plan of Care Review  Outcome: Ongoing (interventions implemented as appropriate)   01/15/18 1602   Coping/Psychosocial Response Interventions   Plan Of Care Reviewed With patient   Patient Care Overview   Progress improving   Outcome Evaluation   Outcome Summary/Follow up Plan Patient alert and oriented. Repeat chest xray today with negative results. Lasix 40 mg po one time dose given per MD order. Patient on room air. Vitals stable. Will continue to monitor.       Problem: Pain, Acute (Adult)  Goal: Acceptable Pain Control/Comfort Level  Outcome: Ongoing (interventions implemented as appropriate)      Problem: Infection, Risk/Actual (Adult)  Goal: Infection Prevention/Resolution  Outcome: Ongoing (interventions implemented as appropriate)

## 2018-01-16 VITALS
WEIGHT: 203.7 LBS | DIASTOLIC BLOOD PRESSURE: 89 MMHG | HEIGHT: 68 IN | RESPIRATION RATE: 16 BRPM | SYSTOLIC BLOOD PRESSURE: 153 MMHG | HEART RATE: 108 BPM | TEMPERATURE: 97.9 F | OXYGEN SATURATION: 97 % | BODY MASS INDEX: 30.87 KG/M2

## 2018-01-16 LAB
ANION GAP SERPL CALCULATED.3IONS-SCNC: 8.9 MMOL/L
BUN BLD-MCNC: 23 MG/DL (ref 6–20)
BUN/CREAT SERPL: 31.9 (ref 7–25)
CALCIUM SPEC-SCNC: 8.4 MG/DL (ref 8.6–10.5)
CHLORIDE SERPL-SCNC: 95 MMOL/L (ref 98–107)
CO2 SERPL-SCNC: 30.1 MMOL/L (ref 22–29)
CREAT BLD-MCNC: 0.72 MG/DL (ref 0.57–1)
DEPRECATED RDW RBC AUTO: 47.4 FL (ref 37–54)
ERYTHROCYTE [DISTWIDTH] IN BLOOD BY AUTOMATED COUNT: 13.5 % (ref 11.7–13)
GFR SERPL CREATININE-BSD FRML MDRD: 86 ML/MIN/1.73
GLUCOSE BLD-MCNC: 95 MG/DL (ref 65–99)
HCT VFR BLD AUTO: 42 % (ref 35.6–45.5)
HGB BLD-MCNC: 13.5 G/DL (ref 11.9–15.5)
MCH RBC QN AUTO: 31.3 PG (ref 26.9–32)
MCHC RBC AUTO-ENTMCNC: 32.1 G/DL (ref 32.4–36.3)
MCV RBC AUTO: 97.4 FL (ref 80.5–98.2)
PLATELET # BLD AUTO: 303 10*3/MM3 (ref 140–500)
PMV BLD AUTO: 9.3 FL (ref 6–12)
POTASSIUM BLD-SCNC: 4.3 MMOL/L (ref 3.5–5.2)
RBC # BLD AUTO: 4.31 10*6/MM3 (ref 3.9–5.2)
SODIUM BLD-SCNC: 134 MMOL/L (ref 136–145)
WBC NRBC COR # BLD: 22.64 10*3/MM3 (ref 4.5–10.7)

## 2018-01-16 PROCEDURE — 80048 BASIC METABOLIC PNL TOTAL CA: CPT | Performed by: HOSPITALIST

## 2018-01-16 PROCEDURE — 94799 UNLISTED PULMONARY SVC/PX: CPT

## 2018-01-16 PROCEDURE — 63710000001 PREDNISONE PER 1 MG: Performed by: INTERNAL MEDICINE

## 2018-01-16 PROCEDURE — 85027 COMPLETE CBC AUTOMATED: CPT | Performed by: HOSPITALIST

## 2018-01-16 RX ORDER — PANTOPRAZOLE SODIUM 40 MG/1
40 TABLET, DELAYED RELEASE ORAL DAILY
Qty: 30 TABLET | Refills: 0 | Status: SHIPPED | OUTPATIENT
Start: 2018-01-16 | End: 2018-10-24 | Stop reason: ALTCHOICE

## 2018-01-16 RX ORDER — ZOLPIDEM TARTRATE 5 MG/1
5 TABLET ORAL NIGHTLY PRN
Qty: 14 TABLET | Refills: 0 | Status: SHIPPED | OUTPATIENT
Start: 2018-01-16 | End: 2018-04-11 | Stop reason: ALTCHOICE

## 2018-01-16 RX ADMIN — MONTELUKAST 10 MG: 10 TABLET, FILM COATED ORAL at 09:47

## 2018-01-16 RX ADMIN — PANTOPRAZOLE SODIUM 40 MG: 40 TABLET, DELAYED RELEASE ORAL at 07:09

## 2018-01-16 RX ADMIN — CETIRIZINE HYDROCHLORIDE 10 MG: 10 TABLET, FILM COATED ORAL at 09:47

## 2018-01-16 RX ADMIN — IPRATROPIUM BROMIDE AND ALBUTEROL SULFATE 3 ML: .5; 3 SOLUTION RESPIRATORY (INHALATION) at 12:39

## 2018-01-16 RX ADMIN — GUAIFENESIN 1200 MG: 600 TABLET, EXTENDED RELEASE ORAL at 09:47

## 2018-01-16 RX ADMIN — IPRATROPIUM BROMIDE AND ALBUTEROL SULFATE 3 ML: .5; 3 SOLUTION RESPIRATORY (INHALATION) at 08:51

## 2018-01-16 RX ADMIN — PREDNISONE 20 MG: 20 TABLET ORAL at 09:47

## 2018-01-16 RX ADMIN — CITALOPRAM 40 MG: 40 TABLET, FILM COATED ORAL at 09:47

## 2018-01-16 RX ADMIN — ACETYLCYSTEINE 4 ML: 200 SOLUTION ORAL; RESPIRATORY (INHALATION) at 12:46

## 2018-01-16 RX ADMIN — PREDNISONE 20 MG: 20 TABLET ORAL at 12:44

## 2018-01-16 NOTE — DISCHARGE SUMMARY
[facility]    Date of Discharge:  1/16/2018    PCP: Ruben Kwan Jr., MD    Discharge Diagnosis:   Active Hospital Problems (** Indicates Principal Problem)    Diagnosis Date Noted   • **Asthma with exacerbation [J45.901] 01/05/2018   • Influenza A [J10.1] 01/09/2018   • Tachycardia [R00.0] 01/07/2018   • Cough [R05] 01/05/2018   • Weakness [R53.1] 01/05/2018   • History of atrial fibrillation [Z86.79] 01/01/2011      Resolved Hospital Problems    Diagnosis Date Noted Date Resolved   No resolved problems to display.     Procedures Performed  Procedure(s):  BRONCHOSCOPY     Consulting Physician(s)     Provider Relationship Specialty    Jorge L Whitehead MD Consulting Physician Pulmonary Disease        Hospital Course  Please see history and physical for details. Patient is a 47 y.o. female with a history of asthma since childhood admitted for influenza A as well as persistent asthma that is severe with acute exacerbation. Pulmonology was asked to see to help. She was started on IV steroids, nebulized breathing treatments. She was slow to improve and bronchoscopy was performed and it was nondiagnostic. Gradually she did improve and does feel better and would like to go home. Pulmonology would like to see her in the office in one to 2 months. I would like her to continue to use flutter valve. She does have leukocytosis likely due to steroids. Recommend continued follow-up and rechecking once she is off steroids.    On CT imaging she was found to have a chronic liver lesion that was present in 2016. It was read as indeterminate at the hepatic dome. I discussed this with the patient. She will follow-up with her primary care physician regarding this. In the meantime I will go ahead and order dedicated liver imaging as an outpatient in a couple weeks.    She did have an elevated IgE level and may be a candidate for Xolair treatments. Pulmonology is looking into  this. I discussed with Dr. Fallon it can take weeks and discussed with the patient.    Condition on Discharge: Improved.     Temp:  [97.9 °F (36.6 °C)-98.7 °F (37.1 °C)] 97.9 °F (36.6 °C)  Heart Rate:  [] 108  Resp:  [16-20] 16  BP: (144-153)/(89-94) 153/89  Body mass index is 30.97 kg/(m^2).    Physical Exam   Constitutional: She is oriented to person, place, and time. No distress.   Cardiovascular: Normal rate and regular rhythm.    Pulmonary/Chest: Effort normal. No respiratory distress. She has no wheezes. She has rhonchi (mild).   Abdominal: Soft. There is no tenderness.   Neurological: She is alert and oriented to person, place, and time.   Skin: Skin is warm and dry.     Discharge Medications   Amirah Godoy   Home Medication Instructions [id number]    Printed on:01/16/18 8904   Medication Information                      albuterol (PROVENTIL) (2.5 MG/3ML) 0.083% nebulizer solution  Take 2.5 mg by nebulization Every 4 (Four) Hours As Needed for Wheezing or Shortness of Air for up to 30 days.             cetirizine (ZyrTEC) 10 MG tablet  Take 10 mg by mouth daily.             citalopram (CeleXA) 20 MG tablet  Take 40 mg by mouth Daily.             Fluticasone Furoate-Vilanterol 200-25 MCG/INH aerosol powder   Inhale 1 puff Daily for 30 days.             guaiFENesin (MUCINEX) 600 MG 12 hr tablet  Take 2 tablets by mouth Every 12 (Twelve) Hours for 30 days.             MINASTRIN 24 FE 1-20 MG-MCG(24) chewable tablet  Chew 1 tablet Every Night.             montelukast (SINGULAIR) 10 MG tablet  TAKE ONE TABLET BY MOUTH DAILY             pantoprazole (PROTONIX) 40 MG EC tablet  Take 1 tablet by mouth Daily.             predniSONE (DELTASONE) 10 MG tablet  4 tabs bid x 3 days then 4 tabs qd x 3 days then 3 tabs qd x 3 days then 2 tabs qd x 3 days then 1 tab qd x 3 days then stop; total 54 tabs             tiotropium (SPIRIVA HANDIHALER) 18 MCG per inhalation capsule  Place 1 capsule into inhaler and  inhale Daily.             zolpidem (AMBIEN) 5 MG tablet  Take 1 tablet by mouth At Night As Needed for Sleep.                Diet Instructions     Diet: Regular       Discharge Diet:  Regular                Activity Instructions     Activity as Tolerated                    Additional Instructions for the Follow-ups that You Need to Schedule     Call MD for problems / concerns.    As directed        CT Abdomen With & Without Contrast    Jan 30, 2018    Liver protocol (indeterminate lesion seen on hepatic dome)   Will Oral Contrast be needed for this procedure?:  No    Pregnant?:  Unknown                 Follow-up Information     Follow up with YORDAN Davis. Schedule an appointment as soon as possible for a visit in 1 month(s).    Specialties:  Family Medicine, Sleep Medicine    Why:  PFT's with pre-post on arrival  Nurse practitioner for Dr. Cas Fallon    Contact information:    [address] [place] [id number] [phone]          Follow up with Ruben Kwan Jr., MD. Call today.    Specialty:  Family Medicine    Why:  CALLED TO MAKE APPOINTMENT PCP OFFICE IS CLOSED 1/16/18 3:07 PM FOR FOLLOW UP VISIT TO HER DISCHARGE TODAY LEFT VOICEMAIL.    Contact information:    [address]  [facility] [address] [phone]          Test Results Pending at Discharge   Order Current Status    AFB Culture - Wash, Bronchus Preliminary result    Fungus Culture - Wash, Bronchus Preliminary result         Sam Putnam MD  01/16/18  3:16 PM    Discharge time spent greater than 30 minutes.

## 2018-01-16 NOTE — PAYOR COMM NOTE
"Amirah Godoy (47 y.o. Female)     PLEASE SEE ATTACHED CLINICAL REVIEW. PT. REMAINS ON A MONITORED TELEM FLOOR.     REF#[id number]    PLEASE CALL   OR  [phone] WITH CONTINUED STAY AUTH AND DAYS APPROVED.     THANK YOU    APOLLO VAUGHAN, HELEN, CCP    Date of Birth Social Security Number Address Home Phone MRN    1970  [address] [id number] [phone] [id number]    Buddhist Marital Status          [employer]        Admission Date Admission Type Admitting Provider Attending Provider Department, Room/Bed    1/5/18 Urgent Sam Putnam MD Nguyen, Minh Loc, MD [address], 544/1    Discharge Date Discharge Disposition Discharge Destination                      Attending Provider: Sam Putnam MD     Allergies:  Demerol [Meperidine], Sulfa Antibiotics    Isolation:  Droplet   Infection:  Influenza (01/05/18)   Code Status:  FULL    Ht:  172.7 cm (68\")   Wt:  92.4 kg (203 lb 11.2 oz)    Admission Cmt:  JASE BATRES   Principal Problem:  Asthma with exacerbation [J45.901]                 Active Insurance as of 1/5/2018     Primary Coverage     Payor Plan Insurance Group Employer/Plan Group    Critical access hospital BLUE CROSS ANTHEM BLUE [facility] PPO [id number]     Payor Plan Address Payor Plan Phone Number Effective From Effective To    [address] [phone] 1/1/2018     [place] [address]       Subscriber Name Subscriber Birth Date Member ID       AMIRAH GODOY 1970 [id number]                 Emergency Contacts      (Rel.) Home Phone Work Phone Mobile Phone    Zeke Godoy (Spouse) [phone] -- [phone]              Intake & Output (last day)       01/15 0701 - 01/16 0700 01/16 0701 - 01/17 0700    P.O. 960 480    Total Intake(mL/kg) 960 (10.4) 480 (5.2)    Net +960 +480              Orders (last 24 hrs)     Start     Ordered    01/16/18 0600  CBC (No Diff)  Morning Draw      01/15/18 1327    " 01/16/18 0600  Basic Metabolic Panel  Morning Draw      01/15/18 1329    01/15/18 0000  albuterol (PROVENTIL) (2.5 MG/3ML) 0.083% nebulizer solution  Every 4 Hours PRN      01/15/18 0846    01/15/18 0000  predniSONE (DELTASONE) 10 MG tablet      01/15/18 0846    01/15/18 0000  guaiFENesin (MUCINEX) 600 MG 12 hr tablet  Every 12 Hours Scheduled      01/15/18 0846    01/15/18 0000  Home Nebulizer      01/15/18 0846    01/15/18 0000  Fluticasone Furoate-Vilanterol 200-25 MCG/INH aerosol powder   Daily      01/15/18 0846    01/13/18 2100  guaiFENesin (MUCINEX) 12 hr tablet 1,200 mg  Every 12 Hours Scheduled      01/13/18 1833    01/13/18 2030  acetylcysteine (MUCOMYST) 20 % nebulizer solution 4 mL  4 Times Daily - RT      01/13/18 1833    01/13/18 2030  ipratropium-albuterol (DUO-NEB) nebulizer solution 3 mL  4 Times Daily - RT      01/13/18 1833    01/13/18 1930  OSCILLATING POSITIVE EXIRATORY PRESSURE (OPEP)  Every 4 Hours While Awake - RT      01/13/18 1833    01/12/18 1616  albuterol (PROVENTIL) nebulizer solution 0.083% 2.5 mg/3mL  Every 4 Hours PRN      01/12/18 1616    01/11/18 1355  lactated ringers infusion  Continuous PRN      01/11/18 1355    01/11/18 0600  pantoprazole (PROTONIX) EC tablet 40 mg  Every Early Morning      01/10/18 2052    01/10/18 1142  promethazine-codeine (PHENERGAN with CODEINE) 6.25-10 MG/5ML syrup 5 mL  Every 4 Hours PRN      01/10/18 1142    01/10/18 1137  benzonatate (TESSALON) capsule 100 mg  3 Times Daily PRN      01/10/18 1137    01/10/18 1136  aluminum-magnesium hydroxide-simethicone (MAALOX/MYLANTA) suspension 20 mL  Every 6 Hours PRN      01/10/18 1137    01/10/18 0800  predniSONE (DELTASONE) tablet 20 mg  3 Times Daily With Meals      01/09/18 2008 01/08/18 1759  acetaminophen (TYLENOL) tablet 650 mg  Every 6 Hours PRN      01/08/18 1800    01/07/18 1427  ALPRAZolam (XANAX) tablet 0.25 mg  2 Times Daily PRN      01/07/18 1427    01/07/18 1245  ipratropium-albuterol (DUO-NEB)  nebulizer solution 3 mL  Every 3 Hours PRN      18 1246    18 1647  zolpidem (AMBIEN) tablet 5 mg  Nightly PRN      18 1652    18 1500  enoxaparin (LOVENOX) syringe 40 mg  Every 24 Hours      18 1428    18 1330  citalopram (CeleXA) tablet 40 mg  Daily      18 1248    18 1330  montelukast (SINGULAIR) tablet 10 mg  Daily      18 1248    18 1330  cetirizine (zyrTEC) tablet 10 mg  Daily      18 1248    18 1246  Pharmacy to Dose enoxaparin (LOVENOX)  Continuous PRN      18 1248    18 1246  sodium chloride 0.9 % flush 1-10 mL  As Needed      18 1248    --  tiotropium (SPIRIVA HANDIHALER) 18 MCG per inhalation capsule  Daily - RT      18 1222        Cas Fallon MD Physician Signed Pulmonology Progress Notes Date of Service: 1/15/2018  8:39 AM      Expand All Collapse All    []Hide copied text  [facility]  Phone: [phone]  Cas Fallon MD     Subjective:  LOS: 10     Improving.  Mucomyst causing some sore throat.  Lasix was not given earlier as no IV.  She will get by mouth dose now.        Objective      Vital Signs past 24hrs  BP range: BP: (140-157)/(84-96) 157/96  Pulse range: Heart Rate:  [] 79  Resp rate range: Resp:  [16-20] 20  Temp range: Temp (24hrs), Av.4 °F (36.9 °C), Min:98.1 °F (36.7 °C), Max:98.9 °F (37.2 °C)     O2 Device: room air   Oxygen range:SpO2:  [95 %-98 %] 98 %   91.2 kg (201 lb); Body mass index is 30.56 kg/(m^2).     Intake/Output Summary (Last 24 hours) at 01/15/18 0839  Last data filed at 18 1730    Gross per 24 hour   Intake              840 ml   Output                0 ml   Net              840 ml         Physical Exam   Constitutional: She appears well-developed.   Eyes: Conjunctivae are normal.   Neck: Normal range of motion. Neck supple.   Cardiovascular: Normal rate and regular rhythm.    No murmur heard.  Pulmonary/Chest: Effort normal. No respiratory distress.  She has decreased breath sounds. She has wheezes (Mild coarse wheeze). She has rhonchi (Improved from yesterday). She has no rales.   Abdominal: Soft. Bowel sounds are normal. She exhibits no mass. There is no tenderness.   Musculoskeletal: She exhibits edema (mild).   Neurological: She is alert.   Skin: Skin is warm. No rash noted.      Results Review:    I have reviewed the laboratory and imaging data since the last note by [facility] physician.  My annotations are noted in assessment and plan.     Medication Review:  I have reviewed the current MAR.  My annotations are noted in assessment and plan.        lactated ringers 30 mL/hr Last Rate: Stopped (18 0765)   Pharmacy to Dose enoxaparin (LOVENOX)             Plan      PCCM Problems  Asthma exacerbation  Influenza A infection  Relevant Medical Diagnoses  Elevated IgE  Exposure to pets at home     Plan of Treatment  Improving on Mucinex and Mucomyst.       Can start to taper the prednisone.     Mild leg edema.  We will give 1 dose Lasix.     OK discharge home per us.  Use flutter valve.  Use nebulized albuterol 4 times a day to 6 times a day until wheezing improves and resolves.  Start Breo-200 inhaler on discharge.  Follow-up in pulmonary office in 1-2 months. Pulmonary orders entered.     Cas Fallon MD  01/15/18  8:39 AM     Part of this note may be an electronic transcription/translation of spoken language to printed text using the Dragon Dictation System.                            Sam Putnam MD Physician Signed Medicine Progress Notes Date of Service: 1/15/2018  1:25 PM      Expand All Collapse All    []Hide copied text                      [facility]      LOS: 10 days      Name: Amirah Godoy  Age: 47 y.o.  Sex: female  :  1970  MRN: [id number]         Primary Care Physician: Ruben Kwan Jr., MD     Diet Regular        Subjective       Better this morning. Not as well now. More SOA. Swelling this  morning but better with lasix (mild leg edema)        Objective       Temp:  [98.1 °F (36.7 °C)-98.9 °F (37.2 °C)] 98.9 °F (37.2 °C)  Heart Rate:  [] 111  Resp:  [16-20] 20  BP: (140-157)/(84-96) 157/96  SpO2:  [95 %-98 %] 98 %  on   ;   O2 Device: room air  Body mass index is 30.56 kg/(m^2).     Physical Exam   Constitutional: She is oriented to person, place, and time. No distress.   Cardiovascular: Normal rate and regular rhythm.    Pulmonary/Chest: Effort normal. No respiratory distress. She has wheezes. She has rhonchi.   Abdominal: Soft. There is no tenderness.   Musculoskeletal: She exhibits no edema.   Neurological: She is alert and oriented to person, place, and time.   Skin: Skin is warm and dry.      Reviewed medications and new clinical results     acetylcysteine 4 mL Nebulization 4x Daily - RT   cetirizine 10 mg Oral Daily   citalopram 40 mg Oral Daily   enoxaparin 40 mg Subcutaneous Q24H   guaiFENesin 1,200 mg Oral Q12H   ipratropium-albuterol 3 mL Nebulization 4x Daily - RT   montelukast 10 mg Oral Daily   pantoprazole 40 mg Oral Q AM   predniSONE 20 mg Oral TID With Meals         lactated ringers 30 mL/hr Last Rate: Stopped (01/11/18 1455)   Pharmacy to Dose enoxaparin (LOVENOX)             Results from last 7 days  Lab Units 01/15/18  0609 01/13/18  0547 01/12/18  0534 01/11/18  0325 01/10/18  0721 01/09/18  0539   WBC 10*3/mm3 20.44* 14.63* 15.15* 15.93* 19.17* 18.07*   HEMOGLOBIN g/dL 13.0 13.0 14.3 12.8 13.3 12.8   PLATELETS 10*3/mm3 304 297 321 339 334 330         Results from last 7 days  Lab Units 01/13/18  0547 01/12/18  0534 01/11/18  0325 01/09/18  0539   SODIUM mmol/L 137 136 138 140   POTASSIUM mmol/L 4.3 3.9 3.8 4.0   CHLORIDE mmol/L 98 94* 99 102   CO2 mmol/L 29.6* 29.5* 25.3 21.8*   BUN mg/dL 20 15 16 20   CREATININE mg/dL 0.63 0.76 0.67 0.73   CALCIUM mg/dL 8.7 8.3* 8.0* 8.0*   GLUCOSE mg/dL 128* 90 99 128*            Lab Results   Component Value Date     ANIONGAP 9.4  01/13/2018      Estimated Creatinine Clearance: 127.6 mL/min (by C-G formula based on Cr of 0.63).        Assessment/Plan             Active Hospital Problems (** Indicates Principal Problem)     Diagnosis Date Noted   • **Asthma with exacerbation [J45.901] 01/05/2018   • Influenza A [J10.1] 01/09/2018   • Tachycardia [R00.0] 01/07/2018   • Cough [R05] 01/05/2018   • Weakness [R53.1] 01/05/2018   • History of atrial fibrillation [Z86.79] 01/01/2011       Resolved Hospital Problems     Diagnosis Date Noted Date Resolved   No resolved problems to display.      · S/P tamiflu  · S/P bronch 1/11/18  · Check CXR  · Monitoring WBC  · I discussed the patient's findings and my recommendations with patient.     Sam Putnam MD   01/15/18  1:25 PM

## 2018-01-16 NOTE — PROGRESS NOTES
Continued Stay Note  [facility]     Patient Name: Amirah Godoy  MRN: [id number]  Today's Date: 1/16/2018    Admit Date: 1/5/2018          Discharge Plan       01/16/18 1620    Case Management/Social Work Plan    Plan Home with New nebulizer from [place].     Patient/Family In Agreement With Plan yes    Additional Comments CCP followed up with pt and she will follow up with Pulmonary for outpatient Xolair Medications. CCP explained called Debra irvin Sandoval for nebulizer and will bring to room. Pt denies any additional dc needs or concerns. Updated CONCETTA Escalera. Ryan HYLTON/CCP              Discharge Codes     None        Expected Discharge Date and Time     Expected Discharge Date Expected Discharge Time    Jan 16, 2018             Fátima Katz, RN

## 2018-01-30 ENCOUNTER — HOSPITAL ENCOUNTER (OUTPATIENT)
Dept: CT IMAGING | Facility: HOSPITAL | Age: 48
Discharge: HOME | End: 2018-01-30
Attending: HOSPITALIST | Admitting: HOSPITALIST

## 2018-01-30 DIAGNOSIS — T17.500A MUCUS PLUGGING OF BRONCHI: ICD-10-CM

## 2018-01-30 LAB — CREAT BLDA-MCNC: 0.7 MG/DL (ref 0.6–1.3)

## 2018-01-30 PROCEDURE — 82565 ASSAY OF CREATININE: CPT

## 2018-01-30 PROCEDURE — 74170 CT ABD WO CNTRST FLWD CNTRST: CPT

## 2018-01-30 PROCEDURE — 0 IOPAMIDOL 61 % SOLUTION: Performed by: HOSPITALIST

## 2018-01-30 RX ADMIN — IOPAMIDOL 85 ML: 612 INJECTION, SOLUTION INTRAVENOUS at 10:30

## 2018-02-08 LAB — FUNGUS WND CULT: NORMAL

## 2018-02-14 ENCOUNTER — TRANSCRIBE ORDERS (OUTPATIENT)
Dept: ADMINISTRATIVE | Facility: HOSPITAL | Age: 48
End: 2018-02-14

## 2018-02-14 DIAGNOSIS — R60.0 BILATERAL LOWER EXTREMITY EDEMA: ICD-10-CM

## 2018-02-14 DIAGNOSIS — R91.8 LUNG MASS: Primary | ICD-10-CM

## 2018-02-19 ENCOUNTER — HOSPITAL ENCOUNTER (OUTPATIENT)
Dept: CT IMAGING | Facility: HOSPITAL | Age: 48
Discharge: HOME | End: 2018-02-19
Attending: INTERNAL MEDICINE

## 2018-02-19 ENCOUNTER — HOSPITAL ENCOUNTER (OUTPATIENT)
Dept: CARDIOLOGY | Facility: HOSPITAL | Age: 48
Discharge: HOME | End: 2018-02-19
Attending: INTERNAL MEDICINE | Admitting: INTERNAL MEDICINE

## 2018-02-19 DIAGNOSIS — R91.8 LUNG MASS: ICD-10-CM

## 2018-02-19 DIAGNOSIS — R60.0 BILATERAL LOWER EXTREMITY EDEMA: ICD-10-CM

## 2018-02-19 LAB
BH CV LOWER VASCULAR LEFT COMMON FEMORAL AUGMENT: NORMAL
BH CV LOWER VASCULAR LEFT COMMON FEMORAL COMPETENT: NORMAL
BH CV LOWER VASCULAR LEFT COMMON FEMORAL COMPRESS: NORMAL
BH CV LOWER VASCULAR LEFT COMMON FEMORAL PHASIC: NORMAL
BH CV LOWER VASCULAR LEFT COMMON FEMORAL SPONT: NORMAL
BH CV LOWER VASCULAR LEFT DISTAL FEMORAL COMPRESS: NORMAL
BH CV LOWER VASCULAR LEFT GASTRONEMIUS COMPRESS: NORMAL
BH CV LOWER VASCULAR LEFT GREATER SAPH AK COMPRESS: NORMAL
BH CV LOWER VASCULAR LEFT GREATER SAPH BK COMPRESS: NORMAL
BH CV LOWER VASCULAR LEFT MID FEMORAL AUGMENT: NORMAL
BH CV LOWER VASCULAR LEFT MID FEMORAL COMPETENT: NORMAL
BH CV LOWER VASCULAR LEFT MID FEMORAL COMPRESS: NORMAL
BH CV LOWER VASCULAR LEFT MID FEMORAL PHASIC: NORMAL
BH CV LOWER VASCULAR LEFT MID FEMORAL SPONT: NORMAL
BH CV LOWER VASCULAR LEFT PERONEAL COMPRESS: NORMAL
BH CV LOWER VASCULAR LEFT POPLITEAL AUGMENT: NORMAL
BH CV LOWER VASCULAR LEFT POPLITEAL COMPETENT: NORMAL
BH CV LOWER VASCULAR LEFT POPLITEAL COMPRESS: NORMAL
BH CV LOWER VASCULAR LEFT POPLITEAL PHASIC: NORMAL
BH CV LOWER VASCULAR LEFT POPLITEAL SPONT: NORMAL
BH CV LOWER VASCULAR LEFT POSTERIOR TIBIAL COMPRESS: NORMAL
BH CV LOWER VASCULAR LEFT PROXIMAL FEMORAL COMPRESS: NORMAL
BH CV LOWER VASCULAR LEFT SAPHENOFEMORAL JUNCTION AUGMENT: NORMAL
BH CV LOWER VASCULAR LEFT SAPHENOFEMORAL JUNCTION COMPETENT: NORMAL
BH CV LOWER VASCULAR LEFT SAPHENOFEMORAL JUNCTION COMPRESS: NORMAL
BH CV LOWER VASCULAR LEFT SAPHENOFEMORAL JUNCTION PHASIC: NORMAL
BH CV LOWER VASCULAR LEFT SAPHENOFEMORAL JUNCTION SPONT: NORMAL
BH CV LOWER VASCULAR RIGHT COMMON FEMORAL AUGMENT: NORMAL
BH CV LOWER VASCULAR RIGHT COMMON FEMORAL COMPETENT: NORMAL
BH CV LOWER VASCULAR RIGHT COMMON FEMORAL COMPRESS: NORMAL
BH CV LOWER VASCULAR RIGHT COMMON FEMORAL PHASIC: NORMAL
BH CV LOWER VASCULAR RIGHT COMMON FEMORAL SPONT: NORMAL
BH CV LOWER VASCULAR RIGHT DISTAL FEMORAL COMPRESS: NORMAL
BH CV LOWER VASCULAR RIGHT GASTRONEMIUS COMPRESS: NORMAL
BH CV LOWER VASCULAR RIGHT GREATER SAPH AK COMPRESS: NORMAL
BH CV LOWER VASCULAR RIGHT GREATER SAPH BK COMPRESS: NORMAL
BH CV LOWER VASCULAR RIGHT MID FEMORAL AUGMENT: NORMAL
BH CV LOWER VASCULAR RIGHT MID FEMORAL COMPETENT: NORMAL
BH CV LOWER VASCULAR RIGHT MID FEMORAL COMPRESS: NORMAL
BH CV LOWER VASCULAR RIGHT MID FEMORAL PHASIC: NORMAL
BH CV LOWER VASCULAR RIGHT MID FEMORAL SPONT: NORMAL
BH CV LOWER VASCULAR RIGHT PERONEAL COMPRESS: NORMAL
BH CV LOWER VASCULAR RIGHT POPLITEAL AUGMENT: NORMAL
BH CV LOWER VASCULAR RIGHT POPLITEAL COMPETENT: NORMAL
BH CV LOWER VASCULAR RIGHT POPLITEAL COMPRESS: NORMAL
BH CV LOWER VASCULAR RIGHT POPLITEAL PHASIC: NORMAL
BH CV LOWER VASCULAR RIGHT POPLITEAL SPONT: NORMAL
BH CV LOWER VASCULAR RIGHT POSTERIOR TIBIAL COMPRESS: NORMAL
BH CV LOWER VASCULAR RIGHT PROXIMAL FEMORAL COMPRESS: NORMAL
BH CV LOWER VASCULAR RIGHT SAPHENOFEMORAL JUNCTION AUGMENT: NORMAL
BH CV LOWER VASCULAR RIGHT SAPHENOFEMORAL JUNCTION COMPETENT: NORMAL
BH CV LOWER VASCULAR RIGHT SAPHENOFEMORAL JUNCTION COMPRESS: NORMAL
BH CV LOWER VASCULAR RIGHT SAPHENOFEMORAL JUNCTION PHASIC: NORMAL
BH CV LOWER VASCULAR RIGHT SAPHENOFEMORAL JUNCTION SPONT: NORMAL

## 2018-02-19 PROCEDURE — 93970 EXTREMITY STUDY: CPT

## 2018-02-19 PROCEDURE — 71250 CT THORAX DX C-: CPT

## 2018-02-22 LAB
MYCOBACTERIUM SPEC CULT: NORMAL
NIGHT BLUE STAIN TISS: NORMAL

## 2018-04-11 ENCOUNTER — OFFICE VISIT (OUTPATIENT)
Dept: CARDIOLOGY | Facility: CLINIC | Age: 48
End: 2018-04-11

## 2018-04-11 VITALS
DIASTOLIC BLOOD PRESSURE: 79 MMHG | HEART RATE: 82 BPM | SYSTOLIC BLOOD PRESSURE: 129 MMHG | WEIGHT: 206 LBS | BODY MASS INDEX: 31.32 KG/M2

## 2018-04-11 DIAGNOSIS — Z86.79 HISTORY OF ATRIAL FIBRILLATION: ICD-10-CM

## 2018-04-11 DIAGNOSIS — E66.09 CLASS 1 OBESITY DUE TO EXCESS CALORIES WITHOUT SERIOUS COMORBIDITY WITH BODY MASS INDEX (BMI) OF 30.0 TO 30.9 IN ADULT: ICD-10-CM

## 2018-04-11 DIAGNOSIS — R00.2 PALPITATIONS: ICD-10-CM

## 2018-04-11 DIAGNOSIS — R07.2 PRECORDIAL PAIN: ICD-10-CM

## 2018-04-11 DIAGNOSIS — R06.02 SOB (SHORTNESS OF BREATH): Primary | ICD-10-CM

## 2018-04-11 PROCEDURE — 93000 ELECTROCARDIOGRAM COMPLETE: CPT | Performed by: INTERNAL MEDICINE

## 2018-04-11 PROCEDURE — 99203 OFFICE O/P NEW LOW 30 MIN: CPT | Performed by: INTERNAL MEDICINE

## 2018-04-11 NOTE — PROGRESS NOTES
Subjective:        CC  Sob on excertion    Leg swelling    Lt arm pain    cp      H/o ablation for afib/ flutter,     asd repair 30 yrs ago    Flutter. palpitation         Amirah Godoy is a 48 y.o. female who  Is here for the cardiac evaluation as the patient has been complaining of shortness of breath    Amirah Godoy has been complaining of the shortness of breath mild-to-moderate in intensity with mild-to-moderate usually relieved with rest associated with anxiety and fatigue    Patient also has been complaining of bilateral leg swelling as well as left arm pain, has a history of atrial fibrillation with ablation history of ASD repair    Patient also complains of off-and-on episodes of palpitations in the fluttering sensations in chest    Past Medical History:   Diagnosis Date   • Allergic    • Asthma    • Depression    • History of atrial fibrillation 2011    NO CURRENT MEDICATION   • PVC (premature ventricular contraction) 2015   • Seasonal allergies    • Sinus infection     STARTED ON ANTIBIOTICS ON 10/30/17   • SVT (supraventricular tachycardia) 2011    NO CURRENT MEDICATIONS    reports that she has never smoked. She has never used smokeless tobacco. She reports that she does not drink alcohol or use drugs.  Family History   Problem Relation Age of Onset   • Depression Mother    • Diabetes Mother    • Heart disease Mother      +of mi at 68   • Hyperlipidemia Mother    • Hypertension Mother    • Heart disease Father      ptca x 5 and mi at 59   • Hyperlipidemia Father    • Hypertension Father    • Kidney disease Brother    • Birth defects Maternal Grandmother    • Heart disease Maternal Grandmother      multiple mi   • Birth defects Maternal Grandfather    • Heart disease Maternal Grandfather      + mi at 68   • Colon cancer Maternal Grandfather    • Heart disease Paternal Grandmother      weak heart   • Asthma Paternal Grandmother    • COPD Paternal Grandmother    • Kidney failure Paternal Grandfather     • Malig Hyperthermia Neg Hx         Review of Systems  Constitutional: No wt loss, fever   Gastrointestinal: No nausea , abdominal pain  Behavioral/Psych: No insomnia or anxiety   Cardiovascular ----positive for Chest pains and palpitations. All other systems reviewed and are negative    Objective:       Physical Exam           Physical Exam  /79   Pulse 82   Wt 93.4 kg (206 lb)   BMI 31.32 kg/m²     General appearance: NAD, conversant   Eyes: anicteric sclerae, moist conjunctivae; no lid-lag; PERRLA   HENT: Atraumatic; oropharynx clear with moist mucous membranes and no mucosal ulcerations;  normal hard and soft palate   Neck: Trachea midline; FROM, supple, no thyromegaly or lymphadenopathy   Lungs: CTA, with normal respiratory effort and no intercostal retractions   CV: S1-S2 regular, no murmurs, no rub, no gallop   Abdomen: Soft, non-tender; no masses or HSM   Extremities: No peripheral edema or extremity lymphadenopathy  Skin: Normal temperature, turgor and texture; no rash, ulcers or subcutaneous nodules   Psych: Appropriate affect, alert and oriented to person, place and time           Cardiographics  @  ECG 12 Lead  Date/Time: 4/11/2018 10:40 AM  Performed by: RAYMOND SÁNCHEZ  Authorized by: RAYMOND SÁNCHEZ   Comparison: not compared with previous ECG   Previous ECG: no previous ECG available  Rhythm: sinus rhythm  ST Segments: ST segments normal  Clinical impression: non-specific ECG            Echocardiogram:     Imaging  Chest x-ray: not done     Lab Review   not applicable       Current Outpatient Prescriptions:   •  cetirizine (ZyrTEC) 10 MG tablet, Take 10 mg by mouth daily., Disp: , Rfl:   •  citalopram (CeleXA) 20 MG tablet, Take 40 mg by mouth Daily., Disp: , Rfl:   •  MINASTRIN 24 FE 1-20 MG-MCG(24) chewable tablet, Chew 1 tablet Every Night., Disp: , Rfl:   •  montelukast (SINGULAIR) 10 MG tablet, TAKE ONE TABLET BY MOUTH DAILY, Disp: 30 tablet, Rfl: 4  •  pantoprazole  (PROTONIX) 40 MG EC tablet, Take 1 tablet by mouth Daily., Disp: 30 tablet, Rfl: 0  •  tiotropium (SPIRIVA HANDIHALER) 18 MCG per inhalation capsule, Place 1 capsule into inhaler and inhale Daily., Disp: , Rfl:         Assessment:      No diagnosis found.    Patient Active Problem List   Diagnosis   • Chest pain   • Reactive depression   • Seasonal allergic rhinitis due to pollen   • Right ovarian cyst   • History of atrial fibrillation   • Asthma with exacerbation   • Cough   • Weakness   • Tachycardia   • Influenza A         Plan:          1. SOB (shortness of breath)  Considering the patient's symptoms as well as clinical situation and  EKG findings, along with cardiac risk factors, ischemic workup is necessary to rule out ischemic cardiomyopathy, stress induced arrhythmias, and functional capacity for diagnosis as well as prognostic consideration    Considering patient's medical condition as well as the risk factors, patient will require echocardiogram for further evaluation for the LV function, four-chamber evaluation, including the pressures, valvular function and  pericardial disease and pericardial effusion    - ECG 12 Lead  - Stress Test With Myocardial Perfusion One Day  - Adult Transthoracic Echo Complete W/ Cont if Necessary Per Protocol  - Holter Monitor - 72 Hour Up To 21 Days    2. Class 1 obesity due to excess calories without serious comorbidity with body mass index (BMI) of 30.0 to 30.9 in adult  Significant risk of obesity to CAD, HTN has been explained  Advantages of wt reduction has been explained    - Stress Test With Myocardial Perfusion One Day  - Adult Transthoracic Echo Complete W/ Cont if Necessary Per Protocol  - Holter Monitor - 72 Hour Up To 21 Days    3. Precordial pain  Considering the patient's symptoms as well as clinical situation and  EKG findings, along with cardiac risk factors, ischemic workup is necessary to rule out ischemic cardiomyopathy, stress induced arrhythmias, and  functional capacity for diagnosis as well as prognostic consideration    - Stress Test With Myocardial Perfusion One Day  - Adult Transthoracic Echo Complete W/ Cont if Necessary Per Protocol  - Holter Monitor - 72 Hour Up To 21 Days    4. History of atrial fibrillation  Status post ablation    5. Palpitations  Workup is pending      Stress cardiolyte, echo, holter    See us 2-4 wks        Counseling was given to Amirah Godoy for the following topics:  risk factor reductions, prognosis and risks and benefits of treatment options .       SMOKING COUNSELING:    Counseling given: Not Answered      .  EMR Dragon/Transcription disclaimer:   Much of this encounter note is an electronic transcription/translation of spoken language to printed text. The electronic translation of spoken language may permit erroneous, or at times, nonsensical words or phrases to be inadvertently transcribed; Although I have reviewed the note for such errors, some may still exist.

## 2018-04-12 ENCOUNTER — HOSPITAL ENCOUNTER (OUTPATIENT)
Dept: CARDIOLOGY | Facility: HOSPITAL | Age: 48
Discharge: HOME | End: 2018-04-12
Attending: INTERNAL MEDICINE

## 2018-04-12 ENCOUNTER — HOSPITAL ENCOUNTER (OUTPATIENT)
Dept: CARDIOLOGY | Facility: HOSPITAL | Age: 48
Discharge: HOME | End: 2018-04-12
Attending: INTERNAL MEDICINE | Admitting: INTERNAL MEDICINE

## 2018-04-12 VITALS — HEIGHT: 68 IN | BODY MASS INDEX: 31.22 KG/M2 | WEIGHT: 206 LBS

## 2018-04-12 VITALS — DIASTOLIC BLOOD PRESSURE: 90 MMHG | HEART RATE: 77 BPM | SYSTOLIC BLOOD PRESSURE: 118 MMHG

## 2018-04-12 LAB
BH CV ECHO MEAS - ACS: 1.9 CM
BH CV ECHO MEAS - AO MAX PG (FULL): 3.4 MMHG
BH CV ECHO MEAS - AO MAX PG: 7.1 MMHG
BH CV ECHO MEAS - AO MEAN PG (FULL): 2 MMHG
BH CV ECHO MEAS - AO MEAN PG: 4 MMHG
BH CV ECHO MEAS - AO ROOT AREA (BSA CORRECTED): 1.4
BH CV ECHO MEAS - AO ROOT AREA: 6.6 CM^2
BH CV ECHO MEAS - AO ROOT DIAM: 2.9 CM
BH CV ECHO MEAS - AO V2 MAX: 133 CM/SEC
BH CV ECHO MEAS - AO V2 MEAN: 89.3 CM/SEC
BH CV ECHO MEAS - AO V2 VTI: 23.5 CM
BH CV ECHO MEAS - AVA(I,A): 2.8 CM^2
BH CV ECHO MEAS - AVA(I,D): 2.8 CM^2
BH CV ECHO MEAS - AVA(V,A): 2.3 CM^2
BH CV ECHO MEAS - AVA(V,D): 2.3 CM^2
BH CV ECHO MEAS - BSA(HAYCOCK): 2.1 M^2
BH CV ECHO MEAS - BSA: 2.1 M^2
BH CV ECHO MEAS - BZI_BMI: 31.3 KILOGRAMS/M^2
BH CV ECHO MEAS - BZI_METRIC_HEIGHT: 172.7 CM
BH CV ECHO MEAS - BZI_METRIC_WEIGHT: 93.4 KG
BH CV ECHO MEAS - CONTRAST EF (2CH): 65.5 ML/M^2
BH CV ECHO MEAS - CONTRAST EF 4CH: 58.8 ML/M^2
BH CV ECHO MEAS - EDV(CUBED): 50.7 ML
BH CV ECHO MEAS - EDV(MOD-SP2): 58 ML
BH CV ECHO MEAS - EDV(MOD-SP4): 68 ML
BH CV ECHO MEAS - EDV(TEICH): 58.1 ML
BH CV ECHO MEAS - EF(CUBED): 72.7 %
BH CV ECHO MEAS - EF(MOD-SP2): 65.5 %
BH CV ECHO MEAS - EF(MOD-SP4): 58.8 %
BH CV ECHO MEAS - EF(TEICH): 65.3 %
BH CV ECHO MEAS - ESV(CUBED): 13.8 ML
BH CV ECHO MEAS - ESV(MOD-SP2): 20 ML
BH CV ECHO MEAS - ESV(MOD-SP4): 28 ML
BH CV ECHO MEAS - ESV(TEICH): 20.2 ML
BH CV ECHO MEAS - FS: 35.1 %
BH CV ECHO MEAS - IVS/LVPW: 0.91
BH CV ECHO MEAS - IVSD: 1 CM
BH CV ECHO MEAS - LA DIMENSION: 3.6 CM
BH CV ECHO MEAS - LA/AO: 1.2
BH CV ECHO MEAS - LAT PEAK E' VEL: 10.9 CM/SEC
BH CV ECHO MEAS - LV DIASTOLIC VOL/BSA (35-75): 32.9 ML/M^2
BH CV ECHO MEAS - LV MASS(C)D: 120.8 GRAMS
BH CV ECHO MEAS - LV MASS(C)DI: 58.4 GRAMS/M^2
BH CV ECHO MEAS - LV MAX PG: 3.7 MMHG
BH CV ECHO MEAS - LV MEAN PG: 2 MMHG
BH CV ECHO MEAS - LV SYSTOLIC VOL/BSA (12-30): 13.5 ML/M^2
BH CV ECHO MEAS - LV V1 MAX: 96.1 CM/SEC
BH CV ECHO MEAS - LV V1 MEAN: 72.8 CM/SEC
BH CV ECHO MEAS - LV V1 VTI: 21 CM
BH CV ECHO MEAS - LVIDD: 3.7 CM
BH CV ECHO MEAS - LVIDS: 2.4 CM
BH CV ECHO MEAS - LVLD AP2: 7 CM
BH CV ECHO MEAS - LVLD AP4: 7.2 CM
BH CV ECHO MEAS - LVLS AP2: 5.9 CM
BH CV ECHO MEAS - LVLS AP4: 5.7 CM
BH CV ECHO MEAS - LVOT AREA (M): 3.1 CM^2
BH CV ECHO MEAS - LVOT AREA: 3.1 CM^2
BH CV ECHO MEAS - LVOT DIAM: 2 CM
BH CV ECHO MEAS - LVPWD: 1.1 CM
BH CV ECHO MEAS - MED PEAK E' VEL: 8.2 CM/SEC
BH CV ECHO MEAS - MV A DUR: 0.16 SEC
BH CV ECHO MEAS - MV A MAX VEL: 89.2 CM/SEC
BH CV ECHO MEAS - MV DEC SLOPE: 635 CM/SEC^2
BH CV ECHO MEAS - MV DEC TIME: 0.11 SEC
BH CV ECHO MEAS - MV E MAX VEL: 76.2 CM/SEC
BH CV ECHO MEAS - MV E/A: 0.85
BH CV ECHO MEAS - MV MAX PG: 6.1 MMHG
BH CV ECHO MEAS - MV MEAN PG: 3 MMHG
BH CV ECHO MEAS - MV P1/2T MAX VEL: 103 CM/SEC
BH CV ECHO MEAS - MV P1/2T: 47.5 MSEC
BH CV ECHO MEAS - MV V2 MAX: 123 CM/SEC
BH CV ECHO MEAS - MV V2 MEAN: 78.7 CM/SEC
BH CV ECHO MEAS - MV V2 VTI: 19.7 CM
BH CV ECHO MEAS - MVA P1/2T LCG: 2.1 CM^2
BH CV ECHO MEAS - MVA(P1/2T): 4.6 CM^2
BH CV ECHO MEAS - MVA(VTI): 3.3 CM^2
BH CV ECHO MEAS - PA ACC TIME: 0.13 SEC
BH CV ECHO MEAS - PA MAX PG (FULL): 1 MMHG
BH CV ECHO MEAS - PA MAX PG: 3.4 MMHG
BH CV ECHO MEAS - PA PR(ACCEL): 21.9 MMHG
BH CV ECHO MEAS - PA V2 MAX: 92.3 CM/SEC
BH CV ECHO MEAS - PULM A REVS DUR: 0.12 SEC
BH CV ECHO MEAS - PULM A REVS VEL: 34.9 CM/SEC
BH CV ECHO MEAS - PULM DIAS VEL: 53.4 CM/SEC
BH CV ECHO MEAS - PULM S/D: 1
BH CV ECHO MEAS - PULM SYS VEL: 53.8 CM/SEC
BH CV ECHO MEAS - PVA(V,A): 2.6 CM^2
BH CV ECHO MEAS - PVA(V,D): 2.6 CM^2
BH CV ECHO MEAS - QP/QS: 0.67
BH CV ECHO MEAS - RAP SYSTOLE: 3 MMHG
BH CV ECHO MEAS - RV MAX PG: 2.4 MMHG
BH CV ECHO MEAS - RV MEAN PG: 1 MMHG
BH CV ECHO MEAS - RV V1 MAX: 76.9 CM/SEC
BH CV ECHO MEAS - RV V1 MEAN: 55.1 CM/SEC
BH CV ECHO MEAS - RV V1 VTI: 14.1 CM
BH CV ECHO MEAS - RVDD: 2.3 CM
BH CV ECHO MEAS - RVOT AREA: 3.1 CM^2
BH CV ECHO MEAS - RVOT DIAM: 2 CM
BH CV ECHO MEAS - RVSP: 21 MMHG
BH CV ECHO MEAS - SI(AO): 75 ML/M^2
BH CV ECHO MEAS - SI(CUBED): 17.8 ML/M^2
BH CV ECHO MEAS - SI(LVOT): 31.9 ML/M^2
BH CV ECHO MEAS - SI(MOD-SP2): 18.4 ML/M^2
BH CV ECHO MEAS - SI(MOD-SP4): 19.3 ML/M^2
BH CV ECHO MEAS - SI(TEICH): 18.3 ML/M^2
BH CV ECHO MEAS - SV(AO): 155.2 ML
BH CV ECHO MEAS - SV(CUBED): 36.8 ML
BH CV ECHO MEAS - SV(LVOT): 66 ML
BH CV ECHO MEAS - SV(MOD-SP2): 38 ML
BH CV ECHO MEAS - SV(MOD-SP4): 40 ML
BH CV ECHO MEAS - SV(RVOT): 44.3 ML
BH CV ECHO MEAS - SV(TEICH): 38 ML
BH CV ECHO MEAS - TAPSE (>1.6): 2.5 CM2
BH CV ECHO MEAS - TR MAX VEL: 212 CM/SEC
BH CV XLRA - RV BASE: 2.7 CM
BH CV XLRA - RV LENGTH: 6.5 CM
BH CV XLRA - RV MID: 2.3 CM
BH CV XLRA - TDI S': 15 CM/SEC
E/E' RATIO: 8.2
LEFT ATRIUM VOLUME INDEX: 22 ML/M2
MAXIMAL PREDICTED HEART RATE: 170 BPM
STRESS TARGET HR: 145 BPM

## 2018-04-12 PROCEDURE — 78452 HT MUSCLE IMAGE SPECT MULT: CPT | Performed by: INTERNAL MEDICINE

## 2018-04-12 PROCEDURE — 0 TECHNETIUM SESTAMIBI: Performed by: INTERNAL MEDICINE

## 2018-04-12 PROCEDURE — 93018 CV STRESS TEST I&R ONLY: CPT | Performed by: INTERNAL MEDICINE

## 2018-04-12 PROCEDURE — 93306 TTE W/DOPPLER COMPLETE: CPT | Performed by: INTERNAL MEDICINE

## 2018-04-12 PROCEDURE — 93306 TTE W/DOPPLER COMPLETE: CPT

## 2018-04-12 PROCEDURE — 93017 CV STRESS TEST TRACING ONLY: CPT

## 2018-04-12 PROCEDURE — 93016 CV STRESS TEST SUPVJ ONLY: CPT | Performed by: INTERNAL MEDICINE

## 2018-04-12 PROCEDURE — A9500 TC99M SESTAMIBI: HCPCS | Performed by: INTERNAL MEDICINE

## 2018-04-12 PROCEDURE — 0399T HC MYOCARDL STRAIN IMAG QUAN ASSMT PER SESS: CPT

## 2018-04-12 PROCEDURE — 78452 HT MUSCLE IMAGE SPECT MULT: CPT

## 2018-04-12 PROCEDURE — 0399T ADULT TRANSTHORACIC ECHO COMPLETE W/ CONT IF NECESSARY PER PROTOCOL: CPT | Performed by: INTERNAL MEDICINE

## 2018-04-12 RX ADMIN — TECHNETIUM TC 99M SESTAMIBI 1 DOSE: 1 INJECTION INTRAVENOUS at 11:48

## 2018-04-12 RX ADMIN — TECHNETIUM TC 99M SESTAMIBI 1 DOSE: 1 INJECTION INTRAVENOUS at 08:49

## 2018-04-13 LAB
BH CV STRESS BP STAGE 1: NORMAL
BH CV STRESS BP STAGE 2: NORMAL
BH CV STRESS DURATION MIN STAGE 1: 3
BH CV STRESS DURATION MIN STAGE 2: 3
BH CV STRESS DURATION SEC STAGE 1: 0
BH CV STRESS DURATION SEC STAGE 2: 0
BH CV STRESS GRADE STAGE 1: 10
BH CV STRESS GRADE STAGE 2: 12
BH CV STRESS HR STAGE 1: 142
BH CV STRESS HR STAGE 2: 176
BH CV STRESS METS STAGE 1: 4.6
BH CV STRESS METS STAGE 2: 7
BH CV STRESS PROTOCOL 1: NORMAL
BH CV STRESS RECOVERY BP: NORMAL MMHG
BH CV STRESS RECOVERY HR: 111 BPM
BH CV STRESS SPEED STAGE 1: 1.7
BH CV STRESS SPEED STAGE 2: 2.5
BH CV STRESS STAGE 1: 1
BH CV STRESS STAGE 2: 2
LV EF NUC BP: 72 %
MAXIMAL PREDICTED HEART RATE: 170 BPM
PERCENT MAX PREDICTED HR: 103.53 %
STRESS BASELINE BP: NORMAL MMHG
STRESS BASELINE HR: 77 BPM
STRESS PERCENT HR: 122 %
STRESS POST ESTIMATED WORKLOAD: 7 METS
STRESS POST EXERCISE DUR MIN: 6 MIN
STRESS POST EXERCISE DUR SEC: 0 SEC
STRESS POST PEAK BP: NORMAL MMHG
STRESS POST PEAK HR: 176 BPM
STRESS TARGET HR: 145 BPM

## 2018-04-18 ENCOUNTER — OFFICE VISIT (OUTPATIENT)
Dept: CARDIOLOGY | Facility: CLINIC | Age: 48
End: 2018-04-18

## 2018-04-18 VITALS
HEIGHT: 68 IN | SYSTOLIC BLOOD PRESSURE: 137 MMHG | HEART RATE: 87 BPM | DIASTOLIC BLOOD PRESSURE: 93 MMHG | OXYGEN SATURATION: 97 % | BODY MASS INDEX: 32.43 KG/M2 | WEIGHT: 214 LBS

## 2018-04-18 DIAGNOSIS — R00.2 PALPITATIONS: ICD-10-CM

## 2018-04-18 DIAGNOSIS — I10 ESSENTIAL HYPERTENSION: ICD-10-CM

## 2018-04-18 DIAGNOSIS — R60.9 SWELLING: ICD-10-CM

## 2018-04-18 DIAGNOSIS — I48.0 PAROXYSMAL ATRIAL FIBRILLATION (HCC): Primary | ICD-10-CM

## 2018-04-18 LAB — TOAL ENROLLMENT DAYS: 2

## 2018-04-18 PROCEDURE — 99214 OFFICE O/P EST MOD 30 MIN: CPT | Performed by: INTERNAL MEDICINE

## 2018-04-18 RX ORDER — HYDROCHLOROTHIAZIDE 25 MG/1
25 TABLET ORAL DAILY
Qty: 30 TABLET | Refills: 11 | Status: SHIPPED | OUTPATIENT
Start: 2018-04-18 | End: 2018-05-17 | Stop reason: HOSPADM

## 2018-04-18 NOTE — PROGRESS NOTES
Subjective:       Amirah Godoy is a 48 y.o. female who here for follow up    CC  The follow-up after the stress test as well as echocardiogram and Holter monitor  HPI  48 years old white female with a history of palpitations, benign essential arterial hypertension as well as swelling underwent a stress test echo and a Holter monitor recently which are normal denies any chest pains or tightness in chest no heaviness with a pressure sensation     Problem List Items Addressed This Visit        Cardiovascular and Mediastinum    Palpitations    Essential hypertension    Relevant Medications    hydrochlorothiazide (HYDRODIURIL) 25 MG tablet    Paroxysmal atrial fibrillation - Primary    Relevant Orders    Comprehensive Metabolic Panel    Lipid Panel       Other    Swelling      Other Visit Diagnoses    None.       .    The following portions of the patient's history were reviewed and updated as appropriate: allergies, current medications, past family history, past medical history, past social history, past surgical history and problem list.    Past Medical History:   Diagnosis Date   • Allergic    • Asthma    • Depression    • History of atrial fibrillation 2011    NO CURRENT MEDICATION   • PVC (premature ventricular contraction) 2015   • Seasonal allergies    • Sinus infection     STARTED ON ANTIBIOTICS ON 10/30/17   • SVT (supraventricular tachycardia) 2011    NO CURRENT MEDICATIONS    reports that she has never smoked. She has never used smokeless tobacco. She reports that she does not drink alcohol or use drugs.  Family History   Problem Relation Age of Onset   • Depression Mother    • Diabetes Mother    • Heart disease Mother      +of mi at 68   • Hyperlipidemia Mother    • Hypertension Mother    • Heart disease Father      ptca x 5 and mi at 59   • Hyperlipidemia Father    • Hypertension Father    • Kidney disease Brother    • Birth defects Maternal Grandmother    • Heart disease Maternal Grandmother       "multiple mi   • Birth defects Maternal Grandfather    • Heart disease Maternal Grandfather      + mi at 68   • Colon cancer Maternal Grandfather    • Heart disease Paternal Grandmother      weak heart   • Asthma Paternal Grandmother    • COPD Paternal Grandmother    • Kidney failure Paternal Grandfather    • Malig Hyperthermia Neg Hx        Review of Systems  Constitutional: No wt loss, fever, fatigue  Gastrointestinal: No nausea, abdominal pain  Behavioral/Psych: No insomnia or anxiety   Cardiovascular no chest pains or tightness in chest  Objective:       Physical Exam           Physical Exam  /93 (BP Location: Right arm, Patient Position: Sitting)   Pulse 87   Ht 172.7 cm (68\")   Wt 97.1 kg (214 lb)   SpO2 97%   BMI 32.54 kg/m²     General appearance: NAD, conversant   Eyes: anicteric sclerae, moist conjunctivae; no lid-lag; PERRLA   HENT: Atraumatic; oropharynx clear with moist mucous membranes and no mucosal ulcerations;  normal hard and soft palate   Neck: Trachea midline; FROM, supple, no thyromegaly or lymphadenopathy   Lungs: CTA, with normal respiratory effort and no intercostal retractions   CV: S1-S2 regular, no murmurs, no rub, no gallop   Abdomen: Soft, non-tender; no masses or HSM   Extremities: 1+ peripheral edema or extremity lymphadenopathy  Skin: Normal temperature, turgor and texture; no rash, ulcers or subcutaneous nodules   Psych: Appropriate affect, alert and oriented to person, place and time           Cardiographics  @Procedures    Echocardiogram:        Current Outpatient Prescriptions:   •  cetirizine (ZyrTEC) 10 MG tablet, Take 10 mg by mouth daily., Disp: , Rfl:   •  citalopram (CeleXA) 20 MG tablet, Take 40 mg by mouth Daily., Disp: , Rfl:   •  MINASTRIN 24 FE 1-20 MG-MCG(24) chewable tablet, Chew 1 tablet Every Night., Disp: , Rfl:   •  montelukast (SINGULAIR) 10 MG tablet, TAKE ONE TABLET BY MOUTH DAILY, Disp: 30 tablet, Rfl: 4  •  pantoprazole (PROTONIX) 40 MG EC tablet, " Take 1 tablet by mouth Daily., Disp: 30 tablet, Rfl: 0  •  tiotropium (SPIRIVA HANDIHALER) 18 MCG per inhalation capsule, Place 1 capsule into inhaler and inhale Daily., Disp: , Rfl:    Assessment:        Patient Active Problem List   Diagnosis   • Chest pain   • Reactive depression   • Seasonal allergic rhinitis due to pollen   • Right ovarian cyst   • History of atrial fibrillation   • Asthma with exacerbation   • Cough   • Weakness   • Tachycardia   • Influenza A     Interpretation Summary        · Findings consistent with a normal ECG stress test.  · Diaphragmatic attenuation artifact is present.  · Left ventricular ejection fraction is hyperdynamic (Calculated EF > 70%).  · Myocardial perfusion imaging indicates a normal myocardial perfusion study with no evidence of ischemia.  · Impressions are consistent with a low risk study.     Interpretation Summary     · NORMAL GLOBAL STRAIN -21.9  · Left atrial cavity size is borderline dilated.  · Mild mitral valve regurgitation is present  · Mild tricuspid valve regurgitation is present.  · Calculated EF = 58.8%.  · There is no evidence of pericardial effusion.         Interpretation Summary     · A normal monitor study.           Plan:            ICD-10-CM ICD-9-CM   1. Paroxysmal atrial fibrillation I48.0 427.31   2. Palpitations R00.2 785.1   3. Swelling R60.9 782.3   4. Essential hypertension I10 401.9     1. Paroxysmal atrial fibrillation  Now normal sinus rhythm  - Comprehensive Metabolic Panel; Future  - Lipid Panel    2. Palpitations  Under control    3. Swelling  Hydrochlorothiazide has been admitted    4. Essential hypertension  Hydrochlorothiazide has been added       hctz for BP/ SWELLING    Pros and cons of this new medication / change medication has been explained to  the patient    Possible side effects has been explained    Associated need of the blood  Work has been explained    Need for the compliance of the medication has been explained      SEE IN  3 WKS WITHFLP/ CMP/ TSH  COUNSELING:    Amirah GodoyCoadriana was given to patient for the following topics: diagnostic results, risk factor reductions, impressions, risks and benefits of treatment options and importance of treatment compliance .       SMOKING COUNSELING:    Counseling given: Not Answered      EMR Dragon/Transcription disclaimer:   Much of this encounter note is an electronic transcription/translation of spoken language to printed text. The electronic translation of spoken language may permit erroneous, or at times, nonsensical words or phrases to be inadvertently transcribed; Although I have reviewed the note for such errors, some may still exist.

## 2018-04-21 PROBLEM — R00.2 PALPITATIONS: Status: ACTIVE | Noted: 2018-04-21

## 2018-04-23 PROBLEM — I48.0 PAROXYSMAL ATRIAL FIBRILLATION (HCC): Status: ACTIVE | Noted: 2018-04-23

## 2018-04-23 PROBLEM — R60.9 SWELLING: Status: ACTIVE | Noted: 2018-04-23

## 2018-04-23 PROBLEM — I10 ESSENTIAL HYPERTENSION: Status: ACTIVE | Noted: 2018-04-23

## 2018-05-11 ENCOUNTER — HOSPITAL ENCOUNTER (OUTPATIENT)
Dept: CARDIOLOGY | Facility: HOSPITAL | Age: 48
Discharge: HOME | End: 2018-05-11
Admitting: INTERNAL MEDICINE

## 2018-05-11 DIAGNOSIS — I48.0 PAROXYSMAL ATRIAL FIBRILLATION (HCC): ICD-10-CM

## 2018-05-11 LAB
ALBUMIN SERPL-MCNC: 4.2 G/DL (ref 3.5–5.2)
ALBUMIN/GLOB SERPL: 1.4 G/DL
ALP SERPL-CCNC: 73 U/L (ref 39–117)
ALT SERPL W P-5'-P-CCNC: 27 U/L (ref 1–33)
ANION GAP SERPL CALCULATED.3IONS-SCNC: 13.6 MMOL/L
AST SERPL-CCNC: 25 U/L (ref 1–32)
BILIRUB SERPL-MCNC: 0.4 MG/DL (ref 0.1–1.2)
BUN BLD-MCNC: 20 MG/DL (ref 6–20)
BUN/CREAT SERPL: 24.7 (ref 7–25)
CALCIUM SPEC-SCNC: 9.7 MG/DL (ref 8.6–10.5)
CHLORIDE SERPL-SCNC: 95 MMOL/L (ref 98–107)
CHOLEST SERPL-MCNC: 265 MG/DL (ref 0–200)
CO2 SERPL-SCNC: 30.4 MMOL/L (ref 22–29)
CREAT BLD-MCNC: 0.81 MG/DL (ref 0.57–1)
GFR SERPL CREATININE-BSD FRML MDRD: 75 ML/MIN/1.73
GLOBULIN UR ELPH-MCNC: 3 GM/DL
GLUCOSE BLD-MCNC: 103 MG/DL (ref 65–99)
HDLC SERPL-MCNC: 35 MG/DL (ref 40–60)
LDLC SERPL CALC-MCNC: 208 MG/DL (ref 0–100)
LDLC/HDLC SERPL: 5.93 {RATIO}
POTASSIUM BLD-SCNC: 3.2 MMOL/L (ref 3.5–5.2)
PROT SERPL-MCNC: 7.2 G/DL (ref 6–8.5)
SODIUM BLD-SCNC: 139 MMOL/L (ref 136–145)
TRIGL SERPL-MCNC: 112 MG/DL (ref 0–150)
VLDLC SERPL-MCNC: 22.4 MG/DL (ref 5–40)

## 2018-05-11 PROCEDURE — 80061 LIPID PANEL: CPT | Performed by: INTERNAL MEDICINE

## 2018-05-11 PROCEDURE — 80053 COMPREHEN METABOLIC PANEL: CPT | Performed by: INTERNAL MEDICINE

## 2018-05-11 PROCEDURE — 36415 COLL VENOUS BLD VENIPUNCTURE: CPT | Performed by: INTERNAL MEDICINE

## 2018-05-17 ENCOUNTER — OFFICE VISIT (OUTPATIENT)
Dept: CARDIOLOGY | Facility: CLINIC | Age: 48
End: 2018-05-17

## 2018-05-17 VITALS
WEIGHT: 200 LBS | BODY MASS INDEX: 30.31 KG/M2 | SYSTOLIC BLOOD PRESSURE: 112 MMHG | HEIGHT: 68 IN | DIASTOLIC BLOOD PRESSURE: 75 MMHG | HEART RATE: 81 BPM

## 2018-05-17 DIAGNOSIS — E78.5 HYPERLIPIDEMIA, UNSPECIFIED HYPERLIPIDEMIA TYPE: Primary | ICD-10-CM

## 2018-05-17 DIAGNOSIS — R00.2 PALPITATIONS: ICD-10-CM

## 2018-05-17 DIAGNOSIS — I10 ESSENTIAL HYPERTENSION: ICD-10-CM

## 2018-05-17 DIAGNOSIS — R60.9 SWELLING: ICD-10-CM

## 2018-05-17 PROCEDURE — 99214 OFFICE O/P EST MOD 30 MIN: CPT | Performed by: INTERNAL MEDICINE

## 2018-05-17 RX ORDER — ROSUVASTATIN CALCIUM 10 MG/1
10 TABLET, COATED ORAL DAILY
Qty: 30 TABLET | Refills: 11 | Status: SHIPPED | OUTPATIENT
Start: 2018-05-17 | End: 2019-09-05

## 2018-05-17 NOTE — PROGRESS NOTES
Subjective:       Amirah Godoy is a 48 y.o. female who here for follow up    CC  Follow-up after the lab results, as well as starting the diuretics  HPI  48 years old white female was started on the diuretics for the leg swelling as well as for the hypertension, also underwent a blood workup here for the follow-up had no side effects with the new medication     Problem List Items Addressed This Visit        Cardiovascular and Mediastinum    Palpitations    Essential hypertension    Hyperlipidemia - Primary    Relevant Medications    rosuvastatin (CRESTOR) 10 MG tablet    Other Relevant Orders    Lipid Panel    Comprehensive Metabolic Panel        .    The following portions of the patient's history were reviewed and updated as appropriate: allergies, current medications, past family history, past medical history, past social history, past surgical history and problem list.    Past Medical History:   Diagnosis Date   • Allergic    • Asthma    • Depression    • History of atrial fibrillation 2011    NO CURRENT MEDICATION   • PVC (premature ventricular contraction) 2015   • Seasonal allergies    • Sinus infection     STARTED ON ANTIBIOTICS ON 10/30/17   • SVT (supraventricular tachycardia) 2011    NO CURRENT MEDICATIONS    reports that she has never smoked. She has never used smokeless tobacco. She reports that she does not drink alcohol or use drugs.  Family History   Problem Relation Age of Onset   • Depression Mother    • Diabetes Mother    • Heart disease Mother         +of mi at 68   • Hyperlipidemia Mother    • Hypertension Mother    • Heart disease Father         ptca x 5 and mi at 59   • Hyperlipidemia Father    • Hypertension Father    • Kidney disease Brother    • Birth defects Maternal Grandmother    • Heart disease Maternal Grandmother         multiple mi   • Birth defects Maternal Grandfather    • Heart disease Maternal Grandfather         + mi at 68   • Colon cancer Maternal Grandfather    • Heart  "disease Paternal Grandmother         weak heart   • Asthma Paternal Grandmother    • COPD Paternal Grandmother    • Kidney failure Paternal Grandfather    • Malig Hyperthermia Neg Hx        Review of Systems  Constitutional: No wt loss, fever, fatigue  Gastrointestinal: No nausea, abdominal pain  Behavioral/Psych: No insomnia or anxiety   Cardiovascular No chest pains or tightness in chest  Objective:       Physical Exam             Physical Exam  /75   Pulse 81   Ht 172.7 cm (68\")   Wt 90.7 kg (200 lb)   BMI 30.41 kg/m²     General appearance: NAD, conversant   Eyes: anicteric sclerae, moist conjunctivae; no lid-lag; PERRLA   HENT: Atraumatic; oropharynx clear with moist mucous membranes and no mucosal ulcerations;  normal hard and soft palate   Neck: Trachea midline; FROM, supple, no thyromegaly or lymphadenopathy   Lungs: CTA, with normal respiratory effort and no intercostal retractions   CV: S1-S2 regular, no murmurs, no rub, no gallop   Abdomen: Soft, non-tender; no masses or HSM   Extremities: No peripheral edema or extremity lymphadenopathy  Skin: Normal temperature, turgor and texture; no rash, ulcers or subcutaneous nodules   Psych: Appropriate affect, alert and oriented to person, place and time           Cardiographics  @Procedures    Echocardiogram:        Current Outpatient Prescriptions:   •  cetirizine (ZyrTEC) 10 MG tablet, Take 10 mg by mouth daily., Disp: , Rfl:   •  citalopram (CeleXA) 20 MG tablet, Take 40 mg by mouth Daily., Disp: , Rfl:   •  MINASTRIN 24 FE 1-20 MG-MCG(24) chewable tablet, Chew 1 tablet Every Night., Disp: , Rfl:   •  montelukast (SINGULAIR) 10 MG tablet, TAKE ONE TABLET BY MOUTH DAILY, Disp: 30 tablet, Rfl: 4  •  pantoprazole (PROTONIX) 40 MG EC tablet, Take 1 tablet by mouth Daily., Disp: 30 tablet, Rfl: 0  •  tiotropium (SPIRIVA HANDIHALER) 18 MCG per inhalation capsule, Place 1 capsule into inhaler and inhale Daily., Disp: , Rfl:    Assessment:        Patient " Active Problem List   Diagnosis   • Chest pain   • Reactive depression   • Seasonal allergic rhinitis due to pollen   • Right ovarian cyst   • History of atrial fibrillation   • Asthma with exacerbation   • Cough   • Weakness   • Tachycardia   • Influenza A   • Palpitations   • Swelling   • Essential hypertension   • Paroxysmal atrial fibrillation     Total Cholesterol 0 - 200 mg/dL 265     Triglycerides 0 - 150 mg/dL 112    HDL Cholesterol 40 - 60 mg/dL 35     LDL Cholesterol  0 - 100 mg/dL 208     VLDL Cholesterol 5 - 40 mg/dL 22.4    LDL/HDL Ratio  5.93            Glucose 65 - 99 mg/dL 103     BUN 6 - 20 mg/dL 20    Creatinine 0.57 - 1.00 mg/dL 0.81    Sodium 136 - 145 mmol/L 139    Potassium 3.5 - 5.2 mmol/L 3.2     Chloride 98 - 107 mmol/L 95     CO2 22.0 - 29.0 mmol/L 30.4     Calcium 8.6 - 10.5 mg/dL 9.7    Total Protein 6.0 - 8.5 g/dL 7.2    Albumin 3.50 - 5.20 g/dL 4.20    ALT (SGPT) 1 - 33 U/L 27    AST (SGOT) 1 - 32 U/L 25    Alkaline Phosphatase 39 - 117 U/L 73    Total Bilirubin 0.1 - 1.2 mg/dL 0.4    eGFR Non African Amer >60 mL/min/1.73 75            Plan:            ICD-10-CM ICD-9-CM   1. Hyperlipidemia, unspecified hyperlipidemia type E78.5 272.4   2. Essential hypertension I10 401.9   3. Palpitations R00.2 785.1   4. Swelling R60.9 782.3     1. Hyperlipidemia, unspecified hyperlipidemia type  Risk of the hyperlipidemia, importance of the treatment has been explained    Pros and cons of the statins has been explained    Regular blood workup as well as side effects including the liver failure, myelopathy death has been explained      Patient now is being started on the Crestor    Pros and cons of this new medication / change medication has been explained to  the patient    Possible side effects has been explained    Associated need of the blood  Work has been explained    Need for the compliance of the medication has been explained    - Lipid Panel  - Comprehensive Metabolic Panel; Future    2.  Essential hypertension  Blood pressure under control, with recently started medication    3. Palpitations  Palpitations are under control    4. Swelling  Swelling much better with the diuretics       Start crestor    See 6 wks with labs  COUNSELING:    Amirah Kevin was given to patient for the following topics: diagnostic results, risk factor reductions, impressions, risks and benefits of treatment options and importance of treatment compliance .       SMOKING COUNSELING:    Counseling given: Not Answered      EMR Dragon/Transcription disclaimer:   Much of this encounter note is an electronic transcription/translation of spoken language to printed text. The electronic translation of spoken language may permit erroneous, or at times, nonsensical words or phrases to be inadvertently transcribed; Although I have reviewed the note for such errors, some may still exist.

## 2018-05-28 PROBLEM — E78.5 HYPERLIPIDEMIA: Status: ACTIVE | Noted: 2018-05-28

## 2018-06-29 ENCOUNTER — HOSPITAL ENCOUNTER (OUTPATIENT)
Dept: CARDIOLOGY | Facility: HOSPITAL | Age: 48
Discharge: HOME | End: 2018-06-29
Admitting: INTERNAL MEDICINE

## 2018-06-29 DIAGNOSIS — E78.5 HYPERLIPIDEMIA, UNSPECIFIED HYPERLIPIDEMIA TYPE: ICD-10-CM

## 2018-06-29 LAB
ALBUMIN SERPL-MCNC: 3.9 G/DL (ref 3.5–5.2)
ALBUMIN/GLOB SERPL: 1.4 G/DL
ALP SERPL-CCNC: 81 U/L (ref 39–117)
ALT SERPL W P-5'-P-CCNC: 12 U/L (ref 1–33)
ANION GAP SERPL CALCULATED.3IONS-SCNC: 12.5 MMOL/L
AST SERPL-CCNC: 15 U/L (ref 1–32)
BILIRUB SERPL-MCNC: 0.5 MG/DL (ref 0.1–1.2)
BUN BLD-MCNC: 13 MG/DL (ref 6–20)
BUN/CREAT SERPL: 18.6 (ref 7–25)
CALCIUM SPEC-SCNC: 9.1 MG/DL (ref 8.6–10.5)
CHLORIDE SERPL-SCNC: 102 MMOL/L (ref 98–107)
CHOLEST SERPL-MCNC: 174 MG/DL (ref 0–200)
CO2 SERPL-SCNC: 22.5 MMOL/L (ref 22–29)
CREAT BLD-MCNC: 0.7 MG/DL (ref 0.57–1)
GFR SERPL CREATININE-BSD FRML MDRD: 89 ML/MIN/1.73
GLOBULIN UR ELPH-MCNC: 2.7 GM/DL
GLUCOSE BLD-MCNC: 97 MG/DL (ref 65–99)
HDLC SERPL-MCNC: 42 MG/DL (ref 40–60)
LDLC SERPL CALC-MCNC: 112 MG/DL (ref 0–100)
LDLC/HDLC SERPL: 2.68 {RATIO}
POTASSIUM BLD-SCNC: 4.2 MMOL/L (ref 3.5–5.2)
PROT SERPL-MCNC: 6.6 G/DL (ref 6–8.5)
SODIUM BLD-SCNC: 137 MMOL/L (ref 136–145)
TRIGL SERPL-MCNC: 98 MG/DL (ref 0–150)
VLDLC SERPL-MCNC: 19.6 MG/DL (ref 5–40)

## 2018-06-29 PROCEDURE — 80061 LIPID PANEL: CPT | Performed by: INTERNAL MEDICINE

## 2018-06-29 PROCEDURE — 80053 COMPREHEN METABOLIC PANEL: CPT | Performed by: INTERNAL MEDICINE

## 2018-07-02 ENCOUNTER — OFFICE VISIT (OUTPATIENT)
Dept: CARDIOLOGY | Facility: CLINIC | Age: 48
End: 2018-07-02

## 2018-07-02 VITALS
WEIGHT: 199 LBS | DIASTOLIC BLOOD PRESSURE: 77 MMHG | HEART RATE: 77 BPM | HEIGHT: 68 IN | BODY MASS INDEX: 30.16 KG/M2 | SYSTOLIC BLOOD PRESSURE: 117 MMHG

## 2018-07-02 DIAGNOSIS — I10 ESSENTIAL HYPERTENSION: ICD-10-CM

## 2018-07-02 DIAGNOSIS — E78.5 HYPERLIPIDEMIA, UNSPECIFIED HYPERLIPIDEMIA TYPE: Primary | ICD-10-CM

## 2018-07-02 PROCEDURE — 99213 OFFICE O/P EST LOW 20 MIN: CPT | Performed by: INTERNAL MEDICINE

## 2018-07-02 NOTE — PROGRESS NOTES
Subjective:       Amirah Godoy is a 48 y.o. female who here for follow up    CC  Post lab result follow-up  HPI  48 years old female with a known history of benign essential arterial hypertension, hyperlipidemia paroxysmal atrial fibrillation here for the follow-up with no complaints of chest pains or tightness in chest and when the blood workup recently     Problem List Items Addressed This Visit        Cardiovascular and Mediastinum    Essential hypertension    Hyperlipidemia - Primary    Relevant Orders    Lipid Panel    Comprehensive Metabolic Panel        .    The following portions of the patient's history were reviewed and updated as appropriate: allergies, current medications, past family history, past medical history, past social history, past surgical history and problem list.    Past Medical History:   Diagnosis Date   • Allergic    • Asthma    • Depression    • History of atrial fibrillation 2011    NO CURRENT MEDICATION   • PVC (premature ventricular contraction) 2015   • Seasonal allergies    • Sinus infection     STARTED ON ANTIBIOTICS ON 10/30/17   • SVT (supraventricular tachycardia) 2011    NO CURRENT MEDICATIONS    reports that she has never smoked. She has never used smokeless tobacco. She reports that she does not drink alcohol or use drugs.  Family History   Problem Relation Age of Onset   • Depression Mother    • Diabetes Mother    • Heart disease Mother         +of mi at 68   • Hyperlipidemia Mother    • Hypertension Mother    • Heart disease Father         ptca x 5 and mi at 59   • Hyperlipidemia Father    • Hypertension Father    • Kidney disease Brother    • Birth defects Maternal Grandmother    • Heart disease Maternal Grandmother         multiple mi   • Birth defects Maternal Grandfather    • Heart disease Maternal Grandfather         + mi at 68   • Colon cancer Maternal Grandfather    • Heart disease Paternal Grandmother         weak heart   • Asthma Paternal Grandmother    • COPD  "Paternal Grandmother    • Kidney failure Paternal Grandfather    • Malig Hyperthermia Neg Hx        Review of Systems  Constitutional: No wt loss, fever, fatigue  Gastrointestinal: No nausea, abdominal pain  Behavioral/Psych: No insomnia or anxiety   Cardiovascular No chest pains or tightness in chest  Objective:                 Physical Exam  /77 (BP Location: Left arm, Patient Position: Sitting)   Pulse 77   Ht 172.7 cm (68\")   Wt 90.3 kg (199 lb)   BMI 30.26 kg/m²     General appearance: NAD, conversant   Eyes: anicteric sclerae, moist conjunctivae; no lid-lag; PERRLA   HENT: Atraumatic; oropharynx clear with moist mucous membranes and no mucosal ulcerations;  normal hard and soft palate   Neck: Trachea midline; FROM, supple, no thyromegaly or lymphadenopathy   Lungs: CTA, with normal respiratory effort and no intercostal retractions   CV: S1-S2 regular, no murmurs, no rub, no gallop   Abdomen: Soft, non-tender; no masses or HSM   Extremities: No peripheral edema or extremity lymphadenopathy  Skin: Normal temperature, turgor and texture; no rash, ulcers or subcutaneous nodules   Psych: Appropriate affect, alert and oriented to person, place and time           Cardiographics  @Procedures    Echocardiogram:        Current Outpatient Prescriptions:   •  cetirizine (ZyrTEC) 10 MG tablet, Take 10 mg by mouth daily., Disp: , Rfl:   •  citalopram (CeleXA) 20 MG tablet, Take 40 mg by mouth Daily., Disp: , Rfl:   •  MINASTRIN 24 FE 1-20 MG-MCG(24) chewable tablet, Chew 1 tablet Every Night., Disp: , Rfl:   •  montelukast (SINGULAIR) 10 MG tablet, TAKE ONE TABLET BY MOUTH DAILY, Disp: 30 tablet, Rfl: 4  •  pantoprazole (PROTONIX) 40 MG EC tablet, Take 1 tablet by mouth Daily., Disp: 30 tablet, Rfl: 0  •  rosuvastatin (CRESTOR) 10 MG tablet, Take 1 tablet by mouth Daily., Disp: 30 tablet, Rfl: 11  •  tiotropium (SPIRIVA HANDIHALER) 18 MCG per inhalation capsule, Place 1 capsule into inhaler and inhale Daily., " Disp: , Rfl:    Assessment:        Patient Active Problem List   Diagnosis   • Chest pain   • Reactive depression   • Seasonal allergic rhinitis due to pollen   • Right ovarian cyst   • History of atrial fibrillation   • Asthma with exacerbation   • Cough   • Weakness   • Tachycardia   • Influenza A   • Palpitations   • Swelling   • Essential hypertension   • Paroxysmal atrial fibrillation   • Hyperlipidemia     Total Cholesterol 0 - 200 mg/dL 174    Triglycerides 0 - 150 mg/dL 98    HDL Cholesterol 40 - 60 mg/dL 42    LDL Cholesterol  0 - 100 mg/dL 112     VLDL Cholesterol 5 - 40 mg/dL 19.6      Total Cholesterol 0 - 200 mg/dL 265     Triglycerides 0 - 150 mg/dL 112    HDL Cholesterol 40 - 60 mg/dL 35     LDL Cholesterol  0 - 100 mg/dL 208             Glucose 65 - 99 mg/dL 97    BUN 6 - 20 mg/dL 13    Creatinine 0.57 - 1.00 mg/dL 0.70    Sodium 136 - 145 mmol/L 137    Potassium 3.5 - 5.2 mmol/L 4.2    Chloride 98 - 107 mmol/L 102    CO2 22.0 - 29.0 mmol/L 22.5    Calcium 8.6 - 10.5 mg/dL 9.1    Total Protein 6.0 - 8.5 g/dL 6.6    Albumin 3.50 - 5.20 g/dL 3.90    ALT (SGPT) 1 - 33 U/L 12    AST (SGOT) 1 - 32 U/L 15    Alkaline Phosphatase 39 - 117 U/L 81    Total Bilirubin 0.1 - 1.2 mg/dL 0.5    eGFR Non African Amer >60 mL/min/1.73 89    Globulin gm/dL 2.7    A/G Ratio g/dL 1.4    BUN/Creatinine Ratio 7.0 - 25.0 18.6    Anion Gap mmol/L 12.5            Plan:            ICD-10-CM ICD-9-CM   1. Hyperlipidemia, unspecified hyperlipidemia type E78.5 272.4   2. Essential hypertension I10 401.9     1. Hyperlipidemia, unspecified hyperlipidemia type  Risk of the hyperlipidemia, importance of the treatment has been explained    Pros and cons of the antilipemic drugs has been explained    Regular blood workup as well as side effects including the liver failure, myelopathy death has been explained    Cholesterol level markedly improved    - Lipid Panel; Future  - Comprehensive Metabolic Panel; Future    2. Essential  hypertension  Blood pressure under control       See us in 6 months with labs  COUNSELING:    Amirah Kevin was given to patient for the following topics: diagnostic results, risk factor reductions, impressions, risks and benefits of treatment options and importance of treatment compliance .       SMOKING COUNSELING:    Counseling given: Not Answered      EMR Dragon/Transcription disclaimer:   Much of this encounter note is an electronic transcription/translation of spoken language to printed text. The electronic translation of spoken language may permit erroneous, or at times, nonsensical words or phrases to be inadvertently transcribed; Although I have reviewed the note for such errors, some may still exist.

## 2018-07-25 ENCOUNTER — APPOINTMENT (OUTPATIENT)
Dept: WOMENS IMAGING | Facility: HOSPITAL | Age: 48
End: 2018-07-25

## 2018-07-25 PROCEDURE — 76641 ULTRASOUND BREAST COMPLETE: CPT | Performed by: RADIOLOGY

## 2018-07-25 PROCEDURE — G0279 TOMOSYNTHESIS, MAMMO: HCPCS | Performed by: RADIOLOGY

## 2018-07-25 PROCEDURE — MDREVIEWSP: Performed by: RADIOLOGY

## 2018-07-25 PROCEDURE — 77065 DX MAMMO INCL CAD UNI: CPT | Performed by: RADIOLOGY

## 2018-07-25 PROCEDURE — 77061 BREAST TOMOSYNTHESIS UNI: CPT | Performed by: RADIOLOGY

## 2018-10-24 ENCOUNTER — OFFICE VISIT (OUTPATIENT)
Dept: CARDIOLOGY | Facility: CLINIC | Age: 48
End: 2018-10-24

## 2018-10-24 VITALS
DIASTOLIC BLOOD PRESSURE: 83 MMHG | HEIGHT: 68 IN | HEART RATE: 72 BPM | BODY MASS INDEX: 33.04 KG/M2 | WEIGHT: 218 LBS | SYSTOLIC BLOOD PRESSURE: 132 MMHG

## 2018-10-24 DIAGNOSIS — I10 ESSENTIAL HYPERTENSION: ICD-10-CM

## 2018-10-24 DIAGNOSIS — E78.5 HYPERLIPIDEMIA, UNSPECIFIED HYPERLIPIDEMIA TYPE: ICD-10-CM

## 2018-10-24 DIAGNOSIS — R00.0 TACHYCARDIA: ICD-10-CM

## 2018-10-24 DIAGNOSIS — Z87.74 STATUS POST DEVICE CLOSURE OF ASD: ICD-10-CM

## 2018-10-24 DIAGNOSIS — I48.0 PAROXYSMAL ATRIAL FIBRILLATION (HCC): ICD-10-CM

## 2018-10-24 DIAGNOSIS — R00.2 PALPITATIONS: ICD-10-CM

## 2018-10-24 DIAGNOSIS — R07.2 PRECORDIAL PAIN: Primary | ICD-10-CM

## 2018-10-24 PROCEDURE — 93000 ELECTROCARDIOGRAM COMPLETE: CPT | Performed by: INTERNAL MEDICINE

## 2018-10-24 PROCEDURE — 99214 OFFICE O/P EST MOD 30 MIN: CPT | Performed by: INTERNAL MEDICINE

## 2018-10-24 RX ORDER — NITROGLYCERIN 0.4 MG/1
TABLET SUBLINGUAL
Qty: 100 TABLET | Refills: 11 | Status: SHIPPED | OUTPATIENT
Start: 2018-10-24 | End: 2023-04-05 | Stop reason: SDUPTHER

## 2018-10-24 RX ORDER — NICOTINE POLACRILEX 4 MG/1
20 GUM, CHEWING ORAL DAILY
COMMUNITY

## 2018-10-24 RX ORDER — ZOLPIDEM TARTRATE 5 MG/1
5 TABLET ORAL NIGHTLY PRN
COMMUNITY
End: 2020-11-11

## 2018-10-24 RX ORDER — ARIPIPRAZOLE 5 MG/1
5 TABLET ORAL DAILY
COMMUNITY
End: 2019-09-05

## 2018-10-31 ENCOUNTER — HOSPITAL ENCOUNTER (OUTPATIENT)
Dept: CARDIOLOGY | Facility: HOSPITAL | Age: 48
Discharge: HOME | End: 2018-10-31
Admitting: INTERNAL MEDICINE

## 2018-10-31 LAB
ANION GAP SERPL CALCULATED.3IONS-SCNC: 14.4 MMOL/L
APTT PPP: 28.4 SECONDS (ref 22.7–35.4)
BASOPHILS # BLD AUTO: 0.04 10*3/MM3 (ref 0–0.2)
BASOPHILS NFR BLD AUTO: 0.5 % (ref 0–1.5)
BUN BLD-MCNC: 17 MG/DL (ref 6–20)
BUN/CREAT SERPL: 19.5 (ref 7–25)
CALCIUM SPEC-SCNC: 9.3 MG/DL (ref 8.6–10.5)
CHLORIDE SERPL-SCNC: 104 MMOL/L (ref 98–107)
CO2 SERPL-SCNC: 20.6 MMOL/L (ref 22–29)
CREAT BLD-MCNC: 0.87 MG/DL (ref 0.57–1)
DEPRECATED RDW RBC AUTO: 42 FL (ref 37–54)
EOSINOPHIL # BLD AUTO: 0.37 10*3/MM3 (ref 0–0.7)
EOSINOPHIL NFR BLD AUTO: 4.7 % (ref 0.3–6.2)
ERYTHROCYTE [DISTWIDTH] IN BLOOD BY AUTOMATED COUNT: 12.4 % (ref 11.7–13)
GFR SERPL CREATININE-BSD FRML MDRD: 69 ML/MIN/1.73
GLUCOSE BLD-MCNC: 111 MG/DL (ref 65–99)
HCT VFR BLD AUTO: 42.7 % (ref 35.6–45.5)
HGB BLD-MCNC: 13.5 G/DL (ref 11.9–15.5)
IMM GRANULOCYTES # BLD: 0.03 10*3/MM3 (ref 0–0.03)
IMM GRANULOCYTES NFR BLD: 0.4 % (ref 0–0.5)
INR PPP: 1.05 (ref 0.9–1.1)
LYMPHOCYTES # BLD AUTO: 1.35 10*3/MM3 (ref 0.9–4.8)
LYMPHOCYTES NFR BLD AUTO: 17.1 % (ref 19.6–45.3)
MCH RBC QN AUTO: 29.3 PG (ref 26.9–32)
MCHC RBC AUTO-ENTMCNC: 31.6 G/DL (ref 32.4–36.3)
MCV RBC AUTO: 92.8 FL (ref 80.5–98.2)
MONOCYTES # BLD AUTO: 0.59 10*3/MM3 (ref 0.2–1.2)
MONOCYTES NFR BLD AUTO: 7.5 % (ref 5–12)
NEUTROPHILS # BLD AUTO: 5.52 10*3/MM3 (ref 1.9–8.1)
NEUTROPHILS NFR BLD AUTO: 69.8 % (ref 42.7–76)
PLATELET # BLD AUTO: 352 10*3/MM3 (ref 140–500)
PMV BLD AUTO: 9.9 FL (ref 6–12)
POTASSIUM BLD-SCNC: 4 MMOL/L (ref 3.5–5.2)
PROTHROMBIN TIME: 13.5 SECONDS (ref 11.7–14.2)
RBC # BLD AUTO: 4.6 10*6/MM3 (ref 3.9–5.2)
SODIUM BLD-SCNC: 139 MMOL/L (ref 136–145)
WBC NRBC COR # BLD: 7.9 10*3/MM3 (ref 4.5–10.7)

## 2018-10-31 PROCEDURE — 85730 THROMBOPLASTIN TIME PARTIAL: CPT | Performed by: INTERNAL MEDICINE

## 2018-10-31 PROCEDURE — 36415 COLL VENOUS BLD VENIPUNCTURE: CPT | Performed by: INTERNAL MEDICINE

## 2018-10-31 PROCEDURE — 85025 COMPLETE CBC W/AUTO DIFF WBC: CPT | Performed by: INTERNAL MEDICINE

## 2018-10-31 PROCEDURE — 85610 PROTHROMBIN TIME: CPT | Performed by: INTERNAL MEDICINE

## 2018-10-31 PROCEDURE — 80048 BASIC METABOLIC PNL TOTAL CA: CPT | Performed by: INTERNAL MEDICINE

## 2018-11-01 NOTE — PERIOPERATIVE NURSING NOTE
Called and spoke with pt confirming arrival time and need to be NPO after MN. May take am meds with a sip of water and /family to accompany pt to hospital in the am.

## 2018-11-02 ENCOUNTER — HOSPITAL ENCOUNTER (OUTPATIENT)
Facility: HOSPITAL | Age: 48
Setting detail: HOSPITAL OUTPATIENT SURGERY
Discharge: HOME | End: 2018-11-02
Attending: INTERNAL MEDICINE | Admitting: INTERNAL MEDICINE

## 2018-11-02 ENCOUNTER — APPOINTMENT (OUTPATIENT)
Dept: GENERAL RADIOLOGY | Facility: HOSPITAL | Age: 48
End: 2018-11-02

## 2018-11-02 ENCOUNTER — HOSPITAL ENCOUNTER (OUTPATIENT)
Facility: HOSPITAL | Age: 48
Discharge: HOME | End: 2018-11-04
Attending: EMERGENCY MEDICINE | Admitting: EMERGENCY MEDICINE

## 2018-11-02 ENCOUNTER — APPOINTMENT (OUTPATIENT)
Dept: CT IMAGING | Facility: HOSPITAL | Age: 48
End: 2018-11-02

## 2018-11-02 ENCOUNTER — APPOINTMENT (OUTPATIENT)
Dept: MRI IMAGING | Facility: HOSPITAL | Age: 48
End: 2018-11-02

## 2018-11-02 VITALS
HEIGHT: 68 IN | SYSTOLIC BLOOD PRESSURE: 124 MMHG | OXYGEN SATURATION: 96 % | WEIGHT: 212 LBS | RESPIRATION RATE: 16 BRPM | TEMPERATURE: 98.4 F | HEART RATE: 72 BPM | BODY MASS INDEX: 32.13 KG/M2 | DIASTOLIC BLOOD PRESSURE: 69 MMHG

## 2018-11-02 DIAGNOSIS — R51.9 ACUTE NONINTRACTABLE HEADACHE, UNSPECIFIED HEADACHE TYPE: ICD-10-CM

## 2018-11-02 DIAGNOSIS — K11.8 PAROTID MASS: ICD-10-CM

## 2018-11-02 DIAGNOSIS — R07.2 PRECORDIAL PAIN: ICD-10-CM

## 2018-11-02 DIAGNOSIS — H54.7 VISUAL LOSS: Primary | ICD-10-CM

## 2018-11-02 LAB
ABO GROUP BLD: NORMAL
ALBUMIN SERPL-MCNC: 4.2 G/DL (ref 3.5–5.2)
ALBUMIN/GLOB SERPL: 1.2 G/DL
ALP SERPL-CCNC: 94 U/L (ref 39–117)
ALT SERPL W P-5'-P-CCNC: 23 U/L (ref 1–33)
ANION GAP SERPL CALCULATED.3IONS-SCNC: 11.5 MMOL/L
APTT PPP: 27 SECONDS (ref 22.7–35.4)
AST SERPL-CCNC: 19 U/L (ref 1–32)
B-HCG UR QL: NEGATIVE
BASOPHILS # BLD AUTO: 0.05 10*3/MM3 (ref 0–0.2)
BASOPHILS NFR BLD AUTO: 0.5 % (ref 0–1.5)
BILIRUB SERPL-MCNC: 0.5 MG/DL (ref 0.1–1.2)
BLD GP AB SCN SERPL QL: NEGATIVE
BUN BLD-MCNC: 10 MG/DL (ref 6–20)
BUN/CREAT SERPL: 12.5 (ref 7–25)
CALCIUM SPEC-SCNC: 9.4 MG/DL (ref 8.6–10.5)
CHLORIDE SERPL-SCNC: 100 MMOL/L (ref 98–107)
CO2 SERPL-SCNC: 26.5 MMOL/L (ref 22–29)
CREAT BLD-MCNC: 0.8 MG/DL (ref 0.57–1)
DEPRECATED RDW RBC AUTO: 43.5 FL (ref 37–54)
EOSINOPHIL # BLD AUTO: 0.37 10*3/MM3 (ref 0–0.7)
EOSINOPHIL NFR BLD AUTO: 3.8 % (ref 0.3–6.2)
ERYTHROCYTE [DISTWIDTH] IN BLOOD BY AUTOMATED COUNT: 13.2 % (ref 11.7–13)
GFR SERPL CREATININE-BSD FRML MDRD: 76 ML/MIN/1.73
GLOBULIN UR ELPH-MCNC: 3.6 GM/DL
GLUCOSE BLD-MCNC: 94 MG/DL (ref 65–99)
GLUCOSE BLDC GLUCOMTR-MCNC: 91 MG/DL (ref 70–130)
HCT VFR BLD AUTO: 42.6 % (ref 35.6–45.5)
HGB BLD-MCNC: 14.5 G/DL (ref 11.9–15.5)
HOLD SPECIMEN: NORMAL
HOLD SPECIMEN: NORMAL
IMM GRANULOCYTES # BLD: 0.03 10*3/MM3 (ref 0–0.03)
IMM GRANULOCYTES NFR BLD: 0.3 % (ref 0–0.5)
INR PPP: 1.04 (ref 0.9–1.1)
INTERNAL NEGATIVE CONTROL: NEGATIVE
INTERNAL POSITIVE CONTROL: POSITIVE
LYMPHOCYTES # BLD AUTO: 2.4 10*3/MM3 (ref 0.9–4.8)
LYMPHOCYTES NFR BLD AUTO: 24.5 % (ref 19.6–45.3)
Lab: NORMAL
MCH RBC QN AUTO: 30.9 PG (ref 26.9–32)
MCHC RBC AUTO-ENTMCNC: 34 G/DL (ref 32.4–36.3)
MCV RBC AUTO: 90.6 FL (ref 80.5–98.2)
MONOCYTES # BLD AUTO: 1.02 10*3/MM3 (ref 0.2–1.2)
MONOCYTES NFR BLD AUTO: 10.4 % (ref 5–12)
NEUTROPHILS # BLD AUTO: 5.92 10*3/MM3 (ref 1.9–8.1)
NEUTROPHILS NFR BLD AUTO: 60.5 % (ref 42.7–76)
PLATELET # BLD AUTO: 373 10*3/MM3 (ref 140–500)
PMV BLD AUTO: 9.3 FL (ref 6–12)
POTASSIUM BLD-SCNC: 3.7 MMOL/L (ref 3.5–5.2)
PROT SERPL-MCNC: 7.8 G/DL (ref 6–8.5)
PROTHROMBIN TIME: 13.4 SECONDS (ref 11.7–14.2)
RBC # BLD AUTO: 4.7 10*6/MM3 (ref 3.9–5.2)
RH BLD: POSITIVE
SODIUM BLD-SCNC: 138 MMOL/L (ref 136–145)
T&S EXPIRATION DATE: NORMAL
TROPONIN T SERPL-MCNC: <0.01 NG/ML (ref 0–0.03)
TSH SERPL DL<=0.05 MIU/L-ACNC: 3.8 MIU/ML (ref 0.27–4.2)
VIT B12 BLD-MCNC: 519 PG/ML (ref 211–946)
WBC NRBC COR # BLD: 9.79 10*3/MM3 (ref 4.5–10.7)
WHOLE BLOOD HOLD SPECIMEN: NORMAL
WHOLE BLOOD HOLD SPECIMEN: NORMAL

## 2018-11-02 PROCEDURE — 82607 VITAMIN B-12: CPT | Performed by: PSYCHIATRY & NEUROLOGY

## 2018-11-02 PROCEDURE — 93010 ELECTROCARDIOGRAM REPORT: CPT | Performed by: INTERNAL MEDICINE

## 2018-11-02 PROCEDURE — 86900 BLOOD TYPING SEROLOGIC ABO: CPT | Performed by: EMERGENCY MEDICINE

## 2018-11-02 PROCEDURE — 25010000002 MIDAZOLAM PER 1 MG: Performed by: INTERNAL MEDICINE

## 2018-11-02 PROCEDURE — 84484 ASSAY OF TROPONIN QUANT: CPT | Performed by: EMERGENCY MEDICINE

## 2018-11-02 PROCEDURE — 70551 MRI BRAIN STEM W/O DYE: CPT

## 2018-11-02 PROCEDURE — 99285 EMERGENCY DEPT VISIT HI MDM: CPT

## 2018-11-02 PROCEDURE — 93458 L HRT ARTERY/VENTRICLE ANGIO: CPT | Performed by: INTERNAL MEDICINE

## 2018-11-02 PROCEDURE — C1769 GUIDE WIRE: HCPCS | Performed by: INTERNAL MEDICINE

## 2018-11-02 PROCEDURE — 96375 TX/PRO/DX INJ NEW DRUG ADDON: CPT

## 2018-11-02 PROCEDURE — 0042T HC CT CEREBRAL PERFUSION W/WO CONTRAST: CPT

## 2018-11-02 PROCEDURE — 93005 ELECTROCARDIOGRAM TRACING: CPT | Performed by: EMERGENCY MEDICINE

## 2018-11-02 PROCEDURE — 25010000002 HEPARIN (PORCINE) PER 1000 UNITS: Performed by: INTERNAL MEDICINE

## 2018-11-02 PROCEDURE — 99244 OFF/OP CNSLTJ NEW/EST MOD 40: CPT | Performed by: PSYCHIATRY & NEUROLOGY

## 2018-11-02 PROCEDURE — G0378 HOSPITAL OBSERVATION PER HR: HCPCS

## 2018-11-02 PROCEDURE — 25010000002 DIPHENHYDRAMINE PER 50 MG: Performed by: PSYCHIATRY & NEUROLOGY

## 2018-11-02 PROCEDURE — 86850 RBC ANTIBODY SCREEN: CPT | Performed by: EMERGENCY MEDICINE

## 2018-11-02 PROCEDURE — 86901 BLOOD TYPING SEROLOGIC RH(D): CPT | Performed by: EMERGENCY MEDICINE

## 2018-11-02 PROCEDURE — 82962 GLUCOSE BLOOD TEST: CPT

## 2018-11-02 PROCEDURE — 85730 THROMBOPLASTIN TIME PARTIAL: CPT | Performed by: EMERGENCY MEDICINE

## 2018-11-02 PROCEDURE — 0 IOPAMIDOL PER 1 ML: Performed by: EMERGENCY MEDICINE

## 2018-11-02 PROCEDURE — 0 IOPAMIDOL PER 1 ML: Performed by: INTERNAL MEDICINE

## 2018-11-02 PROCEDURE — 71045 X-RAY EXAM CHEST 1 VIEW: CPT

## 2018-11-02 PROCEDURE — 85610 PROTHROMBIN TIME: CPT | Performed by: EMERGENCY MEDICINE

## 2018-11-02 PROCEDURE — 80053 COMPREHEN METABOLIC PANEL: CPT | Performed by: EMERGENCY MEDICINE

## 2018-11-02 PROCEDURE — 70496 CT ANGIOGRAPHY HEAD: CPT

## 2018-11-02 PROCEDURE — 70498 CT ANGIOGRAPHY NECK: CPT

## 2018-11-02 PROCEDURE — 96374 THER/PROPH/DIAG INJ IV PUSH: CPT

## 2018-11-02 PROCEDURE — 25010000002 PROCHLORPERAZINE EDISYLATE PER 10 MG: Performed by: PSYCHIATRY & NEUROLOGY

## 2018-11-02 PROCEDURE — 25010000002 FENTANYL CITRATE (PF) 100 MCG/2ML SOLUTION: Performed by: INTERNAL MEDICINE

## 2018-11-02 PROCEDURE — 85025 COMPLETE CBC W/AUTO DIFF WBC: CPT | Performed by: EMERGENCY MEDICINE

## 2018-11-02 PROCEDURE — 82565 ASSAY OF CREATININE: CPT

## 2018-11-02 PROCEDURE — C1894 INTRO/SHEATH, NON-LASER: HCPCS | Performed by: INTERNAL MEDICINE

## 2018-11-02 PROCEDURE — 81025 URINE PREGNANCY TEST: CPT | Performed by: INTERNAL MEDICINE

## 2018-11-02 PROCEDURE — 84443 ASSAY THYROID STIM HORMONE: CPT | Performed by: PSYCHIATRY & NEUROLOGY

## 2018-11-02 RX ORDER — SODIUM CHLORIDE 0.9 % (FLUSH) 0.9 %
10 SYRINGE (ML) INJECTION AS NEEDED
Status: DISCONTINUED | OUTPATIENT
Start: 2018-11-02 | End: 2018-11-04

## 2018-11-02 RX ORDER — SODIUM CHLORIDE 0.9 % (FLUSH) 0.9 %
3-10 SYRINGE (ML) INJECTION AS NEEDED
Status: DISCONTINUED | OUTPATIENT
Start: 2018-11-02 | End: 2018-11-02 | Stop reason: HOSPADM

## 2018-11-02 RX ORDER — LORAZEPAM 2 MG/ML
1 INJECTION INTRAMUSCULAR ONCE
Status: DISCONTINUED | OUTPATIENT
Start: 2018-11-02 | End: 2018-11-03

## 2018-11-02 RX ORDER — ATORVASTATIN CALCIUM 80 MG/1
80 TABLET, FILM COATED ORAL NIGHTLY
Status: DISCONTINUED | OUTPATIENT
Start: 2018-11-02 | End: 2018-11-03

## 2018-11-02 RX ORDER — SODIUM CHLORIDE 0.9 % (FLUSH) 0.9 %
3-10 SYRINGE (ML) INJECTION AS NEEDED
Status: DISCONTINUED | OUTPATIENT
Start: 2018-11-02 | End: 2018-11-04

## 2018-11-02 RX ORDER — SODIUM CHLORIDE 0.9 % (FLUSH) 0.9 %
3 SYRINGE (ML) INJECTION EVERY 12 HOURS SCHEDULED
Status: DISCONTINUED | OUTPATIENT
Start: 2018-11-02 | End: 2018-11-03

## 2018-11-02 RX ORDER — FENTANYL CITRATE 50 UG/ML
INJECTION, SOLUTION INTRAMUSCULAR; INTRAVENOUS AS NEEDED
Status: DISCONTINUED | OUTPATIENT
Start: 2018-11-02 | End: 2018-11-02 | Stop reason: HOSPADM

## 2018-11-02 RX ORDER — MIDAZOLAM HYDROCHLORIDE 1 MG/ML
INJECTION INTRAMUSCULAR; INTRAVENOUS AS NEEDED
Status: DISCONTINUED | OUTPATIENT
Start: 2018-11-02 | End: 2018-11-02 | Stop reason: HOSPADM

## 2018-11-02 RX ORDER — LIDOCAINE HYDROCHLORIDE 20 MG/ML
INJECTION, SOLUTION INFILTRATION; PERINEURAL AS NEEDED
Status: DISCONTINUED | OUTPATIENT
Start: 2018-11-02 | End: 2018-11-02 | Stop reason: HOSPADM

## 2018-11-02 RX ORDER — SODIUM CHLORIDE 9 MG/ML
75 INJECTION, SOLUTION INTRAVENOUS CONTINUOUS
Status: DISCONTINUED | OUTPATIENT
Start: 2018-11-02 | End: 2018-11-02 | Stop reason: HOSPADM

## 2018-11-02 RX ORDER — LORAZEPAM 2 MG/ML
1 INJECTION INTRAMUSCULAR ONCE
Status: DISCONTINUED | OUTPATIENT
Start: 2018-11-02 | End: 2018-11-02

## 2018-11-02 RX ORDER — ASPIRIN 325 MG
325 TABLET ORAL ONCE
Status: COMPLETED | OUTPATIENT
Start: 2018-11-02 | End: 2018-11-02

## 2018-11-02 RX ORDER — SODIUM CHLORIDE 0.9 % (FLUSH) 0.9 %
3 SYRINGE (ML) INJECTION EVERY 12 HOURS SCHEDULED
Status: DISCONTINUED | OUTPATIENT
Start: 2018-11-02 | End: 2018-11-02 | Stop reason: HOSPADM

## 2018-11-02 RX ORDER — LIDOCAINE HYDROCHLORIDE 10 MG/ML
0.1 INJECTION, SOLUTION EPIDURAL; INFILTRATION; INTRACAUDAL; PERINEURAL ONCE AS NEEDED
Status: DISCONTINUED | OUTPATIENT
Start: 2018-11-02 | End: 2018-11-02 | Stop reason: HOSPADM

## 2018-11-02 RX ORDER — DIPHENHYDRAMINE HYDROCHLORIDE 50 MG/ML
25 INJECTION INTRAMUSCULAR; INTRAVENOUS ONCE
Status: COMPLETED | OUTPATIENT
Start: 2018-11-02 | End: 2018-11-02

## 2018-11-02 RX ORDER — ASPIRIN 325 MG
325 TABLET ORAL DAILY
Status: DISCONTINUED | OUTPATIENT
Start: 2018-11-03 | End: 2018-11-03

## 2018-11-02 RX ORDER — ASPIRIN 300 MG/1
300 SUPPOSITORY RECTAL DAILY
Status: DISCONTINUED | OUTPATIENT
Start: 2018-11-03 | End: 2018-11-03

## 2018-11-02 RX ORDER — TOPIRAMATE 25 MG/1
25 TABLET ORAL EVERY 12 HOURS SCHEDULED
Status: DISCONTINUED | OUTPATIENT
Start: 2018-11-02 | End: 2018-11-04 | Stop reason: HOSPADM

## 2018-11-02 RX ORDER — SODIUM CHLORIDE 9 MG/ML
100 INJECTION, SOLUTION INTRAVENOUS CONTINUOUS
Status: DISCONTINUED | OUTPATIENT
Start: 2018-11-02 | End: 2018-11-02 | Stop reason: HOSPADM

## 2018-11-02 RX ADMIN — ATORVASTATIN CALCIUM 80 MG: 80 TABLET, FILM COATED ORAL at 22:25

## 2018-11-02 RX ADMIN — Medication 3 ML: at 22:25

## 2018-11-02 RX ADMIN — SODIUM CHLORIDE 100 ML/HR: 9 INJECTION, SOLUTION INTRAVENOUS at 11:36

## 2018-11-02 RX ADMIN — IOPAMIDOL 150 ML: 755 INJECTION, SOLUTION INTRAVENOUS at 18:04

## 2018-11-02 RX ADMIN — PROCHLORPERAZINE EDISYLATE 10 MG: 5 INJECTION INTRAMUSCULAR; INTRAVENOUS at 18:37

## 2018-11-02 RX ADMIN — DIPHENHYDRAMINE HYDROCHLORIDE 25 MG: 50 INJECTION INTRAMUSCULAR; INTRAVENOUS at 18:43

## 2018-11-02 RX ADMIN — SODIUM CHLORIDE 75 ML/HR: 9 INJECTION, SOLUTION INTRAVENOUS at 09:24

## 2018-11-02 RX ADMIN — TOPIRAMATE 25 MG: 25 TABLET, FILM COATED ORAL at 22:25

## 2018-11-02 RX ADMIN — ASPIRIN 325 MG: 325 TABLET ORAL at 19:00

## 2018-11-02 NOTE — DISCHARGE INSTRUCTIONS
[facility]  [address] [place] [address]    Coronary Angiogram (Radial/Ulnar Approach) After Care    Refer to this sheet in the next few weeks. These instructions provide you with information on caring for yourself after your procedure. Your caregiver may also give you more specific instructions. Your treatment has been planned according to current medical practices, but problems sometimes occur. Call your caregiver if you have any problems or questions after your procedure.    Home Care Instructions:  · You may shower the day after the procedure. Remove the bandage (dressing) and gently wash the site with plain soap and water. Gently pat the site dry. You may apply a band aid daily for 2 days if desired.    · Do not apply powder or lotion to the site.  · Do not submerge the affected site in water for 3 to 5 days or until the site is completely healed.   · Do not lift, push or pull anything over 10 pounds for 2 days after your procedure.  · Inspect the site at least twice daily. You may notice some bruising at the site and it may be tender for 1 to 2 weeks.     · Increase your fluid intake for the next 2 days.    · Keep arm elevated for 24 hours. For the remainder of the day, keep your arm in “Pledge of Allegiance” position when up and about.     · You may drive 24 hours after the procedure unless otherwise instructed by your caregiver.  · Do not operate machinery or power tools for 24 hours.  · A responsible adult should be with you for the first 24 hours after you arrive home. Do not make any important legal decisions or sign legal papers for 24 hours.  Do not drink alcohol for 24 hours.    · Metformin or any medications containing Metformin should not be taken for 48 hours after your procedure.      Call Your Doctor if:   · You have unusual pain at the radial/ulnar (wrist) site.  · You have redness, warmth, swelling, or pain at the radial/ulnar (wrist) site.  · You have drainage (other  than a small amount of blood on the dressing).  · You have chills or a fever > 101.  · Your arm becomes pale or dark, cool, tingly, or numb.  · You have heavy bleeding from the site, hold pressure on the site for 20 minutes.  If the bleeding stops, apply a fresh bandage and call your cardiologist.  However, if you continue to have bleeding, call 911.        Moderate Conscious Sedation, Adult, Care After  Refer to this sheet in the next few weeks. These instructions provide you with information on caring for yourself after your procedure. Your health care provider may also give you more specific instructions. Your treatment has been planned according to current medical practices, but problems sometimes occur. Call your health care provider if you have any problems or questions after your procedure.  WHAT TO EXPECT AFTER THE PROCEDURE   After your procedure:  · You may feel sleepy, clumsy, and have poor balance for several hours.  · Vomiting may occur if you eat too soon after the procedure.  HOME CARE INSTRUCTIONS  · Do not participate in any activities where you could become injured for at least 24 hours. Do not:    Drive.    Swim.    Ride a bicycle.    Operate heavy machinery.    Cook.    Use power tools.    Climb ladders.    Work from a high place.  · Do not make important decisions or sign legal documents until you are improved.  · If you vomit, drink water, juice, or soup when you can drink without vomiting. Make sure you have little or no nausea before eating solid foods.  · Only take over-the-counter or prescription medicines for pain, discomfort, or fever as directed by your health care provider.  · Make sure you and your family fully understand everything about the medicines given to you, including what side effects may occur.  · You should not drink alcohol, take sleeping pills, or take medicines that cause drowsiness for at least 24 hours.  · If you smoke, do not smoke without supervision.  · If you are  feeling better, you may resume normal activities 24 hours after you were sedated.  · Keep all appointments with your health care provider.  · You should have a responsible adult stay with you for the first 24 hours post procedure.  SEEK MEDICAL CARE IF:  · Your skin is pale or bluish in color.  · You continue to feel nauseous or vomit.  · Your pain is getting worse and is not helped by medicine.  · You have bleeding or swelling.  · You are still sleepy or feeling clumsy after 24 hours.  SEEK IMMEDIATE MEDICAL CARE IF:  · You develop a rash.  · You have difficulty breathing.  · You develop any type of allergic problem.  · You have a fever.  MAKE SURE YOU:  · Understand these instructions.  · Will watch your condition.  · Will get help right away if you are not doing well or get worse.     This information is not intended to replace advice given to you by your health care provider. Make sure you discuss any questions you have with your health care provider.     Document Released: 10/08/2014 Document Revised: 01/08/2016 Document Reviewed: 10/08/2014  [employer] Interactive Patient Education ©2016 [employer]

## 2018-11-02 NOTE — CONSULTS
Encounter Date: 2018    CHIEF COMPLAINT:   Left homonymous hemianopia ref by .    Patient Active Problem List   Diagnosis   • Chest pain   • Reactive depression   • Seasonal allergic rhinitis due to pollen   • Right ovarian cyst   • History of atrial fibrillation   • Asthma with exacerbation   • Cough   • Weakness   • Tachycardia   • Influenza A   • Palpitations   • Swelling   • Essential hypertension   • Paroxysmal atrial fibrillation (CMS/HCC)   • Hyperlipidemia   • Precordial pain       PRESENT ILLNESS:    Portions of this notes is copied from previous physician encounters, reviewed and edited appropriately.    Background:     Amirah Godoy is a 48 y.o. female  With history of dyslipidemia paroxysmal atrial fibrillation,  Cardiac catheterization today  Now admitted with visual aura headache and left homonymous hemianopia.  She also had some slowing of her speech.  Family present by the bedside.  They want to hold off on the tPA.  After headache got better her symptoms started to resolve..    Review of systems   No neck stiffness  No photophobia  All systems reviewed and are negative.         Past Medical History:   Diagnosis Date   • Allergic    • Asthma    • Depression    • History of atrial fibrillation     NO CURRENT MEDICATION   • Hyperlipidemia    • PVC (premature ventricular contraction)    • Reflux esophagitis    • Seasonal allergies    • Sinus infection     STARTED ON ANTIBIOTICS ON 10/30/17   • SVT (supraventricular tachycardia) (CMS/HCC)     NO CURRENT MEDICATIONS       Past Surgical History:   Procedure Laterality Date   • ATRIAL SEPTAL DEFECT REPAIR     • BRONCHOSCOPY N/A 2018    Procedure: BRONCHOSCOPY;  Surgeon: Scott Dobson MD;  Location: [facility] ENDOSCOPY;  Service:    • CARDIAC ABLATION      DR. NADEEM SCHUMACHER --   •  SECTION  2000   • DIAGNOSTIC LAPAROSCOPY Right 11/3/2017    Procedure: LAPAROSCOPIC RIGHT OOPHERECTOMY ;  Surgeon: Claudine Barron  MD;  Location: [facility] MAIN OR;  Service:    • OVARIAN CYST REMOVAL  11/2017   • WISDOM TOOTH EXTRACTION         Current Facility-Administered Medications   Medication Dose Route Frequency Provider Last Rate Last Dose   • LORazepam (ATIVAN) injection 1 mg  1 mg Intravenous Once BoTan cardenas MD       • sodium chloride 0.9 % flush 10 mL  10 mL Intravenous PRN Tan Soriano MD         Current Outpatient Prescriptions   Medication Sig Dispense Refill   • ARIPiprazole (ABILIFY) 5 MG tablet Take 5 mg by mouth Daily.     • cetirizine (ZyrTEC) 10 MG tablet Take 10 mg by mouth daily.     • citalopram (CeleXA) 20 MG tablet Take 40 mg by mouth Daily.     • Fluticasone-Umeclidin-Vilant (TRELEGY ELLIPTA IN) Inhale 1 inhaler Daily.     • MINASTRIN 24 FE 1-20 MG-MCG(24) chewable tablet Chew 1 tablet Every Night.     • mometasone (ASMANEX TWISTHALER) inhaler 110 mcg/inhalation Inhale 2 puffs Daily.     • montelukast (SINGULAIR) 10 MG tablet TAKE ONE TABLET BY MOUTH DAILY 30 tablet 4   • nitroglycerin (NITROSTAT) 0.4 MG SL tablet 1 under the tongue as needed for angina, may repeat q5mins for up three doses 100 tablet 11   • omeprazole (priLOSEC) 40 MG capsule Take 40 mg by mouth Daily.     • rosuvastatin (CRESTOR) 10 MG tablet Take 1 tablet by mouth Daily. 30 tablet 11   • zolpidem (AMBIEN) 5 MG tablet Take 5 mg by mouth At Night As Needed for Sleep.         Allergies   Allergen Reactions   • Demerol [Meperidine] Hives   • Sulfa Antibiotics GI Intolerance       Social History     Social History   • Marital status:      Spouse name: N/A   • Number of children: N/A   • Years of education: N/A     Occupational History   • Not on file.     Social History Main Topics   • Smoking status: Never Smoker   • Smokeless tobacco: Never Used   • Alcohol use No   • Drug use: No   • Sexual activity: Defer     Other Topics Concern   • Not on file     Social History Narrative   • No narrative on file       Family History   Problem  Relation Age of Onset   • Depression Mother    • Diabetes Mother    • Heart disease Mother         +of mi at 68   • Hyperlipidemia Mother    • Hypertension Mother    • Heart disease Father         ptca x 5 and mi at 59   • Hyperlipidemia Father    • Hypertension Father    • Kidney disease Brother    • Birth defects Maternal Grandmother    • Heart disease Maternal Grandmother         multiple mi   • Birth defects Maternal Grandfather    • Heart disease Maternal Grandfather         + mi at 68   • Colon cancer Maternal Grandfather    • Heart disease Paternal Grandmother         weak heart   • Asthma Paternal Grandmother    • COPD Paternal Grandmother    • Kidney failure Paternal Grandfather    • Malig Hyperthermia Neg Hx        Family Status   Relation Status   • Mother (Not Specified)   • Father (Not Specified)   • Brother (Not Specified)   • MGM (Not Specified)   • MGF (Not Specified)   • PGM (Not Specified)   • PGF (Not Specified)   • Neg Hx (Not Specified)       Current Facility-Administered Medications on File Prior to Encounter   Medication   • [DISCONTINUED] fentaNYL citrate (PF) (SUBLIMAZE) injection   • [DISCONTINUED] iopamidol (ISOVUE-370) 76 % injection   • [DISCONTINUED] lidocaine (XYLOCAINE) 2% injection   • [DISCONTINUED] lidocaine PF 1% (XYLOCAINE) injection 0.1 mL   • [DISCONTINUED] midazolam (VERSED) injection   • [DISCONTINUED] sodium chloride 0.9 % flush 3 mL   • [DISCONTINUED] sodium chloride 0.9 % flush 3-10 mL   • [DISCONTINUED] sodium chloride 0.9 % infusion   • [DISCONTINUED] sodium chloride 0.9 % infusion   • [DISCONTINUED] verapamil (ISOPTIN) 500 mcg, nitroglycerin (TRIDIL) 200 mcg, heparin (porcine) 3,000 Units radial artery injection     Current Outpatient Prescriptions on File Prior to Encounter   Medication Sig   • ARIPiprazole (ABILIFY) 5 MG tablet Take 5 mg by mouth Daily.   • cetirizine (ZyrTEC) 10 MG tablet Take 10 mg by mouth daily.   • citalopram (CeleXA) 20 MG tablet Take 40 mg by  "mouth Daily.   • Fluticasone-Umeclidin-Vilant (TRELEGY ELLIPTA IN) Inhale 1 inhaler Daily.   • MINASTRIN 24 FE 1-20 MG-MCG(24) chewable tablet Chew 1 tablet Every Night.   • mometasone (ASMANEX TWISTHALER) inhaler 110 mcg/inhalation Inhale 2 puffs Daily.   • montelukast (SINGULAIR) 10 MG tablet TAKE ONE TABLET BY MOUTH DAILY   • nitroglycerin (NITROSTAT) 0.4 MG SL tablet 1 under the tongue as needed for angina, may repeat q5mins for up three doses   • omeprazole (priLOSEC) 40 MG capsule Take 40 mg by mouth Daily.   • rosuvastatin (CRESTOR) 10 MG tablet Take 1 tablet by mouth Daily.   • zolpidem (AMBIEN) 5 MG tablet Take 5 mg by mouth At Night As Needed for Sleep.           PHYSICAL EXAMINATION:    Vitals: Patient Vitals for the past 4 hrs:   BP Temp Temp src Pulse Resp SpO2 Height Weight   11/02/18 1847 - - - - - - 172.7 cm (68\") -   11/02/18 1834 158/84 - - 88 20 - - -   11/02/18 1831 - - - 92 18 96 % - -   11/02/18 1816 - 98.9 °F (37.2 °C) Tympanic - - - - -   11/02/18 1741 - - - - - - - 99.7 kg (219 lb 12.8 oz)   11/02/18 1737 156/96 - - - - - - -   11/02/18 1732 - - - - 12 - - -   11/02/18 1724 - - - 84 - 94 % - -     Temp:  [98.4 °F (36.9 °C)-98.9 °F (37.2 °C)] 98.9 °F (37.2 °C)  Heart Rate:  [72-92] 88  Resp:  [12-20] 20  BP: (119-158)/(60-96) 158/84    General:  pleasant in no acute distress.  HEENT: No pallor or icterus. Neck supple. No Carotid bruits.  Heart: S1 and S2 normal with regular rhythm.  No murmur.       GENERAL NEUROLOGIC EXAM     Mental Status: Awake alert and oriented to person place and time. Language is normal for comprehension, repetition, naming and fluency. Recent and remote memory were normal.  Attention span and concentration were normal. Fund of knowledge was normal.      Cranial nerves:  Fundi were normal bilaterally.   Left homonymous hemianopia.  Pupils are equal regular and reactive to light. Extraocular movements are intact. Facial sensation was normal and symmetrical bilaterally. " Muscles of facial expression were strong and symmetric. Hearing to finger rub normal bilaterally. Palate elevates symmetrically. Sternocleidomastoid and trapezius normal. The tongue protrudes midline without atrophy or fasciculations.      Motor: Normal tone and bulk with no involuntary movements or pronator drift.    Strength normal 5/5 in bilateral upper and lower extremities.     Sensation: Light touch, pin prick, vibration and joint position sense were normal in the upper and lower extremities.     Reflexes: 2+ bilaterally symmetrical with down going toes.    Coordination: finger nose and heel shin test normal bilaterally.     Gait: Normal station and gait.  Heel, toe and tandem walk normal.  Romberg negative.     Labs:     Lab Results   Component Value Date    HGB 14.5 11/02/2018    HCT 42.6 11/02/2018    WBC 9.79 11/02/2018     11/02/2018    MONOPCT 6.0 01/11/2018     Lab Results   Component Value Date    BUN 10 11/02/2018    CALCIUM 9.4 11/02/2018     11/02/2018    K 3.7 11/02/2018     11/02/2018    CO2 26.5 11/02/2018    GLU 81 03/10/2017     Lab Results   Component Value Date    ALT 23 11/02/2018    AST 19 11/02/2018    BILITOT 0.5 11/02/2018     Lab Results   Component Value Date    PROTIME 13.4 11/02/2018    INR 1.04 11/02/2018     No components found for: POCGLUC  No components found for: A1C  Lab Results   Component Value Date    HDL 42 06/29/2018     (H) 06/29/2018     No components found for: B12  Lab Results   Component Value Date    TSH 1.820 03/10/2017       Lab Results (last 24 hours)     Procedure Component Value Units Date/Time    [place] Draw [[id number]] Collected:  11/02/18 1746    Specimen:  Blood Updated:  11/02/18 1900    Narrative:       The following orders were created for panel order [place] Draw.  Procedure                               Abnormality         Status                     ---------                               -----------         ------                      Light Blue Top[[id number]]                                   Final result               Green Top (Gel)[[id number]]                                  Final result               Lavender Top[[id number]]                                     Final result               Gold Top - SST[[id number]]                                   Final result                 Please view results for these tests on the individual orders.    Light Blue Top [[id number]] Collected:  11/02/18 1746    Specimen:  Blood Updated:  11/02/18 1900     Extra Tube hold for add-on     Comment: Auto resulted       Green Top (Gel) [[id number]] Collected:  11/02/18 1746    Specimen:  Blood Updated:  11/02/18 1900     Extra Tube Hold for add-ons.     Comment: Auto resulted.       Lavender Top [[id number]] Collected:  11/02/18 1747    Specimen:  Blood Updated:  11/02/18 1900     Extra Tube hold for add-on     Comment: Auto resulted       Gold Top - SST [[id number]] Collected:  11/02/18 1746    Specimen:  Blood Updated:  11/02/18 1900     Extra Tube Hold for add-ons.     Comment: Auto resulted.       Comprehensive Metabolic Panel [[id number]] Collected:  11/02/18 1746    Specimen:  Blood Updated:  11/02/18 1824     Glucose 94 mg/dL      BUN 10 mg/dL      Creatinine 0.80 mg/dL      Sodium 138 mmol/L      Potassium 3.7 mmol/L      Chloride 100 mmol/L      CO2 26.5 mmol/L      Calcium 9.4 mg/dL      Total Protein 7.8 g/dL      Albumin 4.20 g/dL      ALT (SGPT) 23 U/L      AST (SGOT) 19 U/L      Alkaline Phosphatase 94 U/L      Total Bilirubin 0.5 mg/dL      eGFR Non African Amer 76 mL/min/1.73      Globulin 3.6 gm/dL      A/G Ratio 1.2 g/dL      BUN/Creatinine Ratio 12.5     Anion Gap 11.5 mmol/L     Troponin [[id number]]  (Normal) Collected:  11/02/18 1746    Specimen:  Blood Updated:  11/02/18 1822     Troponin T <0.010 ng/mL     Narrative:       Troponin T Reference Ranges:  Less than 0.03 ng/mL:    Negative for AMI  0.03 to 0.09 ng/mL:      Indeterminant for  AMI  Greater than 0.09 ng/mL: Positive for AMI    Protime-INR [[id number]]  (Normal) Collected:  11/02/18 1746    Specimen:  Blood Updated:  11/02/18 1816     Protime 13.4 Seconds      INR 1.04    aPTT [[id number]]  (Normal) Collected:  11/02/18 1746    Specimen:  Blood Updated:  11/02/18 1816     PTT 27.0 seconds     CBC & Differential [[id number]] Collected:  11/02/18 1747    Specimen:  Blood Updated:  11/02/18 1806    Narrative:       The following orders were created for panel order CBC & Differential.  Procedure                               Abnormality         Status                     ---------                               -----------         ------                     CBC Auto Differential[[id number]]        Abnormal            Final result                 Please view results for these tests on the individual orders.    CBC Auto Differential [[id number]]  (Abnormal) Collected:  11/02/18 1747    Specimen:  Blood Updated:  11/02/18 1806     WBC 9.79 10*3/mm3      RBC 4.70 10*6/mm3      Hemoglobin 14.5 g/dL      Hematocrit 42.6 %      MCV 90.6 fL      MCH 30.9 pg      MCHC 34.0 g/dL      RDW 13.2 (H) %      RDW-SD 43.5 fl      MPV 9.3 fL      Platelets 373 10*3/mm3      Neutrophil % 60.5 %      Lymphocyte % 24.5 %      Monocyte % 10.4 %      Eosinophil % 3.8 %      Basophil % 0.5 %      Immature Grans % 0.3 %      Neutrophils, Absolute 5.92 10*3/mm3      Lymphocytes, Absolute 2.40 10*3/mm3      Monocytes, Absolute 1.02 10*3/mm3      Eosinophils, Absolute 0.37 10*3/mm3      Basophils, Absolute 0.05 10*3/mm3      Immature Grans, Absolute 0.03 10*3/mm3     POC Glucose Once [[id number]]  (Normal) Collected:  11/02/18 1729    Specimen:  Blood Updated:  11/02/18 1730     Glucose 91 mg/dL     Narrative:       Meter: [id number] : [id number] Levar Strong RN          .  Imaging Results (last 24 hours)     Procedure Component Value Units Date/Time    MRI Brain Without Contrast [[id number]] Updated:  11/02/18 1939    XR  Chest 1 View [[id number]] Collected:  11/02/18 1913     Updated:  11/02/18 1913    Narrative:       EMERGENCY PORTABLE VIEW THE CHEST 11/02/2018      CLINICAL HISTORY: Acute stroke, patient had a heart catheter today and  now has left-sided homonymous hemianopsia, headache and some left-sided  weakness.     COMPARISON: This is correlated to CT scan of the chest 02/19/2018 and  chest x-ray 01/15/2018.      FINDINGS: The cardiomediastinal silhouette and pulmonary vasculature are  within normal limits. Lungs are clear. The costophrenic angles are  sharp.       Impression:          1. No active disease is seen in the chest.       CT Angiogram Neck With & Without Contrast [[id number]] Collected:  11/02/18 1912     Updated:  11/02/18 1912    Narrative:       EMERGENCY CONTRAST-ENHANCED CT ANGIOGRAM OF HEAD AND NECK AND CT  PERFUSION STUDY HEAD 11/02/2018     CLINICAL HISTORY: Team D. Patient has acute onset of left-sided  weakness, left-sided homonymous hemianopsia.      NONCONTRAST HEAD CT TECHNIQUE: Spiral CT images were obtained from the  base of the skull to the vertex without intravenous contrast and images  were reformatted and submitted in 3 mm thick axial CT section with brain  algorithm and 2 mm thick sagittal and coronal reconstructions were  performed.     This is correlated to a prior MRI of the head from [facility] on 02/22/2011.     FINDINGS: The brain parenchyma is normal in attenuation. The ventricles  are normal in size.  I see no mass effect and no midline shift.  No  extra-axial fluid collections are identified.  There is no evidence of  acute intracranial hemorrhage. The paranasal sinuses, mastoid air cells  and middle ear cavities are clear.  There is a mass in the visualized  superior aspect of the superficial lobe extending into the deep lobe of  the parotid gland measuring 20 x 18 mm in medial lateral and anterior  posterior dimension, similar to prior MRI 02/22/2011.        Impression:       1. 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 The mass is  incompletely characterized on this exam.   2. The remainder of the head CT is normal. The results were communicated  to Dr. Esparza from Stroke Neurology 11/02/2018 at 5:48 while the patient  was still on the table, and he requested a CT angiogram of the head and  neck and CT perfusion study.      CT ANGIOGRAM HEAD AND NECK AND CT PERFUSION STUDY TECHNIQUE: Spiral CT  images were obtained through the head during the arterial phase of  contrast and images were reformatted and analyzed with RAPID software  analysis per CT perfusion study and subsequently spiral CT images were  obtained from the top of the aortic arch up through the great vessels of  the head and neck during the arterial phase of contrast and images were  reformatted and submitted in 1 mm thick axial, sagittal and coronal CT  sections and 3D reconstructions were performed to complete the CT  angiogram of the head and neck     CT PERFUSION STUDY: CT perfusion studies demonstrate no focal areas of  reduced blood flow below 30% with no areas of acute completed infarction  and no areas of delayed time to maximal enhancement beyond 6 seconds and  thus no obvious hypoperfused or ischemic areas.     CT ANGIOGRAM HEAD AND NECK: The nasopharynx, oropharynx, hypopharynx,  true cords, and subglottic airway are normal in appearance.  The thyroid  gland enhances homogeneously and is normal in appearance. The lung  apices are clear. Reidentified is a mass in the superior aspect of the  superficial lobe extending into the deep lobe of the right parotid gland  that measures 2 x 1.8 cm in size, similar to prior MRI of the head  02/22/2011. Otherwise, the remainder of the parotid, ,  parapharyngeal and submandibular spaces are symmetric and  are normal in  appearance. The cervical spine is unremarkable. There is anatomic origin  of the great vessels off the aortic arch.  The left subclavian artery  origin is normal in appearance.  No stenosis is seen in the left  subclavian artery.  The left vertebral artery origin is normal in  appearance.  No stenosis is seen in the left vertebral artery from its  origin to the vertebrobasilar junction. The left common carotid origin  is normal in appearance.  No stenosis is seen in the left common carotid  artery and its bifurcation into the left internal and external carotid  arteries is normal in appearance.  No stenosis is seen in the left  internal carotid artery using the NASCET criteria. The brachiocephalic  artery origin is normal in appearance.  No stenosis in seen in the  brachiocephalic artery.  Its bifurcation into the right subclavian and  common carotid arteries is obscured by dense beam hardening artifact off  the densely opacified right subclavian vein as is the proximal right  subclavian artery and the right vertebral origin, and they are not  assessed. Beyond its origin, the right vertebral artery is widely patent  to the vertebrobasilar junction without stenosis. The right common  carotid origin, proximal right common carotid artery is obscured again  by dense beam hardening artifact.  The mid and distal right common  carotid artery and bifurcation into the right internal and external  carotid arteries is normal in appearance.  No stenosis is seen in the  right internal carotid artery using the NASCET criteria. CT angiogram  images through the head demonstrate a normal appearance of the  intracranial segments of the distal vertebral arteries as well as the  posterior inferior cerebellar arteries and the basilar artery and the  basilar tip.  There is a slightly hypoplastic P1 segment of the right  posterior cerebral artery, which is a normal variation.  Both posterior  cerebral and superior  cerebellar arteries are normal in appearance.  The  upper cervical, petrous, cavernous and supracavernous segments of the  internal carotid arteries are normal in appearance.  The anterior and  middle cerebral arteries are normal in appearance.  The anterior  communicating artery origin and middle cerebral artery trifurcations are  normal in appearance.     IMPRESSION:  1. There is a 2 x 1.8 cm mass extending from the superior aspect of the  superficial lobe into the deep lobe of the right parotid gland, similar  to prior MRI 02/22/2011, favoring a benign etiology.   2. The remainder of the CT angiogram of the head and neck is normal with  no acute completed infarct and no intracranial hemorrhage identified and  no stenosis seen in the great vessels of the head or neck, and the  etiology of the left-sided myomatous hemianopsia and left-sided weakness  is not established on this exam.  If symptoms persist, I recommend an  MRI of the head for more complete assessment. The final dictated results  were communicated to Dr. Esparza 11/02/2018 at 6:15 PM and Dr. Soriano from  the emergency room 11/02/2018 at 6:20 PM.      Radiation dose reduction techniques were utilized, including automated  exposure control and exposure modulation based on body size.          CT Cerebral Perfusion With & Without Contrast [[id number]] Collected:  11/02/18 1912     Updated:  11/02/18 1912    Narrative:       EMERGENCY CONTRAST-ENHANCED CT ANGIOGRAM OF HEAD AND NECK AND CT  PERFUSION STUDY HEAD 11/02/2018     CLINICAL HISTORY: Team D. Patient has acute onset of left-sided  weakness, left-sided homonymous hemianopsia.      NONCONTRAST HEAD CT TECHNIQUE: Spiral CT images were obtained from the  base of the skull to the vertex without intravenous contrast and images  were reformatted and submitted in 3 mm thick axial CT section with brain  algorithm and 2 mm thick sagittal and coronal reconstructions were  performed.     This is correlated to a  prior MRI of the head from [facility] on 02/22/2011.     FINDINGS: The brain parenchyma is normal in attenuation. The ventricles  are normal in size.  I see no mass effect and no midline shift.  No  extra-axial fluid collections are identified.  There is no evidence of  acute intracranial hemorrhage. The paranasal sinuses, mastoid air cells  and middle ear cavities are clear.  There is a mass in the visualized  superior aspect of the superficial lobe extending into the deep lobe of  the parotid gland measuring 20 x 18 mm in medial lateral and anterior  posterior dimension, similar to prior MRI 02/22/2011.       Impression:       1. 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 The mass is  incompletely characterized on this exam.   2. The remainder of the head CT is normal. The results were communicated  to Dr. Esparza from Stroke Neurology 11/02/2018 at 5:48 while the patient  was still on the table, and he requested a CT angiogram of the head and  neck and CT perfusion study.      CT ANGIOGRAM HEAD AND NECK AND CT PERFUSION STUDY TECHNIQUE: Spiral CT  images were obtained through the head during the arterial phase of  contrast and images were reformatted and analyzed with RAPID software  analysis per CT perfusion study and subsequently spiral CT images were  obtained from the top of the aortic arch up through the great vessels of  the head and neck during the arterial phase of contrast and images were  reformatted and submitted in 1 mm thick axial, sagittal and coronal CT  sections and 3D reconstructions were performed to complete the CT  angiogram of the head and neck     CT PERFUSION STUDY: CT perfusion studies demonstrate no focal areas of  reduced blood flow below 30% with no areas of acute completed infarction  and no areas of delayed time to maximal  enhancement beyond 6 seconds and  thus no obvious hypoperfused or ischemic areas.     CT ANGIOGRAM HEAD AND NECK: The nasopharynx, oropharynx, hypopharynx,  true cords, and subglottic airway are normal in appearance.  The thyroid  gland enhances homogeneously and is normal in appearance. The lung  apices are clear. Reidentified is a mass in the superior aspect of the  superficial lobe extending into the deep lobe of the right parotid gland  that measures 2 x 1.8 cm in size, similar to prior MRI of the head  02/22/2011. Otherwise, the remainder of the parotid, ,  parapharyngeal and submandibular spaces are symmetric and are normal in  appearance. The cervical spine is unremarkable. There is anatomic origin  of the great vessels off the aortic arch.  The left subclavian artery  origin is normal in appearance.  No stenosis is seen in the left  subclavian artery.  The left vertebral artery origin is normal in  appearance.  No stenosis is seen in the left vertebral artery from its  origin to the vertebrobasilar junction. The left common carotid origin  is normal in appearance.  No stenosis is seen in the left common carotid  artery and its bifurcation into the left internal and external carotid  arteries is normal in appearance.  No stenosis is seen in the left  internal carotid artery using the NASCET criteria. The brachiocephalic  artery origin is normal in appearance.  No stenosis in seen in the  brachiocephalic artery.  Its bifurcation into the right subclavian and  common carotid arteries is obscured by dense beam hardening artifact off  the densely opacified right subclavian vein as is the proximal right  subclavian artery and the right vertebral origin, and they are not  assessed. Beyond its origin, the right vertebral artery is widely patent  to the vertebrobasilar junction without stenosis. The right common  carotid origin, proximal right common carotid artery is obscured again  by dense beam hardening  artifact.  The mid and distal right common  carotid artery and bifurcation into the right internal and external  carotid arteries is normal in appearance.  No stenosis is seen in the  right internal carotid artery using the NASCET criteria. CT angiogram  images through the head demonstrate a normal appearance of the  intracranial segments of the distal vertebral arteries as well as the  posterior inferior cerebellar arteries and the basilar artery and the  basilar tip.  There is a slightly hypoplastic P1 segment of the right  posterior cerebral artery, which is a normal variation.  Both posterior  cerebral and superior cerebellar arteries are normal in appearance.  The  upper cervical, petrous, cavernous and supracavernous segments of the  internal carotid arteries are normal in appearance.  The anterior and  middle cerebral arteries are normal in appearance.  The anterior  communicating artery origin and middle cerebral artery trifurcations are  normal in appearance.     IMPRESSION:  1. There is a 2 x 1.8 cm mass extending from the superior aspect of the  superficial lobe into the deep lobe of the right parotid gland, similar  to prior MRI 02/22/2011, favoring a benign etiology.   2. The remainder of the CT angiogram of the head and neck is normal with  no acute completed infarct and no intracranial hemorrhage identified and  no stenosis seen in the great vessels of the head or neck, and the  etiology of the left-sided myomatous hemianopsia and left-sided weakness  is not established on this exam.  If symptoms persist, I recommend an  MRI of the head for more complete assessment. The final dictated results  were communicated to Dr. Esparza 11/02/2018 at 6:15 PM and Dr. Soriano from  the emergency room 11/02/2018 at 6:20 PM.      Radiation dose reduction techniques were utilized, including automated  exposure control and exposure modulation based on body size.          CT Angiogram Head With & Without Contrast  [[id number]] Collected:  11/02/18 1912     Updated:  11/02/18 1912    Narrative:       EMERGENCY CONTRAST-ENHANCED CT ANGIOGRAM OF HEAD AND NECK AND CT  PERFUSION STUDY HEAD 11/02/2018     CLINICAL HISTORY: Team D. Patient has acute onset of left-sided  weakness, left-sided homonymous hemianopsia.      NONCONTRAST HEAD CT TECHNIQUE: Spiral CT images were obtained from the  base of the skull to the vertex without intravenous contrast and images  were reformatted and submitted in 3 mm thick axial CT section with brain  algorithm and 2 mm thick sagittal and coronal reconstructions were  performed.     This is correlated to a prior MRI of the head from [facility] on 02/22/2011.     FINDINGS: The brain parenchyma is normal in attenuation. The ventricles  are normal in size.  I see no mass effect and no midline shift.  No  extra-axial fluid collections are identified.  There is no evidence of  acute intracranial hemorrhage. The paranasal sinuses, mastoid air cells  and middle ear cavities are clear.  There is a mass in the visualized  superior aspect of the superficial lobe extending into the deep lobe of  the parotid gland measuring 20 x 18 mm in medial lateral and anterior  posterior dimension, similar to prior MRI 02/22/2011.       Impression:       1. 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 The mass is  incompletely characterized on this exam.   2. The remainder of the head CT is normal. The results were communicated  to Dr. Esparza from Stroke Neurology 11/02/2018 at 5:48 while the patient  was still on the table, and he requested a CT angiogram of the head and  neck and CT perfusion study.      CT ANGIOGRAM HEAD AND NECK AND CT PERFUSION STUDY TECHNIQUE: Spiral CT  images were obtained through the head during the arterial phase of  contrast and images were  reformatted and analyzed with RAPID software  analysis per CT perfusion study and subsequently spiral CT images were  obtained from the top of the aortic arch up through the great vessels of  the head and neck during the arterial phase of contrast and images were  reformatted and submitted in 1 mm thick axial, sagittal and coronal CT  sections and 3D reconstructions were performed to complete the CT  angiogram of the head and neck     CT PERFUSION STUDY: CT perfusion studies demonstrate no focal areas of  reduced blood flow below 30% with no areas of acute completed infarction  and no areas of delayed time to maximal enhancement beyond 6 seconds and  thus no obvious hypoperfused or ischemic areas.     CT ANGIOGRAM HEAD AND NECK: The nasopharynx, oropharynx, hypopharynx,  true cords, and subglottic airway are normal in appearance.  The thyroid  gland enhances homogeneously and is normal in appearance. The lung  apices are clear. Reidentified is a mass in the superior aspect of the  superficial lobe extending into the deep lobe of the right parotid gland  that measures 2 x 1.8 cm in size, similar to prior MRI of the head  02/22/2011. Otherwise, the remainder of the parotid, ,  parapharyngeal and submandibular spaces are symmetric and are normal in  appearance. The cervical spine is unremarkable. There is anatomic origin  of the great vessels off the aortic arch.  The left subclavian artery  origin is normal in appearance.  No stenosis is seen in the left  subclavian artery.  The left vertebral artery origin is normal in  appearance.  No stenosis is seen in the left vertebral artery from its  origin to the vertebrobasilar junction. The left common carotid origin  is normal in appearance.  No stenosis is seen in the left common carotid  artery and its bifurcation into the left internal and external carotid  arteries is normal in appearance.  No stenosis is seen in the left  internal carotid artery using the  NASCET criteria. The brachiocephalic  artery origin is normal in appearance.  No stenosis in seen in the  brachiocephalic artery.  Its bifurcation into the right subclavian and  common carotid arteries is obscured by dense beam hardening artifact off  the densely opacified right subclavian vein as is the proximal right  subclavian artery and the right vertebral origin, and they are not  assessed. Beyond its origin, the right vertebral artery is widely patent  to the vertebrobasilar junction without stenosis. The right common  carotid origin, proximal right common carotid artery is obscured again  by dense beam hardening artifact.  The mid and distal right common  carotid artery and bifurcation into the right internal and external  carotid arteries is normal in appearance.  No stenosis is seen in the  right internal carotid artery using the NASCET criteria. CT angiogram  images through the head demonstrate a normal appearance of the  intracranial segments of the distal vertebral arteries as well as the  posterior inferior cerebellar arteries and the basilar artery and the  basilar tip.  There is a slightly hypoplastic P1 segment of the right  posterior cerebral artery, which is a normal variation.  Both posterior  cerebral and superior cerebellar arteries are normal in appearance.  The  upper cervical, petrous, cavernous and supracavernous segments of the  internal carotid arteries are normal in appearance.  The anterior and  middle cerebral arteries are normal in appearance.  The anterior  communicating artery origin and middle cerebral artery trifurcations are  normal in appearance.     IMPRESSION:  1. There is a 2 x 1.8 cm mass extending from the superior aspect of the  superficial lobe into the deep lobe of the right parotid gland, similar  to prior MRI 02/22/2011, favoring a benign etiology.   2. The remainder of the CT angiogram of the head and neck is normal with  no acute completed infarct and no intracranial  hemorrhage identified and  no stenosis seen in the great vessels of the head or neck, and the  etiology of the left-sided myomatous hemianopsia and left-sided weakness  is not established on this exam.  If symptoms persist, I recommend an  MRI of the head for more complete assessment. The final dictated results  were communicated to Dr. Esparza 11/02/2018 at 6:15 PM and Dr. Soriano from  the emergency room 11/02/2018 at 6:20 PM.      Radiation dose reduction techniques were utilized, including automated  exposure control and exposure modulation based on body size.                For this problem, the following was ordered:  Orders Placed This Encounter   Procedures   • XR Chest 1 View   • CT Angiogram Neck With & Without Contrast   • CT Cerebral Perfusion With & Without Contrast   • CT Angiogram Head With & Without Contrast   • MRI Brain Without Contrast   • Gomer Draw   • Comprehensive Metabolic Panel   • Protime-INR   • aPTT   • Troponin   • CBC Auto Differential   • NPO Diet   • Initiate Department's Acute Stroke Process (Team D, Code 19, etc)   • Measure Weight   • Head of bed 30 degrees or less   • Undress and Gown   • Cardiac monitoring   • Continuous Pulse Oximetry   • Vital signs   • Neuro checks   • No Hypotonic Fluids   • Nursing swallow assessment   • Inpatient Neurology Consult Stroke   • Inpatient Neurology Consult Stroke   • [facility] (on-call MD unless specified)   • Oxygen Therapy- Nasal Cannula; 2 LPM; Titrate for SPO2: equal to or greater than, 94%   • POC Glucose Once   • POC Glucose Once   • ECG 12 Lead   • Type & Screen   • Insert large-bore peripheral IV - Right AC preferred   • CBC & Differential   • Light Blue Top   • Green Top (Gel)   • Lavender Top   • Gold Top - SST       Problem List Items Addressed This Visit     None      Visit Diagnoses     Visual loss    -  Primary    Acute nonintractable headache, unspecified headache type              ASSESSMENT/PLAN: This is a 48 y.o. female who has    Past Medical History:   Diagnosis Date   • Allergic    • Asthma    • Depression    • History of atrial fibrillation 2011    NO CURRENT MEDICATION   • Hyperlipidemia    • PVC (premature ventricular contraction) 2015   • Reflux esophagitis    • Seasonal allergies    • Sinus infection     STARTED ON ANTIBIOTICS ON 10/30/17   • SVT (supraventricular tachycardia) (CMS/HCC) 2011    NO CURRENT MEDICATIONS    presents with Headache, visual aura, left homonymous hemianopia.  Has history of migraine.  Patient family refused  tPA until MRI.  MRI ruled out CVA in the brain.    1.  Complicated migraine: remote possibility of  TIA given the history of atrial fibrillation.  -  Fioricet as needed  -  Topamax 25 mg at bedtime and increase it to 50 mg at bedtime in 1 week for migraine prophylaxis  -  Aspirin for prophylaxis  -  B12 tsh lipids and A1c  -  Echocardiogram    2.  Parotid tumor:  Management as per primary.    3.  Paroxysmal atrial fibrillation:  -  Cardiology consult for potential anticoagulation if it is confirmed.     will follow.    Thank you for allowing us to participate in the care of this patient.    Jelani Esparza MD  11/02/18  7:57 PM

## 2018-11-02 NOTE — ED PROVIDER NOTES
EMERGENCY DEPARTMENT ENCOUNTER    CHIEF COMPLAINT  Chief Complaint: Headache  History given by: Patient, pt's   History limited by: None  Room Number: 31/31  PMD: Ruben Kwan Jr., MD      HPI:  Pt is a 48 y.o. female who presents complaining of  sudden onset, severe, posteriorly-focused headache which occurred PTA. Pt also notes loss of vision in the left eye, and facial pain. Pt notes she has also had nausea which is relieved at the moment. Pt had a heart cath today.     Duration:  PTA   Onset: Sudden  Timing: Constant  Radiation: None  Quality: Posteriorly-focused  Intensity/Severity: Severe  Progression: Unchanged  Associated Symptoms: Loss of vision in the left eye, and facial pain. Pt notes she has also had nausea which is relieved at the moment.  Aggravating Factors orAlleviating Factors: None specified  Previous Episodes: None   Treatment before arrival: Pt had a heart cath today.    PAST MEDICAL HISTORY  Active Ambulatory Problems     Diagnosis Date Noted   • Chest pain 07/15/2016   • Reactive depression 03/10/2017   • Seasonal allergic rhinitis due to pollen 03/10/2017   • Right ovarian cyst 11/03/2017   • History of atrial fibrillation 01/01/2011   • Asthma with exacerbation 01/05/2018   • Cough 01/05/2018   • Weakness 01/05/2018   • Tachycardia 01/07/2018   • Influenza A 01/09/2018   • Palpitations 04/21/2018   • Swelling 04/23/2018   • Essential hypertension 04/23/2018   • Paroxysmal atrial fibrillation (CMS/HCC) 04/23/2018   • Hyperlipidemia 05/28/2018   • Precordial pain 10/24/2018     Resolved Ambulatory Problems     Diagnosis Date Noted   • No Resolved Ambulatory Problems     Past Medical History:   Diagnosis Date   • Allergic    • Asthma    • Depression    • History of atrial fibrillation 2011   • Hyperlipidemia    • PVC (premature ventricular contraction) 2015   • Reflux esophagitis    • Seasonal allergies    • Sinus infection    • SVT (supraventricular tachycardia) (CMS/[facility]) 2011        PAST SURGICAL HISTORY  Past Surgical History:   Procedure Laterality Date   • ATRIAL SEPTAL DEFECT REPAIR     • BRONCHOSCOPY N/A 2018    Procedure: BRONCHOSCOPY;  Surgeon: Scott Dobson MD;  Location: [facility] ENDOSCOPY;  Service:    • CARDIAC ABLATION      DR. NADEEM SCHUMACHER --   •  SECTION  2000   • DIAGNOSTIC LAPAROSCOPY Right 11/3/2017    Procedure: LAPAROSCOPIC RIGHT OOPHERECTOMY ;  Surgeon: Claudine Barron MD;  Location: [facility] MAIN OR;  Service:    • OVARIAN CYST REMOVAL  2017   • WISDOM TOOTH EXTRACTION         FAMILY HISTORY  Family History   Problem Relation Age of Onset   • Depression Mother    • Diabetes Mother    • Heart disease Mother         +of mi at 68   • Hyperlipidemia Mother    • Hypertension Mother    • Heart disease Father         ptca x 5 and mi at 59   • Hyperlipidemia Father    • Hypertension Father    • Kidney disease Brother    • Birth defects Maternal Grandmother    • Heart disease Maternal Grandmother         multiple mi   • Birth defects Maternal Grandfather    • Heart disease Maternal Grandfather         + mi at 68   • Colon cancer Maternal Grandfather    • Heart disease Paternal Grandmother         weak heart   • Asthma Paternal Grandmother    • COPD Paternal Grandmother    • Kidney failure Paternal Grandfather    • Malig Hyperthermia Neg Hx        SOCIAL HISTORY  Social History     Social History   • Marital status:      Spouse name: N/A   • Number of children: N/A   • Years of education: N/A     Occupational History   • Not on file.     Social History Main Topics   • Smoking status: Never Smoker   • Smokeless tobacco: Never Used   • Alcohol use No   • Drug use: No   • Sexual activity: Defer     Other Topics Concern   • Not on file     Social History Narrative   • No narrative on file       ALLERGIES  Demerol [meperidine] and Sulfa antibiotics    REVIEW OF SYSTEMS  Review of Systems   Constitutional: Negative.  Negative for fever.   HENT: Negative  for sore throat.         Facial pain   Eyes: Positive for visual disturbance (loss of vision left eye).   Respiratory: Negative.  Negative for cough.    Cardiovascular: Negative.  Negative for chest pain.   Gastrointestinal: Negative.    Genitourinary: Negative.  Negative for dysuria.   Musculoskeletal: Negative.  Negative for back pain.   Skin: Negative.  Negative for rash.   Neurological: Positive for headaches (severe, posteriorly-focused).   All other systems reviewed and are negative.      PHYSICAL EXAM  ED Triage Vitals [11/02/18 1724]   Temp Heart Rate Resp BP SpO2   -- 84 -- -- 94 %      Temp src Heart Rate Source Patient Position BP Location FiO2 (%)   -- -- -- -- --       Physical Exam   Constitutional: No distress.   HENT:   Head: Normocephalic and atraumatic.   Mouth/Throat: Oropharynx is clear and moist.   Eyes: Conjunctivae are normal.   Loss of vision in left side   Cardiovascular: Normal rate and regular rhythm.    Pulmonary/Chest: Breath sounds normal. No respiratory distress.   Abdominal: There is no tenderness.   Musculoskeletal: She exhibits no edema or tenderness.   Neurological: She is alert. She has normal sensation, normal strength, normal reflexes and intact cranial nerves. She displays normal speech. No sensory deficit. She has a normal Straight Leg Raise Test, a normal Cerebellar Exam and a normal Finger-Nose-Finger Test.   Normal cerebellar testing.  No drift in any extremity.  No dysarthria.  No aphasia.  No neglect/extinction.      Skin: No rash noted.   Nursing note and vitals reviewed.    LAB RESULTS  Lab Results (last 24 hours)     Procedure Component Value Units Date/Time    POC Urine Pregnancy [[id number]] Collected:  11/02/18 0912    Specimen:  Urine Updated:  11/02/18 0913     HCG, Urine, QL Negative     Lot Number [id number]     Internal Positive Control Positive     Internal Negative Control Negative    POC Glucose Once [[id number]]  (Normal) Collected:  11/02/18 1729     Specimen:  Blood Updated:  11/02/18 1730     Glucose 91 mg/dL     Narrative:       Meter: [id number] : [id number] Levar Strong RN    Comprehensive Metabolic Panel [[id number]] Collected:  11/02/18 1746    Specimen:  Blood Updated:  11/02/18 1824     Glucose 94 mg/dL      BUN 10 mg/dL      Creatinine 0.80 mg/dL      Sodium 138 mmol/L      Potassium 3.7 mmol/L      Chloride 100 mmol/L      CO2 26.5 mmol/L      Calcium 9.4 mg/dL      Total Protein 7.8 g/dL      Albumin 4.20 g/dL      ALT (SGPT) 23 U/L      AST (SGOT) 19 U/L      Alkaline Phosphatase 94 U/L      Total Bilirubin 0.5 mg/dL      eGFR Non African Amer 76 mL/min/1.73      Globulin 3.6 gm/dL      A/G Ratio 1.2 g/dL      BUN/Creatinine Ratio 12.5     Anion Gap 11.5 mmol/L     Protime-INR [[id number]]  (Normal) Collected:  11/02/18 1746    Specimen:  Blood Updated:  11/02/18 1816     Protime 13.4 Seconds      INR 1.04    aPTT [[id number]]  (Normal) Collected:  11/02/18 1746    Specimen:  Blood Updated:  11/02/18 1816     PTT 27.0 seconds     Troponin [[id number]]  (Normal) Collected:  11/02/18 1746    Specimen:  Blood Updated:  11/02/18 1822     Troponin T <0.010 ng/mL     Narrative:       Troponin T Reference Ranges:  Less than 0.03 ng/mL:    Negative for AMI  0.03 to 0.09 ng/mL:      Indeterminant for AMI  Greater than 0.09 ng/mL: Positive for AMI    Vitamin B12 [[id number]] Collected:  11/02/18 1746    Specimen:  Blood Updated:  11/02/18 2005    TSH [[id number]] Collected:  11/02/18 1746    Specimen:  Blood Updated:  11/02/18 2005    CBC & Differential [[id number]] Collected:  11/02/18 1747    Specimen:  Blood Updated:  11/02/18 1806    Narrative:       The following orders were created for panel order CBC & Differential.  Procedure                               Abnormality         Status                     ---------                               -----------         ------                     CBC Auto Differential[[id number]]        Abnormal            Final  result                 Please view results for these tests on the individual orders.    CBC Auto Differential [[id number]]  (Abnormal) Collected:  11/02/18 1747    Specimen:  Blood Updated:  11/02/18 1806     WBC 9.79 10*3/mm3      RBC 4.70 10*6/mm3      Hemoglobin 14.5 g/dL      Hematocrit 42.6 %      MCV 90.6 fL      MCH 30.9 pg      MCHC 34.0 g/dL      RDW 13.2 (H) %      RDW-SD 43.5 fl      MPV 9.3 fL      Platelets 373 10*3/mm3      Neutrophil % 60.5 %      Lymphocyte % 24.5 %      Monocyte % 10.4 %      Eosinophil % 3.8 %      Basophil % 0.5 %      Immature Grans % 0.3 %      Neutrophils, Absolute 5.92 10*3/mm3      Lymphocytes, Absolute 2.40 10*3/mm3      Monocytes, Absolute 1.02 10*3/mm3      Eosinophils, Absolute 0.37 10*3/mm3      Basophils, Absolute 0.05 10*3/mm3      Immature Grans, Absolute 0.03 10*3/mm3         I ordered the above labs and reviewed the results    RADIOLOGY  XR Chest 1 View   Preliminary Result       1. No active disease is seen in the chest.          CT Angiogram Neck With & Without Contrast   Preliminary Result   1. 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 The mass is   incompletely characterized on this exam.    2. The remainder of the head CT is normal. The results were communicated   to Dr. Esparza from [facility]rology 11/02/2018 at 5:48 while the patient   was still on the table, and he requested a CT angiogram of the head and   neck and CT perfusion study.        CT ANGIOGRAM HEAD AND NECK AND CT PERFUSION STUDY TECHNIQUE: Spiral CT   images were obtained through the head during the arterial phase of   contrast and images were reformatted and analyzed with RAPID software   analysis per CT perfusion study and subsequently spiral CT images were   obtained from the top of the aortic arch up through the great vessels of   the head and  neck during the arterial phase of contrast and images were   reformatted and submitted in 1 mm thick axial, sagittal and coronal CT   sections and 3D reconstructions were performed to complete the CT   angiogram of the head and neck       CT PERFUSION STUDY: CT perfusion studies demonstrate no focal areas of   reduced blood flow below 30% with no areas of acute completed infarction   and no areas of delayed time to maximal enhancement beyond 6 seconds and   thus no obvious hypoperfused or ischemic areas.       CT ANGIOGRAM HEAD AND NECK: The nasopharynx, oropharynx, hypopharynx,   true cords, and subglottic airway are normal in appearance.  The thyroid   gland enhances homogeneously and is normal in appearance. The lung   apices are clear. Reidentified is a mass in the superior aspect of the   superficial lobe extending into the deep lobe of the right parotid gland   that measures 2 x 1.8 cm in size, similar to prior MRI of the head   02/22/2011. Otherwise, the remainder of the parotid, ,   parapharyngeal and submandibular spaces are symmetric and are normal in   appearance. The cervical spine is unremarkable. There is anatomic origin   of the great vessels off the aortic arch.  The left subclavian artery   origin is normal in appearance.  No stenosis is seen in the left   subclavian artery.  The left vertebral artery origin is normal in   appearance.  No stenosis is seen in the left vertebral artery from its   origin to the vertebrobasilar junction. The left common carotid origin   is normal in appearance.  No stenosis is seen in the left common carotid   artery and its bifurcation into the left internal and external carotid   arteries is normal in appearance.  No stenosis is seen in the left   internal carotid artery using the NASCET criteria. The brachiocephalic   artery origin is normal in appearance.  No stenosis in seen in the   brachiocephalic artery.  Its bifurcation into the right subclavian and    common carotid arteries is obscured by dense beam hardening artifact off   the densely opacified right subclavian vein as is the proximal right   subclavian artery and the right vertebral origin, and they are not   assessed. Beyond its origin, the right vertebral artery is widely patent   to the vertebrobasilar junction without stenosis. The right common   carotid origin, proximal right common carotid artery is obscured again   by dense beam hardening artifact.  The mid and distal right common   carotid artery and bifurcation into the right internal and external   carotid arteries is normal in appearance.  No stenosis is seen in the   right internal carotid artery using the NASCET criteria. CT angiogram   images through the head demonstrate a normal appearance of the   intracranial segments of the distal vertebral arteries as well as the   posterior inferior cerebellar arteries and the basilar artery and the   basilar tip.  There is a slightly hypoplastic P1 segment of the right   posterior cerebral artery, which is a normal variation.  Both posterior   cerebral and superior cerebellar arteries are normal in appearance.  The   upper cervical, petrous, cavernous and supracavernous segments of the   internal carotid arteries are normal in appearance.  The anterior and   middle cerebral arteries are normal in appearance.  The anterior   communicating artery origin and middle cerebral artery trifurcations are   normal in appearance.       IMPRESSION:   1. There is a 2 x 1.8 cm mass extending from the superior aspect of the   superficial lobe into the deep lobe of the right parotid gland, similar   to prior MRI 02/22/2011, favoring a benign etiology.    2. The remainder of the CT angiogram of the head and neck is normal with   no acute completed infarct and no intracranial hemorrhage identified and   no stenosis seen in the great vessels of the head or neck, and the   etiology of the left-sided myomatous hemianopsia  and left-sided weakness   is not established on this exam.  If symptoms persist, I recommend an   MRI of the head for more complete assessment. The final dictated results   were communicated to Dr. Esparza 11/02/2018 at 6:15 PM and Dr. Soriano from   the emergency room 11/02/2018 at 6:20 PM.        Radiation dose reduction techniques were utilized, including automated   exposure control and exposure modulation based on body size.              CT Cerebral Perfusion With & Without Contrast   Preliminary Result   1. 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 The mass is   incompletely characterized on this exam.    2. The remainder of the head CT is normal. The results were communicated   to Dr. Esparza from [facility] 11/02/2018 at 5:48 while the patient   was still on the table, and he requested a CT angiogram of the head and   neck and CT perfusion study.        CT ANGIOGRAM HEAD AND NECK AND CT PERFUSION STUDY TECHNIQUE: Spiral CT   images were obtained through the head during the arterial phase of   contrast and images were reformatted and analyzed with RAPID software   analysis per CT perfusion study and subsequently spiral CT images were   obtained from the top of the aortic arch up through the great vessels of   the head and neck during the arterial phase of contrast and images were   reformatted and submitted in 1 mm thick axial, sagittal and coronal CT   sections and 3D reconstructions were performed to complete the CT   angiogram of the head and neck       CT PERFUSION STUDY: CT perfusion studies demonstrate no focal areas of   reduced blood flow below 30% with no areas of acute completed infarction   and no areas of delayed time to maximal enhancement beyond 6 seconds and   thus no obvious hypoperfused or ischemic areas.       CT ANGIOGRAM HEAD AND NECK: The  nasopharynx, oropharynx, hypopharynx,   true cords, and subglottic airway are normal in appearance.  The thyroid   gland enhances homogeneously and is normal in appearance. The lung   apices are clear. Reidentified is a mass in the superior aspect of the   superficial lobe extending into the deep lobe of the right parotid gland   that measures 2 x 1.8 cm in size, similar to prior MRI of the head   02/22/2011. Otherwise, the remainder of the parotid, ,   parapharyngeal and submandibular spaces are symmetric and are normal in   appearance. The cervical spine is unremarkable. There is anatomic origin   of the great vessels off the aortic arch.  The left subclavian artery   origin is normal in appearance.  No stenosis is seen in the left   subclavian artery.  The left vertebral artery origin is normal in   appearance.  No stenosis is seen in the left vertebral artery from its   origin to the vertebrobasilar junction. The left common carotid origin   is normal in appearance.  No stenosis is seen in the left common carotid   artery and its bifurcation into the left internal and external carotid   arteries is normal in appearance.  No stenosis is seen in the left   internal carotid artery using the NASCET criteria. The brachiocephalic   artery origin is normal in appearance.  No stenosis in seen in the   brachiocephalic artery.  Its bifurcation into the right subclavian and   common carotid arteries is obscured by dense beam hardening artifact off   the densely opacified right subclavian vein as is the proximal right   subclavian artery and the right vertebral origin, and they are not   assessed. Beyond its origin, the right vertebral artery is widely patent   to the vertebrobasilar junction without stenosis. The right common   carotid origin, proximal right common carotid artery is obscured again   by dense beam hardening artifact.  The mid and distal right common   carotid artery and bifurcation into the right  internal and external   carotid arteries is normal in appearance.  No stenosis is seen in the   right internal carotid artery using the NASCET criteria. CT angiogram   images through the head demonstrate a normal appearance of the   intracranial segments of the distal vertebral arteries as well as the   posterior inferior cerebellar arteries and the basilar artery and the   basilar tip.  There is a slightly hypoplastic P1 segment of the right   posterior cerebral artery, which is a normal variation.  Both posterior   cerebral and superior cerebellar arteries are normal in appearance.  The   upper cervical, petrous, cavernous and supracavernous segments of the   internal carotid arteries are normal in appearance.  The anterior and   middle cerebral arteries are normal in appearance.  The anterior   communicating artery origin and middle cerebral artery trifurcations are   normal in appearance.       IMPRESSION:   1. There is a 2 x 1.8 cm mass extending from the superior aspect of the   superficial lobe into the deep lobe of the right parotid gland, similar   to prior MRI 02/22/2011, favoring a benign etiology.    2. The remainder of the CT angiogram of the head and neck is normal with   no acute completed infarct and no intracranial hemorrhage identified and   no stenosis seen in the great vessels of the head or neck, and the   etiology of the left-sided myomatous hemianopsia and left-sided weakness   is not established on this exam.  If symptoms persist, I recommend an   MRI of the head for more complete assessment. The final dictated results   were communicated to Dr. Esparza 11/02/2018 at 6:15 PM and Dr. Soriano from   the emergency room 11/02/2018 at 6:20 PM.        Radiation dose reduction techniques were utilized, including automated   exposure control and exposure modulation based on body size.              CT Angiogram Head With & Without Contrast   Preliminary Result   1. 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 The mass is   incompletely characterized on this exam.    2. The remainder of the head CT is normal. The results were communicated   to Dr. Esparza from [facility] 11/02/2018 at 5:48 while the patient   was still on the table, and he requested a CT angiogram of the head and   neck and CT perfusion study.        CT ANGIOGRAM HEAD AND NECK AND CT PERFUSION STUDY TECHNIQUE: Spiral CT   images were obtained through the head during the arterial phase of   contrast and images were reformatted and analyzed with RAPID software   analysis per CT perfusion study and subsequently spiral CT images were   obtained from the top of the aortic arch up through the great vessels of   the head and neck during the arterial phase of contrast and images were   reformatted and submitted in 1 mm thick axial, sagittal and coronal CT   sections and 3D reconstructions were performed to complete the CT   angiogram of the head and neck       CT PERFUSION STUDY: CT perfusion studies demonstrate no focal areas of   reduced blood flow below 30% with no areas of acute completed infarction   and no areas of delayed time to maximal enhancement beyond 6 seconds and   thus no obvious hypoperfused or ischemic areas.       CT ANGIOGRAM HEAD AND NECK: The nasopharynx, oropharynx, hypopharynx,   true cords, and subglottic airway are normal in appearance.  The thyroid   gland enhances homogeneously and is normal in appearance. The lung   apices are clear. Reidentified is a mass in the superior aspect of the   superficial lobe extending into the deep lobe of the right parotid gland   that measures 2 x 1.8 cm in size, similar to prior MRI of the head   02/22/2011. Otherwise, the remainder of the parotid, ,   parapharyngeal and submandibular spaces are symmetric and are normal in    appearance. The cervical spine is unremarkable. There is anatomic origin   of the great vessels off the aortic arch.  The left subclavian artery   origin is normal in appearance.  No stenosis is seen in the left   subclavian artery.  The left vertebral artery origin is normal in   appearance.  No stenosis is seen in the left vertebral artery from its   origin to the vertebrobasilar junction. The left common carotid origin   is normal in appearance.  No stenosis is seen in the left common carotid   artery and its bifurcation into the left internal and external carotid   arteries is normal in appearance.  No stenosis is seen in the left   internal carotid artery using the NASCET criteria. The brachiocephalic   artery origin is normal in appearance.  No stenosis in seen in the   brachiocephalic artery.  Its bifurcation into the right subclavian and   common carotid arteries is obscured by dense beam hardening artifact off   the densely opacified right subclavian vein as is the proximal right   subclavian artery and the right vertebral origin, and they are not   assessed. Beyond its origin, the right vertebral artery is widely patent   to the vertebrobasilar junction without stenosis. The right common   carotid origin, proximal right common carotid artery is obscured again   by dense beam hardening artifact.  The mid and distal right common   carotid artery and bifurcation into the right internal and external   carotid arteries is normal in appearance.  No stenosis is seen in the   right internal carotid artery using the NASCET criteria. CT angiogram   images through the head demonstrate a normal appearance of the   intracranial segments of the distal vertebral arteries as well as the   posterior inferior cerebellar arteries and the basilar artery and the   basilar tip.  There is a slightly hypoplastic P1 segment of the right   posterior cerebral artery, which is a normal variation.  Both posterior   cerebral and  superior cerebellar arteries are normal in appearance.  The   upper cervical, petrous, cavernous and supracavernous segments of the   internal carotid arteries are normal in appearance.  The anterior and   middle cerebral arteries are normal in appearance.  The anterior   communicating artery origin and middle cerebral artery trifurcations are   normal in appearance.       IMPRESSION:   1. There is a 2 x 1.8 cm mass extending from the superior aspect of the   superficial lobe into the deep lobe of the right parotid gland, similar   to prior MRI 02/22/2011, favoring a benign etiology.    2. The remainder of the CT angiogram of the head and neck is normal with   no acute completed infarct and no intracranial hemorrhage identified and   no stenosis seen in the great vessels of the head or neck, and the   etiology of the left-sided myomatous hemianopsia and left-sided weakness   is not established on this exam.  If symptoms persist, I recommend an   MRI of the head for more complete assessment. The final dictated results   were communicated to Dr. Esparza 11/02/2018 at 6:15 PM and Dr. Das from   the emergency room 11/02/2018 at 6:20 PM.        Radiation dose reduction techniques were utilized, including automated   exposure control and exposure modulation based on body size.              MRI Brain Without Contrast    (Results Pending)     I ordered the above noted radiological studies. Interpreted by radiologist. Reviewed by me in PACS.     PROCEDURES  Critical Care  Performed by: ARIANNE DAS  Authorized by: ARIANNE DAS     Critical care provider statement:     Critical care time (minutes):  40    Critical care time was exclusive of:  Separately billable procedures and treating other patients    Critical care was necessary to treat or prevent imminent or life-threatening deterioration of the following conditions: Neuro.    Critical care was time spent personally by me on the following activities:  Blood draw  for specimens, development of treatment plan with patient or surrogate, discussions with consultants, evaluation of patient's response to treatment, examination of patient, obtaining history from patient or surrogate, re-evaluation of patient's condition, review of old charts, ordering and performing treatments and interventions, ordering and review of laboratory studies and ordering and review of radiographic studies             EKG          EKG time: 1905  Rhythm/Rate: Sinus 73  P waves and FL: NML  QRS, axis: Anterior Q waves  ST and T waves: NML     Interpreted Contemporaneously by me, independently viewed  No significant change compared to prior January 2018    NIH stroke scale  Level of consciousness: 0  LOC questions: 0  LOC commands: 0  Best gaze: 0  Visual:2 (Complete hemianopia)  Facial Palsy: 0  Motor Arm, Left: 0  Motor Arm, Right: 0  Motor Leg, Left: 0  Motor Leg,Right: 0  Limb ataxia: 0  Sensory: 0  Best language: 0  Dysarthria: 0  Extinction/Inattention: 0  Total score: 2    PROGRESS AND CONSULTS  1728-Checked patient and performed NIH stroke scale.    1735-Discussed plan to CT for further evaluation. Pt understands and agrees with the plan, all questions answered.    1738- Team D called.    1740-Discussed pt's case with  (neuro) who reports that the pt is a TPA candidate.     1743-Rechecked patient and discussed that the pt is a TPA candidate due to cardio cath IV being inserted at compressible site (rigth wrist) and plan to review CT head for further evaluation.     1820-Discussed CT head results with Dr. Esparza which is unremarkable. I will defer TPA decision to Dr. Esparza who wants to give TPA and will come see the pt.    1821-Rechecked patient and discussed results of head CT which are unremarkable.     1827-Dr. Esparza is here to see the patient.    1943-Dr. Esparza reports that the pt's MRI brain is negative for stroke. He notes that he gave the pt migraine medication with relief of headache.  We discussed the plan to admit to medicine.     1948-Rechecked patient and discussed results of CT, blood work, and plan to admit for observation. I discussed slightly larger parotid mass found on MRI when compared to previous imaging. I instructed her to follow up with ENT for further evaluation. Pt understands and agrees with the plan, all questions answered.    2008-Discussed patient's case with Dr. Daniel who agrees to admit the patient.     MEDICAL DECISION MAKING  Results were reviewed/discussed with the patient and they were also made aware of online access. Pt also made aware that some labs, such as cultures, will not be resulted during ER visit and follow up with PMD is necessary.     MDM  Number of Diagnoses or Management Options     Amount and/or Complexity of Data Reviewed  Clinical lab tests: reviewed and ordered (Troponin is <0.010, protime=13.4, INR=1.04, PTT=27)  Tests in the radiology section of CPT®: ordered and reviewed (CT head is unremarkable. MRI brain is negative for CVA but shows slightly larger parotid mass found when compared to previous imaging.)  Tests in the medicine section of CPT®: reviewed and ordered (See EKG procedure note.)  Discuss the patient with other providers: yes (Dr. Esparza (neuro), Dr. Daniel)    Patient Progress  Patient progress: stable     DIAGNOSIS  Final diagnoses:   Visual loss   Acute nonintractable headache, unspecified headache type   Parotid mass     DISPOSITION  ADMISSION    Discussed treatment plan and reason for admission with pt/family and admitting physician.  Pt/family voiced understanding of the plan for admission for further testing/treatment as needed.     Latest Documented Vital Signs:  As of 8:13 PM  BP- 158/84 HR- 88 Temp- 98.9 °F (37.2 °C) (Tympanic) O2 sat- 96%    --  Documentation assistance provided by timmy Vaca for Dr. Soriano.  Information recorded by the timmy was done at my direction and has been verified and validated by me.          Elizabeth Vaca  11/02/18 2013       Tan Soriano MD  11/02/18 2014

## 2018-11-02 NOTE — ED NOTES
MRI called by Se HYLTON, MRI will be ready for patient in 15 minutes.     Dayan Green RN  11/02/18 1952

## 2018-11-02 NOTE — ED NOTES
MD Isaias spoke with pharmacist Chandrika at 1939 and MD Isaias determined not to give TPA.      Dayan Green RN  11/02/18 1958

## 2018-11-03 ENCOUNTER — APPOINTMENT (OUTPATIENT)
Dept: CARDIOLOGY | Facility: HOSPITAL | Age: 48
End: 2018-11-03
Attending: PSYCHIATRY & NEUROLOGY

## 2018-11-03 PROBLEM — Z87.74 STATUS POST DEVICE CLOSURE OF ASD: Status: ACTIVE | Noted: 2018-11-03

## 2018-11-03 LAB
BH CV ECHO MEAS - ACS: 1.8 CM
BH CV ECHO MEAS - AO MAX PG (FULL): 6.2 MMHG
BH CV ECHO MEAS - AO MAX PG: 10.1 MMHG
BH CV ECHO MEAS - AO MEAN PG (FULL): 3 MMHG
BH CV ECHO MEAS - AO MEAN PG: 5 MMHG
BH CV ECHO MEAS - AO ROOT AREA (BSA CORRECTED): 1.2
BH CV ECHO MEAS - AO ROOT AREA: 5.3 CM^2
BH CV ECHO MEAS - AO ROOT DIAM: 2.6 CM
BH CV ECHO MEAS - AO V2 MAX: 159 CM/SEC
BH CV ECHO MEAS - AO V2 MEAN: 100 CM/SEC
BH CV ECHO MEAS - AO V2 VTI: 34.7 CM
BH CV ECHO MEAS - ASC AORTA: 2.7 CM
BH CV ECHO MEAS - AVA(I,A): 1.9 CM^2
BH CV ECHO MEAS - AVA(I,D): 1.9 CM^2
BH CV ECHO MEAS - AVA(V,A): 1.9 CM^2
BH CV ECHO MEAS - AVA(V,D): 1.9 CM^2
BH CV ECHO MEAS - BSA(HAYCOCK): 2.3 M^2
BH CV ECHO MEAS - BSA: 2.1 M^2
BH CV ECHO MEAS - BZI_BMI: 34.2 KILOGRAMS/M^2
BH CV ECHO MEAS - BZI_METRIC_HEIGHT: 172.7 CM
BH CV ECHO MEAS - BZI_METRIC_WEIGHT: 102.1 KG
BH CV ECHO MEAS - EDV(CUBED): 91.1 ML
BH CV ECHO MEAS - EDV(MOD-SP2): 66 ML
BH CV ECHO MEAS - EDV(MOD-SP4): 77 ML
BH CV ECHO MEAS - EDV(TEICH): 92.4 ML
BH CV ECHO MEAS - EF(CUBED): 70.4 %
BH CV ECHO MEAS - EF(MOD-BP): 62 %
BH CV ECHO MEAS - EF(MOD-SP2): 59.1 %
BH CV ECHO MEAS - EF(MOD-SP4): 63.6 %
BH CV ECHO MEAS - EF(TEICH): 62.1 %
BH CV ECHO MEAS - ESV(CUBED): 27 ML
BH CV ECHO MEAS - ESV(MOD-SP2): 27 ML
BH CV ECHO MEAS - ESV(MOD-SP4): 28 ML
BH CV ECHO MEAS - ESV(TEICH): 35 ML
BH CV ECHO MEAS - FS: 33.3 %
BH CV ECHO MEAS - IVS/LVPW: 0.9
BH CV ECHO MEAS - IVSD: 0.9 CM
BH CV ECHO MEAS - LAT PEAK E' VEL: 12 CM/SEC
BH CV ECHO MEAS - LV DIASTOLIC VOL/BSA (35-75): 35.8 ML/M^2
BH CV ECHO MEAS - LV MASS(C)D: 142.9 GRAMS
BH CV ECHO MEAS - LV MASS(C)DI: 66.5 GRAMS/M^2
BH CV ECHO MEAS - LV MAX PG: 3.9 MMHG
BH CV ECHO MEAS - LV MEAN PG: 2 MMHG
BH CV ECHO MEAS - LV SYSTOLIC VOL/BSA (12-30): 13 ML/M^2
BH CV ECHO MEAS - LV V1 MAX: 98.3 CM/SEC
BH CV ECHO MEAS - LV V1 MEAN: 61.9 CM/SEC
BH CV ECHO MEAS - LV V1 VTI: 21.1 CM
BH CV ECHO MEAS - LVIDD: 4.5 CM
BH CV ECHO MEAS - LVIDS: 3 CM
BH CV ECHO MEAS - LVLD AP2: 7.8 CM
BH CV ECHO MEAS - LVLD AP4: 8.3 CM
BH CV ECHO MEAS - LVLS AP2: 6.4 CM
BH CV ECHO MEAS - LVLS AP4: 6.9 CM
BH CV ECHO MEAS - LVOT AREA (M): 3.1 CM^2
BH CV ECHO MEAS - LVOT AREA: 3.1 CM^2
BH CV ECHO MEAS - LVOT DIAM: 2 CM
BH CV ECHO MEAS - LVPWD: 1 CM
BH CV ECHO MEAS - MED PEAK E' VEL: 10 CM/SEC
BH CV ECHO MEAS - MV A DUR: 0.1 SEC
BH CV ECHO MEAS - MV A MAX VEL: 89.7 CM/SEC
BH CV ECHO MEAS - MV DEC SLOPE: 376 CM/SEC^2
BH CV ECHO MEAS - MV DEC TIME: 0.13 SEC
BH CV ECHO MEAS - MV E MAX VEL: 81 CM/SEC
BH CV ECHO MEAS - MV E/A: 0.9
BH CV ECHO MEAS - MV MAX PG: 4.2 MMHG
BH CV ECHO MEAS - MV MEAN PG: 2 MMHG
BH CV ECHO MEAS - MV P1/2T MAX VEL: 91.2 CM/SEC
BH CV ECHO MEAS - MV P1/2T: 71 MSEC
BH CV ECHO MEAS - MV V2 MAX: 102 CM/SEC
BH CV ECHO MEAS - MV V2 MEAN: 57.9 CM/SEC
BH CV ECHO MEAS - MV V2 VTI: 28.2 CM
BH CV ECHO MEAS - MVA P1/2T LCG: 2.4 CM^2
BH CV ECHO MEAS - MVA(P1/2T): 3.1 CM^2
BH CV ECHO MEAS - MVA(VTI): 2.4 CM^2
BH CV ECHO MEAS - PA ACC TIME: 0.11 SEC
BH CV ECHO MEAS - PA MAX PG (FULL): 1.9 MMHG
BH CV ECHO MEAS - PA MAX PG: 3.7 MMHG
BH CV ECHO MEAS - PA PR(ACCEL): 28.2 MMHG
BH CV ECHO MEAS - PA V2 MAX: 96.6 CM/SEC
BH CV ECHO MEAS - PULM A REVS DUR: 0.11 SEC
BH CV ECHO MEAS - PULM A REVS VEL: 24.7 CM/SEC
BH CV ECHO MEAS - PULM DIAS VEL: 60.8 CM/SEC
BH CV ECHO MEAS - PULM S/D: 1.1
BH CV ECHO MEAS - PULM SYS VEL: 67.2 CM/SEC
BH CV ECHO MEAS - PVA(V,A): 2.9 CM^2
BH CV ECHO MEAS - PVA(V,D): 2.9 CM^2
BH CV ECHO MEAS - QP/QS: 0.97
BH CV ECHO MEAS - RAP SYSTOLE: 3 MMHG
BH CV ECHO MEAS - RV MAX PG: 1.8 MMHG
BH CV ECHO MEAS - RV MEAN PG: 1 MMHG
BH CV ECHO MEAS - RV V1 MAX: 67.2 CM/SEC
BH CV ECHO MEAS - RV V1 MEAN: 40.2 CM/SEC
BH CV ECHO MEAS - RV V1 VTI: 15.5 CM
BH CV ECHO MEAS - RVOT AREA: 4.2 CM^2
BH CV ECHO MEAS - RVOT DIAM: 2.3 CM
BH CV ECHO MEAS - RVSP: 26.8 MMHG
BH CV ECHO MEAS - SI(AO): 85.7 ML/M^2
BH CV ECHO MEAS - SI(CUBED): 29.8 ML/M^2
BH CV ECHO MEAS - SI(LVOT): 30.8 ML/M^2
BH CV ECHO MEAS - SI(MOD-SP2): 18.1 ML/M^2
BH CV ECHO MEAS - SI(MOD-SP4): 22.8 ML/M^2
BH CV ECHO MEAS - SI(TEICH): 26.7 ML/M^2
BH CV ECHO MEAS - SV(AO): 184.2 ML
BH CV ECHO MEAS - SV(CUBED): 64.1 ML
BH CV ECHO MEAS - SV(LVOT): 66.3 ML
BH CV ECHO MEAS - SV(MOD-SP2): 39 ML
BH CV ECHO MEAS - SV(MOD-SP4): 49 ML
BH CV ECHO MEAS - SV(RVOT): 64.4 ML
BH CV ECHO MEAS - SV(TEICH): 57.4 ML
BH CV ECHO MEAS - TAPSE (>1.6): 2.3 CM2
BH CV ECHO MEAS - TR MAX VEL: 244 CM/SEC
BH CV ECHO MEASUREMENTS AVERAGE E/E' RATIO: 7.36
BH CV VAS BP RIGHT ARM: NORMAL MMHG
BH CV XLRA - RV BASE: 3.3 CM
BH CV XLRA - TDI S': 13 CM/SEC
CHOLEST SERPL-MCNC: 184 MG/DL (ref 0–200)
GLUCOSE BLDC GLUCOMTR-MCNC: 98 MG/DL (ref 70–130)
HBA1C MFR BLD: 5 % (ref 4.8–5.6)
HDLC SERPL-MCNC: 46 MG/DL (ref 40–60)
LDLC SERPL CALC-MCNC: 121 MG/DL (ref 0–100)
LDLC/HDLC SERPL: 2.62 {RATIO}
LEFT ATRIUM VOLUME INDEX: 11 ML/M2
LV EF 2D ECHO EST: 62 %
MAXIMAL PREDICTED HEART RATE: 170 BPM
STRESS TARGET HR: 145 BPM
TRIGL SERPL-MCNC: 87 MG/DL (ref 0–150)
VLDLC SERPL-MCNC: 17.4 MG/DL (ref 5–40)

## 2018-11-03 PROCEDURE — G0008 ADMIN INFLUENZA VIRUS VAC: HCPCS | Performed by: HOSPITALIST

## 2018-11-03 PROCEDURE — 93306 TTE W/DOPPLER COMPLETE: CPT | Performed by: INTERNAL MEDICINE

## 2018-11-03 PROCEDURE — 80061 LIPID PANEL: CPT | Performed by: PSYCHIATRY & NEUROLOGY

## 2018-11-03 PROCEDURE — G8997 SWALLOW GOAL STATUS: HCPCS

## 2018-11-03 PROCEDURE — 90661 CCIIV3 VAC ABX FR 0.5 ML IM: CPT | Performed by: HOSPITALIST

## 2018-11-03 PROCEDURE — G0378 HOSPITAL OBSERVATION PER HR: HCPCS

## 2018-11-03 PROCEDURE — 82962 GLUCOSE BLOOD TEST: CPT

## 2018-11-03 PROCEDURE — 92610 EVALUATE SWALLOWING FUNCTION: CPT

## 2018-11-03 PROCEDURE — G8998 SWALLOW D/C STATUS: HCPCS

## 2018-11-03 PROCEDURE — 83036 HEMOGLOBIN GLYCOSYLATED A1C: CPT | Performed by: PSYCHIATRY & NEUROLOGY

## 2018-11-03 PROCEDURE — G8996 SWALLOW CURRENT STATUS: HCPCS

## 2018-11-03 PROCEDURE — 25010000002 INFLUENZA VAC SUBUNIT QUAD 0.5 ML SUSPENSION PREFILLED SYRINGE: Performed by: HOSPITALIST

## 2018-11-03 PROCEDURE — 99243 OFF/OP CNSLTJ NEW/EST LOW 30: CPT | Performed by: INTERNAL MEDICINE

## 2018-11-03 PROCEDURE — 93306 TTE W/DOPPLER COMPLETE: CPT

## 2018-11-03 RX ORDER — BUDESONIDE AND FORMOTEROL FUMARATE DIHYDRATE 160; 4.5 UG/1; UG/1
2 AEROSOL RESPIRATORY (INHALATION)
Status: DISCONTINUED | OUTPATIENT
Start: 2018-11-03 | End: 2018-11-04 | Stop reason: HOSPADM

## 2018-11-03 RX ORDER — CETIRIZINE HYDROCHLORIDE 10 MG/1
10 TABLET ORAL DAILY
Status: DISCONTINUED | OUTPATIENT
Start: 2018-11-03 | End: 2018-11-04 | Stop reason: HOSPADM

## 2018-11-03 RX ORDER — BUDESONIDE 0.5 MG/2ML
0.25 INHALANT ORAL
Status: DISCONTINUED | OUTPATIENT
Start: 2018-11-03 | End: 2018-11-03

## 2018-11-03 RX ORDER — CITALOPRAM 40 MG/1
40 TABLET ORAL DAILY
Status: DISCONTINUED | OUTPATIENT
Start: 2018-11-03 | End: 2018-11-04 | Stop reason: HOSPADM

## 2018-11-03 RX ORDER — ZOLPIDEM TARTRATE 5 MG/1
5 TABLET ORAL NIGHTLY PRN
Status: DISCONTINUED | OUTPATIENT
Start: 2018-11-03 | End: 2018-11-04

## 2018-11-03 RX ORDER — PANTOPRAZOLE SODIUM 40 MG/1
40 TABLET, DELAYED RELEASE ORAL EVERY MORNING
Status: DISCONTINUED | OUTPATIENT
Start: 2018-11-04 | End: 2018-11-04 | Stop reason: HOSPADM

## 2018-11-03 RX ORDER — ARIPIPRAZOLE 5 MG/1
5 TABLET ORAL DAILY
Status: DISCONTINUED | OUTPATIENT
Start: 2018-11-03 | End: 2018-11-04 | Stop reason: HOSPADM

## 2018-11-03 RX ORDER — ATORVASTATIN CALCIUM 20 MG/1
40 TABLET, FILM COATED ORAL NIGHTLY
Status: DISCONTINUED | OUTPATIENT
Start: 2018-11-03 | End: 2018-11-04 | Stop reason: HOSPADM

## 2018-11-03 RX ORDER — BUDESONIDE AND FORMOTEROL FUMARATE DIHYDRATE 160; 4.5 UG/1; UG/1
2 AEROSOL RESPIRATORY (INHALATION)
Status: DISCONTINUED | OUTPATIENT
Start: 2018-11-03 | End: 2018-11-03

## 2018-11-03 RX ORDER — ASPIRIN 325 MG
325 TABLET ORAL ONCE
Status: DISCONTINUED | OUTPATIENT
Start: 2018-11-03 | End: 2018-11-03

## 2018-11-03 RX ORDER — MONTELUKAST SODIUM 10 MG/1
10 TABLET ORAL DAILY
Status: DISCONTINUED | OUTPATIENT
Start: 2018-11-03 | End: 2018-11-04 | Stop reason: HOSPADM

## 2018-11-03 RX ORDER — SODIUM CHLORIDE AND POTASSIUM CHLORIDE 150; 900 MG/100ML; MG/100ML
75 INJECTION, SOLUTION INTRAVENOUS CONTINUOUS
Status: DISCONTINUED | OUTPATIENT
Start: 2018-11-03 | End: 2018-11-03

## 2018-11-03 RX ORDER — DIPHENHYDRAMINE HYDROCHLORIDE 50 MG/ML
25 INJECTION INTRAMUSCULAR; INTRAVENOUS ONCE
Status: DISCONTINUED | OUTPATIENT
Start: 2018-11-03 | End: 2018-11-03

## 2018-11-03 RX ORDER — SODIUM CHLORIDE AND POTASSIUM CHLORIDE 150; 900 MG/100ML; MG/100ML
75 INJECTION, SOLUTION INTRAVENOUS CONTINUOUS
Status: DISCONTINUED | OUTPATIENT
Start: 2018-11-03 | End: 2018-11-04

## 2018-11-03 RX ORDER — ASPIRIN 81 MG/1
81 TABLET ORAL DAILY
Status: DISCONTINUED | OUTPATIENT
Start: 2018-11-03 | End: 2018-11-04

## 2018-11-03 RX ADMIN — ATORVASTATIN CALCIUM 40 MG: 20 TABLET, FILM COATED ORAL at 22:02

## 2018-11-03 RX ADMIN — TOPIRAMATE 25 MG: 25 TABLET, FILM COATED ORAL at 22:02

## 2018-11-03 RX ADMIN — INFLUENZA A VIRUS A/SINGAPORE/GP1908/2015 IVR-180 (H1N1) ANTIGEN (MDCK CELL DERIVED, PROPIOLACTONE INACTIVATED), INFLUENZA A VIRUS A/NORTH CAROLINA/04/2016 (H3N2) HEMAGGLUTININ ANTIGEN (MDCK CELL DERIVED, PROPIOLACTONE INACTIVATED), INFLUENZA B VIRUS B/IOWA/06/2017 HEMAGGLUTININ ANTIGEN (MDCK CELL DERIVED, PROPIOLACTONE INACTIVATED), INFLUENZA B VIRUS B/SINGAPORE/INFTT-16-0610/2016 HEMAGGLUTININ ANTIGEN (MDCK CELL DERIVED, PROPIOLACTONE INACTIVATED) 0.5 ML: 15; 15; 15; 15 INJECTION, SUSPENSION INTRAMUSCULAR at 16:55

## 2018-11-03 RX ADMIN — TOPIRAMATE 25 MG: 25 TABLET, FILM COATED ORAL at 08:53

## 2018-11-03 RX ADMIN — ARIPIPRAZOLE 5 MG: 5 TABLET ORAL at 22:02

## 2018-11-03 RX ADMIN — ASPIRIN 81 MG: 81 TABLET, DELAYED RELEASE ORAL at 08:53

## 2018-11-03 RX ADMIN — CITALOPRAM 40 MG: 40 TABLET, FILM COATED ORAL at 15:56

## 2018-11-03 RX ADMIN — MONTELUKAST SODIUM 10 MG: 10 TABLET, FILM COATED ORAL at 15:56

## 2018-11-03 RX ADMIN — CETIRIZINE HYDROCHLORIDE 10 MG: 10 TABLET, FILM COATED ORAL at 16:55

## 2018-11-03 RX ADMIN — Medication 3 ML: at 08:53

## 2018-11-03 NOTE — ED NOTES
"Nursing report ED to floor  Amirah Godoy  48 y.o.  female    HPI (triage note):   Chief Complaint   Patient presents with   • Vision Disturbance   • Headache       Admitting doctor:   Rolando Daniel MD    Admitting diagnosis:   The primary encounter diagnosis was Visual loss. Diagnoses of Acute nonintractable headache, unspecified headache type and Parotid mass were also pertinent to this visit.    Code status:   Current Code Status     Date Active Code Status Order ID Comments User Context       11/2/2018  8:03 PM CPR [id number]  Jelani Esparza MD ED       Questions for Current Code Status     Question Answer Comment    Code Status CPR     Medical Interventions (Level of Support Prior to Arrest) Full     Level Of Support Discussed With Patient           Allergies:   Demerol [meperidine] and Sulfa antibiotics    Weight:   1    11/02/18  1741   Weight: 99.7 kg (219 lb 12.8 oz)       Most recent vitals:   Vitals:    11/02/18 1831 11/02/18 1834 11/02/18 1847 11/02/18 2022   BP:  158/84  172/80   BP Location:  Left arm     Patient Position:  Lying     Pulse: 92 88  75   Resp: 18 20 18   Temp:       TempSrc:       SpO2: 96%   95%   Weight:       Height:   172.7 cm (68\")        Active LDAs/IV Access:   Lines, Drains & Airways    Active LDAs     Name:   Placement date:   Placement time:   Site:   Days:    Peripheral IV 11/02/18 1738 Right Antecubital  11/02/18    1738    Antecubital    less than 1    Peripheral IV 11/02/18 1755 Left Hand  11/02/18    1755    Hand    less than 1                Labs (abnormal labs have a star):   Labs Reviewed   CBC WITH AUTO DIFFERENTIAL - Abnormal; Notable for the following:        Result Value    RDW 13.2 (*)     All other components within normal limits   PROTIME-INR - Normal   APTT - Normal   TROPONIN (IN-HOUSE) - Normal    Narrative:     Troponin T Reference Ranges:  Less than 0.03 ng/mL:    Negative for AMI  0.03 to 0.09 ng/mL:      Indeterminant for AMI  Greater than 0.09 " ng/mL: Positive for AMI   TSH - Normal   POCT GLUCOSE FINGERSTICK - Normal    Narrative:     Meter: [id number] : [id number] Levar Strong RN   RAINBOW DRAW    Narrative:     The following orders were created for panel order [place] Draw.  Procedure                               Abnormality         Status                     ---------                               -----------         ------                     Light Blue Top[[id number]]                                   Final result               Green Top (Gel)[[id number]]                                  Final result               Lavender Top[[id number]]                                     Final result               Gold Top - SST[[id number]]                                   Final result                 Please view results for these tests on the individual orders.   COMPREHENSIVE METABOLIC PANEL   VITAMIN B12   POCT GLUCOSE FINGERSTICK   POCT GLUCOSE FINGERSTICK   POCT GLUCOSE FINGERSTICK   POCT GLUCOSE FINGERSTICK   POCT GLUCOSE FINGERSTICK   TYPE AND SCREEN   CBC AND DIFFERENTIAL    Narrative:     The following orders were created for panel order CBC & Differential.  Procedure                               Abnormality         Status                     ---------                               -----------         ------                     CBC Auto Differential[[id number]]        Abnormal            Final result                 Please view results for these tests on the individual orders.   LIGHT BLUE TOP   GREEN TOP   LAVENDER TOP   GOLD TOP - SST       EKG:   ECG 12 Lead             Meds given in ED:   Medications   sodium chloride 0.9 % flush 10 mL (not administered)   LORazepam (ATIVAN) injection 1 mg (1 mg Intravenous Not Given 11/2/18 1951)   sodium chloride 0.9 % flush 3 mL (not administered)   sodium chloride 0.9 % flush 3-10 mL (not administered)   atorvastatin (LIPITOR) tablet 80 mg (not administered)   aspirin tablet 325 mg (not administered)     Or    aspirin suppository 300 mg (not administered)   topiramate (TOPAMAX) tablet 25 mg (not administered)   iopamidol (ISOVUE-370) 76 % injection 150 mL (150 mL Intravenous Given 11/2/18 1804)   prochlorperazine (COMPAZINE) injection 10 mg (10 mg Intravenous Given 11/2/18 1837)   diphenhydrAMINE (BENADRYL) injection 25 mg (25 mg Intravenous Given 11/2/18 1843)   aspirin tablet 325 mg (325 mg Oral Given 11/2/18 1900)       Imaging results:  Ct Angiogram Head With & Without Contrast    Result Date: 11/2/2018  1. 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 The mass is incompletely characterized on this exam. 2. The remainder of the head CT is normal. The results were communicated to Dr. Esparza from Stroke Neurology 11/02/2018 at 5:48 while the patient was still on the table, and he requested a CT angiogram of the head and neck and CT perfusion study.  CT ANGIOGRAM HEAD AND NECK AND CT PERFUSION STUDY TECHNIQUE: Spiral CT images were obtained through the head during the arterial phase of contrast and images were reformatted and analyzed with RAPID software analysis per CT perfusion study and subsequently spiral CT images were obtained from the top of the aortic arch up through the great vessels of the head and neck during the arterial phase of contrast and images were reformatted and submitted in 1 mm thick axial, sagittal and coronal CT sections and 3D reconstructions were performed to complete the CT angiogram of the head and neck  CT PERFUSION STUDY: CT perfusion studies demonstrate no focal areas of reduced blood flow below 30% with no areas of acute completed infarction and no areas of delayed time to maximal enhancement beyond 6 seconds and thus no obvious hypoperfused or ischemic areas.  CT ANGIOGRAM HEAD AND NECK: The nasopharynx, oropharynx, hypopharynx, true cords, and  subglottic airway are normal in appearance.  The thyroid gland enhances homogeneously and is normal in appearance. The lung apices are clear. Reidentified is a mass in the superior aspect of the superficial lobe extending into the deep lobe of the right parotid gland that measures 2 x 1.8 cm in size, which is larger than it was on prior MRI the head from [facility] February 22, 2011 where it measured 1.7 x 1 cm in size and could be a slowly growing pleomorphic adenoma or low-grade right parotid malignancy.. Otherwise, the remainder of the parotid, , parapharyngeal and submandibular spaces are symmetric and are normal in appearance. The cervical spine is unremarkable. There is anatomic origin of the great vessels off the aortic arch.  The left subclavian artery origin is normal in appearance.  No stenosis is seen in the left subclavian artery.  The left vertebral artery origin is normal in appearance.  No stenosis is seen in the left vertebral artery from its origin to the vertebrobasilar junction. The left common carotid origin is normal in appearance.  No stenosis is seen in the left common carotid artery and its bifurcation into the left internal and external carotid arteries is normal in appearance.  No stenosis is seen in the left internal carotid artery using the NASCET criteria. The brachiocephalic artery origin is normal in appearance.  No stenosis in seen in the brachiocephalic artery.  Its bifurcation into the right subclavian and common carotid arteries is obscured by dense beam hardening artifact off the densely opacified right subclavian vein as is the proximal right subclavian artery and the right vertebral origin, and they are not assessed. Beyond its origin, the right vertebral artery is widely patent to the vertebrobasilar junction without stenosis. The right common carotid origin, proximal right common carotid artery is obscured again by dense beam hardening artifact.   The mid and distal right common carotid artery and bifurcation into the right internal and external carotid arteries is normal in appearance. No stenosis is seen in the right internal carotid artery using the NASCET criteria. CT angiogram images through the head demonstrate a normal appearance of the intracranial segments of the distal vertebral arteries as well as the posterior inferior cerebellar arteries and the basilar artery and the basilar tip.  There is a slightly hypoplastic P1 segment of the right posterior cerebral artery, which is a normal variation.  Both posterior cerebral and superior cerebellar arteries are normal in appearance.  The upper cervical, petrous, cavernous and supracavernous segments of the internal carotid arteries are normal in appearance.  The anterior and middle cerebral arteries are normal in appearance.  The anterior communicating artery origin and middle cerebral artery trifurcations are normal in appearance.  IMPRESSION: 1. There is a 2 x 1.8 cm mass extending from the superior aspect of the superficial lobe into the deep lobe of the right parotid gland, larger than on prior MRI of the head from [facility] on February 22, 2011 where it measured 1.7 x 1 cm in size. This felt to be a primary parotid tumor could be a slow-growing pleomorphic adenoma or low-grade parotid malignancy and ENT consultation is warranted. 2. The remainder of the CT angiogram of the head and neck is normal with no acute completed infarct and no intracranial hemorrhage identified and no stenosis seen in the great vessels of the head or neck, and the etiology of the left-sided myomatous hemianopsia and left-sided weakness is not established on this exam.  If symptoms persist, I recommend an MRI of the head for more complete assessment. The final dictated results were communicated to Dr. Esparza 11/02/2018 at 6:15 PM and Dr. Soriano from the emergency room 11/02/2018 at 6:20 PM.  Radiation dose  reduction techniques were utilized, including automated exposure control and exposure modulation based on body size.  This report was finalized on 11/2/2018 8:20 PM by Dr. Dejuan Walters M.D.      Ct Angiogram Neck With & Without Contrast    Result Date: 11/2/2018  1. 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 The mass is incompletely characterized on this exam. 2. The remainder of the head CT is normal. The results were communicated to Dr. Esparza from Stroke Neurology 11/02/2018 at 5:48 while the patient was still on the table, and he requested a CT angiogram of the head and neck and CT perfusion study.  CT ANGIOGRAM HEAD AND NECK AND CT PERFUSION STUDY TECHNIQUE: Spiral CT images were obtained through the head during the arterial phase of contrast and images were reformatted and analyzed with RAPID software analysis per CT perfusion study and subsequently spiral CT images were obtained from the top of the aortic arch up through the great vessels of the head and neck during the arterial phase of contrast and images were reformatted and submitted in 1 mm thick axial, sagittal and coronal CT sections and 3D reconstructions were performed to complete the CT angiogram of the head and neck  CT PERFUSION STUDY: CT perfusion studies demonstrate no focal areas of reduced blood flow below 30% with no areas of acute completed infarction and no areas of delayed time to maximal enhancement beyond 6 seconds and thus no obvious hypoperfused or ischemic areas.  CT ANGIOGRAM HEAD AND NECK: The nasopharynx, oropharynx, hypopharynx, true cords, and subglottic airway are normal in appearance.  The thyroid gland enhances homogeneously and is normal in appearance. The lung apices are clear. Reidentified is a mass in the superior aspect of the superficial lobe extending into the deep lobe of the  right parotid gland that measures 2 x 1.8 cm in size, which is larger than it was on prior MRI the head from [facility] February 22, 2011 where it measured 1.7 x 1 cm in size and could be a slowly growing pleomorphic adenoma or low-grade right parotid malignancy.. Otherwise, the remainder of the parotid, , parapharyngeal and submandibular spaces are symmetric and are normal in appearance. The cervical spine is unremarkable. There is anatomic origin of the great vessels off the aortic arch.  The left subclavian artery origin is normal in appearance.  No stenosis is seen in the left subclavian artery.  The left vertebral artery origin is normal in appearance.  No stenosis is seen in the left vertebral artery from its origin to the vertebrobasilar junction. The left common carotid origin is normal in appearance.  No stenosis is seen in the left common carotid artery and its bifurcation into the left internal and external carotid arteries is normal in appearance.  No stenosis is seen in the left internal carotid artery using the NASCET criteria. The brachiocephalic artery origin is normal in appearance.  No stenosis in seen in the brachiocephalic artery.  Its bifurcation into the right subclavian and common carotid arteries is obscured by dense beam hardening artifact off the densely opacified right subclavian vein as is the proximal right subclavian artery and the right vertebral origin, and they are not assessed. Beyond its origin, the right vertebral artery is widely patent to the vertebrobasilar junction without stenosis. The right common carotid origin, proximal right common carotid artery is obscured again by dense beam hardening artifact.  The mid and distal right common carotid artery and bifurcation into the right internal and external carotid arteries is normal in appearance. No stenosis is seen in the right internal carotid artery using the NASCET criteria. CT angiogram images  through the head demonstrate a normal appearance of the intracranial segments of the distal vertebral arteries as well as the posterior inferior cerebellar arteries and the basilar artery and the basilar tip.  There is a slightly hypoplastic P1 segment of the right posterior cerebral artery, which is a normal variation.  Both posterior cerebral and superior cerebellar arteries are normal in appearance.  The upper cervical, petrous, cavernous and supracavernous segments of the internal carotid arteries are normal in appearance.  The anterior and middle cerebral arteries are normal in appearance.  The anterior communicating artery origin and middle cerebral artery trifurcations are normal in appearance.  IMPRESSION: 1. There is a 2 x 1.8 cm mass extending from the superior aspect of the superficial lobe into the deep lobe of the right parotid gland, larger than on prior MRI of the head from [facility] on February 22, 2011 where it measured 1.7 x 1 cm in size. This felt to be a primary parotid tumor could be a slow-growing pleomorphic adenoma or low-grade parotid malignancy and ENT consultation is warranted. 2. The remainder of the CT angiogram of the head and neck is normal with no acute completed infarct and no intracranial hemorrhage identified and no stenosis seen in the great vessels of the head or neck, and the etiology of the left-sided myomatous hemianopsia and left-sided weakness is not established on this exam.  If symptoms persist, I recommend an MRI of the head for more complete assessment. The final dictated results were communicated to Dr. Esparza 11/02/2018 at 6:15 PM and Dr. Soriano from the emergency room 11/02/2018 at 6:20 PM.  Radiation dose reduction techniques were utilized, including automated exposure control and exposure modulation based on body size.  This report was finalized on 11/2/2018 8:20 PM by Dr. Dejuan Walters M.D.      Xr Chest 1 View    Result Date: 11/2/2018   1. No  active disease is seen in the chest.  This report was finalized on 11/2/2018 8:20 PM by Dr. Dejuan Walters M.D.      Ct Cerebral Perfusion With & Without Contrast    Result Date: 11/2/2018  1. 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 The mass is incompletely characterized on this exam. 2. The remainder of the head CT is normal. The results were communicated to Dr. Esparza from Stroke Neurology 11/02/2018 at 5:48 while the patient was still on the table, and he requested a CT angiogram of the head and neck and CT perfusion study.  CT ANGIOGRAM HEAD AND NECK AND CT PERFUSION STUDY TECHNIQUE: Spiral CT images were obtained through the head during the arterial phase of contrast and images were reformatted and analyzed with RAPID software analysis per CT perfusion study and subsequently spiral CT images were obtained from the top of the aortic arch up through the great vessels of the head and neck during the arterial phase of contrast and images were reformatted and submitted in 1 mm thick axial, sagittal and coronal CT sections and 3D reconstructions were performed to complete the CT angiogram of the head and neck  CT PERFUSION STUDY: CT perfusion studies demonstrate no focal areas of reduced blood flow below 30% with no areas of acute completed infarction and no areas of delayed time to maximal enhancement beyond 6 seconds and thus no obvious hypoperfused or ischemic areas.  CT ANGIOGRAM HEAD AND NECK: The nasopharynx, oropharynx, hypopharynx, true cords, and subglottic airway are normal in appearance.  The thyroid gland enhances homogeneously and is normal in appearance. The lung apices are clear. Reidentified is a mass in the superior aspect of the superficial lobe extending into the deep lobe of the right parotid gland that measures 2 x 1.8 cm in size, which is larger than it  was on prior MRI the head from [facility] February 22, 2011 where it measured 1.7 x 1 cm in size and could be a slowly growing pleomorphic adenoma or low-grade right parotid malignancy.. Otherwise, the remainder of the parotid, , parapharyngeal and submandibular spaces are symmetric and are normal in appearance. The cervical spine is unremarkable. There is anatomic origin of the great vessels off the aortic arch.  The left subclavian artery origin is normal in appearance.  No stenosis is seen in the left subclavian artery.  The left vertebral artery origin is normal in appearance.  No stenosis is seen in the left vertebral artery from its origin to the vertebrobasilar junction. The left common carotid origin is normal in appearance.  No stenosis is seen in the left common carotid artery and its bifurcation into the left internal and external carotid arteries is normal in appearance.  No stenosis is seen in the left internal carotid artery using the NASCET criteria. The brachiocephalic artery origin is normal in appearance.  No stenosis in seen in the brachiocephalic artery.  Its bifurcation into the right subclavian and common carotid arteries is obscured by dense beam hardening artifact off the densely opacified right subclavian vein as is the proximal right subclavian artery and the right vertebral origin, and they are not assessed. Beyond its origin, the right vertebral artery is widely patent to the vertebrobasilar junction without stenosis. The right common carotid origin, proximal right common carotid artery is obscured again by dense beam hardening artifact.  The mid and distal right common carotid artery and bifurcation into the right internal and external carotid arteries is normal in appearance. No stenosis is seen in the right internal carotid artery using the NASCET criteria. CT angiogram images through the head demonstrate a normal appearance of the intracranial segments of  the distal vertebral arteries as well as the posterior inferior cerebellar arteries and the basilar artery and the basilar tip.  There is a slightly hypoplastic P1 segment of the right posterior cerebral artery, which is a normal variation.  Both posterior cerebral and superior cerebellar arteries are normal in appearance.  The upper cervical, petrous, cavernous and supracavernous segments of the internal carotid arteries are normal in appearance.  The anterior and middle cerebral arteries are normal in appearance.  The anterior communicating artery origin and middle cerebral artery trifurcations are normal in appearance.  IMPRESSION: 1. There is a 2 x 1.8 cm mass extending from the superior aspect of the superficial lobe into the deep lobe of the right parotid gland, larger than on prior MRI of the head from [facility] on February 22, 2011 where it measured 1.7 x 1 cm in size. This felt to be a primary parotid tumor could be a slow-growing pleomorphic adenoma or low-grade parotid malignancy and ENT consultation is warranted. 2. The remainder of the CT angiogram of the head and neck is normal with no acute completed infarct and no intracranial hemorrhage identified and no stenosis seen in the great vessels of the head or neck, and the etiology of the left-sided myomatous hemianopsia and left-sided weakness is not established on this exam.  If symptoms persist, I recommend an MRI of the head for more complete assessment. The final dictated results were communicated to Dr. Esparza 11/02/2018 at 6:15 PM and Dr. Soriano from the emergency room 11/02/2018 at 6:20 PM.  Radiation dose reduction techniques were utilized, including automated exposure control and exposure modulation based on body size.  This report was finalized on 11/2/2018 8:20 PM by Dr. Dejuan Walters M.D.        Ambulatory status:   Up with assist    Social issues:   Social History     Social History   • Marital status:      Spouse  name: N/A   • Number of children: N/A   • Years of education: N/A     Occupational History   • Not on file.     Social History Main Topics   • Smoking status: Never Smoker   • Smokeless tobacco: Never Used   • Alcohol use No   • Drug use: No   • Sexual activity: Defer     Other Topics Concern   • Not on file     Social History Narrative   • No narrative on file          Dayan Green RN  11/02/18 [id number]

## 2018-11-03 NOTE — PLAN OF CARE
Patient examined by the bedside.     improved.  No focal deficits.     assessment plan:    1.  Complicated migraine:  -  Continue Topamax 25 mg at bedtime and increase it to 50 mg at bedtime in 1 week.  -  Fioricet as needed for pain  -  Continue aspirin 81 mg and statins for prophylaxis     we will sign off.

## 2018-11-03 NOTE — SIGNIFICANT NOTE
11/03/18 1621   Rehab Time/Intention   Evaluation Not Performed patient/family declined evaluation  (pt decline PT evaluation stating she is not having any mobility issues and feels safe getting up and moving around.)   Rehab Treatment   Discipline physical therapist

## 2018-11-03 NOTE — PLAN OF CARE
Problem: Patient Care Overview  Goal: Plan of Care Review  Outcome: Ongoing (interventions implemented as appropriate)   11/03/18 1044 11/03/18 1728   Coping/Psychosocial   Plan of Care Reviewed With patient --    OTHER   Outcome Summary --  NIH-0. Linq placed in am. NPO past midnight. CTM   Plan of Care Review   Progress --  no change     Goal: Individualization and Mutuality  Outcome: Ongoing (interventions implemented as appropriate)    Goal: Discharge Needs Assessment  Outcome: Ongoing (interventions implemented as appropriate)    Goal: Interprofessional Rounds/Family Conf  Outcome: Ongoing (interventions implemented as appropriate)      Problem: Pain, Acute (Adult)  Goal: Acceptable Pain Control/Comfort Level  Outcome: Ongoing (interventions implemented as appropriate)      Problem: Stroke (Ischemic) (Adult)  Goal: Signs and Symptoms of Listed Potential Problems Will be Absent, Minimized or Managed (Stroke)  Outcome: Outcome(s) achieved Date Met: 11/03/18

## 2018-11-03 NOTE — CONSULTS
[facility]  Cardiology Consult Note    Patient Identification:  Name: Amirah Godoy  Age: 48 y.o.  Sex: female  :  1970  MRN: [id number]             Requesting Physician: DR. HALL    Reason for Consultation / Chief Complaint: management recommendations  paroxysmal atrial fibrillation    History of Present Illness:   48 years old female with a history of atrial septal defect, with the repair many years ago with a history of paroxysmal atrial fibrillation during that time underwent diagnostic heart catheter yesterday, patient came back to the emergency room with neurological symptoms    Comorbid cardiac risk factors:     Past Medical History:  Past Medical History:   Diagnosis Date   • Allergic    • Asthma    • Depression    • History of atrial fibrillation     NO CURRENT MEDICATION   • Hyperlipidemia    • PVC (premature ventricular contraction)    • Reflux esophagitis    • Seasonal allergies    • Sinus infection     STARTED ON ANTIBIOTICS ON 10/30/17   • SVT (supraventricular tachycardia) (CMS/[facility])     NO CURRENT MEDICATIONS     Past Surgical History:  Past Surgical History:   Procedure Laterality Date   • ATRIAL SEPTAL DEFECT REPAIR     • BRONCHOSCOPY N/A 2018    Procedure: BRONCHOSCOPY;  Surgeon: Scott Dobson MD;  Location: [facility] ENDOSCOPY;  Service:    • CARDIAC ABLATION      DR. NADEEM SCHUMACHER --   •  SECTION  2000   • DIAGNOSTIC LAPAROSCOPY Right 11/3/2017    Procedure: LAPAROSCOPIC RIGHT OOPHERECTOMY ;  Surgeon: Claudine Barron MD;  Location: [facility] OR;  Service:    • OVARIAN CYST REMOVAL  2017   • WISDOM TOOTH EXTRACTION        Allergies:  Allergies   Allergen Reactions   • Demerol [Meperidine] Hives   • Sulfa Antibiotics GI Intolerance     Home Meds:  Prescriptions Prior to Admission   Medication Sig Dispense Refill Last Dose   • cetirizine (ZyrTEC) 10 MG tablet Take 10 mg by mouth daily.   2018 at Unknown time   • citalopram (CeleXA)  20 MG tablet Take 40 mg by mouth Daily.   11/2/2018 at Unknown time   • Fluticasone-Umeclidin-Vilant (TRELEGY ELLIPTA IN) Inhale 1 inhaler Every Morning.   11/2/2018 at Unknown time   • MINASTRIN 24 FE 1-20 MG-MCG(24) chewable tablet Chew 1 tablet Every Night.   11/1/2018 at Unknown time   • mometasone (ASMANEX TWISTHALER) inhaler 110 mcg/inhalation Inhale 2 puffs Daily.   11/1/2018 at Unknown time   • montelukast (SINGULAIR) 10 MG tablet TAKE ONE TABLET BY MOUTH DAILY 30 tablet 4 11/2/2018 at Unknown time   • nitroglycerin (NITROSTAT) 0.4 MG SL tablet 1 under the tongue as needed for angina, may repeat q5mins for up three doses 100 tablet 11 NEVER USED   • omeprazole (priLOSEC) 40 MG capsule Take 40 mg by mouth Daily.   10/28/2018   • rosuvastatin (CRESTOR) 10 MG tablet Take 1 tablet by mouth Daily. 30 tablet 11 11/1/2018 at Unknown time   • zolpidem (AMBIEN) 5 MG tablet Take 5 mg by mouth At Night As Needed for Sleep.   HAVE NOT STARTED YET   • ARIPiprazole (ABILIFY) 5 MG tablet Take 5 mg by mouth Daily.   HAVE NOT STARTED YET     Current Meds:   [unfilled]  Social History:   Social History   Substance Use Topics   • Smoking status: Never Smoker   • Smokeless tobacco: Never Used   • Alcohol use No      Family History:  Family History   Problem Relation Age of Onset   • Depression Mother    • Diabetes Mother    • Heart disease Mother         +of mi at 68   • Hyperlipidemia Mother    • Hypertension Mother    • Heart disease Father         ptca x 5 and mi at 59   • Hyperlipidemia Father    • Hypertension Father    • Kidney disease Brother    • Birth defects Maternal Grandmother    • Heart disease Maternal Grandmother         multiple mi   • Birth defects Maternal Grandfather    • Heart disease Maternal Grandfather         + mi at 68   • Colon cancer Maternal Grandfather    • Heart disease Paternal Grandmother         weak heart   • Asthma Paternal Grandmother    • COPD Paternal Grandmother    • Kidney failure Paternal  Grandfather    • Malig Hyperthermia Neg Hx         Review of Systems    Constitutional: No weakness,fatigue, fever, rigors, chills   Eyes: No vision changes, eye pain   ENT/oropharynx: No difficulty swallowing, sore throat, epistaxis, changes in hearing   Cardiovascular: No chest pain, chest tightness, palpitations, paroxysmal nocturnal dyspnea, orthopnea, diaphoresis, dizziness / syncopal episode   Respiratory: No shortness of breath, dyspnea on exertion, cough, wheezing hemoptysis   Gastrointestinal: No abdominal pain, nausea, vomiting, diarrhea, bloody stools   Genitourinary: No hematuria, dysuria   Neurological: No headache, tremors, numbness,  one-sided weakness    Musculoskeletal: No cramps, myalgias,  joint pain, joint swelling   Integument: No rash, edema           Constitutional:  Temp:  [97.8 °F (36.6 °C)-98.9 °F (37.2 °C)] 97.8 °F (36.6 °C)  Heart Rate:  [72-92] 80  Resp:  [12-20] 16  BP: (119-172)/(60-96) 139/66    Physical Exam   General:  Appears in no acute distress  Eyes: PERTL,  HEENT:  No JVD. Thyroid not visibly enlarged. No mucosal pallor or cyanosis  Respiratory: Respirations regular and unlabored at rest. BBS with good air entry in all fields. No crackles, rubs or wheezes auscultated  Cardiovascular: S1S2 Regular rate and rhythm. No murmur, rub or gallop auscultated. No carotid bruits. DP/PT pulses    . No pretibial pitting edema  Gastrointestinal: Abdomen soft, flat, non tender. Bowel sounds present. No hepatosplenomegaly. No ascites  Musculoskeletal: DICKERSON x4. No abnormal movements  Extremities: No digital clubbing or cyanosis  Skin: Color pink. Skin warm and dry to touch. No rashes  No xanthoma  Neuro: AAO x3 CN II-XII grossly intact              Cardiographics  ECG:     Telemetry:    Echocardiogram:     Imaging  Chest X-ray:     Lab Review     Results from last 7 days  Lab Units 11/02/18  1746   TROPONIN T ng/mL <0.010           Results from last 7 days  Lab Units 11/02/18  1746   SODIUM  mmol/L 138   POTASSIUM mmol/L 3.7   BUN mg/dL 10   CREATININE mg/dL 0.80   CALCIUM mg/dL 9.4     @[id number]@    Results from last 7 days  Lab Units 11/02/18  1747 10/31/18  1300   WBC 10*3/mm3 9.79 7.90   HEMOGLOBIN g/dL 14.5 13.5   HEMATOCRIT % 42.6 42.7   PLATELETS 10*3/mm3 373 352       Results from last 7 days  Lab Units 11/02/18  1746 10/31/18  1300   INR  1.04 1.05   APTT seconds 27.0 28.4         Assessment:  1. Precordial pain    Was performed yesterday which was normal          2. Tachycardia  Treat medically     3. Paroxysmal atrial fibrillation (CMS/HCC)  : Watch carefully     4. Palpitations        5. Essential hypertension  Control     6. Hyperlipidemia, unspecified hyperlipidemia type  Counseling given     7. Status post device closure of ASD       Constricting there is a question of atrial fibrillation as well as questionable migraine as well as question of anticoagulation had a long discussion with the patient as well as family pros and cons were discussed, it was decided to proceed with the Linq implantation      Niles Koch MD  11/3/2018, 10:59 AM      EMR Dragon/Transcription:   Dictated utilizing Dragon dictation

## 2018-11-03 NOTE — H&P (VIEW-ONLY)
Subjective:        Amirah Godoy is a 48 y.o. female who here for follow up    CC  CRUSHING PAIN, RADIATING SIDES, BACK WITH SOB  AT REST  HPI  48 years old female with a known history of paroxysmal atrial fibrillation with a history of ASD repair    Patient was recently seen in the emergency room because of the crushing pain radiating to the sides as well as to the back associated with shortness of breath    Patient's workup has been negative     Problem List Items Addressed This Visit        Cardiovascular and Mediastinum    Tachycardia    Palpitations    Essential hypertension    Paroxysmal atrial fibrillation (CMS/HCC)    Relevant Medications    nitroglycerin (NITROSTAT) 0.4 MG SL tablet    Hyperlipidemia       Nervous and Auditory    Chest pain - Primary    Relevant Orders    Case Request Cath Lab: Left Heart Cath (Completed)    CBC & Differential (Completed)    Basic Metabolic Panel (Completed)    aPTT (Completed)    Protime-INR (Completed)    CBC Auto Differential (Completed)       Other    Status post device closure of ASD        .    The following portions of the patient's history were reviewed and updated as appropriate: allergies, current medications, past family history, past medical history, past social history, past surgical history and problem list.    Past Medical History:   Diagnosis Date   • Allergic    • Asthma    • Depression    • History of atrial fibrillation 2011    NO CURRENT MEDICATION   • PVC (premature ventricular contraction) 2015   • Seasonal allergies    • Sinus infection     STARTED ON ANTIBIOTICS ON 10/30/17   • SVT (supraventricular tachycardia) (CMS/HCC) 2011    NO CURRENT MEDICATIONS     reports that she has never smoked. She has never used smokeless tobacco. She reports that she does not drink alcohol or use drugs.   Family History   Problem Relation Age of Onset   • Depression Mother    • Diabetes Mother    • Heart disease Mother         +of mi at 68   • Hyperlipidemia Mother   "  • Hypertension Mother    • Heart disease Father         ptca x 5 and mi at 59   • Hyperlipidemia Father    • Hypertension Father    • Kidney disease Brother    • Birth defects Maternal Grandmother    • Heart disease Maternal Grandmother         multiple mi   • Birth defects Maternal Grandfather    • Heart disease Maternal Grandfather         + mi at 68   • Colon cancer Maternal Grandfather    • Heart disease Paternal Grandmother         weak heart   • Asthma Paternal Grandmother    • COPD Paternal Grandmother    • Kidney failure Paternal Grandfather    • Malig Hyperthermia Neg Hx        Review of Systems  Constitutional: No wt loss, fever, fatigue  Gastrointestinal: No nausea, abdominal pain  Behavioral/Psych: No insomnia or anxiety   Cardiovascular chest pain  Objective:                 Physical Exam  /83   Pulse 72   Ht 172.7 cm (68\")   Wt 98.9 kg (218 lb)   BMI 33.15 kg/m²     General appearance: NAD, conversant   Eyes: anicteric sclerae, moist conjunctivae; no lid-lag; PERRLA   HENT: Atraumatic; oropharynx clear with moist mucous membranes and no mucosal ulcerations;  normal hard and soft palate   Neck: Trachea midline; FROM, supple, no thyromegaly or lymphadenopathy   Lungs: CTA, with normal respiratory effort and no intercostal retractions   CV: S1-S2 regular, no murmurs, no rub, no gallop   Abdomen: Soft, non-tender; no masses or HSM   Extremities: No peripheral edema or extremity lymphadenopathy  Skin: Normal temperature, turgor and texture; no rash, ulcers or subcutaneous nodules   Psych: Appropriate affect, alert and oriented to person, place and time           Cardiographics  @  ECG 12 Lead  Date/Time: 10/24/2018 12:33 PM  Performed by: RAYMOND SÁNCHEZ  Authorized by: RAYMOND SÁNCHEZ   Comparison: compared with previous ECG   Similar to previous ECG  Rhythm: sinus rhythm  ST Flattening: all  Clinical impression: non-specific ECG            Echocardiogram:        Current Outpatient " Prescriptions:   •  cetirizine (ZyrTEC) 10 MG tablet, Take 10 mg by mouth daily., Disp: , Rfl:   •  citalopram (CeleXA) 20 MG tablet, Take 40 mg by mouth Daily., Disp: , Rfl:   •  Fluticasone-Umeclidin-Vilant (TRELEGY ELLIPTA IN), Inhale., Disp: , Rfl:   •  MINASTRIN 24 FE 1-20 MG-MCG(24) chewable tablet, Chew 1 tablet Every Night., Disp: , Rfl:   •  mometasone (ASMANEX TWISTHALER) inhaler 110 mcg/inhalation, Inhale 2 puffs Daily., Disp: , Rfl:   •  montelukast (SINGULAIR) 10 MG tablet, TAKE ONE TABLET BY MOUTH DAILY, Disp: 30 tablet, Rfl: 4  •  omeprazole (priLOSEC) 40 MG capsule, Take 40 mg by mouth Daily., Disp: , Rfl:   •  rosuvastatin (CRESTOR) 10 MG tablet, Take 1 tablet by mouth Daily., Disp: 30 tablet, Rfl: 11  •  zolpidem (AMBIEN) 5 MG tablet, Take 5 mg by mouth At Night As Needed for Sleep., Disp: , Rfl:   •  ARIPiprazole (ABILIFY) 5 MG tablet, Take 5 mg by mouth Daily., Disp: , Rfl:    Assessment:        Patient Active Problem List   Diagnosis   • Chest pain   • Reactive depression   • Seasonal allergic rhinitis due to pollen   • Right ovarian cyst   • History of atrial fibrillation   • Asthma with exacerbation   • Cough   • Weakness   • Tachycardia   • Influenza A   • Palpitations   • Swelling   • Essential hypertension   • Paroxysmal atrial fibrillation (CMS/HCC)   • Hyperlipidemia               Plan:            ICD-10-CM ICD-9-CM   1. Precordial pain R07.2 786.51   2. Tachycardia R00.0 785.0   3. Paroxysmal atrial fibrillation (CMS/HCC) I48.0 427.31   4. Palpitations R00.2 785.1   5. Essential hypertension I10 401.9   6. Hyperlipidemia, unspecified hyperlipidemia type E78.5 272.4   7. Status post device closure of ASD Z87.74 V13.65     1. Precordial pain    - Case Request Cath Lab: Left Heart Cath  - CBC & Differential  - Basic Metabolic Panel  - aPTT  - Protime-INR  - CBC Auto Differential    2. Tachycardia  Treat medically    3. Paroxysmal atrial fibrillation (CMS/HCC)  : Watch carefully    4.  Palpitations      5. Essential hypertension  Control    6. Hyperlipidemia, unspecified hyperlipidemia type  Counseling given    7. Status post device closure of ASD       Procedure, risks and options of cardiac cath explained to pt INCLUDING BUT NOT LIMITED TO MI, STROKE, DEATH, INFECTION HAEMORRHAGE, . Pt understands well and agrees with no further questions.    Prescriptions of the nitroglycerin and associated instructions has been given  Patient has been advised to use sublingual nitroglycerin for chest pain or equivalent symptoms, maximum of 3 with the 10 minutes interval.  If the symptoms persist patient has been advised to go to emergency room  Due to the risk of the hypotension patient has been advised to take the sublingual nitroglycerin while the patient in sitting position    COUNSELING:    Amirah Kevin was given to patient for the following topics: diagnostic results, risk factor reductions, impressions, risks and benefits of treatment options and importance of treatment compliance .       SMOKING COUNSELING:    Counseling given: Not Answered      EMR Dragon/Transcription disclaimer:   Much of this encounter note is an electronic transcription/translation of spoken language to printed text. The electronic translation of spoken language may permit erroneous, or at times, nonsensical words or phrases to be inadvertently transcribed; Although I have reviewed the note for such errors, some may still exist.

## 2018-11-03 NOTE — PLAN OF CARE
Problem: Patient Care Overview  Goal: Plan of Care Review   11/03/18 1044   Coping/Psychosocial   Plan of Care Reviewed With patient   OTHER   Outcome Summary Bedside swallow eval completed. Recommend continue with current diet. Deferred a ffull speech eval as MRI CT negative for infarct and pt reports she is at    11/03/18 1044   Coping/Psychosocial   Plan of Care Reviewed With patient   OTHER   Outcome Summary Bedside swallow eval completed. Recommend continue with current diet. Deferred a ffull speech eval as MRI CT negative for infarct and pt reports she is at baseline.   baseline.

## 2018-11-03 NOTE — THERAPY EVALUATION
Acute Care - Speech Language Pathology   Swallow Initial Evaluation [facility]     Patient Name: Amirah Godoy  : 1970  MRN: [id number]  Today's Date: 11/3/2018               Admit Date: 2018    Visit Dx:     ICD-10-CM ICD-9-CM   1. Visual loss H54.7 369.9   2. Acute nonintractable headache, unspecified headache type R51 784.0   3. Parotid mass K11.9 784.2     Patient Active Problem List   Diagnosis   • Chest pain   • Reactive depression   • Seasonal allergic rhinitis due to pollen   • Right ovarian cyst   • History of atrial fibrillation   • Asthma with exacerbation   • Cough   • Weakness   • Tachycardia   • Influenza A   • Palpitations   • Swelling   • Essential hypertension   • Paroxysmal atrial fibrillation (CMS/HCC)   • Hyperlipidemia   • Precordial pain   • Visual loss     Past Medical History:   Diagnosis Date   • Allergic    • Asthma    • Depression    • History of atrial fibrillation     NO CURRENT MEDICATION   • Hyperlipidemia    • PVC (premature ventricular contraction)    • Reflux esophagitis    • Seasonal allergies    • Sinus infection     STARTED ON ANTIBIOTICS ON 10/30/17   • SVT (supraventricular tachycardia) (CMS/HCC)     NO CURRENT MEDICATIONS     Past Surgical History:   Procedure Laterality Date   • ATRIAL SEPTAL DEFECT REPAIR     • BRONCHOSCOPY N/A 2018    Procedure: BRONCHOSCOPY;  Surgeon: Scott Dobson MD;  Location: [facility] ENDOSCOPY;  Service:    • CARDIAC ABLATION      DR. NADEEM SCHUMACHER --   •  SECTION  2000   • DIAGNOSTIC LAPAROSCOPY Right 11/3/2017    Procedure: LAPAROSCOPIC RIGHT OOPHERECTOMY ;  Surgeon: Claudine Barron MD;  Location: [facility] OR;  Service:    • OVARIAN CYST REMOVAL  2017   • WISDOM TOOTH EXTRACTION            SWALLOW EVALUATION (last 72 hours)      SLP Adult Swallow Evaluation     Row Name [id number]                   Rehab Evaluation    Document Type evaluation  -NR        Subjective Information no complaints   -NR        Patient Observations alert;cooperative;agree to therapy  -NR        Patient Effort excellent  -NR        Symptoms Noted During/After Treatment none  -NR           General Information    Patient Profile Reviewed yes  -NR        Prior Level of Function-Communication WFL  -NR        Prior Level of Function-Swallowing safe, efficient swallowing in all situations  -NR        Barriers to Rehab none identified  -NR        Patient's Goals for Discharge return to all previous roles/activities  -NR           Pain Assessment    Additional Documentation Pain Scale: Numbers Pre/Post-Treatment (Group)  -NR           Pain Scale: Numbers Pre/Post-Treatment    Pain Scale: Numbers, Pretreatment 0/10 - no pain  -NR        Pain Scale: Numbers, Post-Treatment 0/10 - no pain  -NR           Oral Motor and Function    Dentition Assessment natural, present and adequate  -NR        Secretion Management WNL/WFL  -NR        Volitional Swallow WFL  -NR           Oral Musculature and Cranial Nerve Assessment    Oral Motor General Assessment WFL  -NR           General Eating/Swallowing Observations    Eating/Swallowing Skills self-fed  -NR        Positioning During Eating upright 90 degree;upright in bed  -NR        Utensils Used spoon;cup;straw  -NR        Consistencies Trialed regular textures;chopped;pureed;thin liquids  -NR           Clinical Swallow Eval    Oral Prep Phase WFL  -NR        Oral Transit WFL  -NR        Oral Residue WFL  -NR        Pharyngeal Phase WFL  -NR        Clinical Swallow Evaluation Summary Pt reports migranes contributed to neurologcial symptoms.  Pt reports she is back at baseline.  Pt denies any current neurological deficits.  Will defer a full speech language cognitive evaluation.  The pt also denied dysphagia.  The oral phase was noted for no oral residuals and is WFL.  The pharyngeal swallow reflex appears timely upon palpation with adeuqate laryngeal elevation.  No coughing, choking, throat clearing  or wet vocal quality noted.  Recommend continue with current diet.  Will follow for diet tolerance and to determine need for possible VFSS.  -NR           Clinical Impression    SLP Swallowing Diagnosis functional oral phase;functional pharyngeal phase  -NR        Functional Impact no impact on function  -NR        Swallow Criteria for Skilled Therapeutic Interventions Met no problems identified which require skilled intervention;baseline status  -NR           Recommendations    Therapy Frequency (Swallow) PRN  -NR        SLP Diet Recommendation regular textures;thin liquids  -NR        Recommended Precautions and Strategies upright posture during/after eating;small bites of food and sips of liquid  -NR        SLP Rec. for Method of Medication Administration as tolerated  -NR           Swallow Goals (SLP)    Oral Nutrition/Hydration Goal Selection (SLP) oral nutrition/hydration, SLP goal 1;oral nutrition/hydration, SLP goal 2  -NR           Oral Nutrition/Hydration Goal 1 (SLP)    Oral Nutrition/Hydration Goal 1, SLP --   David diet without overt or clinical s/s of aspiraiton  -NR        Time Frame (Oral Nutrition/Hydration Goal 1, SLP) by discharge  -NR           Oral Nutrition/Hydration Goal 2 (SLP)    Oral Nutrition/Hydration Goal 2, SLP --   Monitor to determine need for possible VFSS  -NR        Time Frame (Oral Nutrition/Hydration Goal 2, SLP) by discharge  -NR          User Key  (r) = Recorded By, (t) = Taken By, (c) = Cosigned By    Initials Name Effective Dates    NR Chacha Reyes MA,[facility]-SLP 06/08/18 -         EDUCATION  The patient has been educated in the following areas:   Dysphagia (Swallowing Impairment).    SLP Recommendation and Plan  SLP Swallowing Diagnosis: functional oral phase, functional pharyngeal phase  SLP Diet Recommendation: regular textures, thin liquids  Recommended Precautions and Strategies: upright posture during/after eating, small bites of food and sips of liquid           Swallow  Criteria for Skilled Therapeutic Interventions Met: no problems identified which require skilled intervention, baseline status        Therapy Frequency (Swallow): PRN          Plan of Care Reviewed With: (P) patient  Plan of Care Review  Plan of Care Reviewed With: (P) patient  Outcome Summary: (P) Bedside swallow eval completed.  Recommend continue with current diet.  Deferred a ffull speech eval as MRI CT negative for infarct and pt reports she is at baseline.          SLP GOALS     Row Name 11/03/18 0981             Oral Nutrition/Hydration Goal 1 (SLP)    Oral Nutrition/Hydration Goal 1, SLP --   David diet without overt or clinical s/s of aspiraiton  -NR      Time Frame (Oral Nutrition/Hydration Goal 1, SLP) by discharge  -NR         Oral Nutrition/Hydration Goal 2 (SLP)    Oral Nutrition/Hydration Goal 2, SLP --   Monitor to determine need for possible VFSS  -NR      Time Frame (Oral Nutrition/Hydration Goal 2, SLP) by discharge  -NR        User Key  (r) = Recorded By, (t) = Taken By, (c) = Cosigned By    Initials Name Provider Type    NR Chacha Reyes MA,[facility]-SLP Speech and Language Pathologist             SLP Outcome Measures (last 72 hours)      SLP Outcome Measures     Row Name 11/03/18 0930             SLP Outcome Measures    Outcome Measure Used? Adult NOMS  -NR         Adult FCM Scores    FCM Chosen Swallowing  -NR      Swallowing FCM Score 7  -NR        User Key  (r) = Recorded By, (t) = Taken By, (c) = Cosigned By    Initials Name Effective Dates    NR Chacha Reyes MA,[facility]-SLP 06/08/18 -            Time Calculation:         Time Calculation- SLP     Row Name 11/03/18 1046             Time Calculation- SLP    SLP Start Time 0900  -NR      SLP Stop Time 0930  -NR      SLP Time Calculation (min) 30 min  -NR      SLP Received On 11/03/18  -NR        User Key  (r) = Recorded By, (t) = Taken By, (c) = Cosigned By    Initials Name Provider Type    NR Chacha Reyes MA,CCC-SLP Speech and Language Pathologist           Therapy Charges for Today     Code Description Service Date Service Provider Modifiers Qty    31021678629 HC ST SWALLOWING CURRENT STATUS 11/3/2018 Chacha Reyes MA,CCC-SLP GN,  1    46150002318 HC ST SWALLOWING PROJECTED 11/3/2018 Chacha Reyes MA,CCC-SLP GN,  1    38451710043 HC ST SWALLOWING DISCHARGE 11/3/2018 Chacha Reyes MA,CCC-SLP GN,  1    45529935253 HC ST EVAL ORAL PHARYNG SWALLOW 2 11/3/2018 Chacha Reyes MA,CCC-SLP GN 1          SLP G-Codes  SLP NOMS Used?: Yes  Functional Limitations: Swallowing  Swallow Current Status (): 0 percent impaired, limited or restricted  Swallow Goal Status (): 0 percent impaired, limited or restricted  Swallow Discharge Status (): 0 percent impaired, limited or restricted    Chacha Reyes MA,CCC-SLP  11/3/2018

## 2018-11-03 NOTE — SIGNIFICANT NOTE
11/03/18 1237   Rehab Time/Intention   Evaluation Not Performed other (see comments)  (talked w. pt who states she is getting up on her own now, no OT needs. strength is good, UE coordination is good. admitted w. migraines. no stroke. OT signing off. 1049)   Rehab Treatment   Discipline occupational therapist

## 2018-11-03 NOTE — PLAN OF CARE
Problem: Patient Care Overview  Goal: Plan of Care Review  Outcome: Ongoing (interventions implemented as appropriate)   11/03/18 1757   Coping/Psychosocial   Plan of Care Reviewed With patient;family   OTHER   Outcome Summary Patient newly admitted c/o visual loss and w/u for stroke. MRI and CT negative for infarct. NIH 0. Patient thought to have migraine and has been started on Topamax. Echo to be completed. Labs for the morning. Will continue to monitor.        Problem: Pain, Acute (Adult)  Goal: Identify Related Risk Factors and Signs and Symptoms  Outcome: Outcome(s) achieved Date Met: 11/03/18    Goal: Acceptable Pain Control/Comfort Level  Outcome: Ongoing (interventions implemented as appropriate)      Problem: Stroke (Ischemic) (Adult)  Goal: Signs and Symptoms of Listed Potential Problems Will be Absent, Minimized or Managed (Stroke)  Outcome: Ongoing (interventions implemented as appropriate)

## 2018-11-03 NOTE — H&P
History and physical    Primary care physician  Dr. WONG    Chief complaint  Headache  Left eye vision loss transient    History of present illness  48-year-old white female with history of asthma hypertension hyperlipidemia anxiety disorder depression gastroesophageal reflux disease who was discharged home yesterday after cardiac catheterization which was essentially unremarkable presented back to emergency room with sudden onset of severe headache followed by left eye vision loss and facial pain.  Patient denies any nausea focal weakness numbness or tingling in her headache and vision resolved by the time she ended up in ER.  Patient evaluated in ER is extensive workup no TPA given but admitted for possible TIA after cardiac catheterization.  At the time of interview she is back to baseline no complaint whatsoever but doesn't feel comfortable going home.    PAST MEDICAL HISTORY  • Allergic     • Asthma     • Depression     • History of atrial fibrillation    • Hyperlipidemia     • PVC (premature ventricular contraction)    • Reflux esophagitis     • Seasonal allergies     • Sinus infection     • SVT (supraventricular tachycardia) (CMS/[facility])       PAST SURGICAL HISTORY  Surgical History         Past Surgical History:   Procedure Laterality Date   • ATRIAL SEPTAL DEFECT REPAIR       • BRONCHOSCOPY N/A 2018     Procedure: BRONCHOSCOPY;  Surgeon: Scott Dobson MD;  Location[facility] ENDOSCOPY;  Service:    • CARDIAC ABLATION        DR. NADEEM SCHUMACHER --   •  SECTION   2000   • DIAGNOSTIC LAPAROSCOPY Right 11/3/2017     Procedure: LAPAROSCOPIC RIGHT OOPHERECTOMY ;  Surgeon: Claudine Barron MD;  Location: [facility] OR;  Service:    • OVARIAN CYST REMOVAL   2017   • WISDOM TOOTH EXTRACTION             FAMILY HISTORY        Family History   Problem Relation Age of Onset   • Depression Mother     • Diabetes Mother     • Heart disease Mother           +of mi at 68   • Hyperlipidemia  Mother     • Hypertension Mother     • Heart disease Father           ptca x 5 and mi at 59   • Hyperlipidemia Father     • Hypertension Father     • Kidney disease Brother     • Birth defects Maternal Grandmother     • Heart disease Maternal Grandmother           multiple mi   • Birth defects Maternal Grandfather     • Heart disease Maternal Grandfather           + mi at 68   • Colon cancer Maternal Grandfather     • Heart disease Paternal Grandmother           weak heart   • Asthma Paternal Grandmother     • COPD Paternal Grandmother     • Kidney failure Paternal Grandfather     • Malig Hyperthermia Neg Hx        SOCIAL HISTORY  Social History   Social History            Social History   • Marital status:        Spouse name: N/A   • Number of children: N/A   • Years of education: N/A          Occupational History   • Not on file.           Social History Main Topics   • Smoking status: Never Smoker   • Smokeless tobacco: Never Used   • Alcohol use No   • Drug use: No   • Sexual activity: Defer           Other Topics Concern   • Not on file          Social History Narrative   • No narrative on file         ALLERGIES  Demerol [meperidine] and Sulfa antibiotics  Home medications reviewed     REVIEW OF SYSTEMS  Review of Systems   Constitutional: Negative.  Negative for fever.   HENT: Negative for sore throat.         Facial pain   Eyes: Positive for visual disturbance (loss of vision left eye).   Respiratory: Negative.  Negative for cough.    Cardiovascular: Negative.  Negative for chest pain.   Gastrointestinal: Negative.    Genitourinary: Negative.  Negative for dysuria.   Musculoskeletal: Negative.  Negative for back pain.   Skin: Negative.  Negative for rash.   Neurological: Positive for headaches (severe, posteriorly-focused).   All other systems reviewed and are negative.     PHYSICAL EXAM  Blood pressure 118/68, pulse 82, temperature 98.5 °F (36.9 °C), temperature source Oral, resp. rate 16, height  "172.7 cm (68\"), weight 102 kg (225 lb 8.5 oz), SpO2 95 %.    Constitutional: No distress.   Head: Normocephalic and atraumatic.   Mouth/Throat: Oropharynx is clear and moist.   Eyes: Conjunctivae are normal.   Loss of vision in left side   Cardiovascular: Normal rate and regular rhythm.    Pulmonary/Chest: Breath sounds normal. No respiratory distress.   Abdominal: There is no tenderness.   Musculoskeletal: She exhibits no edema or tenderness.   Neurological: She is alert. She has normal sensation, normal strength, normal reflexes and intact cranial nerves. She displays normal speech. No sensory deficit. She has a normal Straight Leg Raise Test, a normal Cerebellar Exam and a normal Finger-Nose-Finger Test.   Normal cerebellar testing.  No drift in any extremity.  No dysarthria.  No aphasia.  No neglect/extinction.   Skin: No rash noted.     LAB RESULTS  Lab Results (last 24 hours)     Procedure Component Value Units Date/Time    Lipid Panel [[id number]]  (Abnormal) Collected:  11/03/18 0616    Specimen:  Blood Updated:  11/03/18 0704     Total Cholesterol 184 mg/dL      Triglycerides 87 mg/dL      HDL Cholesterol 46 mg/dL      LDL Cholesterol  121 (H) mg/dL      VLDL Cholesterol 17.4 mg/dL      LDL/HDL Ratio 2.62    Narrative:       Cholesterol Reference Ranges  (U.S. Department of Health and Human Services ATP III Classifications)    Desirable          <200 mg/dL  Borderline High    200-239 mg/dL  High Risk          >240 mg/dL      Triglyceride Reference Ranges  (U.S. Department of Health and Human Services ATP III Classifications)    Normal           <150 mg/dL  Borderline High  150-199 mg/dL  High             200-499 mg/dL  Very High        >500 mg/dL    HDL Reference Ranges  (U.S. Department of Health and Human Services ATP III Classifcations)    Low     <40 mg/dl (major risk factor for CHD)  High    >60 mg/dl ('negative' risk factor for CHD)        LDL Reference Ranges  (U.S. Department of Health and Human " Services ATP III Classifcations)    Optimal          <100 mg/dL  Near Optimal     100-129 mg/dL  Borderline High  130-159 mg/dL  High             160-189 mg/dL  Very High        >189 mg/dL    Hemoglobin A1c [[id number]]  (Normal) Collected:  11/03/18 0616    Specimen:  Blood Updated:  11/03/18 0650     Hemoglobin A1C 5.00 %     Narrative:       Hemoglobin A1C Ranges:    Increased Risk for Diabetes  5.7% to 6.4%  Diabetes                     >= 6.5%  Diabetic Goal                < 7.0%    POC Glucose Once [[id number]]  (Normal) Collected:  11/03/18 0511    Specimen:  Blood Updated:  11/03/18 0512     Glucose 98 mg/dL     Narrative:       Meter: [id number] : [id number] Seun Woods RN    Vitamin B12 [[id number]]  (Normal) Collected:  11/02/18 1746    Specimen:  Blood Updated:  11/02/18 2038     Vitamin B-12 519 pg/mL     TSH [[id number]]  (Normal) Collected:  11/02/18 1746    Specimen:  Blood Updated:  11/02/18 2029     TSH 3.800 mIU/mL     [place] Draw [[id number]] Collected:  11/02/18 1746    Specimen:  Blood Updated:  11/02/18 1900    Narrative:       The following orders were created for panel order [place] Draw.  Procedure                               Abnormality         Status                     ---------                               -----------         ------                     Light Blue Top[[id number]]                                   Final result               Green Top (Gel)[[id number]]                                  Final result               Lavender Top[[id number]]                                     Final result               Gold Top - SST[[id number]]                                   Final result                 Please view results for these tests on the individual orders.    Light Blue Top [[id number]] Collected:  11/02/18 1746    Specimen:  Blood Updated:  11/02/18 1900     Extra Tube hold for add-on     Comment: Auto resulted       Green Top (Gel) [[id number]] Collected:  11/02/18 1746    Specimen:   Blood Updated:  11/02/18 1900     Extra Tube Hold for add-ons.     Comment: Auto resulted.       Lavender Top [[id number]] Collected:  11/02/18 1747    Specimen:  Blood Updated:  11/02/18 1900     Extra Tube hold for add-on     Comment: Auto resulted       Gold Top - SST [[id number]] Collected:  11/02/18 1746    Specimen:  Blood Updated:  11/02/18 1900     Extra Tube Hold for add-ons.     Comment: Auto resulted.       Comprehensive Metabolic Panel [[id number]] Collected:  11/02/18 1746    Specimen:  Blood Updated:  11/02/18 1824     Glucose 94 mg/dL      BUN 10 mg/dL      Creatinine 0.80 mg/dL      Sodium 138 mmol/L      Potassium 3.7 mmol/L      Chloride 100 mmol/L      CO2 26.5 mmol/L      Calcium 9.4 mg/dL      Total Protein 7.8 g/dL      Albumin 4.20 g/dL      ALT (SGPT) 23 U/L      AST (SGOT) 19 U/L      Alkaline Phosphatase 94 U/L      Total Bilirubin 0.5 mg/dL      eGFR Non African Amer 76 mL/min/1.73      Globulin 3.6 gm/dL      A/G Ratio 1.2 g/dL      BUN/Creatinine Ratio 12.5     Anion Gap 11.5 mmol/L     Troponin [[id number]]  (Normal) Collected:  11/02/18 1746    Specimen:  Blood Updated:  11/02/18 1822     Troponin T <0.010 ng/mL     Narrative:       Troponin T Reference Ranges:  Less than 0.03 ng/mL:    Negative for AMI  0.03 to 0.09 ng/mL:      Indeterminant for AMI  Greater than 0.09 ng/mL: Positive for AMI    Protime-INR [[id number]]  (Normal) Collected:  11/02/18 1746    Specimen:  Blood Updated:  11/02/18 1816     Protime 13.4 Seconds      INR 1.04    aPTT [[id number]]  (Normal) Collected:  11/02/18 1746    Specimen:  Blood Updated:  11/02/18 1816     PTT 27.0 seconds     CBC & Differential [[id number]] Collected:  11/02/18 1747    Specimen:  Blood Updated:  11/02/18 1806    Narrative:       The following orders were created for panel order CBC & Differential.  Procedure                               Abnormality         Status                     ---------                               -----------          ------                     CBC Auto Differential[[id number]]        Abnormal            Final result                 Please view results for these tests on the individual orders.    CBC Auto Differential [[id number]]  (Abnormal) Collected:  11/02/18 1747    Specimen:  Blood Updated:  11/02/18 1806     WBC 9.79 10*3/mm3      RBC 4.70 10*6/mm3      Hemoglobin 14.5 g/dL      Hematocrit 42.6 %      MCV 90.6 fL      MCH 30.9 pg      MCHC 34.0 g/dL      RDW 13.2 (H) %      RDW-SD 43.5 fl      MPV 9.3 fL      Platelets 373 10*3/mm3      Neutrophil % 60.5 %      Lymphocyte % 24.5 %      Monocyte % 10.4 %      Eosinophil % 3.8 %      Basophil % 0.5 %      Immature Grans % 0.3 %      Neutrophils, Absolute 5.92 10*3/mm3      Lymphocytes, Absolute 2.40 10*3/mm3      Monocytes, Absolute 1.02 10*3/mm3      Eosinophils, Absolute 0.37 10*3/mm3      Basophils, Absolute 0.05 10*3/mm3      Immature Grans, Absolute 0.03 10*3/mm3     POC Glucose Once [[id number]]  (Normal) Collected:  11/02/18 1729    Specimen:  Blood Updated:  11/02/18 1730     Glucose 91 mg/dL     Narrative:       Meter: [id number] : [id number] Levar Strong RN        Imaging Results (last 24 hours)     Procedure Component Value Units Date/Time    MRI Brain Without Contrast [[id number]] Collected:  11/02/18 2111     Updated:  11/02/18 2112    Narrative:       EMERGENCY MRI OF THE BRAIN WITHOUT CONTRAST 11/02/2018     CLINICAL HISTORY: Acute onset of left-sided homonymous hemianopsia with  left visual field loss and some left-sided weakness.     TECHNIQUE: Axial T1, FLAIR, fat-suppressed T2, axial diffusion gradient  echo T2 and sagittal T1-weighted images were obtained of the entire  head.     COMPARISON: This is correlated to contrast-enhanced CT angiogram of head  and neck and CT perfusion study just prior to this exam, an hour ago, as  well as a prior MRI of the head and MRA of the head and neck from  [facility] 7-1/2 years ago on 02/22/2011.       FINDINGS: The brain parenchyma is normal in signal intensity.  Specifically no diffusion weighted abnormality is identified with no  acute infarct identified and on the gradient echo T2 weighted images no  acute or old blood breakdown products are seen intracranially. The  ventricles are normal in size. I see no mass effect, no midline shift,  no extra-axial fluid collections are identified. The paranasal sinuses  and mastoid air cells and middle ear cavities are clear. There are  multiple patchy and nodular foci T2 high signal in the subcutaneous fat  overlying the anterior walls of the maxillary sinuses and malar eminence  and zygomatic arches felt to be injected substance in the subcutaneous  fat for cosmetic purposes. There is an ovoid T2 hyperintense, T1  hypointense mass in the posterior superior aspect of the superficial  lobe of the right parotid gland extending posterior to the mandible into  the deep lobe of the right parotid gland. The mass maximally measures  2.3 x 1.7 x 2.2 cm in mediolateral and anterior, posterior and  craniocaudal dimensions respectively. This mass has clearly grown since  prior MRI of the head 02/22/2011 where it measured 1.7 x 1 x 1.5 cm in  size and ENT consultation is warranted with tissue sampling of the mass.       Impression:          1. There is a mass extending from the posterior superior aspect of the  superficial lobe of the right parotid gland posterior to the right  retromandibular vein into the deep lobe of the right parotid gland that  maximally measures 2.3 x 1.7 x 2.2 cm in mediolateral, anterior,  posterior and craniocaudal dimension. This is a solid mass that is T2  and diffusion hyperintense, T1 hypointense. It has clearly increased in  size since prior MRI of the head 7-1/2 years ago on 02/22/2011 where it  measured 1.7 x 1 x 1.5 cm. This is a primary parotid tumor and could be  an enlarging pleomorphic adenoma versus a low-grade parotid malignancy  and I  recommend ENT consultation and tissue sampling mass for more  definitive diagnosis.     2. There are multiple patchy nodular foci of T2 high signal in the  subcutaneous fat of the face overlying the anterior walls of the  maxillary sinus extending over the malar eminence and lateral zygomatic  arches felt to be injected substances for prior cosmetic surgery.  Correlation with clinical history suggested.     3. The remainder of the MRI of the brain is normal. Specifically no  acute infarct is seen and the etiology of the left-sided homonymous  hemianopsia and left-sided weakness is not established on this exam.     The results were communicated to Dr. Soriano in the emergency room by  telephone 11/02/2018 at 7:40 PM and Dr. Esparza from stroke neurology by  telephone 11/02/2018 at 7:45 PM.        XR Chest 1 View [[id number]] Collected:  11/02/18 1913     Updated:  11/02/18 2023    Narrative:       EMERGENCY PORTABLE VIEW THE CHEST 11/02/2018      CLINICAL HISTORY: Acute stroke, patient had a heart catheter today and  now has left-sided homonymous hemianopsia, headache and some left-sided  weakness.     COMPARISON: This is correlated to CT scan of the chest 02/19/2018 and  chest x-ray 01/15/2018.      FINDINGS: The cardiomediastinal silhouette and pulmonary vasculature are  within normal limits. Lungs are clear. The costophrenic angles are  sharp.       Impression:          1. No active disease is seen in the chest.     This report was finalized on 11/2/2018 8:20 PM by Dr. Dejuan Walters M.D.       CT Angiogram Neck With & Without Contrast [[id number]] Collected:  11/02/18 1912     Updated:  11/02/18 2023    Narrative:       EMERGENCY CONTRAST-ENHANCED CT ANGIOGRAM OF HEAD AND NECK AND CT  PERFUSION STUDY HEAD 11/02/2018     CLINICAL HISTORY: Team D. Patient has acute onset of left-sided  weakness, left-sided homonymous hemianopsia.      NONCONTRAST HEAD CT TECHNIQUE: Spiral CT images were obtained from the  base of the  skull to the vertex without intravenous contrast and images  were reformatted and submitted in 3 mm thick axial CT section with brain  algorithm and 2 mm thick sagittal and coronal reconstructions were  performed.     This is correlated to a prior MRI of the head from [facility] on 02/22/2011.     FINDINGS: The brain parenchyma is normal in attenuation. The ventricles  are normal in size.  I see no mass effect and no midline shift.  No  extra-axial fluid collections are identified.  There is no evidence of  acute intracranial hemorrhage. The paranasal sinuses, mastoid air cells  and middle ear cavities are clear.  There is a mass in the visualized  superior aspect of the superficial lobe extending into the deep lobe of  the parotid gland measuring 20 x 18 mm in medial lateral and anterior  posterior dimension, similar to prior MRI 02/22/2011.       Impression:       1. 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 The mass is  incompletely characterized on this exam.   2. The remainder of the head CT is normal. The results were communicated  to Dr. Esparza from Stroke Neurology 11/02/2018 at 5:48 while the patient  was still on the table, and he requested a CT angiogram of the head and  neck and CT perfusion study.      CT ANGIOGRAM HEAD AND NECK AND CT PERFUSION STUDY TECHNIQUE: Spiral CT  images were obtained through the head during the arterial phase of  contrast and images were reformatted and analyzed with RAPID software  analysis per CT perfusion study and subsequently spiral CT images were  obtained from the top of the aortic arch up through the great vessels of  the head and neck during the arterial phase of contrast and images were  reformatted and submitted in 1 mm thick axial, sagittal and coronal CT  sections and 3D reconstructions were performed to  complete the CT  angiogram of the head and neck     CT PERFUSION STUDY: CT perfusion studies demonstrate no focal areas of  reduced blood flow below 30% with no areas of acute completed infarction  and no areas of delayed time to maximal enhancement beyond 6 seconds and  thus no obvious hypoperfused or ischemic areas.     CT ANGIOGRAM HEAD AND NECK: The nasopharynx, oropharynx, hypopharynx,  true cords, and subglottic airway are normal in appearance.  The thyroid  gland enhances homogeneously and is normal in appearance. The lung  apices are clear. Reidentified is a mass in the superior aspect of the  superficial lobe extending into the deep lobe of the right parotid gland  that measures 2 x 1.8 cm in size, which is larger than it was on prior  MRI the head from [facility] February 22, 2011 where  it measured 1.7 x 1 cm in size and could be a slowly growing pleomorphic  adenoma or low-grade right parotid malignancy.. Otherwise, the remainder  of the parotid, , parapharyngeal and submandibular spaces are  symmetric and are normal in appearance. The cervical spine is  unremarkable. There is anatomic origin of the great vessels off the  aortic arch.  The left subclavian artery origin is normal in appearance.   No stenosis is seen in the left subclavian artery.  The left vertebral  artery origin is normal in appearance.  No stenosis is seen in the left  vertebral artery from its origin to the vertebrobasilar junction. The  left common carotid origin is normal in appearance.  No stenosis is seen  in the left common carotid artery and its bifurcation into the left  internal and external carotid arteries is normal in appearance.  No  stenosis is seen in the left internal carotid artery using the NASCET  criteria. The brachiocephalic artery origin is normal in appearance.  No  stenosis in seen in the brachiocephalic artery.  Its bifurcation into  the right subclavian and common carotid arteries  is obscured by dense  beam hardening artifact off the densely opacified right subclavian vein  as is the proximal right subclavian artery and the right vertebral  origin, and they are not assessed. Beyond its origin, the right  vertebral artery is widely patent to the vertebrobasilar junction  without stenosis. The right common carotid origin, proximal right common  carotid artery is obscured again by dense beam hardening artifact.  The  mid and distal right common carotid artery and bifurcation into the  right internal and external carotid arteries is normal in appearance.   No stenosis is seen in the right internal carotid artery using the  NASCET criteria. CT angiogram images through the head demonstrate a  normal appearance of the intracranial segments of the distal vertebral  arteries as well as the posterior inferior cerebellar arteries and the  basilar artery and the basilar tip.  There is a slightly hypoplastic P1  segment of the right posterior cerebral artery, which is a normal  variation.  Both posterior cerebral and superior cerebellar arteries are  normal in appearance.  The upper cervical, petrous, cavernous and  supracavernous segments of the internal carotid arteries are normal in  appearance.  The anterior and middle cerebral arteries are normal in  appearance.  The anterior communicating artery origin and middle  cerebral artery trifurcations are normal in appearance.     IMPRESSION:  1. There is a 2 x 1.8 cm mass extending from the superior aspect of the  superficial lobe into the deep lobe of the right parotid gland, larger  than on prior MRI of the head from [facility] on  February 22, 2011 where it measured 1.7 x 1 cm in size. This felt to be  a primary parotid tumor could be a slow-growing pleomorphic adenoma or  low-grade parotid malignancy and ENT consultation is warranted.  2. The remainder of the CT angiogram of the head and neck is normal with  no acute completed  infarct and no intracranial hemorrhage identified and  no stenosis seen in the great vessels of the head or neck, and the  etiology of the left-sided myomatous hemianopsia and left-sided weakness  is not established on this exam.  If symptoms persist, I recommend an  MRI of the head for more complete assessment. The final dictated results  were communicated to Dr. Esparza 11/02/2018 at 6:15 PM and Dr. Soriano from  the emergency room 11/02/2018 at 6:20 PM.      Radiation dose reduction techniques were utilized, including automated  exposure control and exposure modulation based on body size.     This report was finalized on 11/2/2018 8:20 PM by Dr. Dejuan Walters M.D.       CT Angiogram Head With & Without Contrast [[id number]] Collected:  11/02/18 1912     Updated:  11/02/18 2023    Narrative:       EMERGENCY CONTRAST-ENHANCED CT ANGIOGRAM OF HEAD AND NECK AND CT  PERFUSION STUDY HEAD 11/02/2018     CLINICAL HISTORY: Team D. Patient has acute onset of left-sided  weakness, left-sided homonymous hemianopsia.      NONCONTRAST HEAD CT TECHNIQUE: Spiral CT images were obtained from the  base of the skull to the vertex without intravenous contrast and images  were reformatted and submitted in 3 mm thick axial CT section with brain  algorithm and 2 mm thick sagittal and coronal reconstructions were  performed.     This is correlated to a prior MRI of the head from [facility] on 02/22/2011.     FINDINGS: The brain parenchyma is normal in attenuation. The ventricles  are normal in size.  I see no mass effect and no midline shift.  No  extra-axial fluid collections are identified.  There is no evidence of  acute intracranial hemorrhage. The paranasal sinuses, mastoid air cells  and middle ear cavities are clear.  There is a mass in the visualized  superior aspect of the superficial lobe extending into the deep lobe of  the parotid gland measuring 20 x 18 mm in medial lateral and anterior  posterior dimension,  similar to prior MRI 02/22/2011.       Impression:       1. 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 The mass is  incompletely characterized on this exam.   2. The remainder of the head CT is normal. The results were communicated  to Dr. Esparza from Stroke Neurology 11/02/2018 at 5:48 while the patient  was still on the table, and he requested a CT angiogram of the head and  neck and CT perfusion study.      CT ANGIOGRAM HEAD AND NECK AND CT PERFUSION STUDY TECHNIQUE: Spiral CT  images were obtained through the head during the arterial phase of  contrast and images were reformatted and analyzed with RAPID software  analysis per CT perfusion study and subsequently spiral CT images were  obtained from the top of the aortic arch up through the great vessels of  the head and neck during the arterial phase of contrast and images were  reformatted and submitted in 1 mm thick axial, sagittal and coronal CT  sections and 3D reconstructions were performed to complete the CT  angiogram of the head and neck     CT PERFUSION STUDY: CT perfusion studies demonstrate no focal areas of  reduced blood flow below 30% with no areas of acute completed infarction  and no areas of delayed time to maximal enhancement beyond 6 seconds and  thus no obvious hypoperfused or ischemic areas.     CT ANGIOGRAM HEAD AND NECK: The nasopharynx, oropharynx, hypopharynx,  true cords, and subglottic airway are normal in appearance.  The thyroid  gland enhances homogeneously and is normal in appearance. The lung  apices are clear. Reidentified is a mass in the superior aspect of the  superficial lobe extending into the deep lobe of the right parotid gland  that measures 2 x 1.8 cm in size, which is larger than it was on prior  MRI the head from [facility] February 22, 2011 where  it  measured 1.7 x 1 cm in size and could be a slowly growing pleomorphic  adenoma or low-grade right parotid malignancy.. Otherwise, the remainder  of the parotid, , parapharyngeal and submandibular spaces are  symmetric and are normal in appearance. The cervical spine is  unremarkable. There is anatomic origin of the great vessels off the  aortic arch.  The left subclavian artery origin is normal in appearance.   No stenosis is seen in the left subclavian artery.  The left vertebral  artery origin is normal in appearance.  No stenosis is seen in the left  vertebral artery from its origin to the vertebrobasilar junction. The  left common carotid origin is normal in appearance.  No stenosis is seen  in the left common carotid artery and its bifurcation into the left  internal and external carotid arteries is normal in appearance.  No  stenosis is seen in the left internal carotid artery using the NASCET  criteria. The brachiocephalic artery origin is normal in appearance.  No  stenosis in seen in the brachiocephalic artery.  Its bifurcation into  the right subclavian and common carotid arteries is obscured by dense  beam hardening artifact off the densely opacified right subclavian vein  as is the proximal right subclavian artery and the right vertebral  origin, and they are not assessed. Beyond its origin, the right  vertebral artery is widely patent to the vertebrobasilar junction  without stenosis. The right common carotid origin, proximal right common  carotid artery is obscured again by dense beam hardening artifact.  The  mid and distal right common carotid artery and bifurcation into the  right internal and external carotid arteries is normal in appearance.   No stenosis is seen in the right internal carotid artery using the  NASCET criteria. CT angiogram images through the head demonstrate a  normal appearance of the intracranial segments of the distal vertebral  arteries as well as the posterior  inferior cerebellar arteries and the  basilar artery and the basilar tip.  There is a slightly hypoplastic P1  segment of the right posterior cerebral artery, which is a normal  variation.  Both posterior cerebral and superior cerebellar arteries are  normal in appearance.  The upper cervical, petrous, cavernous and  supracavernous segments of the internal carotid arteries are normal in  appearance.  The anterior and middle cerebral arteries are normal in  appearance.  The anterior communicating artery origin and middle  cerebral artery trifurcations are normal in appearance.     IMPRESSION:  1. There is a 2 x 1.8 cm mass extending from the superior aspect of the  superficial lobe into the deep lobe of the right parotid gland, larger  than on prior MRI of the head from [facility] on  February 22, 2011 where it measured 1.7 x 1 cm in size. This felt to be  a primary parotid tumor could be a slow-growing pleomorphic adenoma or  low-grade parotid malignancy and ENT consultation is warranted.  2. The remainder of the CT angiogram of the head and neck is normal with  no acute completed infarct and no intracranial hemorrhage identified and  no stenosis seen in the great vessels of the head or neck, and the  etiology of the left-sided myomatous hemianopsia and left-sided weakness  is not established on this exam.  If symptoms persist, I recommend an  MRI of the head for more complete assessment. The final dictated results  were communicated to Dr. Esparza 11/02/2018 at 6:15 PM and Dr. Soriano from  the emergency room 11/02/2018 at 6:20 PM.      Radiation dose reduction techniques were utilized, including automated  exposure control and exposure modulation based on body size.     This report was finalized on 11/2/2018 8:20 PM by Dr. Dejuan Walters M.D.       CT Cerebral Perfusion With & Without Contrast [[id number]] Collected:  11/02/18 1912     Updated:  11/02/18 2023    Narrative:       EMERGENCY  CONTRAST-ENHANCED CT ANGIOGRAM OF HEAD AND NECK AND CT  PERFUSION STUDY HEAD 11/02/2018     CLINICAL HISTORY: Team D. Patient has acute onset of left-sided  weakness, left-sided homonymous hemianopsia.      NONCONTRAST HEAD CT TECHNIQUE: Spiral CT images were obtained from the  base of the skull to the vertex without intravenous contrast and images  were reformatted and submitted in 3 mm thick axial CT section with brain  algorithm and 2 mm thick sagittal and coronal reconstructions were  performed.     This is correlated to a prior MRI of the head from [facility] on 02/22/2011.     FINDINGS: The brain parenchyma is normal in attenuation. The ventricles  are normal in size.  I see no mass effect and no midline shift.  No  extra-axial fluid collections are identified.  There is no evidence of  acute intracranial hemorrhage. The paranasal sinuses, mastoid air cells  and middle ear cavities are clear.  There is a mass in the visualized  superior aspect of the superficial lobe extending into the deep lobe of  the parotid gland measuring 20 x 18 mm in medial lateral and anterior  posterior dimension, similar to prior MRI 02/22/2011.       Impression:       1. 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 The mass is  incompletely characterized on this exam.   2. The remainder of the head CT is normal. The results were communicated  to Dr. Esparza from Stroke Neurology 11/02/2018 at 5:48 while the patient  was still on the table, and he requested a CT angiogram of the head and  neck and CT perfusion study.      CT ANGIOGRAM HEAD AND NECK AND CT PERFUSION STUDY TECHNIQUE: Spiral CT  images were obtained through the head during the arterial phase of  contrast and images were reformatted and analyzed with RAPID software  analysis per CT perfusion study and subsequently  spiral CT images were  obtained from the top of the aortic arch up through the great vessels of  the head and neck during the arterial phase of contrast and images were  reformatted and submitted in 1 mm thick axial, sagittal and coronal CT  sections and 3D reconstructions were performed to complete the CT  angiogram of the head and neck     CT PERFUSION STUDY: CT perfusion studies demonstrate no focal areas of  reduced blood flow below 30% with no areas of acute completed infarction  and no areas of delayed time to maximal enhancement beyond 6 seconds and  thus no obvious hypoperfused or ischemic areas.     CT ANGIOGRAM HEAD AND NECK: The nasopharynx, oropharynx, hypopharynx,  true cords, and subglottic airway are normal in appearance.  The thyroid  gland enhances homogeneously and is normal in appearance. The lung  apices are clear. Reidentified is a mass in the superior aspect of the  superficial lobe extending into the deep lobe of the right parotid gland  that measures 2 x 1.8 cm in size, which is larger than it was on prior  MRI the head from [facility] February 22, 2011 where  it measured 1.7 x 1 cm in size and could be a slowly growing pleomorphic  adenoma or low-grade right parotid malignancy.. Otherwise, the remainder  of the parotid, , parapharyngeal and submandibular spaces are  symmetric and are normal in appearance. The cervical spine is  unremarkable. There is anatomic origin of the great vessels off the  aortic arch.  The left subclavian artery origin is normal in appearance.   No stenosis is seen in the left subclavian artery.  The left vertebral  artery origin is normal in appearance.  No stenosis is seen in the left  vertebral artery from its origin to the vertebrobasilar junction. The  left common carotid origin is normal in appearance.  No stenosis is seen  in the left common carotid artery and its bifurcation into the left  internal and external carotid arteries is  normal in appearance.  No  stenosis is seen in the left internal carotid artery using the NASCET  criteria. The brachiocephalic artery origin is normal in appearance.  No  stenosis in seen in the brachiocephalic artery.  Its bifurcation into  the right subclavian and common carotid arteries is obscured by dense  beam hardening artifact off the densely opacified right subclavian vein  as is the proximal right subclavian artery and the right vertebral  origin, and they are not assessed. Beyond its origin, the right  vertebral artery is widely patent to the vertebrobasilar junction  without stenosis. The right common carotid origin, proximal right common  carotid artery is obscured again by dense beam hardening artifact.  The  mid and distal right common carotid artery and bifurcation into the  right internal and external carotid arteries is normal in appearance.   No stenosis is seen in the right internal carotid artery using the  NASCET criteria. CT angiogram images through the head demonstrate a  normal appearance of the intracranial segments of the distal vertebral  arteries as well as the posterior inferior cerebellar arteries and the  basilar artery and the basilar tip.  There is a slightly hypoplastic P1  segment of the right posterior cerebral artery, which is a normal  variation.  Both posterior cerebral and superior cerebellar arteries are  normal in appearance.  The upper cervical, petrous, cavernous and  supracavernous segments of the internal carotid arteries are normal in  appearance.  The anterior and middle cerebral arteries are normal in  appearance.  The anterior communicating artery origin and middle  cerebral artery trifurcations are normal in appearance.     IMPRESSION:  1. There is a 2 x 1.8 cm mass extending from the superior aspect of the  superficial lobe into the deep lobe of the right parotid gland, larger  than on prior MRI of the head from [facility] on  February 22,  2011 where it measured 1.7 x 1 cm in size. This felt to be  a primary parotid tumor could be a slow-growing pleomorphic adenoma or  low-grade parotid malignancy and ENT consultation is warranted.  2. The remainder of the CT angiogram of the head and neck is normal with  no acute completed infarct and no intracranial hemorrhage identified and  no stenosis seen in the great vessels of the head or neck, and the  etiology of the left-sided myomatous hemianopsia and left-sided weakness  is not established on this exam.  If symptoms persist, I recommend an  MRI of the head for more complete assessment. The final dictated results  were communicated to Dr. Esparza 11/02/2018 at 6:15 PM and Dr. Soriano from  the emergency room 11/02/2018 at 6:20 PM.      Radiation dose reduction techniques were utilized, including automated  exposure control and exposure modulation based on body size.     This report was finalized on 11/2/2018 8:20 PM by Dr. Dejuan Walters M.D.         EKG                              Rhythm/Rate: Sinus 73  P waves and AK: NML  QRS, axis: Anterior Q waves  ST and T waves: NML       Current Facility-Administered Medications:   •  ARIPiprazole (ABILIFY) tablet 5 mg, 5 mg, Oral, Daily, Rolando Daniel MD  •  aspirin EC tablet 81 mg, 81 mg, Oral, Daily, Jelani Esparza MD, 81 mg at 11/03/18 0853  •  atorvastatin (LIPITOR) tablet 40 mg, 40 mg, Oral, Nightly, Jelani Esparza MD  •  budesonide-formoterol (SYMBICORT) 160-4.5 MCG/ACT inhaler 2 puff, 2 puff, Inhalation, BID - RT, Rolando Daniel MD  •  citalopram (CeleXA) tablet 40 mg, 40 mg, Oral, Daily, Rolando Daniel MD  •  Influenza Vac Subunit Quad (FLUCELVAX) injection 0.5 mL, 0.5 mL, Intramuscular, Once, Rolando Daniel MD  •  montelukast (SINGULAIR) tablet 10 mg, 10 mg, Oral, Daily, Rolando Daniel MD  •  [START ON 11/4/2018] pantoprazole (PROTONIX) EC tablet 40 mg, 40 mg, Oral, QAM, Rolando Daniel MD  •  sodium chloride 0.9 % flush 10 mL, 10 mL, Intravenous, PRN,  Tan Soriano MD  •  sodium chloride 0.9 % flush 3-10 mL, 3-10 mL, Intravenous, PRN, Jelani Esparza MD  •  sodium chloride 0.9 % with KCl 20 mEq/L infusion, 75 mL/hr, Intravenous, Continuous, Michael Daniel MD  •  topiramate (TOPAMAX) tablet 25 mg, 25 mg, Oral, Q12H, Jelani Esparza MD, 25 mg at 11/03/18 0853  •  zolpidem (AMBIEN) tablet 5 mg, 5 mg, Oral, Nightly PRN, Michael Daniel MD     ASSESSMENT  Severe headache with left vision loss after cardiac catheterization rule out TIA versus migraine headache  Paroxysmal atrial fibrillation  Hypertension  Hyperlipidemia  Status post closure ASD  Asthma  Depression  Gastroesophageal reflux disease    PLAN  Admit  Per neurology and cardiology  Continue home medications  Supportive care  Stress ulcer DVT prophylaxis  Discussed with family  Follow closely further recommendation according to hospital course    MICHAEL DANIEL MD

## 2018-11-04 VITALS
DIASTOLIC BLOOD PRESSURE: 77 MMHG | HEART RATE: 75 BPM | WEIGHT: 225.53 LBS | OXYGEN SATURATION: 98 % | HEIGHT: 68 IN | RESPIRATION RATE: 16 BRPM | BODY MASS INDEX: 34.18 KG/M2 | TEMPERATURE: 98 F | SYSTOLIC BLOOD PRESSURE: 123 MMHG

## 2018-11-04 LAB
ALBUMIN SERPL-MCNC: 3.6 G/DL (ref 3.5–5.2)
ALBUMIN/GLOB SERPL: 1.1 G/DL
ALP SERPL-CCNC: 82 U/L (ref 39–117)
ALT SERPL W P-5'-P-CCNC: 18 U/L (ref 1–33)
ANION GAP SERPL CALCULATED.3IONS-SCNC: 10.2 MMOL/L
AST SERPL-CCNC: 13 U/L (ref 1–32)
BASOPHILS # BLD AUTO: 0.05 10*3/MM3 (ref 0–0.2)
BASOPHILS NFR BLD AUTO: 0.5 % (ref 0–1.5)
BILIRUB SERPL-MCNC: 0.4 MG/DL (ref 0.1–1.2)
BUN BLD-MCNC: 11 MG/DL (ref 6–20)
BUN/CREAT SERPL: 13.8 (ref 7–25)
CALCIUM SPEC-SCNC: 9 MG/DL (ref 8.6–10.5)
CHLORIDE SERPL-SCNC: 105 MMOL/L (ref 98–107)
CHOLEST SERPL-MCNC: 175 MG/DL (ref 0–200)
CO2 SERPL-SCNC: 22.8 MMOL/L (ref 22–29)
CREAT BLD-MCNC: 0.8 MG/DL (ref 0.57–1)
DEPRECATED RDW RBC AUTO: 42.9 FL (ref 37–54)
EOSINOPHIL # BLD AUTO: 0.63 10*3/MM3 (ref 0–0.7)
EOSINOPHIL NFR BLD AUTO: 5.8 % (ref 0.3–6.2)
ERYTHROCYTE [DISTWIDTH] IN BLOOD BY AUTOMATED COUNT: 12.7 % (ref 11.7–13)
GFR SERPL CREATININE-BSD FRML MDRD: 76 ML/MIN/1.73
GLOBULIN UR ELPH-MCNC: 3.3 GM/DL
GLUCOSE BLD-MCNC: 99 MG/DL (ref 65–99)
HCT VFR BLD AUTO: 40.5 % (ref 35.6–45.5)
HDLC SERPL-MCNC: 44 MG/DL (ref 40–60)
HGB BLD-MCNC: 13.6 G/DL (ref 11.9–15.5)
IMM GRANULOCYTES # BLD: 0.03 10*3/MM3 (ref 0–0.03)
IMM GRANULOCYTES NFR BLD: 0.3 % (ref 0–0.5)
LDLC SERPL CALC-MCNC: 113 MG/DL (ref 0–100)
LDLC/HDLC SERPL: 2.57 {RATIO}
LYMPHOCYTES # BLD AUTO: 2.17 10*3/MM3 (ref 0.9–4.8)
LYMPHOCYTES NFR BLD AUTO: 19.9 % (ref 19.6–45.3)
MCH RBC QN AUTO: 30.8 PG (ref 26.9–32)
MCHC RBC AUTO-ENTMCNC: 33.6 G/DL (ref 32.4–36.3)
MCV RBC AUTO: 91.8 FL (ref 80.5–98.2)
MONOCYTES # BLD AUTO: 1.08 10*3/MM3 (ref 0.2–1.2)
MONOCYTES NFR BLD AUTO: 9.9 % (ref 5–12)
NEUTROPHILS # BLD AUTO: 6.98 10*3/MM3 (ref 1.9–8.1)
NEUTROPHILS NFR BLD AUTO: 63.9 % (ref 42.7–76)
NT-PROBNP SERPL-MCNC: 46.5 PG/ML (ref 5–900)
PLATELET # BLD AUTO: 336 10*3/MM3 (ref 140–500)
PMV BLD AUTO: 9.5 FL (ref 6–12)
POTASSIUM BLD-SCNC: 4.2 MMOL/L (ref 3.5–5.2)
PROT SERPL-MCNC: 6.9 G/DL (ref 6–8.5)
RBC # BLD AUTO: 4.41 10*6/MM3 (ref 3.9–5.2)
SODIUM BLD-SCNC: 138 MMOL/L (ref 136–145)
TRIGL SERPL-MCNC: 89 MG/DL (ref 0–150)
TSH SERPL DL<=0.05 MIU/L-ACNC: 2.75 MIU/ML (ref 0.27–4.2)
VLDLC SERPL-MCNC: 17.8 MG/DL (ref 5–40)
WBC NRBC COR # BLD: 10.91 10*3/MM3 (ref 4.5–10.7)

## 2018-11-04 PROCEDURE — C1764 EVENT RECORDER, CARDIAC: HCPCS | Performed by: INTERNAL MEDICINE

## 2018-11-04 PROCEDURE — 80061 LIPID PANEL: CPT | Performed by: HOSPITALIST

## 2018-11-04 PROCEDURE — 85025 COMPLETE CBC W/AUTO DIFF WBC: CPT | Performed by: HOSPITALIST

## 2018-11-04 PROCEDURE — 25010000002 FENTANYL CITRATE (PF) 100 MCG/2ML SOLUTION: Performed by: INTERNAL MEDICINE

## 2018-11-04 PROCEDURE — 25010000003 CEFAZOLIN 1-4 GM/50ML-% SOLUTION: Performed by: INTERNAL MEDICINE

## 2018-11-04 PROCEDURE — 33282 HC INSERTION PT ACTIV CARD EVNT REC: CPT | Performed by: INTERNAL MEDICINE

## 2018-11-04 PROCEDURE — 83880 ASSAY OF NATRIURETIC PEPTIDE: CPT | Performed by: HOSPITALIST

## 2018-11-04 PROCEDURE — 84443 ASSAY THYROID STIM HORMONE: CPT | Performed by: HOSPITALIST

## 2018-11-04 PROCEDURE — 80053 COMPREHEN METABOLIC PANEL: CPT | Performed by: HOSPITALIST

## 2018-11-04 PROCEDURE — 33282 PR IMPLANTATION PT-ACTIVATED CARDIAC EVENT RECORDER: CPT | Performed by: INTERNAL MEDICINE

## 2018-11-04 PROCEDURE — 25010000002 MIDAZOLAM PER 1 MG: Performed by: INTERNAL MEDICINE

## 2018-11-04 PROCEDURE — G0378 HOSPITAL OBSERVATION PER HR: HCPCS

## 2018-11-04 DEVICE — SYS ICM REVEAL LINQ W/MONTR/PATNT ASS/LINQ11 44.8X7.2X4MM: Type: IMPLANTABLE DEVICE | Status: FUNCTIONAL

## 2018-11-04 RX ORDER — FENTANYL CITRATE 50 UG/ML
INJECTION, SOLUTION INTRAMUSCULAR; INTRAVENOUS AS NEEDED
Status: DISCONTINUED | OUTPATIENT
Start: 2018-11-04 | End: 2018-11-04 | Stop reason: HOSPADM

## 2018-11-04 RX ORDER — CEFAZOLIN SODIUM 1 G/50ML
INJECTION, SOLUTION INTRAVENOUS CONTINUOUS PRN
Status: COMPLETED | OUTPATIENT
Start: 2018-11-04 | End: 2018-11-04

## 2018-11-04 RX ORDER — TOPIRAMATE 25 MG/1
25 TABLET ORAL EVERY 12 HOURS SCHEDULED
Qty: 60 TABLET | Refills: 0 | Status: SHIPPED | OUTPATIENT
Start: 2018-11-04 | End: 2018-12-04

## 2018-11-04 RX ORDER — SODIUM CHLORIDE 9 MG/ML
INJECTION, SOLUTION INTRAVENOUS CONTINUOUS PRN
Status: COMPLETED | OUTPATIENT
Start: 2018-11-04 | End: 2018-11-04

## 2018-11-04 RX ORDER — LIDOCAINE HYDROCHLORIDE AND EPINEPHRINE 10; 10 MG/ML; UG/ML
INJECTION, SOLUTION INFILTRATION; PERINEURAL AS NEEDED
Status: DISCONTINUED | OUTPATIENT
Start: 2018-11-04 | End: 2018-11-04 | Stop reason: HOSPADM

## 2018-11-04 RX ORDER — MIDAZOLAM HYDROCHLORIDE 1 MG/ML
INJECTION INTRAMUSCULAR; INTRAVENOUS AS NEEDED
Status: DISCONTINUED | OUTPATIENT
Start: 2018-11-04 | End: 2018-11-04 | Stop reason: HOSPADM

## 2018-11-04 RX ORDER — ASPIRIN 325 MG
325 TABLET, DELAYED RELEASE (ENTERIC COATED) ORAL DAILY
Status: DISCONTINUED | OUTPATIENT
Start: 2018-11-04 | End: 2018-11-04 | Stop reason: HOSPADM

## 2018-11-04 RX ADMIN — CETIRIZINE HYDROCHLORIDE 10 MG: 10 TABLET, FILM COATED ORAL at 11:47

## 2018-11-04 RX ADMIN — MONTELUKAST SODIUM 10 MG: 10 TABLET, FILM COATED ORAL at 11:47

## 2018-11-04 RX ADMIN — CITALOPRAM 40 MG: 40 TABLET, FILM COATED ORAL at 11:47

## 2018-11-04 RX ADMIN — PANTOPRAZOLE SODIUM 40 MG: 40 TABLET, DELAYED RELEASE ORAL at 11:47

## 2018-11-04 RX ADMIN — TOPIRAMATE 25 MG: 25 TABLET, FILM COATED ORAL at 11:47

## 2018-11-04 NOTE — PLAN OF CARE
If afib is confirmed she needs anticoagulation, otherwise aspirin full dose to continue, follow up with cardiology. continue Topamax, Will sign off.

## 2018-11-04 NOTE — DISCHARGE SUMMARY
Discharge summary    Date of admission 11/2/2018  Date of discharge 11/4/2018    Final diagnosis  Migraine headache  Paroxysmal atrial fibrillation per cardiology  Hypertension  Hyperlipidemia  Status post closure ASD  Asthma  Depression  Gastroesophageal reflux disease    Discharge medications    Current Facility-Administered Medications:   •  [MAR Hold] ARIPiprazole (ABILIFY) tablet 5 mg, 5 mg, Oral, Daily, Rolando Daniel MD, 5 mg at 11/03/18 2202  •  [MAR Hold] aspirin EC tablet 325 mg, 325 mg, Oral, Daily, Corinne Fung APRN  •  [MAR Hold] atorvastatin (LIPITOR) tablet 40 mg, 40 mg, Oral, Nightly, Jelani Esparza MD, 40 mg at 11/03/18 2202  •  [MAR Hold] budesonide-formoterol (SYMBICORT) 160-4.5 MCG/ACT inhaler 2 puff, 2 puff, Inhalation, BID - RT, Rolando Daniel MD  •  [MAR Hold] cetirizine (zyrTEC) tablet 10 mg, 10 mg, Oral, Daily, Rolando Daniel MD, 10 mg at 11/04/18 1147  •  [MAR Hold] citalopram (CeleXA) tablet 40 mg, 40 mg, Oral, Daily, Rolando Daniel MD, 40 mg at 11/04/18 1147  •  [MAR Hold] montelukast (SINGULAIR) tablet 10 mg, 10 mg, Oral, Daily, Rolando Daniel MD, 10 mg at 11/04/18 1147  •  [MAR Hold] pantoprazole (PROTONIX) EC tablet 40 mg, 40 mg, Oral, QAM, Rolando Daniel MD, 40 mg at 11/04/18 1147  •  topiramate (TOPAMAX) tablet 25 mg, 25 mg, Oral, Q12H, Jelani Esparza MD, 25 mg at 11/04/18 1147     Consult obtained  Cardiology  Neurology    Procedures  Loop recorder placement    Hospital course  48-year-old white female with history of asthma hypertension hyperlipidemia anxiety disorder who was recently discharged to the hospital after cardiac catheterization admitted through emergency room with headache left eye vision loss which resolved by the time she gets to the hospital.  Patient evaluated and admitted for further workup.  Patient admitted her stroke workup is essentially negative and she is diagnosed with migraine headache.  Her headache and vision loss is back to normal and  cardiology followed the patient for possible atrial fibrillation and put the Loop recorder and clear for discharge.  Patient also cleared from neurology to be discharged.    Discharge diet regular    Activity as tolerated    Medication as above    Follow-up with primary doctor in 1 week and follow with cardiology and neurology per their instructions and take medication as directed    MICHAEL HALL MD

## 2018-11-04 NOTE — PROGRESS NOTES
"Daily progress note    Chief complaint  Doing better  No new complaints  Wants to go home    History of present illness  48-year-old white female with history of asthma hypertension hyperlipidemia anxiety disorder depression gastroesophageal reflux disease who was discharged home yesterday after cardiac catheterization which was essentially unremarkable presented back to emergency room with sudden onset of severe headache followed by left eye vision loss and facial pain.  Patient denies any nausea focal weakness numbness or tingling in her headache and vision resolved by the time she ended up in ER.  Patient evaluated in ER is extensive workup no TPA given but admitted for possible TIA after cardiac catheterization.  At the time of interview she is back to baseline no complaint whatsoever but doesn't feel comfortable going home.     REVIEW OF SYSTEMS  Review of Systems   Constitutional: Negative.  Negative for fever.   HENT: Negative for sore throat.         Facial pain   Eyes: Positive for visual disturbance (loss of vision left eye).   Respiratory: Negative.  Negative for cough.    Cardiovascular: Negative.  Negative for chest pain.   Gastrointestinal: Negative.    Genitourinary: Negative.  Negative for dysuria.   Musculoskeletal: Negative.  Negative for back pain.   Skin: Negative.  Negative for rash.   Neurological: Positive for headaches (severe, posteriorly-focused).   All other systems reviewed and are negative.     PHYSICAL EXAM  Blood pressure 123/77, pulse 75, temperature 98 °F (36.7 °C), temperature source Oral, resp. rate 16, height 172.7 cm (68\"), weight 102 kg (225 lb 8.5 oz), SpO2 98 %.    Constitutional: No distress.   Head: Normocephalic and atraumatic.   Mouth/Throat: Oropharynx is clear and moist.   Eyes: Conjunctivae are normal.   Loss of vision in left side   Cardiovascular: Normal rate and regular rhythm.    Pulmonary/Chest: Breath sounds normal. No respiratory distress.   Abdominal: There is " no tenderness.   Musculoskeletal: She exhibits no edema or tenderness.   Neurological: She is alert. She has normal sensation, normal strength, normal reflexes and intact cranial nerves. She displays normal speech. No sensory deficit. She has a normal Straight Leg Raise Test, a normal Cerebellar Exam and a normal Finger-Nose-Finger Test.   Normal cerebellar testing.  No drift in any extremity.  No dysarthria.  No aphasia.  No neglect/extinction.   Skin: No rash noted.     LAB RESULTS  Lab Results (last 24 hours)     Procedure Component Value Units Date/Time    TSH [[id number]]  (Normal) Collected:  11/04/18 0531    Specimen:  Blood Updated:  11/04/18 0717     TSH 2.750 mIU/mL     BNP [[id number]]  (Normal) Collected:  11/04/18 0531    Specimen:  Blood Updated:  11/04/18 0717     proBNP 46.5 pg/mL     Narrative:       Among patients with dyspnea, NT-proBNP is highly sensitive for the detection of acute congestive heart failure. In addition NT-proBNP of <300 pg/ml effectively rules out acute congestive heart failure with 99% negative predictive value.    Comprehensive Metabolic Panel [[id number]] Collected:  11/04/18 0531    Specimen:  Blood Updated:  11/04/18 0710     Glucose 99 mg/dL      BUN 11 mg/dL      Creatinine 0.80 mg/dL      Sodium 138 mmol/L      Potassium 4.2 mmol/L      Chloride 105 mmol/L      CO2 22.8 mmol/L      Calcium 9.0 mg/dL      Total Protein 6.9 g/dL      Albumin 3.60 g/dL      ALT (SGPT) 18 U/L      AST (SGOT) 13 U/L      Alkaline Phosphatase 82 U/L      Total Bilirubin 0.4 mg/dL      eGFR Non African Amer 76 mL/min/1.73      Globulin 3.3 gm/dL      A/G Ratio 1.1 g/dL      BUN/Creatinine Ratio 13.8     Anion Gap 10.2 mmol/L     Lipid Panel [[id number]]  (Abnormal) Collected:  11/04/18 0531    Specimen:  Blood Updated:  11/04/18 0710     Total Cholesterol 175 mg/dL      Triglycerides 89 mg/dL      HDL Cholesterol 44 mg/dL      LDL Cholesterol  113 (H) mg/dL      VLDL Cholesterol 17.8 mg/dL       LDL/HDL Ratio 2.57    Narrative:       Cholesterol Reference Ranges  (U.S. Department of Health and Human Services ATP III Classifications)    Desirable          <200 mg/dL  Borderline High    200-239 mg/dL  High Risk          >240 mg/dL      Triglyceride Reference Ranges  (U.S. Department of Health and Human Services ATP III Classifications)    Normal           <150 mg/dL  Borderline High  150-199 mg/dL  High             200-499 mg/dL  Very High        >500 mg/dL    HDL Reference Ranges  (U.S. Department of Health and Human Services ATP III Classifcations)    Low     <40 mg/dl (major risk factor for CHD)  High    >60 mg/dl ('negative' risk factor for CHD)        LDL Reference Ranges  (U.S. Department of Health and Human Services ATP III Classifcations)    Optimal          <100 mg/dL  Near Optimal     100-129 mg/dL  Borderline High  130-159 mg/dL  High             160-189 mg/dL  Very High        >189 mg/dL    CBC & Differential [[id number]] Collected:  11/04/18 0531    Specimen:  Blood Updated:  11/04/18 0706    Narrative:       The following orders were created for panel order CBC & Differential.  Procedure                               Abnormality         Status                     ---------                               -----------         ------                     CBC Auto Differential[[id number]]        Abnormal            Final result                 Please view results for these tests on the individual orders.    CBC Auto Differential [[id number]]  (Abnormal) Collected:  11/04/18 0531    Specimen:  Blood Updated:  11/04/18 0706     WBC 10.91 (H) 10*3/mm3      RBC 4.41 10*6/mm3      Hemoglobin 13.6 g/dL      Hematocrit 40.5 %      MCV 91.8 fL      MCH 30.8 pg      MCHC 33.6 g/dL      RDW 12.7 %      RDW-SD 42.9 fl      MPV 9.5 fL      Platelets 336 10*3/mm3      Neutrophil % 63.9 %      Lymphocyte % 19.9 %      Monocyte % 9.9 %      Eosinophil % 5.8 %      Basophil % 0.5 %      Immature Grans % 0.3 %       Neutrophils, Absolute 6.98 10*3/mm3      Lymphocytes, Absolute 2.17 10*3/mm3      Monocytes, Absolute 1.08 10*3/mm3      Eosinophils, Absolute 0.63 10*3/mm3      Basophils, Absolute 0.05 10*3/mm3      Immature Grans, Absolute 0.03 10*3/mm3         Imaging Results (last 24 hours)     ** No results found for the last 24 hours. **      EKG                              Rhythm/Rate: Sinus 73  P waves and IA: NML  QRS, axis: Anterior Q waves  ST and T waves: NML       Current Facility-Administered Medications:   •  [MAR Hold] ARIPiprazole (ABILIFY) tablet 5 mg, 5 mg, Oral, Daily, Rolando Daniel MD, 5 mg at 11/03/18 2202  •  [MAR Hold] aspirin EC tablet 325 mg, 325 mg, Oral, Daily, Corinne Fung APRN  •  [MAR Hold] atorvastatin (LIPITOR) tablet 40 mg, 40 mg, Oral, Nightly, Jelani Esparza MD, 40 mg at 11/03/18 2202  •  [MAR Hold] budesonide-formoterol (SYMBICORT) 160-4.5 MCG/ACT inhaler 2 puff, 2 puff, Inhalation, BID - RT, Rolando Daniel MD  •  [MAR Hold] cetirizine (zyrTEC) tablet 10 mg, 10 mg, Oral, Daily, Rolando Daniel MD, 10 mg at 11/03/18 1655  •  [MAR Hold] citalopram (CeleXA) tablet 40 mg, 40 mg, Oral, Daily, Rolando Daniel MD, 40 mg at 11/03/18 1556  •  [MAR Hold] montelukast (SINGULAIR) tablet 10 mg, 10 mg, Oral, Daily, Rolando Daniel MD, 10 mg at 11/03/18 1556  •  [MAR Hold] pantoprazole (PROTONIX) EC tablet 40 mg, 40 mg, Oral, QAMMaría Aftab, MD  •  [MAR Hold] sodium chloride 0.9 % flush 10 mL, 10 mL, Intravenous, PRN, BoTan MD  •  [MAR Hold] sodium chloride 0.9 % flush 3-10 mL, 3-10 mL, Intravenous, PRN, Jelani Esparza MD  •  sodium chloride 0.9 % with KCl 20 mEq/L infusion, 75 mL/hr, Intravenous, Continuous, Rolando Daniel MD  •  topiramate (TOPAMAX) tablet 25 mg, 25 mg, Oral, Q12H, Jelani Esparza MD, 25 mg at 11/03/18 2202  •  [MAR Hold] zolpidem (AMBIEN) tablet 5 mg, 5 mg, Oral, Nightly PRN, Rolando Daniel MD     ASSESSMENT  Migraine headache  Paroxysmal atrial fibrillation per  cardiology  Hypertension  Hyperlipidemia  Status post closure ASD  Asthma  Depression  Gastroesophageal reflux disease    PLAN  Discharge home  Discharge summary dictated    MICHAEL HALL MD

## 2018-11-04 NOTE — SIGNIFICANT NOTE
11/04/18 1123   Rehab Treatment   Discipline physical therapist   Reason Treatment Not Performed other (see comments)  (Stacie HYLTON reported no PT today as she just got back from a procedure; pt refused PT yesterday saying she does not need it and is having no mobility deficits)

## 2018-11-04 NOTE — PLAN OF CARE
Problem: Patient Care Overview  Goal: Plan of Care Review  Outcome: Ongoing (interventions implemented as appropriate)   11/04/18 0614   Coping/Psychosocial   Plan of Care Reviewed With patient   OTHER   Outcome Summary Pt has been npo past mn for Loop recorder placement, alert & oriented x4, vss, medicated as ordered & tolerated well, I will continue to monitor.     Goal: Individualization and Mutuality  Outcome: Ongoing (interventions implemented as appropriate)    Goal: Discharge Needs Assessment  Outcome: Ongoing (interventions implemented as appropriate)    Goal: Interprofessional Rounds/Family Conf  Outcome: Ongoing (interventions implemented as appropriate)      Problem: Pain, Acute (Adult)  Goal: Acceptable Pain Control/Comfort Level  Outcome: Ongoing (interventions implemented as appropriate)

## 2018-11-05 LAB — CREAT BLDA-MCNC: 0.7 MG/DL (ref 0.6–1.3)

## 2018-11-19 ENCOUNTER — OFFICE VISIT (OUTPATIENT)
Dept: CARDIOLOGY | Facility: CLINIC | Age: 48
End: 2018-11-19

## 2018-11-19 ENCOUNTER — CLINICAL SUPPORT NO REQUIREMENTS (OUTPATIENT)
Dept: CARDIOLOGY | Facility: CLINIC | Age: 48
End: 2018-11-19

## 2018-11-19 VITALS
WEIGHT: 212.6 LBS | SYSTOLIC BLOOD PRESSURE: 133 MMHG | BODY MASS INDEX: 32.22 KG/M2 | HEIGHT: 68 IN | HEART RATE: 94 BPM | DIASTOLIC BLOOD PRESSURE: 82 MMHG

## 2018-11-19 DIAGNOSIS — E78.5 HYPERLIPIDEMIA, UNSPECIFIED HYPERLIPIDEMIA TYPE: ICD-10-CM

## 2018-11-19 DIAGNOSIS — I48.0 PAROXYSMAL ATRIAL FIBRILLATION (HCC): Primary | ICD-10-CM

## 2018-11-19 DIAGNOSIS — Z95.818 STATUS POST PLACEMENT OF IMPLANTABLE LOOP RECORDER: Primary | ICD-10-CM

## 2018-11-19 DIAGNOSIS — I10 ESSENTIAL HYPERTENSION: ICD-10-CM

## 2018-11-19 DIAGNOSIS — R00.2 PALPITATIONS: ICD-10-CM

## 2018-11-19 PROCEDURE — 93291 INTERROG DEV EVAL SCRMS IP: CPT | Performed by: INTERNAL MEDICINE

## 2018-11-19 PROCEDURE — 99024 POSTOP FOLLOW-UP VISIT: CPT | Performed by: INTERNAL MEDICINE

## 2018-11-19 NOTE — PROGRESS NOTES
Subjective:        Amirah Godoy is a 48 y.o. female who here for follow up    CC  The follow-up for palpitation hypertension paroxysmal atrial fibrillation and hyperlipidemia  HPI  48 years old female with history of the palpitations hypertension paroxysmal and atrial fibrillation and hyperlipidemia here for the follow-up    Patient recently underwent a loop recorder implantation     Problem List Items Addressed This Visit        Cardiovascular and Mediastinum    Palpitations    Essential hypertension    Paroxysmal atrial fibrillation (CMS/HCC) - Primary    Hyperlipidemia        .    The following portions of the patient's history were reviewed and updated as appropriate: allergies, current medications, past family history, past medical history, past social history, past surgical history and problem list.    Past Medical History:   Diagnosis Date   • Allergic    • Asthma    • Depression    • History of atrial fibrillation 2011    NO CURRENT MEDICATION   • Hyperlipidemia    • PVC (premature ventricular contraction) 2015   • Reflux esophagitis    • Seasonal allergies    • Sinus infection     STARTED ON ANTIBIOTICS ON 10/30/17   • SVT (supraventricular tachycardia) (CMS/HCC) 2011    NO CURRENT MEDICATIONS     reports that  has never smoked. she has never used smokeless tobacco. She reports that she does not drink alcohol or use drugs.   Family History   Problem Relation Age of Onset   • Depression Mother    • Diabetes Mother    • Heart disease Mother         +of mi at 68   • Hyperlipidemia Mother    • Hypertension Mother    • Heart disease Father         ptca x 5 and mi at 59   • Hyperlipidemia Father    • Hypertension Father    • Kidney disease Brother    • Birth defects Maternal Grandmother    • Heart disease Maternal Grandmother         multiple mi   • Birth defects Maternal Grandfather    • Heart disease Maternal Grandfather         + mi at 68   • Colon cancer Maternal Grandfather    • Heart disease Paternal  Grandmother         weak heart   • Asthma Paternal Grandmother    • COPD Paternal Grandmother    • Kidney failure Paternal Grandfather    • Malig Hyperthermia Neg Hx        Review of Systems  Constitutional: No wt loss, fever, fatigue  Gastrointestinal: No nausea, abdominal pain  Behavioral/Psych: No insomnia or anxiety   Cardiovascular palpitations  Objective:       Physical Exam    Loop recorder has healed well      Procedures      Echocardiogram:        Current Outpatient Medications:   •  ARIPiprazole (ABILIFY) 5 MG tablet, Take 5 mg by mouth Daily., Disp: , Rfl:   •  aspirin  MG EC tablet, Take 1 tablet by mouth Daily for 30 days., Disp: 30 tablet, Rfl: 0  •  cetirizine (ZyrTEC) 10 MG tablet, Take 10 mg by mouth daily., Disp: , Rfl:   •  citalopram (CeleXA) 20 MG tablet, Take 40 mg by mouth Daily., Disp: , Rfl:   •  Fluticasone-Umeclidin-Vilant (TRELEGY ELLIPTA IN), Inhale 1 inhaler Every Morning., Disp: , Rfl:   •  MINASTRIN 24 FE 1-20 MG-MCG(24) chewable tablet, Chew 1 tablet Every Night., Disp: , Rfl:   •  mometasone (ASMANEX TWISTHALER) inhaler 110 mcg/inhalation, Inhale 2 puffs Daily., Disp: , Rfl:   •  montelukast (SINGULAIR) 10 MG tablet, TAKE ONE TABLET BY MOUTH DAILY, Disp: 30 tablet, Rfl: 4  •  nitroglycerin (NITROSTAT) 0.4 MG SL tablet, 1 under the tongue as needed for angina, may repeat q5mins for up three doses, Disp: 100 tablet, Rfl: 11  •  omeprazole (priLOSEC) 40 MG capsule, Take 40 mg by mouth Daily., Disp: , Rfl:   •  rosuvastatin (CRESTOR) 10 MG tablet, Take 1 tablet by mouth Daily., Disp: 30 tablet, Rfl: 11  •  topiramate (TOPAMAX) 25 MG tablet, Take 1 tablet by mouth Every 12 (Twelve) Hours for 30 days., Disp: 60 tablet, Rfl: 0  •  zolpidem (AMBIEN) 5 MG tablet, Take 5 mg by mouth At Night As Needed for Sleep., Disp: , Rfl:    Assessment:        Patient Active Problem List   Diagnosis   • Chest pain   • Reactive depression   • Seasonal allergic rhinitis due to pollen   • Right ovarian  cyst   • History of atrial fibrillation   • Asthma with exacerbation   • Cough   • Weakness   • Tachycardia   • Influenza A   • Palpitations   • Swelling   • Essential hypertension   • Paroxysmal atrial fibrillation (CMS/HCC)   • Hyperlipidemia   • Precordial pain   • Visual loss   • Status post device closure of ASD               Plan:          No diagnosis found.  There are no diagnoses linked to this encounter.     LOOP OK, STILL HAVE SYMPTOMS WITH NO ARR.  COUNSELING:    Amirah GodoyCoadriana was given to patient for the following topics: diagnostic results, risk factor reductions, impressions, risks and benefits of treatment options and importance of treatment compliance .       SMOKING COUNSELING:    Counseling given: Not Answered      EMR Dragon/Transcription disclaimer:   Much of this encounter note is an electronic transcription/translation of spoken language to printed text. The electronic translation of spoken language may permit erroneous, or at times, nonsensical words or phrases to be inadvertently transcribed; Although I have reviewed the note for such errors, some may still exist.

## 2018-11-20 ENCOUNTER — TELEPHONE (OUTPATIENT)
Dept: NEUROLOGY | Facility: CLINIC | Age: 48
End: 2018-11-20

## 2018-11-20 PROBLEM — Z95.818 STATUS POST PLACEMENT OF IMPLANTABLE LOOP RECORDER: Status: ACTIVE | Noted: 2018-11-20

## 2018-11-20 NOTE — TELEPHONE ENCOUNTER
Two Week TIA Phone Call  Spoke with the patient    · Admission Date: 11/2/2018    · Discharge Date: 11/4/2018    · Discharge Destination: Home    · Meds reviewed with patient/caregiver?     [x]Yes [] No   o Antiplatelet: Aspirin  - Understands purpose     [x]  Yes     []  No     - Understands how to take      [x]  Yes     []  No    o Cholesterol Reducing: Crestor  - Understands purpose     [x]  Yes     []  No    - Understands how to take      [x]  Yes     []  No   · Is the patient taking all medication as directed?    [x]  Yes  []  No    · Discussed personal risk factors     [x]  Yes []  No     o High blood pressure   - Has been monitoring BP [x]  Yes     []  No  - Bp goal <130/80  o High cholesterol   - Review desired LDL goal <70  o Atrial fibrillation   - Has Loop recorder    • Discussed signs and symptoms of stroke and when patient to call 911?        [x]  Yes []  No  o Sudden weakness or numbness of the face, arm, or leg especially on one side of the body  o Sudden confusion, trouble speaking or understanding  o Sudden trouble seeing in one or both eyes   o Sudden trouble walking, dizziness, loss of balance or coordination  o Sudden severe headaches with no known cause      Notified Patient that if any of these symptoms occur to call 911    · Does the patient have any new signs or symptoms of a stroke?     []  Yes     [x]  No   Patient has had some dizziness today, but only with sudden movement, no weakness on one side, no trouble speakING, no numbness, or facial droop.   Patient states she has not been drinking her fluids well, she plan to slowly rehydrate herself. Advised the patient to call 911 if she experience any stroke symptoms. She states understanding.     · Does the patietnt have an appointment with PCP?    [x]  Yes     []  No  · Does the patient have 3 month Stroke Clinic appointment?   Per Dr. Esparza note, no need to follow up with Neurology, patient is being seen by cardiology.     · Is the patient  currently in therapy, outpatient, or home health?    []  Yes     [x]  No    Needs a referral?     []  Yes     [x]  No    Patient is going to ask cardiology for possibly cardiac therapy       Patient Satisfaction     · How often were you kept up to date with your plan of care?    []Never  [] Sometimes  [x] Usually  [] Always    · When test were ordered how often did someone explain to you the results?    []  Never  [] Sometimes  [x] Usually  [] Always    · What suggestions does the patient have of ways to improve the care to stroke patients?  No suggestions at this time.     · Overall how satisfied were you with the stroke care you received at [facility]?   []  Very Dissatisfied [] Dissatisfied   [x] Satisfied [] Very Satisfied

## 2018-11-30 ENCOUNTER — TELEPHONE (OUTPATIENT)
Dept: NEUROLOGY | Facility: CLINIC | Age: 48
End: 2018-11-30

## 2018-11-30 NOTE — TELEPHONE ENCOUNTER
Spoke with patient. Appointment scheduled     ----- Message from YORDAN Bernstein sent at 11/29/2018  9:36 AM EST -----  Contact: [phone]  I can see her. Please put her in a one hour time slot. Thanks.   ----- Message -----  From: Irene Davalos MA  Sent: 11/14/2018   9:50 AM  To: YORDAN Bernstein        ----- Message -----  From: Allyson Patel MA  Sent: 11/14/2018   9:30 AM  To: Irene Davalos MA    Would [facility]he[facility] Martinez want to see this patient?    ----- Message -----  From: Nikki Morris APRN  Sent: 11/13/2018   9:21 PM  To: Allyson Patel MA    I'm happy to see unless [facility]he[facility] Martinez has openings and wants new patients. Thanks.   ----- Message -----  From: Allyson Patel MA  Sent: 11/13/2018   3:09 PM  To: YORDAN Pina    This pt only saw Dr. Esparza, but it is migraine. Do you want to take care of pt or another APRN? Thanks.    ----- Message -----  From: Kasey Rousseau  Sent: 11/12/2018  12:38 PM  To: Allyson Patel MA    Patient was seen in the hospital by . She states she was supposed to make a 2 wk f/u but she never did. She also had questions about her Topamax 25 mg, she was told to increase which she has done so. She wants to know what to do next.

## 2018-12-06 ENCOUNTER — TELEPHONE (OUTPATIENT)
Dept: CARDIOLOGY | Facility: CLINIC | Age: 48
End: 2018-12-06

## 2018-12-06 NOTE — TELEPHONE ENCOUNTER
Per Dr Koch ok to have cleaning    S/w pt should she take her Amoxicillin prior to dental cleaning or is the Cefdinir 300 mg enough antibiotics

## 2018-12-06 NOTE — TELEPHONE ENCOUNTER
----- Message from Wen Stewart sent at 12/4/2018 10:56 AM EST -----  Regarding: clearance  Contact: [phone]  Having her teeth cleaned on Thursday, she is taking  Cefdinir 300 mg for bronchitis, and dentist is wanting to make sure her takng this and having loop is ok for her to have this done

## 2018-12-06 NOTE — TELEPHONE ENCOUNTER
PER  SHE NEEDS TO TAKE HER AMOXICILLIN  ALONG WIT CEFDINIR SHE ALREADY TAKING .     SPOKE WITH PATIENT AND SHE AGREED AND UNDERSTOOD

## 2018-12-11 ENCOUNTER — OFFICE VISIT (OUTPATIENT)
Dept: NEUROLOGY | Facility: CLINIC | Age: 48
End: 2018-12-11

## 2018-12-11 VITALS
WEIGHT: 223 LBS | DIASTOLIC BLOOD PRESSURE: 76 MMHG | BODY MASS INDEX: 33.8 KG/M2 | SYSTOLIC BLOOD PRESSURE: 142 MMHG | HEIGHT: 68 IN

## 2018-12-11 DIAGNOSIS — G43.409 HEMIPLEGIC MIGRAINE WITHOUT STATUS MIGRAINOSUS, NOT INTRACTABLE: Primary | ICD-10-CM

## 2018-12-11 DIAGNOSIS — G45.9 TIA (TRANSIENT ISCHEMIC ATTACK): ICD-10-CM

## 2018-12-11 PROCEDURE — 99214 OFFICE O/P EST MOD 30 MIN: CPT | Performed by: NURSE PRACTITIONER

## 2018-12-11 RX ORDER — TOPIRAMATE 50 MG/1
1 CAPSULE, EXTENDED RELEASE ORAL NIGHTLY
Qty: 30 CAPSULE | Refills: 4 | Status: SHIPPED | OUTPATIENT
Start: 2018-12-11 | End: 2019-05-21

## 2018-12-11 RX ORDER — TOPIRAMATE 25 MG/1
25 CAPSULE, COATED PELLETS ORAL 2 TIMES DAILY
COMMUNITY
End: 2018-12-11

## 2018-12-11 RX ORDER — ASPIRIN 325 MG
325 TABLET ORAL DAILY
COMMUNITY
End: 2020-11-24 | Stop reason: HOSPADM

## 2018-12-11 NOTE — PROGRESS NOTES
Subjective:     Patient ID: Amirah Godoy is a 48 y.o. female presenting for hospital follow up for TIA and migraine headaches. The patient had a cardiac cath on November 2, 2018.  After the cardiac cath she was sent home and within 4 hours developed symptoms of left sided weakness, slurred speech, left sided numbness and tingling, and a left homonymous hemanopsia. She also had a severe stabbing headache. MRI did not reveal a stroke. 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     Her symptoms fully resolved prior to discharge from the hospital.    Dr. Esparza saw the patient and felt this was likely a complicated migraine. He started her on topiramate 25 mg twice a day. She has been doing well on the medication. She has had some difficulty concentrating but her headaches have improved significantly.    She has a loop recorder in place. She has not been diagnosed with atrial fibrillation and therefore is not anticoagulated but is taking a full dose ASA daily. She is also taking rosuvastatin daily and has been doing so for the past 3 months.     She is following with cardiology.     She denies any headaches since hospitalizations. Denies S/S of stroke.   Her BP is elevated today, 142/76. She does check this at home and states it is normally in the 120s.     History of Present Illness  The following portions of the patient's history were reviewed and updated as appropriate: allergies, current medications, past family history, past medical history, past social history, past surgical history and problem list.    Review of Systems   Constitutional: Positive for diaphoresis, fatigue and unexpected weight change.   HENT: Positive for congestion, facial swelling and tinnitus.    Respiratory: Positive for chest tightness, shortness of breath and wheezing.    Endocrine: Positive for polydipsia.   Neurological: Negative for dizziness, tremors, seizures,  syncope, facial asymmetry, speech difficulty, weakness, light-headedness, numbness and headaches.   Hematological: Negative for adenopathy. Does not bruise/bleed easily.   Psychiatric/Behavioral: Negative for agitation, behavioral problems, confusion, decreased concentration, dysphoric mood, hallucinations, self-injury, sleep disturbance and suicidal ideas. The patient is not nervous/anxious and is not hyperactive.         Objective:    Neurologic Exam  General: Well nourished, well developed, and in no acute distress.  HEENT: Normocephalic/atraumatic. Mucous membranes moist. Sclerae anicteric.   Heart: Regular rate and rhythm. No murmurs, rubs or gallops.  Lungs: Clear to auscultation bilaterally.  Skin: No notable rashes or lesions on the visible surfaces.   Extremities: No clubbing, cyanosis or significant edema.   Psychiatric: Pleasant, cooperative, and appropriate.   Neurologic Exam:  Mental Status:  Alert and oriented. Speech is fluent. Comprehension is intact.   Cranial Nerves II-XII: Pupils equal, round, reactive to light. Extraocular movements are full and conjugate in all directions. Pursuit movements do not provoke any apparent dizziness or discomfort.  No nystagmus noted.  Hearing is intact to voice. Facial strength and sensation are preserved and symmetric. Tongue and palate midline. Voice non-hoarse, non-dysarthric.   Motor: Normal bulk and tone of bilateral upper and lower extremities. Strength is 5/5 in all 4 extremities both proximally and distally. There are no abnormal or involuntary movements noted.  Sensation: Intact to light touch throughout. Romberg was negative with no significant sway.   Coordination: Fully intact. Finger-to-nose performed accurately bilaterally.  Reflexes: The deep tendon reflexes are 2+/4 in bilateral biceps, brachioradialis, triceps, patella, and Achilles.  No pathologic reflexes were noted.  Gait: Normal. No ataxia or apraxia.     Physical Exam    Assessment/Plan:      Amirah was seen today for migraine and transient ischemic attack.    Diagnoses and all orders for this visit:    Hemiplegic migraine without status migrainosus, not intractable    TIA (transient ischemic attack)     The patient has been doing well since hospitalization and denies any headaches or S/S of stroke. She is scheduled to have the parotid tumor in January 2019. Dr. Esparza felt that this was likely a complicated migraine but given the possibility of a TIA she is to continue the ASA daily. I discussed the importance of risk factor control with her, including LDL <70, blood sugar control, and blood pressure <135/85. The topiramate seems to be working very well for her other than some difficulty concentrating. We will try Trokendi 50 mg nightly to see if this improves the cognitive side effects. She will call with any problems. I would not recommend treating her with triptans for her migraines given the possibility of the TIA. I did discuss the option of Sprix and diclofenac with her and we can discuss these further at her next visit if needed, but for now she has not a headache since hospitalization so I will hold off. She will try Excedrin OTC as needed first.     Follow up 3 months, sooner if needed.

## 2018-12-21 ENCOUNTER — CLINICAL SUPPORT NO REQUIREMENTS (OUTPATIENT)
Dept: CARDIOLOGY | Facility: CLINIC | Age: 48
End: 2018-12-21

## 2018-12-21 DIAGNOSIS — Z95.818 STATUS POST PLACEMENT OF IMPLANTABLE LOOP RECORDER: Primary | ICD-10-CM

## 2018-12-21 PROCEDURE — 93299 PR REM INTERROG ICPMS/SCRMS <30 D TECH REVIEW: CPT | Performed by: INTERNAL MEDICINE

## 2018-12-21 PROCEDURE — 93298 REM INTERROG DEV EVAL SCRMS: CPT | Performed by: INTERNAL MEDICINE

## 2019-01-11 ENCOUNTER — HOSPITAL ENCOUNTER (OUTPATIENT)
Dept: CARDIOLOGY | Facility: HOSPITAL | Age: 49
Discharge: HOME | End: 2019-01-11
Admitting: INTERNAL MEDICINE

## 2019-01-11 DIAGNOSIS — E78.5 HYPERLIPIDEMIA, UNSPECIFIED HYPERLIPIDEMIA TYPE: ICD-10-CM

## 2019-01-11 LAB
ALBUMIN SERPL-MCNC: 3.8 G/DL (ref 3.5–5.2)
ALBUMIN/GLOB SERPL: 1.3 G/DL
ALP SERPL-CCNC: 96 U/L (ref 39–117)
ALT SERPL W P-5'-P-CCNC: 133 U/L (ref 1–33)
ANION GAP SERPL CALCULATED.3IONS-SCNC: 10.2 MMOL/L
AST SERPL-CCNC: 55 U/L (ref 1–32)
BILIRUB SERPL-MCNC: 0.4 MG/DL (ref 0.1–1.2)
BUN BLD-MCNC: 20 MG/DL (ref 6–20)
BUN/CREAT SERPL: 22.5 (ref 7–25)
CALCIUM SPEC-SCNC: 9.3 MG/DL (ref 8.6–10.5)
CHLORIDE SERPL-SCNC: 103 MMOL/L (ref 98–107)
CHOLEST SERPL-MCNC: 189 MG/DL (ref 0–200)
CO2 SERPL-SCNC: 26.8 MMOL/L (ref 22–29)
CREAT BLD-MCNC: 0.89 MG/DL (ref 0.57–1)
GFR SERPL CREATININE-BSD FRML MDRD: 67 ML/MIN/1.73
GLOBULIN UR ELPH-MCNC: 2.9 GM/DL
GLUCOSE BLD-MCNC: 104 MG/DL (ref 65–99)
HDLC SERPL-MCNC: 39 MG/DL (ref 40–60)
LDLC SERPL CALC-MCNC: 129 MG/DL (ref 0–100)
LDLC/HDLC SERPL: 3.32 {RATIO}
POTASSIUM BLD-SCNC: 4.5 MMOL/L (ref 3.5–5.2)
PROT SERPL-MCNC: 6.7 G/DL (ref 6–8.5)
SODIUM BLD-SCNC: 140 MMOL/L (ref 136–145)
TRIGL SERPL-MCNC: 103 MG/DL (ref 0–150)
VLDLC SERPL-MCNC: 20.6 MG/DL (ref 5–40)

## 2019-01-11 PROCEDURE — 80061 LIPID PANEL: CPT | Performed by: INTERNAL MEDICINE

## 2019-01-11 PROCEDURE — 80053 COMPREHEN METABOLIC PANEL: CPT | Performed by: INTERNAL MEDICINE

## 2019-01-11 PROCEDURE — 36415 COLL VENOUS BLD VENIPUNCTURE: CPT | Performed by: INTERNAL MEDICINE

## 2019-01-20 ENCOUNTER — CLINICAL SUPPORT NO REQUIREMENTS (OUTPATIENT)
Dept: CARDIOLOGY | Facility: CLINIC | Age: 49
End: 2019-01-20

## 2019-01-20 DIAGNOSIS — Z95.818 STATUS POST PLACEMENT OF IMPLANTABLE LOOP RECORDER: Primary | ICD-10-CM

## 2019-01-20 PROCEDURE — 93298 REM INTERROG DEV EVAL SCRMS: CPT | Performed by: INTERNAL MEDICINE

## 2019-01-20 PROCEDURE — 93299 PR REM INTERROG ICPMS/SCRMS <30 D TECH REVIEW: CPT | Performed by: INTERNAL MEDICINE

## 2019-02-19 ENCOUNTER — CLINICAL SUPPORT NO REQUIREMENTS (OUTPATIENT)
Dept: CARDIOLOGY | Facility: CLINIC | Age: 49
End: 2019-02-19

## 2019-02-19 DIAGNOSIS — Z95.818 STATUS POST PLACEMENT OF IMPLANTABLE LOOP RECORDER: Primary | ICD-10-CM

## 2019-02-19 PROCEDURE — 93298 REM INTERROG DEV EVAL SCRMS: CPT | Performed by: INTERNAL MEDICINE

## 2019-02-19 PROCEDURE — 93299 PR REM INTERROG ICPMS/SCRMS <30 D TECH REVIEW: CPT | Performed by: INTERNAL MEDICINE

## 2019-03-01 ENCOUNTER — OFFICE VISIT (OUTPATIENT)
Dept: CARDIOLOGY | Facility: CLINIC | Age: 49
End: 2019-03-01

## 2019-03-01 VITALS
DIASTOLIC BLOOD PRESSURE: 86 MMHG | SYSTOLIC BLOOD PRESSURE: 129 MMHG | HEIGHT: 68 IN | BODY MASS INDEX: 31.83 KG/M2 | WEIGHT: 210 LBS | HEART RATE: 102 BPM

## 2019-03-01 DIAGNOSIS — E78.49 OTHER HYPERLIPIDEMIA: Primary | ICD-10-CM

## 2019-03-01 DIAGNOSIS — R00.0 TACHYCARDIA: ICD-10-CM

## 2019-03-01 DIAGNOSIS — I10 ESSENTIAL HYPERTENSION: ICD-10-CM

## 2019-03-01 DIAGNOSIS — R00.2 PALPITATIONS: ICD-10-CM

## 2019-03-01 DIAGNOSIS — I48.0 PAROXYSMAL ATRIAL FIBRILLATION (HCC): ICD-10-CM

## 2019-03-01 PROCEDURE — 99214 OFFICE O/P EST MOD 30 MIN: CPT | Performed by: NURSE PRACTITIONER

## 2019-03-01 PROCEDURE — 93000 ELECTROCARDIOGRAM COMPLETE: CPT | Performed by: NURSE PRACTITIONER

## 2019-03-22 ENCOUNTER — CLINICAL SUPPORT NO REQUIREMENTS (OUTPATIENT)
Dept: CARDIOLOGY | Facility: CLINIC | Age: 49
End: 2019-03-22

## 2019-03-22 DIAGNOSIS — Z95.818 STATUS POST PLACEMENT OF IMPLANTABLE LOOP RECORDER: Primary | ICD-10-CM

## 2019-03-22 PROCEDURE — 93298 REM INTERROG DEV EVAL SCRMS: CPT | Performed by: INTERNAL MEDICINE

## 2019-03-22 PROCEDURE — 93299 PR REM INTERROG ICPMS/SCRMS <30 D TECH REVIEW: CPT | Performed by: INTERNAL MEDICINE

## 2019-04-22 ENCOUNTER — CLINICAL SUPPORT NO REQUIREMENTS (OUTPATIENT)
Dept: CARDIOLOGY | Facility: CLINIC | Age: 49
End: 2019-04-22

## 2019-04-22 DIAGNOSIS — Z95.818 STATUS POST PLACEMENT OF IMPLANTABLE LOOP RECORDER: Primary | ICD-10-CM

## 2019-04-22 PROCEDURE — 93298 REM INTERROG DEV EVAL SCRMS: CPT | Performed by: INTERNAL MEDICINE

## 2019-04-22 PROCEDURE — 93299 PR REM INTERROG ICPMS/SCRMS <30 D TECH REVIEW: CPT | Performed by: INTERNAL MEDICINE

## 2019-05-21 ENCOUNTER — LAB (OUTPATIENT)
Dept: LAB | Facility: HOSPITAL | Age: 49
End: 2019-05-21

## 2019-05-21 ENCOUNTER — OFFICE VISIT (OUTPATIENT)
Dept: NEUROLOGY | Facility: CLINIC | Age: 49
End: 2019-05-21

## 2019-05-21 VITALS
SYSTOLIC BLOOD PRESSURE: 134 MMHG | DIASTOLIC BLOOD PRESSURE: 90 MMHG | HEIGHT: 68 IN | WEIGHT: 210.1 LBS | OXYGEN SATURATION: 98 % | HEART RATE: 82 BPM | BODY MASS INDEX: 31.84 KG/M2

## 2019-05-21 DIAGNOSIS — G43.409 HEMIPLEGIC MIGRAINE WITHOUT STATUS MIGRAINOSUS, NOT INTRACTABLE: ICD-10-CM

## 2019-05-21 DIAGNOSIS — F41.9 ANXIETY: ICD-10-CM

## 2019-05-21 DIAGNOSIS — F33.9 EPISODE OF RECURRENT MAJOR DEPRESSIVE DISORDER, UNSPECIFIED DEPRESSION EPISODE SEVERITY (HCC): ICD-10-CM

## 2019-05-21 DIAGNOSIS — R41.3 MEMORY LOSS: ICD-10-CM

## 2019-05-21 DIAGNOSIS — G45.9 TIA (TRANSIENT ISCHEMIC ATTACK): ICD-10-CM

## 2019-05-21 DIAGNOSIS — R41.3 MEMORY LOSS: Primary | ICD-10-CM

## 2019-05-21 LAB
25(OH)D3 SERPL-MCNC: 32.8 NG/ML (ref 30–100)
ALBUMIN SERPL-MCNC: 4 G/DL (ref 3.5–5.2)
ALBUMIN/GLOB SERPL: 1.5 G/DL
ALP SERPL-CCNC: 121 U/L (ref 39–117)
ALT SERPL W P-5'-P-CCNC: 45 U/L (ref 1–33)
ANION GAP SERPL CALCULATED.3IONS-SCNC: 8.9 MMOL/L
AST SERPL-CCNC: 32 U/L (ref 1–32)
BASOPHILS # BLD AUTO: 0.02 10*3/MM3 (ref 0–0.2)
BASOPHILS NFR BLD AUTO: 0.3 % (ref 0–1.5)
BILIRUB SERPL-MCNC: 0.4 MG/DL (ref 0.1–1.2)
BUN BLD-MCNC: 15 MG/DL (ref 6–20)
BUN/CREAT SERPL: 15.5 (ref 7–25)
CALCIUM SPEC-SCNC: 9.3 MG/DL (ref 8.6–10.5)
CHLORIDE SERPL-SCNC: 105 MMOL/L (ref 98–107)
CO2 SERPL-SCNC: 27.1 MMOL/L (ref 22–29)
CREAT BLD-MCNC: 0.97 MG/DL (ref 0.57–1)
DEPRECATED RDW RBC AUTO: 41.4 FL (ref 37–54)
EOSINOPHIL # BLD AUTO: 0 10*3/MM3 (ref 0–0.4)
EOSINOPHIL NFR BLD AUTO: 0 % (ref 0.3–6.2)
ERYTHROCYTE [DISTWIDTH] IN BLOOD BY AUTOMATED COUNT: 12.7 % (ref 12.3–15.4)
GFR SERPL CREATININE-BSD FRML MDRD: 61 ML/MIN/1.73
GLOBULIN UR ELPH-MCNC: 2.7 GM/DL
GLUCOSE BLD-MCNC: 103 MG/DL (ref 65–99)
HCT VFR BLD AUTO: 42.3 % (ref 34–46.6)
HGB BLD-MCNC: 14.2 G/DL (ref 12–15.9)
IMM GRANULOCYTES # BLD AUTO: 0.02 10*3/MM3 (ref 0–0.05)
IMM GRANULOCYTES NFR BLD AUTO: 0.3 % (ref 0–0.5)
LYMPHOCYTES # BLD AUTO: 1.35 10*3/MM3 (ref 0.7–3.1)
LYMPHOCYTES NFR BLD AUTO: 19 % (ref 19.6–45.3)
MCH RBC QN AUTO: 30.3 PG (ref 26.6–33)
MCHC RBC AUTO-ENTMCNC: 33.6 G/DL (ref 31.5–35.7)
MCV RBC AUTO: 90.2 FL (ref 79–97)
MONOCYTES # BLD AUTO: 0.57 10*3/MM3 (ref 0.1–0.9)
MONOCYTES NFR BLD AUTO: 8 % (ref 5–12)
NEUTROPHILS # BLD AUTO: 5.15 10*3/MM3 (ref 1.7–7)
NEUTROPHILS NFR BLD AUTO: 72.4 % (ref 42.7–76)
NRBC BLD AUTO-RTO: 0 /100 WBC (ref 0–0.2)
PLATELET # BLD AUTO: 321 10*3/MM3 (ref 140–450)
PMV BLD AUTO: 8.7 FL (ref 6–12)
POTASSIUM BLD-SCNC: 3.7 MMOL/L (ref 3.5–5.2)
PROT SERPL-MCNC: 6.7 G/DL (ref 6–8.5)
RBC # BLD AUTO: 4.69 10*6/MM3 (ref 3.77–5.28)
SODIUM BLD-SCNC: 141 MMOL/L (ref 136–145)
T4 SERPL-MCNC: 6.32 MCG/DL (ref 4.5–11.7)
TSH SERPL DL<=0.05 MIU/L-ACNC: 2.24 MIU/ML (ref 0.27–4.2)
VIT B12 BLD-MCNC: 571 PG/ML (ref 211–946)
WBC NRBC COR # BLD: 7.11 10*3/MM3 (ref 3.4–10.8)

## 2019-05-21 PROCEDURE — 80053 COMPREHEN METABOLIC PANEL: CPT

## 2019-05-21 PROCEDURE — 36415 COLL VENOUS BLD VENIPUNCTURE: CPT | Performed by: NURSE PRACTITIONER

## 2019-05-21 PROCEDURE — 99213 OFFICE O/P EST LOW 20 MIN: CPT | Performed by: NURSE PRACTITIONER

## 2019-05-21 PROCEDURE — 85025 COMPLETE CBC W/AUTO DIFF WBC: CPT

## 2019-05-21 PROCEDURE — 82607 VITAMIN B-12: CPT | Performed by: NURSE PRACTITIONER

## 2019-05-21 PROCEDURE — 83921 ORGANIC ACID SINGLE QUANT: CPT | Performed by: NURSE PRACTITIONER

## 2019-05-21 PROCEDURE — 82306 VITAMIN D 25 HYDROXY: CPT | Performed by: NURSE PRACTITIONER

## 2019-05-21 PROCEDURE — 84436 ASSAY OF TOTAL THYROXINE: CPT | Performed by: NURSE PRACTITIONER

## 2019-05-21 PROCEDURE — 84443 ASSAY THYROID STIM HORMONE: CPT | Performed by: NURSE PRACTITIONER

## 2019-05-21 NOTE — PROGRESS NOTES
Subjective:     Patient ID: Amirah Godoy is a 49 y.o. female presenting for follow-up for TIA and migraine headaches.  My initial visit with the patient was on December 11, 2018.  She had a cardiac cath on November 2, 2018 and after the cardiac cath was sent home and within 4 hours developed symptoms of left-sided weakness, slurred speech, left-sided numbness and tingling, and a left homonymous hemianopsia.  She also had a severe stabbing headache.  She had an MRI of her brain that was negative for stroke.  Her symptoms fully resolved prior to discharge from the hospital.  She was seen by Dr. Atkins and it was felt that the symptoms were likely related to a complicated migraine.  She was started on topiramate 25 mg twice daily.  At her last visit with me she reported some difficulty with concentrating and I sent her in a prescription for Trokendi to take in place of the topiramate.  She returns today stating that she never started the Trokendi.  She is not sure why and states that she is unaware that a different prescription was sent in for her.She has about 1-2 headaches per week and states that she takes Tylenol or Advil for these which does help.    Her  is with her today and expresses concerns regarding her memory and cognition.  He states that he and his children have both noticed problems over the last several months.  They feel that his cough worsened over the last few months.  She often will forget words to be able to complete sentences.  She forgets directions and how to get home from places that she frequently visits.  She forgets names of people.  Her  states that she does not seem as interested in hobbies and other things that previously interested her.  She does struggle with depression.  Her mother passed away 4 years ago and she has struggled with this.  She is also close to her grandmother and was told yesterday that she has less than 2 weeks to live.  The patient is taking Celexa,  "Wellbutrin, and Abilify for her anxiety and depression.  The Wellbutrin was recently added by her primary care physician.  She does not see a psychiatrist.  Her  states that previously she was the leader of the family \"go-getter\", but now she is much more passive.    History of Present Illness  The following portions of the patient's history were reviewed and updated as appropriate: allergies, current medications, past family history, past medical history, past social history, past surgical history and problem list.    Review of Systems   Constitutional: Negative for activity change, appetite change and fatigue.   HENT: Negative for facial swelling, sinus pressure and trouble swallowing.    Eyes: Negative for photophobia, pain and visual disturbance.   Respiratory: Positive for chest tightness, shortness of breath and wheezing.    Cardiovascular: Positive for palpitations. Negative for chest pain and leg swelling.   Gastrointestinal: Negative for diarrhea, nausea and vomiting.   Endocrine: Negative for cold intolerance and heat intolerance.   Genitourinary: Negative for difficulty urinating, frequency and urgency.   Musculoskeletal: Negative for back pain, gait problem and neck pain.   Neurological: Positive for dizziness. Negative for tremors, seizures, syncope, facial asymmetry, speech difficulty, weakness, light-headedness, numbness and headaches.   Hematological: Does not bruise/bleed easily.   Psychiatric/Behavioral: Positive for confusion. Negative for agitation, behavioral problems, decreased concentration, dysphoric mood, hallucinations, self-injury, sleep disturbance and suicidal ideas. The patient is nervous/anxious. The patient is not hyperactive.         Objective:    Neurologic Exam  General: Well nourished, well developed, and in no acute distress.  HEENT: Normocephalic/atraumatic. Mucous membranes moist. Sclerae anicteric.   Heart: Regular rate and rhythm. No murmurs, rubs or gallops.  Lungs: " Clear to auscultation bilaterally.  Skin: No notable rashes or lesions on the visible surfaces.   Extremities: No clubbing, cyanosis or significant edema.   Psychiatric: Pleasant, cooperative, and appropriate.   Neurologic Exam:  Mental Status:  Alert and oriented to person, place, time month, year, place, city. She scored an 18/30 on the MOCA. She missed points for naming animals, similarity between objets, and delayed recall.   Cranial Nerves II-XII: Pupils equal, round, reactive to light. Extraocular movements are full and conjugate in all directions. Pursuit movements do not provoke any apparent dizziness or discomfort.  No nystagmus noted.  Hearing is intact to voice. Facial strength and sensation are preserved and symmetric. Tongue and palate midline. Voice non-hoarse, non-dysarthric.   Motor: Normal bulk and tone of bilateral upper and lower extremities. Strength is 5/5 in all 4 extremities both proximally and distally. There are no abnormal or involuntary movements noted.  Sensation: Intact to light touch throughout. Romberg was negative with no significant sway.   Coordination: Fully intact. Finger-to-nose performed accurately bilaterally.  Reflexes: The deep tendon reflexes are 2+/4 in bilateral biceps, brachioradialis, triceps, patella, and Achilles.  No pathologic reflexes were noted.  Gait: Normal. No ataxia or apraxia.     Physical Exam    Assessment/Plan:     Amirah was seen today for migraine.    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  I recommended that she stop the topiramate.  She is going  to reduce the dose to 25 mg daily for 1 week and then stop taking it.  I told her to give this a few weeks and to see if her memory and concentration improves.  If her headaches worsen significantly during this time they are to call me and I can suggest something else for the headaches.  I also discussed with the patient and her  that I think her anxiety and depression is playing a major role in her memory problems as well.  She is taking 3 antidepressants and these may need to be adjusted.  I suggested that she consider seeing a psychiatrist.  She scored an 18 out of 30 on the MOCA today we are going to contact her in about 4 weeks to see how she was doing.  If her cognition has not improved since stopping the topiramate my suggestion is to have the patient undergo neuropsychological testing to better define her memory problems.  If her cognition has improved significantly after stopping the topiramate I will follow-up with her in the office in 6 months.

## 2019-05-23 ENCOUNTER — CLINICAL SUPPORT NO REQUIREMENTS (OUTPATIENT)
Dept: CARDIOLOGY | Facility: CLINIC | Age: 49
End: 2019-05-23

## 2019-05-23 DIAGNOSIS — Z95.818 STATUS POST PLACEMENT OF IMPLANTABLE LOOP RECORDER: Primary | ICD-10-CM

## 2019-05-23 PROCEDURE — 93299 PR REM INTERROG ICPMS/SCRMS <30 D TECH REVIEW: CPT | Performed by: INTERNAL MEDICINE

## 2019-05-23 PROCEDURE — 93298 REM INTERROG DEV EVAL SCRMS: CPT | Performed by: INTERNAL MEDICINE

## 2019-05-25 LAB
Lab: NORMAL
METHYLMALONATE SERPL-SCNC: 251 NMOL/L (ref 0–378)

## 2019-06-23 ENCOUNTER — CLINICAL SUPPORT NO REQUIREMENTS (OUTPATIENT)
Dept: CARDIOLOGY | Facility: CLINIC | Age: 49
End: 2019-06-23

## 2019-06-23 DIAGNOSIS — Z95.818 STATUS POST PLACEMENT OF IMPLANTABLE LOOP RECORDER: Primary | ICD-10-CM

## 2019-06-23 PROCEDURE — 93299 PR REM INTERROG ICPMS/SCRMS <30 D TECH REVIEW: CPT | Performed by: INTERNAL MEDICINE

## 2019-06-23 PROCEDURE — 93298 REM INTERROG DEV EVAL SCRMS: CPT | Performed by: INTERNAL MEDICINE

## 2019-07-05 ENCOUNTER — PREP FOR SURGERY (OUTPATIENT)
Dept: OTHER | Facility: HOSPITAL | Age: 49
End: 2019-07-05

## 2019-07-05 DIAGNOSIS — Z12.11 SCREEN FOR COLON CANCER: Primary | ICD-10-CM

## 2019-07-18 PROBLEM — Z12.11 SCREEN FOR COLON CANCER: Status: ACTIVE | Noted: 2019-07-18

## 2019-07-24 ENCOUNTER — CLINICAL SUPPORT NO REQUIREMENTS (OUTPATIENT)
Dept: CARDIOLOGY | Facility: CLINIC | Age: 49
End: 2019-07-24

## 2019-07-24 DIAGNOSIS — Z95.818 STATUS POST PLACEMENT OF IMPLANTABLE LOOP RECORDER: Primary | ICD-10-CM

## 2019-07-24 PROCEDURE — 93298 REM INTERROG DEV EVAL SCRMS: CPT | Performed by: INTERNAL MEDICINE

## 2019-07-24 PROCEDURE — 93299 PR REM INTERROG ICPMS/SCRMS <30 D TECH REVIEW: CPT | Performed by: INTERNAL MEDICINE

## 2019-08-16 ENCOUNTER — HOSPITAL ENCOUNTER (OUTPATIENT)
Dept: GENERAL RADIOLOGY | Facility: HOSPITAL | Age: 49
Discharge: HOME | End: 2019-08-16
Admitting: FAMILY MEDICINE

## 2019-08-16 DIAGNOSIS — M25.562 PAIN IN BOTH KNEES, UNSPECIFIED CHRONICITY: ICD-10-CM

## 2019-08-16 DIAGNOSIS — M25.561 PAIN IN BOTH KNEES, UNSPECIFIED CHRONICITY: ICD-10-CM

## 2019-08-16 PROCEDURE — 73560 X-RAY EXAM OF KNEE 1 OR 2: CPT

## 2019-08-24 ENCOUNTER — CLINICAL SUPPORT NO REQUIREMENTS (OUTPATIENT)
Dept: CARDIOLOGY | Facility: CLINIC | Age: 49
End: 2019-08-24

## 2019-08-24 DIAGNOSIS — Z95.818 STATUS POST PLACEMENT OF IMPLANTABLE LOOP RECORDER: Primary | ICD-10-CM

## 2019-08-24 PROCEDURE — 93298 REM INTERROG DEV EVAL SCRMS: CPT | Performed by: INTERNAL MEDICINE

## 2019-08-24 PROCEDURE — 93299 PR REM INTERROG ICPMS/SCRMS <30 D TECH REVIEW: CPT | Performed by: INTERNAL MEDICINE

## 2019-09-05 RX ORDER — BENRALIZUMAB 30 MG/ML
INJECTION, SOLUTION SUBCUTANEOUS
COMMUNITY
Start: 2019-06-19

## 2019-09-05 RX ORDER — BUPROPION HYDROCHLORIDE 150 MG/1
50 TABLET ORAL DAILY
COMMUNITY
Start: 2019-07-01 | End: 2021-12-14 | Stop reason: ALTCHOICE

## 2019-09-06 ENCOUNTER — ANESTHESIA EVENT (OUTPATIENT)
Dept: GASTROENTEROLOGY | Facility: HOSPITAL | Age: 49
End: 2019-09-06

## 2019-09-06 ENCOUNTER — ANESTHESIA (OUTPATIENT)
Dept: GASTROENTEROLOGY | Facility: HOSPITAL | Age: 49
End: 2019-09-06

## 2019-09-06 ENCOUNTER — HOSPITAL ENCOUNTER (OUTPATIENT)
Facility: HOSPITAL | Age: 49
Setting detail: HOSPITAL OUTPATIENT SURGERY
Discharge: HOME | End: 2019-09-06
Attending: INTERNAL MEDICINE | Admitting: INTERNAL MEDICINE

## 2019-09-06 VITALS
HEART RATE: 73 BPM | RESPIRATION RATE: 20 BRPM | OXYGEN SATURATION: 98 % | SYSTOLIC BLOOD PRESSURE: 140 MMHG | DIASTOLIC BLOOD PRESSURE: 70 MMHG | WEIGHT: 220 LBS | TEMPERATURE: 98.2 F | BODY MASS INDEX: 33.34 KG/M2 | HEIGHT: 68 IN

## 2019-09-06 PROCEDURE — 81025 URINE PREGNANCY TEST: CPT | Performed by: INTERNAL MEDICINE

## 2019-09-06 PROCEDURE — 25010000002 PROPOFOL 10 MG/ML EMULSION: Performed by: ANESTHESIOLOGY

## 2019-09-06 PROCEDURE — 45378 DIAGNOSTIC COLONOSCOPY: CPT | Performed by: INTERNAL MEDICINE

## 2019-09-06 RX ORDER — PROPOFOL 10 MG/ML
VIAL (ML) INTRAVENOUS AS NEEDED
Status: DISCONTINUED | OUTPATIENT
Start: 2019-09-06 | End: 2019-09-06 | Stop reason: SURG

## 2019-09-06 RX ORDER — LIDOCAINE HYDROCHLORIDE 20 MG/ML
INJECTION, SOLUTION INFILTRATION; PERINEURAL AS NEEDED
Status: DISCONTINUED | OUTPATIENT
Start: 2019-09-06 | End: 2019-09-06 | Stop reason: SURG

## 2019-09-06 RX ORDER — SODIUM CHLORIDE, SODIUM LACTATE, POTASSIUM CHLORIDE, CALCIUM CHLORIDE 600; 310; 30; 20 MG/100ML; MG/100ML; MG/100ML; MG/100ML
30 INJECTION, SOLUTION INTRAVENOUS CONTINUOUS PRN
Status: DISCONTINUED | OUTPATIENT
Start: 2019-09-06 | End: 2019-09-06 | Stop reason: HOSPADM

## 2019-09-06 RX ADMIN — PROPOFOL 200 MG: 10 INJECTION, EMULSION INTRAVENOUS at 11:10

## 2019-09-06 RX ADMIN — PROPOFOL 200 MG: 10 INJECTION, EMULSION INTRAVENOUS at 11:01

## 2019-09-06 RX ADMIN — SODIUM CHLORIDE, POTASSIUM CHLORIDE, SODIUM LACTATE AND CALCIUM CHLORIDE 30 ML/HR: 600; 310; 30; 20 INJECTION, SOLUTION INTRAVENOUS at 10:27

## 2019-09-06 RX ADMIN — LIDOCAINE HYDROCHLORIDE 100 MG: 20 INJECTION, SOLUTION INFILTRATION; PERINEURAL at 11:01

## 2019-09-06 NOTE — DISCHARGE INSTRUCTIONS
For the next 24 hours patient needs to be with a responsible adult.    For 24 hours DO NOT drive, operate machinery, appliances, drink alcohol, make important decisions or sign legal documents.    Start with a light or bland diet and advance to regular diet as tolerated.    Follow recommendations on procedure report provided by your doctor.    Call Dr Black for problems [phone]    Problems may include but not limited to: large amounts of bleeding, trouble breathing, repeated vomiting, severe unrelieved pain, fever or chills.

## 2019-09-06 NOTE — ANESTHESIA POSTPROCEDURE EVALUATION
"Patient: Amirah Godoy    Procedure Summary     Date:  09/06/19 Room / Location:  [facility]COPY [facility] /  [place] [facility]    Anesthesia Start:  1054 Anesthesia Stop:  1120    Procedure:  COLONOSCOPY TO CECUM AND TI (N/A ) Diagnosis:       Screen for colon cancer      (Screen for colon cancer [Z12.11])    Surgeon:  Jignesh Black MD Provider:  Aleisha Beasley MD    Anesthesia Type:  MAC ASA Status:  3          Anesthesia Type: MAC  Last vitals  BP   137/63 (09/06/19 1141)   Temp   36.8 °C (98.2 °F) (09/06/19 1025)   Pulse   75 (09/06/19 1141)   Resp   16 (09/06/19 1141)     SpO2   97 % (09/06/19 1141)     Post Anesthesia Care and Evaluation    Patient location during evaluation: PACU  Patient participation: complete - patient participated  Level of consciousness: awake  Pain score: 0  Pain management: adequate  Airway patency: patent  Anesthetic complications: No anesthetic complications    Cardiovascular status: acceptable  Respiratory status: acceptable  Hydration status: acceptable    Comments: Blood pressure 137/63, pulse 75, temperature 36.8 °C (98.2 °F), temperature source Oral, resp. rate 16, height 172.7 cm (68\"), weight 99.8 kg (220 lb), SpO2 97 %.    No anesthesia care post op    "

## 2019-09-06 NOTE — H&P
[facility]  Pre Procedure History & Physical    Chief Complaint:   48 yo female here for a screening colonoscopy. Her grandmother had colon cancer.     Subjective     HPI:   49 y.o. female here for a screening colonoscopy. Her grandmother had colon cancer.         Past Medical History:   Past Medical History:   Diagnosis Date   • Allergic    • Asthma    • Depression    • History of atrial fibrillation 2011    NO CURRENT MEDICATION   • Hyperlipidemia    • PVC (premature ventricular contraction) 2015   • Reflux esophagitis    • Seasonal allergies    • Sinus infection     STARTED ON ANTIBIOTICS ON 10/30/17   • SVT (supraventricular tachycardia) (CMS/HCC) 2011    NO CURRENT MEDICATIONS       Family History:  Family History   Problem Relation Age of Onset   • Depression Mother    • Diabetes Mother    • Heart disease Mother         +of mi at 68   • Hyperlipidemia Mother    • Hypertension Mother    • Heart disease Father         ptca x 5 and mi at 59   • Hyperlipidemia Father    • Hypertension Father    • Kidney disease Brother    • Birth defects Maternal Grandmother    • Heart disease Maternal Grandmother         multiple mi   • Birth defects Maternal Grandfather    • Heart disease Maternal Grandfather         + mi at 68   • Colon cancer Maternal Grandfather    • Heart disease Paternal Grandmother         weak heart   • Asthma Paternal Grandmother    • COPD Paternal Grandmother    • Kidney failure Paternal Grandfather    • Migraines Sister    • Malig Hyperthermia Neg Hx        Social History:   reports that she has never smoked. She has never used smokeless tobacco. She reports that she does not drink alcohol or use drugs.    Medications:   Medications Prior to Admission   Medication Sig Dispense Refill Last Dose   • aspirin 325 MG tablet Take 325 mg by mouth Daily.   8/30/2019 at Unknown time   • buPROPion XL (WELLBUTRIN XL) 150 MG 24 hr tablet Take 100 mg by mouth Daily.   9/5/2019 at Unknown  "time   • cetirizine (ZyrTEC) 10 MG tablet Take 10 mg by mouth daily.   9/5/2019 at Unknown time   • citalopram (CeleXA) 20 MG tablet Take 40 mg by mouth Daily.   9/5/2019 at Unknown time   • Fluticasone-Umeclidin-Vilant (TRELEGY ELLIPTA IN) Inhale 1 inhaler Every Morning.   9/6/2019 at Unknown time   • montelukast (SINGULAIR) 10 MG tablet TAKE ONE TABLET BY MOUTH DAILY 30 tablet 4 9/5/2019 at Unknown time   • omeprazole (priLOSEC) 40 MG capsule Take 40 mg by mouth Daily.   9/5/2019 at Unknown time   • zolpidem (AMBIEN) 5 MG tablet Take 5 mg by mouth At Night As Needed for Sleep.   9/5/2019 at Unknown time   • FASENRA 30 MG/ML solution prefilled syringe Every 2 (Two) Months.   7/26/2019   • nitroglycerin (NITROSTAT) 0.4 MG SL tablet 1 under the tongue as needed for angina, may repeat q5mins for up three doses 100 tablet 11 Unknown at Unknown time       Allergies:  Demerol [meperidine] and Sulfa antibiotics    ROS:    Pertinent items are noted in HPI     Objective     Blood pressure 124/74, pulse 95, temperature 98.2 °F (36.8 °C), temperature source Oral, resp. rate 16, height 172.7 cm (68\"), weight 99.8 kg (220 lb), SpO2 93 %.    Physical Exam   Constitutional: Pt is oriented to person, place, and time and well-developed, well-nourished, and in no distress.   HENT:   Mouth/Throat: Oropharynx is clear and moist.   Neck: Normal range of motion. Neck supple.   Cardiovascular: Normal rate, regular rhythm and normal heart sounds.    Pulmonary/Chest: Effort normal and breath sounds normal. No respiratory distress. No  wheezes.   Abdominal: Soft. Bowel sounds are normal.   Skin: Skin is warm and dry.   Psychiatric: Mood, memory, affect and judgment normal.     Assessment/Plan     Diagnosis:  49 y.o. female here for a screening colonoscopy. Her grandmother had colon cancer.     Anticipated Surgical Procedure:  Colonoscopy    The risks, benefits, and alternatives of this procedure have been discussed with the patient or the " responsible party- the patient understands and agrees to proceed.

## 2019-09-06 NOTE — ANESTHESIA PREPROCEDURE EVALUATION
Anesthesia Evaluation     Patient summary reviewed and Nursing notes reviewed   NPO Solid Status: > 8 hours  NPO Liquid Status: > 6 hours           Airway   Mallampati: II  TM distance: >3 FB  Neck ROM: full  No difficulty expected  Dental - normal exam     Pulmonary - normal exam   (+) asthma,   Cardiovascular - normal exam    (+) dysrhythmias (s/p ablation) Paroxysmal Atrial Fib,       Neuro/Psych  (+) psychiatric history Anxiety and Depression,     GI/Hepatic/Renal/Endo    (+) obesity,       Musculoskeletal     Abdominal    Substance History      OB/GYN          Other                      Anesthesia Plan    ASA 3     MAC     Anesthetic plan, all risks, benefits, and alternatives have been provided, discussed and informed consent has been obtained with: patient.

## 2019-09-24 ENCOUNTER — CLINICAL SUPPORT NO REQUIREMENTS (OUTPATIENT)
Dept: CARDIOLOGY | Facility: CLINIC | Age: 49
End: 2019-09-24

## 2019-09-24 DIAGNOSIS — Z95.818 STATUS POST PLACEMENT OF IMPLANTABLE LOOP RECORDER: Primary | ICD-10-CM

## 2019-09-24 PROCEDURE — 93299 PR REM INTERROG ICPMS/SCRMS <30 D TECH REVIEW: CPT | Performed by: INTERNAL MEDICINE

## 2019-09-24 PROCEDURE — 93298 REM INTERROG DEV EVAL SCRMS: CPT | Performed by: INTERNAL MEDICINE

## 2019-10-25 ENCOUNTER — CLINICAL SUPPORT NO REQUIREMENTS (OUTPATIENT)
Dept: CARDIOLOGY | Facility: CLINIC | Age: 49
End: 2019-10-25

## 2019-10-25 DIAGNOSIS — Z95.818 STATUS POST PLACEMENT OF IMPLANTABLE LOOP RECORDER: Primary | ICD-10-CM

## 2019-10-25 PROCEDURE — 93299 PR REM INTERROG ICPMS/SCRMS <30 D TECH REVIEW: CPT | Performed by: INTERNAL MEDICINE

## 2019-10-25 PROCEDURE — 93298 REM INTERROG DEV EVAL SCRMS: CPT | Performed by: INTERNAL MEDICINE

## 2019-11-25 ENCOUNTER — CLINICAL SUPPORT NO REQUIREMENTS (OUTPATIENT)
Dept: CARDIOLOGY | Facility: CLINIC | Age: 49
End: 2019-11-25

## 2019-11-25 DIAGNOSIS — Z95.818 STATUS POST PLACEMENT OF IMPLANTABLE LOOP RECORDER: Primary | ICD-10-CM

## 2019-11-25 PROCEDURE — 93298 REM INTERROG DEV EVAL SCRMS: CPT | Performed by: INTERNAL MEDICINE

## 2019-11-25 PROCEDURE — 93299 PR REM INTERROG ICPMS/SCRMS <30 D TECH REVIEW: CPT | Performed by: INTERNAL MEDICINE

## 2019-12-12 ENCOUNTER — APPOINTMENT (OUTPATIENT)
Dept: CT IMAGING | Facility: HOSPITAL | Age: 49
End: 2019-12-12

## 2019-12-12 ENCOUNTER — APPOINTMENT (OUTPATIENT)
Dept: GENERAL RADIOLOGY | Facility: HOSPITAL | Age: 49
End: 2019-12-12

## 2019-12-12 ENCOUNTER — HOSPITAL ENCOUNTER (EMERGENCY)
Facility: HOSPITAL | Age: 49
Discharge: HOME | End: 2019-12-12
Attending: EMERGENCY MEDICINE | Admitting: EMERGENCY MEDICINE

## 2019-12-12 VITALS
BODY MASS INDEX: 32.58 KG/M2 | TEMPERATURE: 99.7 F | WEIGHT: 215 LBS | DIASTOLIC BLOOD PRESSURE: 78 MMHG | RESPIRATION RATE: 18 BRPM | OXYGEN SATURATION: 96 % | HEIGHT: 68 IN | HEART RATE: 104 BPM | SYSTOLIC BLOOD PRESSURE: 137 MMHG

## 2019-12-12 DIAGNOSIS — K52.9 GE (GASTROENTERITIS): ICD-10-CM

## 2019-12-12 DIAGNOSIS — R11.2 NON-INTRACTABLE VOMITING WITH NAUSEA, UNSPECIFIED VOMITING TYPE: Primary | ICD-10-CM

## 2019-12-12 LAB
ALBUMIN SERPL-MCNC: 4 G/DL (ref 3.5–5.2)
ALBUMIN/GLOB SERPL: 1.3 G/DL
ALP SERPL-CCNC: 124 U/L (ref 39–117)
ALT SERPL W P-5'-P-CCNC: 24 U/L (ref 1–33)
ANION GAP SERPL CALCULATED.3IONS-SCNC: 13.1 MMOL/L (ref 5–15)
AST SERPL-CCNC: 23 U/L (ref 1–32)
B PARAPERT DNA SPEC QL NAA+PROBE: NOT DETECTED
B PERT DNA SPEC QL NAA+PROBE: NOT DETECTED
BASOPHILS # BLD AUTO: 0.02 10*3/MM3 (ref 0–0.2)
BASOPHILS NFR BLD AUTO: 0.2 % (ref 0–1.5)
BILIRUB SERPL-MCNC: 0.6 MG/DL (ref 0.2–1.2)
BILIRUB UR QL STRIP: NEGATIVE
BUN BLD-MCNC: 15 MG/DL (ref 6–20)
BUN/CREAT SERPL: 20.8 (ref 7–25)
C PNEUM DNA NPH QL NAA+NON-PROBE: NOT DETECTED
CALCIUM SPEC-SCNC: 9 MG/DL (ref 8.6–10.5)
CHLORIDE SERPL-SCNC: 101 MMOL/L (ref 98–107)
CLARITY UR: CLEAR
CO2 SERPL-SCNC: 23.9 MMOL/L (ref 22–29)
COLOR UR: YELLOW
CREAT BLD-MCNC: 0.72 MG/DL (ref 0.57–1)
D-LACTATE SERPL-SCNC: 1.5 MMOL/L (ref 0.5–2)
DEPRECATED RDW RBC AUTO: 43.1 FL (ref 37–54)
EOSINOPHIL # BLD AUTO: 0 10*3/MM3 (ref 0–0.4)
EOSINOPHIL NFR BLD AUTO: 0 % (ref 0.3–6.2)
ERYTHROCYTE [DISTWIDTH] IN BLOOD BY AUTOMATED COUNT: 13.2 % (ref 12.3–15.4)
FLUAV H1 2009 PAND RNA NPH QL NAA+PROBE: NOT DETECTED
FLUAV H1 HA GENE NPH QL NAA+PROBE: NOT DETECTED
FLUAV H3 RNA NPH QL NAA+PROBE: NOT DETECTED
FLUAV SUBTYP SPEC NAA+PROBE: NOT DETECTED
FLUBV RNA ISLT QL NAA+PROBE: NOT DETECTED
GFR SERPL CREATININE-BSD FRML MDRD: 85 ML/MIN/1.73
GLOBULIN UR ELPH-MCNC: 3.1 GM/DL
GLUCOSE BLD-MCNC: 136 MG/DL (ref 65–99)
GLUCOSE UR STRIP-MCNC: NEGATIVE MG/DL
HADV DNA SPEC NAA+PROBE: NOT DETECTED
HCOV 229E RNA SPEC QL NAA+PROBE: NOT DETECTED
HCOV HKU1 RNA SPEC QL NAA+PROBE: NOT DETECTED
HCOV NL63 RNA SPEC QL NAA+PROBE: NOT DETECTED
HCOV OC43 RNA SPEC QL NAA+PROBE: NOT DETECTED
HCT VFR BLD AUTO: 42.8 % (ref 34–46.6)
HGB BLD-MCNC: 14.4 G/DL (ref 12–15.9)
HGB UR QL STRIP.AUTO: NEGATIVE
HMPV RNA NPH QL NAA+NON-PROBE: NOT DETECTED
HPIV1 RNA SPEC QL NAA+PROBE: NOT DETECTED
HPIV2 RNA SPEC QL NAA+PROBE: NOT DETECTED
HPIV3 RNA NPH QL NAA+PROBE: NOT DETECTED
HPIV4 P GENE NPH QL NAA+PROBE: NOT DETECTED
IMM GRANULOCYTES # BLD AUTO: 0.06 10*3/MM3 (ref 0–0.05)
IMM GRANULOCYTES NFR BLD AUTO: 0.5 % (ref 0–0.5)
KETONES UR QL STRIP: NEGATIVE
LEUKOCYTE ESTERASE UR QL STRIP.AUTO: NEGATIVE
LIPASE SERPL-CCNC: 21 U/L (ref 13–60)
LYMPHOCYTES # BLD AUTO: 0.21 10*3/MM3 (ref 0.7–3.1)
LYMPHOCYTES NFR BLD AUTO: 1.8 % (ref 19.6–45.3)
M PNEUMO IGG SER IA-ACNC: NOT DETECTED
MCH RBC QN AUTO: 29.8 PG (ref 26.6–33)
MCHC RBC AUTO-ENTMCNC: 33.6 G/DL (ref 31.5–35.7)
MCV RBC AUTO: 88.6 FL (ref 79–97)
MONOCYTES # BLD AUTO: 0.28 10*3/MM3 (ref 0.1–0.9)
MONOCYTES NFR BLD AUTO: 2.4 % (ref 5–12)
NEUTROPHILS # BLD AUTO: 10.9 10*3/MM3 (ref 1.7–7)
NEUTROPHILS NFR BLD AUTO: 95.1 % (ref 42.7–76)
NITRITE UR QL STRIP: NEGATIVE
NRBC BLD AUTO-RTO: 0 /100 WBC (ref 0–0.2)
PH UR STRIP.AUTO: 8 [PH] (ref 5–8)
PLATELET # BLD AUTO: 321 10*3/MM3 (ref 140–450)
PMV BLD AUTO: 8.6 FL (ref 6–12)
POTASSIUM BLD-SCNC: 3.8 MMOL/L (ref 3.5–5.2)
PROCALCITONIN SERPL-MCNC: 0.19 NG/ML (ref 0.1–0.25)
PROT SERPL-MCNC: 7.1 G/DL (ref 6–8.5)
PROT UR QL STRIP: NEGATIVE
RBC # BLD AUTO: 4.83 10*6/MM3 (ref 3.77–5.28)
RHINOVIRUS RNA SPEC NAA+PROBE: NOT DETECTED
RSV RNA NPH QL NAA+NON-PROBE: NOT DETECTED
SODIUM BLD-SCNC: 138 MMOL/L (ref 136–145)
SP GR UR STRIP: >=1.03 (ref 1–1.03)
TROPONIN T SERPL-MCNC: <0.01 NG/ML (ref 0–0.03)
UROBILINOGEN UR QL STRIP: NORMAL
WBC NRBC COR # BLD: 11.47 10*3/MM3 (ref 3.4–10.8)

## 2019-12-12 PROCEDURE — 84484 ASSAY OF TROPONIN QUANT: CPT | Performed by: EMERGENCY MEDICINE

## 2019-12-12 PROCEDURE — 93010 ELECTROCARDIOGRAM REPORT: CPT | Performed by: INTERNAL MEDICINE

## 2019-12-12 PROCEDURE — 96374 THER/PROPH/DIAG INJ IV PUSH: CPT

## 2019-12-12 PROCEDURE — 0 IOPAMIDOL PER 1 ML: Performed by: EMERGENCY MEDICINE

## 2019-12-12 PROCEDURE — 80053 COMPREHEN METABOLIC PANEL: CPT | Performed by: EMERGENCY MEDICINE

## 2019-12-12 PROCEDURE — 99284 EMERGENCY DEPT VISIT MOD MDM: CPT

## 2019-12-12 PROCEDURE — 25010000002 ONDANSETRON PER 1 MG: Performed by: EMERGENCY MEDICINE

## 2019-12-12 PROCEDURE — 25010000002 PROMETHAZINE PER 50 MG: Performed by: EMERGENCY MEDICINE

## 2019-12-12 PROCEDURE — 71046 X-RAY EXAM CHEST 2 VIEWS: CPT

## 2019-12-12 PROCEDURE — 74177 CT ABD & PELVIS W/CONTRAST: CPT

## 2019-12-12 PROCEDURE — 84145 PROCALCITONIN (PCT): CPT | Performed by: EMERGENCY MEDICINE

## 2019-12-12 PROCEDURE — 83690 ASSAY OF LIPASE: CPT | Performed by: EMERGENCY MEDICINE

## 2019-12-12 PROCEDURE — 83605 ASSAY OF LACTIC ACID: CPT | Performed by: EMERGENCY MEDICINE

## 2019-12-12 PROCEDURE — 0100U HC BIOFIRE FILMARRAY RESP PANEL 2: CPT | Performed by: EMERGENCY MEDICINE

## 2019-12-12 PROCEDURE — 81003 URINALYSIS AUTO W/O SCOPE: CPT | Performed by: EMERGENCY MEDICINE

## 2019-12-12 PROCEDURE — 93005 ELECTROCARDIOGRAM TRACING: CPT

## 2019-12-12 PROCEDURE — 85025 COMPLETE CBC W/AUTO DIFF WBC: CPT | Performed by: EMERGENCY MEDICINE

## 2019-12-12 PROCEDURE — 96375 TX/PRO/DX INJ NEW DRUG ADDON: CPT

## 2019-12-12 PROCEDURE — 87040 BLOOD CULTURE FOR BACTERIA: CPT | Performed by: EMERGENCY MEDICINE

## 2019-12-12 PROCEDURE — 93005 ELECTROCARDIOGRAM TRACING: CPT | Performed by: EMERGENCY MEDICINE

## 2019-12-12 RX ORDER — PROMETHAZINE HYDROCHLORIDE 25 MG/ML
12.5 INJECTION, SOLUTION INTRAMUSCULAR; INTRAVENOUS ONCE
Status: COMPLETED | OUTPATIENT
Start: 2019-12-12 | End: 2019-12-12

## 2019-12-12 RX ORDER — ONDANSETRON 2 MG/ML
4 INJECTION INTRAMUSCULAR; INTRAVENOUS ONCE
Status: COMPLETED | OUTPATIENT
Start: 2019-12-12 | End: 2019-12-12

## 2019-12-12 RX ORDER — SODIUM CHLORIDE 0.9 % (FLUSH) 0.9 %
10 SYRINGE (ML) INJECTION AS NEEDED
Status: DISCONTINUED | OUTPATIENT
Start: 2019-12-12 | End: 2019-12-12 | Stop reason: HOSPADM

## 2019-12-12 RX ORDER — ONDANSETRON 4 MG/1
4 TABLET, FILM COATED ORAL EVERY 6 HOURS
Qty: 12 TABLET | Refills: 0 | Status: SHIPPED | OUTPATIENT
Start: 2019-12-12 | End: 2020-11-11

## 2019-12-12 RX ADMIN — IOPAMIDOL 85 ML: 755 INJECTION, SOLUTION INTRAVENOUS at 16:33

## 2019-12-12 RX ADMIN — ONDANSETRON 4 MG: 2 INJECTION INTRAMUSCULAR; INTRAVENOUS at 17:53

## 2019-12-12 RX ADMIN — SODIUM CHLORIDE, POTASSIUM CHLORIDE, SODIUM LACTATE AND CALCIUM CHLORIDE 500 ML: 600; 310; 30; 20 INJECTION, SOLUTION INTRAVENOUS at 17:54

## 2019-12-12 RX ADMIN — SODIUM CHLORIDE 1000 ML: 9 INJECTION, SOLUTION INTRAVENOUS at 15:55

## 2019-12-12 RX ADMIN — PROMETHAZINE HYDROCHLORIDE 12.5 MG: 25 INJECTION INTRAMUSCULAR; INTRAVENOUS at 15:56

## 2019-12-12 NOTE — ED PROVIDER NOTES
" EMERGENCY DEPARTMENT ENCOUNTER    CHIEF COMPLAINT  Chief Complaint: shoulder pain  History given by: patient  History limited by: none  Room Number: 10/10  PMD: Ruben Kwan Jr., MD      HPI:  Pt is a 49 y.o. female who presents complaining of right shoulder pain that started one hour PTA. Pt describes the pain as \"achy\" and states that it is not aggravated by movement or position. Pt also complains of 5-6 episodes of N/V since 0600. Pt states that after her episodes of vomiting she has an episode of loose stool. Pt states that her emesis is dark but denies any blood. Pt denies urinary sx and neck pain. Pt also complains of subjective fever and productive cough with yellow sputum. Pt has significant asthma hx and hx of heart surgry (ASD repair and ablation).    (cardiology)   (pulmonology).            Duration:  One hour  Onset: gradual  Timing: constant  Location: right shoulder  Radiation: none  Quality: achy  Intensity/Severity: moderate  Progression: unchanged  Associated Symptoms: subjective fever, N/V/D, productive cough  Aggravating Factors: none  Alleviating Factors: none  Previous Episodes: none  Treatment before arrival: none    PAST MEDICAL HISTORY  Active Ambulatory Problems     Diagnosis Date Noted   • Chest pain 07/15/2016   • Reactive depression 03/10/2017   • Seasonal allergic rhinitis due to pollen 03/10/2017   • Right ovarian cyst 11/03/2017   • History of atrial fibrillation 01/01/2011   • Asthma with exacerbation 01/05/2018   • Cough 01/05/2018   • Weakness 01/05/2018   • Tachycardia 01/07/2018   • Influenza A 01/09/2018   • Palpitations 04/21/2018   • Swelling 04/23/2018   • Essential hypertension 04/23/2018   • Paroxysmal atrial fibrillation (CMS/HCC) 04/23/2018   • Hyperlipidemia 05/28/2018   • Precordial pain 10/24/2018   • Visual loss 11/02/2018   • Status post device closure of ASD 11/03/2018   • Status post placement of implantable loop recorder 11/20/2018   • " Episode of recurrent major depressive disorder (CMS/[facility]) 2019   • TIA (transient ischemic attack) 2019   • Hemiplegic migraine without status migrainosus, not intractable 2019   • Anxiety 2019   • Screen for colon cancer 2019     Resolved Ambulatory Problems     Diagnosis Date Noted   • No Resolved Ambulatory Problems     Past Medical History:   Diagnosis Date   • Allergic    • Asthma    • Depression    • PVC (premature ventricular contraction)    • Reflux esophagitis    • Seasonal allergies    • Sinus infection    • SVT (supraventricular tachycardia) (CMS/[facility])        PAST SURGICAL HISTORY  Past Surgical History:   Procedure Laterality Date   • ATRIAL SEPTAL DEFECT REPAIR     • BRONCHOSCOPY N/A 2018    Procedure: BRONCHOSCOPY;  Surgeon: Scott Dobson MD;  Location: [facility] ENDOSCOPY;  Service:    • CARDIAC ABLATION      DR. NADEEM SCHUMACHER --   • CARDIAC CATHETERIZATION N/A 2018    Procedure: Left Heart Cath;  Surgeon: Niles Koch MD;  Location: [facility] CATH INVASIVE LOCATION;  Service: Cardiology   • CARDIAC CATHETERIZATION N/A 2018    Procedure: Coronary angiography;  Surgeon: Niles Koch MD;  Location: [facility] CATH INVASIVE LOCATION;  Service: Cardiology   • CARDIAC CATHETERIZATION N/A 2018    Procedure: Left ventriculography;  Surgeon: Niles Koch MD;  Location: [facility] CATH INVASIVE LOCATION;  Service: Cardiology   • CARDIAC ELECTROPHYSIOLOGY PROCEDURE N/A 2018    Procedure: Loop insertion;  Surgeon: Niles Koch MD;  Location: [facility] CATH INVASIVE LOCATION;  Service: Cardiovascular   •  SECTION  2000   • COLONOSCOPY N/A 2019    Procedure: COLONOSCOPY TO CECUM AND TI;  Surgeon: Jignesh Black MD;  Location: [facility] ENDOSCOPY;  Service: Gastroenterology   • DIAGNOSTIC LAPAROSCOPY Right 11/3/2017    Procedure: LAPAROSCOPIC RIGHT OOPHERECTOMY ;  Surgeon: Claudine Barron MD;  Location: [facility]  OR;  Service:    • OVARIAN CYST REMOVAL  11/2017   • WISDOM TOOTH EXTRACTION         FAMILY HISTORY  Family History   Problem Relation Age of Onset   • Depression Mother    • Diabetes Mother    • Heart disease Mother         +of mi at 68   • Hyperlipidemia Mother    • Hypertension Mother    • Heart disease Father         ptca x 5 and mi at 59   • Hyperlipidemia Father    • Hypertension Father    • Kidney disease Brother    • Birth defects Maternal Grandmother    • Heart disease Maternal Grandmother         multiple mi   • Birth defects Maternal Grandfather    • Heart disease Maternal Grandfather         + mi at 68   • Colon cancer Maternal Grandfather    • Heart disease Paternal Grandmother         weak heart   • Asthma Paternal Grandmother    • COPD Paternal Grandmother    • Kidney failure Paternal Grandfather    • Migraines Sister    • Malig Hyperthermia Neg Hx        SOCIAL HISTORY  Social History     Socioeconomic History   • Marital status:      Spouse name: Not on file   • Number of children: 2   • Years of education: Not on file   • Highest education level: Some college, no degree   Tobacco Use   • Smoking status: Never Smoker   • Smokeless tobacco: Never Used   Substance and Sexual Activity   • Alcohol use: No   • Drug use: No   • Sexual activity: Defer       ALLERGIES  Demerol [meperidine] and Sulfa antibiotics    REVIEW OF SYSTEMS  Review of Systems   Constitutional: Positive for fever (subjective).   HENT: Negative for sore throat.    Eyes: Negative.    Respiratory: Positive for cough (productive). Negative for shortness of breath.    Cardiovascular: Negative for chest pain.   Gastrointestinal: Positive for diarrhea, nausea and vomiting. Negative for abdominal pain.   Genitourinary: Negative for dysuria.   Musculoskeletal: Positive for arthralgias (right shoulder). Negative for neck pain.   Skin: Negative for rash.   Allergic/Immunologic: Negative.    Neurological: Negative for weakness, numbness  and headaches.   Hematological: Negative.    Psychiatric/Behavioral: Negative.    All other systems reviewed and are negative.      PHYSICAL EXAM  ED Triage Vitals [12/12/19 1504]   Temp Heart Rate Resp BP SpO2   (!) 100.9 °F (38.3 °C) (!) 126 16 -- 97 %      Temp src Heart Rate Source Patient Position BP Location FiO2 (%)   Tympanic Monitor -- -- --       Physical Exam   Constitutional: She is oriented to person, place, and time.   Appears uncomfortable   HENT:   Head: Normocephalic and atraumatic.   Eyes: Pupils are equal, round, and reactive to light. EOM are normal.   Neck: Normal range of motion. Neck supple.   No meningismus    Cardiovascular: Normal rate, regular rhythm and normal heart sounds. Exam reveals no gallop and no friction rub.   No murmur heard.  Pulmonary/Chest: Effort normal and breath sounds normal. No respiratory distress.   Abdominal: Soft. Bowel sounds are normal. She exhibits no distension and no mass. There is tenderness (mild). There is no rebound and no guarding.   Musculoskeletal: Normal range of motion. She exhibits no edema.   Good ROM of bilateral shoulders   Lymphadenopathy:     She has no cervical adenopathy.   Neurological: She is alert and oriented to person, place, and time. She has normal sensation and normal strength.   Skin: Skin is warm and dry. No rash noted.   Psychiatric: Mood and affect normal.   Nursing note and vitals reviewed.      LAB RESULTS  Lab Results (last 24 hours)     Procedure Component Value Units Date/Time    CBC & Differential [[id number]] Collected:  12/12/19 1539    Specimen:  Blood Updated:  12/12/19 1715    Narrative:       The following orders were created for panel order CBC & Differential.  Procedure                               Abnormality         Status                     ---------                               -----------         ------                     CBC Auto Differential[[id number]]        Abnormal            Final result                  Please view results for these tests on the individual orders.    Comprehensive Metabolic Panel [[id number]]  (Abnormal) Collected:  12/12/19 1539    Specimen:  Blood Updated:  12/12/19 1617     Glucose 136 mg/dL      BUN 15 mg/dL      Creatinine 0.72 mg/dL      Sodium 138 mmol/L      Potassium 3.8 mmol/L      Chloride 101 mmol/L      CO2 23.9 mmol/L      Calcium 9.0 mg/dL      Total Protein 7.1 g/dL      Albumin 4.00 g/dL      ALT (SGPT) 24 U/L      AST (SGOT) 23 U/L      Alkaline Phosphatase 124 U/L      Total Bilirubin 0.6 mg/dL      eGFR Non African Amer 85 mL/min/1.73      Globulin 3.1 gm/dL      A/G Ratio 1.3 g/dL      BUN/Creatinine Ratio 20.8     Anion Gap 13.1 mmol/L     Narrative:       GFR Normal >60  Chronic Kidney Disease <60  Kidney Failure <15      Lipase [[id number]]  (Normal) Collected:  12/12/19 1539    Specimen:  Blood Updated:  12/12/19 1617     Lipase 21 U/L     Troponin [[id number]]  (Normal) Collected:  12/12/19 1539    Specimen:  Blood Updated:  12/12/19 1623     Troponin T <0.010 ng/mL     Narrative:       Troponin T Reference Range:  <= 0.03 ng/mL-   Negative for AMI  >0.03 ng/mL-     Abnormal for myocardial necrosis.  Clinicians would have to utilize clinical acumen, EKG, Troponin and serial changes to determine if it is an Acute Myocardial Infarction or myocardial injury due to an underlying chronic condition.     Blood Culture - Blood, Arm, Left [[id number]] Collected:  12/12/19 1539    Specimen:  Blood from Arm, Left Updated:  12/12/19 1546    Lactic Acid, Plasma [[id number]]  (Normal) Collected:  12/12/19 1539    Specimen:  Blood Updated:  12/12/19 1604     Lactate 1.5 mmol/L     Procalcitonin [[id number]]  (Normal) Collected:  12/12/19 1539    Specimen:  Blood Updated:  12/12/19 1623     Procalcitonin 0.19 ng/mL     Narrative:       As a Marker for Sepsis (Non-Neonates):   1. <0.5 ng/mL represents a low risk of severe sepsis and/or septic shock.  1. >2 ng/mL represents a high risk of  "severe sepsis and/or septic shock.    As a Marker for Lower Respiratory Tract Infections that require antibiotic therapy:  PCT on Admission     Antibiotic Therapy             6-12 Hrs later  > 0.5                Strongly Recommended            >0.25 - <0.5         Recommended  0.1 - 0.25           Discouraged                   Remeasure/reassess PCT  <0.1                 Strongly Discouraged          Remeasure/reassess PCT      As 28 day mortality risk marker: \"Change in Procalcitonin Result\" (> 80 % or <=80 %) if Day 0 (or Day 1) and Day 4 values are available. Refer to http://www.[facility] [place]-pct-calculator.com/   Change in PCT <=80 %   A decrease of PCT levels below or equal to 80 % defines a positive change in PCT test result representing a higher risk for 28-day all-cause mortality of patients diagnosed with severe sepsis or septic shock.  Change in PCT > 80 %   A decrease of PCT levels of more than 80 % defines a negative change in PCT result representing a lower risk for 28-day all-cause mortality of patients diagnosed with severe sepsis or septic shock.                  CBC Auto Differential [[id number]]  (Abnormal) Collected:  12/12/19 1539    Specimen:  Blood Updated:  12/12/19 1554     WBC 11.47 10*3/mm3      RBC 4.83 10*6/mm3      Hemoglobin 14.4 g/dL      Hematocrit 42.8 %      MCV 88.6 fL      MCH 29.8 pg      MCHC 33.6 g/dL      RDW 13.2 %      RDW-SD 43.1 fl      MPV 8.6 fL      Platelets 321 10*3/mm3      Neutrophil % 95.1 %      Lymphocyte % 1.8 %      Monocyte % 2.4 %      Eosinophil % 0.0 %      Basophil % 0.2 %      Immature Grans % 0.5 %      Neutrophils, Absolute 10.90 10*3/mm3      Lymphocytes, Absolute 0.21 10*3/mm3      Monocytes, Absolute 0.28 10*3/mm3      Eosinophils, Absolute 0.00 10*3/mm3      Basophils, Absolute 0.02 10*3/mm3      Immature Grans, Absolute 0.06 10*3/mm3      nRBC 0.0 /100 WBC     Respiratory Panel, PCR - Swab, Nasopharynx [[id number]]  (Normal) Collected:  12/12/19 1550    " Specimen:  Swab from Nasopharynx Updated:  12/12/19 1701     ADENOVIRUS, PCR Not Detected     Coronavirus 229E Not Detected     Coronavirus HKU1 Not Detected     Coronavirus NL63 Not Detected     Coronavirus OC43 Not Detected     Human Metapneumovirus Not Detected     Human Rhinovirus/Enterovirus Not Detected     Influenza B PCR Not Detected     Parainfluenza Virus 1 Not Detected     Parainfluenza Virus 2 Not Detected     Parainfluenza Virus 3 Not Detected     Parainfluenza Virus 4 Not Detected     Bordetella pertussis pcr Not Detected     Influenza A H1 2009 PCR Not Detected     Chlamydophila pneumoniae PCR Not Detected     Mycoplasma pneumo by PCR Not Detected     Influenza A PCR Not Detected     Influenza A H3 Not Detected     Influenza A H1 Not Detected     RSV, PCR Not Detected     Bordetella parapertussis PCR Not Detected    Blood Culture - Blood, Arm, Left [[id number]] Collected:  12/12/19 1555    Specimen:  Blood from Arm, Left Updated:  12/12/19 1604    Urinalysis With Microscopic If Indicated (No Culture) - Urine, Clean Catch [[id number]]  (Normal) Collected:  12/12/19 1711    Specimen:  Urine, Clean Catch Updated:  12/12/19 1729     Color, UA Yellow     Appearance, UA Clear     pH, UA 8.0     Specific Gravity, UA >=1.030     Glucose, UA Negative     Ketones, UA Negative     Bilirubin, UA Negative     Blood, UA Negative     Protein, UA Negative     Leuk Esterase, UA Negative     Nitrite, UA Negative     Urobilinogen, UA 1.0 E.U./dL    Narrative:       Urine microscopic not indicated.          I ordered the above labs and reviewed the results    RADIOLOGY  CT Abdomen Pelvis With Contrast   Final Result   Small stable hemangioma in the dome of the liver. Small   probable chronic right hydrosalpinx. Mild hepatic steatosis. Solitary   very tiny 1 mm diameter nonobstructing left renal calculus. Otherwise   unremarkable CT scan of the abdomen and pelvis. No acute process is   identified.       This report was  finalized on 12/12/2019 5:06 PM by Dr. Tan Juan M.D.          XR Chest 2 View   Final Result           I ordered the above noted radiological studies. Interpreted by radiologist. Discussed with radiologist (Drake). Reviewed by me in PACS.       PROCEDURES  Procedures    EKG          EKG time: 1509  Rhythm/Rate: sinus tachycardia 112 BPM  occasional PVC  P waves and MS: LAE  QRS, axis: normal axis, poor R wave progression anteriorly   ST and T waves: normal     Interpreted Contemporaneously by me, independently viewed  Rate is now faster compared to prior 11/2/18      PROGRESS AND CONSULTS     1512-Discussed with pt the plan to review lab work, CXR, and CT abd/pelvis. Informed pt that her EKG is unremarkable besides being slightly tachycardic. The patient indicates understanding of these issues and agrees with the plan.    1706-Per  (radiology), CT abd/pelvis is negative acute.    1743-Rechecked pt. Pt is resting comfortably. Pt states that nausea has improved mildly with IVF and phenergan. Notified pt of CT abd/pelvis-negative acute except for incidental renal stone finding in the left kidney. Notified pt of lactate-1.5, troponin <0.010, WBC-11.47, and respiratory panel-negative. Informed pt of plan to repeat IVF and order zofran for nausea as sx are likely due to viral infection. Pt understands and agrees with the plan, all questions answered.    1853-Rechecked pt. Pt is resting comfortably and states she feels improved. Notified pt of stable vitals and that sx are likely viral in nature. Discussed the plan to discharge the pt home with prescriptions for zofran. I instructed the pt to f/u with her PCP for further evaluation and care. RTER warnings given. Pt understands and agrees with the plan, all questions answered.      MEDICAL DECISION MAKING  Results were reviewed/discussed with the patient and they were also made aware of online access. Pt also made aware that some labs, such as cultures,  will not be resulted during ER visit and follow up with PMD is necessary.     MDM  Number of Diagnoses or Management Options  GE (gastroenteritis):   Non-intractable vomiting with nausea, unspecified vomiting type:      Amount and/or Complexity of Data Reviewed  Clinical lab tests: reviewed and ordered (UA-negative  troponin <0.010  lactate-1.5  respiratory panel-negative  WBC-11.47)  Tests in the radiology section of CPT®: reviewed and ordered (CT abd/pelvis-negative acute)  Tests in the medicine section of CPT®: reviewed (See EKG procedure note. )  Discussion of test results with the performing providers: yes (Dr. Juan-Radiolgy)  Decide to obtain previous medical records or to obtain history from someone other than the patient: yes  Review and summarize past medical records: yes (Colonoscopy 9/2019 was unremarkable except for internal hemorrhoids )  Independent visualization of images, tracings, or specimens: yes           DIAGNOSIS  Final diagnoses:   Non-intractable vomiting with nausea, unspecified vomiting type   GE (gastroenteritis)       DISPOSITION  DISCHARGE    Patient discharged in stable condition.    Reviewed implications of results, diagnosis, meds, responsibility to follow up, warning signs and symptoms of possible worsening, potential complications and reasons to return to ER.    Patient/Family voiced understanding of above instructions.    Discussed plan for discharge, as there is no emergent indication for admission. Patient referred to primary care provider for BP management due to today's BP. Pt/family is agreeable and understands need for follow up and repeat testing.  Pt is aware that discharge does not mean that nothing is wrong but it indicates no emergency is present that requires admission and they must continue care with follow-up as given below or physician of their choice.     FOLLOW-UP  Ruben Kwan Jr., MD  [address]  [address]  [phone]    Schedule an appointment as soon as possible for a visit   If symptoms worsen or do not improve         Medication List      New Prescriptions    ondansetron 4 MG tablet  Commonly known as:  ZOFRAN  Take 1 tablet by mouth Every 6 (Six) Hours.                Latest Documented Vital Signs:  As of 6:56 PM  BP- 147/84 HR- 103 Temp- 100.4 °F (38 °C) (Oral) O2 sat- 98%    --  Documentation assistance provided by timmy Rodrigez for MD Gregorio.  Information recorded by the scribe was done at my direction and has been verified and validated by me.            Della Rodrigez  12/12/19 1856       Jagjit Bob MD  12/12/19 1936

## 2019-12-17 LAB
BACTERIA SPEC AEROBE CULT: NORMAL
BACTERIA SPEC AEROBE CULT: NORMAL

## 2019-12-26 ENCOUNTER — CLINICAL SUPPORT NO REQUIREMENTS (OUTPATIENT)
Dept: CARDIOLOGY | Facility: CLINIC | Age: 49
End: 2019-12-26

## 2019-12-26 DIAGNOSIS — Z95.818 STATUS POST PLACEMENT OF IMPLANTABLE LOOP RECORDER: Primary | ICD-10-CM

## 2019-12-26 PROCEDURE — 93298 REM INTERROG DEV EVAL SCRMS: CPT | Performed by: INTERNAL MEDICINE

## 2019-12-26 PROCEDURE — 93299 PR REM INTERROG ICPMS/SCRMS <30 D TECH REVIEW: CPT | Performed by: INTERNAL MEDICINE

## 2020-01-26 ENCOUNTER — CLINICAL SUPPORT NO REQUIREMENTS (OUTPATIENT)
Dept: CARDIOLOGY | Facility: CLINIC | Age: 50
End: 2020-01-26

## 2020-01-26 DIAGNOSIS — Z95.818 STATUS POST PLACEMENT OF IMPLANTABLE LOOP RECORDER: Primary | ICD-10-CM

## 2020-01-26 PROCEDURE — 93298 REM INTERROG DEV EVAL SCRMS: CPT | Performed by: INTERNAL MEDICINE

## 2020-02-26 ENCOUNTER — CLINICAL SUPPORT NO REQUIREMENTS (OUTPATIENT)
Dept: CARDIOLOGY | Facility: CLINIC | Age: 50
End: 2020-02-26

## 2020-02-26 DIAGNOSIS — Z95.818 STATUS POST PLACEMENT OF IMPLANTABLE LOOP RECORDER: Primary | ICD-10-CM

## 2020-02-26 PROCEDURE — 93298 REM INTERROG DEV EVAL SCRMS: CPT | Performed by: INTERNAL MEDICINE

## 2020-03-28 ENCOUNTER — CLINICAL SUPPORT NO REQUIREMENTS (OUTPATIENT)
Dept: CARDIOLOGY | Facility: CLINIC | Age: 50
End: 2020-03-28

## 2020-03-28 DIAGNOSIS — Z95.818 STATUS POST PLACEMENT OF IMPLANTABLE LOOP RECORDER: Primary | ICD-10-CM

## 2020-03-28 PROCEDURE — 93298 REM INTERROG DEV EVAL SCRMS: CPT | Performed by: INTERNAL MEDICINE

## 2020-04-28 ENCOUNTER — CLINICAL SUPPORT NO REQUIREMENTS (OUTPATIENT)
Dept: CARDIOLOGY | Facility: CLINIC | Age: 50
End: 2020-04-28

## 2020-04-28 DIAGNOSIS — Z95.818 STATUS POST PLACEMENT OF IMPLANTABLE LOOP RECORDER: Primary | ICD-10-CM

## 2020-04-28 PROCEDURE — 93298 REM INTERROG DEV EVAL SCRMS: CPT | Performed by: INTERNAL MEDICINE

## 2020-05-01 ENCOUNTER — TELEPHONE (OUTPATIENT)
Dept: CARDIOLOGY | Facility: CLINIC | Age: 50
End: 2020-05-01

## 2020-05-01 NOTE — TELEPHONE ENCOUNTER
Patient states that she was supposed to be placed on repatha 1 yr ago. Has has not heard anything since. Please advise

## 2020-05-04 NOTE — TELEPHONE ENCOUNTER
Pt Repatha was denied. Pt also will need follow up.  LOV 3/01/19.  Per last office note from Raheem FARR pt to follow up in 1 month with labs.   Will discuss with Raheem FARR.

## 2020-05-05 DIAGNOSIS — G45.9 TIA (TRANSIENT ISCHEMIC ATTACK): ICD-10-CM

## 2020-05-05 DIAGNOSIS — R00.0 TACHYCARDIA: Primary | ICD-10-CM

## 2020-05-05 DIAGNOSIS — R00.2 PALPITATIONS: ICD-10-CM

## 2020-05-05 DIAGNOSIS — E78.5 HYPERLIPIDEMIA, UNSPECIFIED HYPERLIPIDEMIA TYPE: ICD-10-CM

## 2020-05-05 DIAGNOSIS — I48.0 PAROXYSMAL ATRIAL FIBRILLATION (HCC): ICD-10-CM

## 2020-05-05 RX ORDER — PRAVASTATIN SODIUM 40 MG
40 TABLET ORAL DAILY
Qty: 30 TABLET | Refills: 2 | Status: SHIPPED | OUTPATIENT
Start: 2020-05-05 | End: 2020-08-06

## 2020-05-05 NOTE — TELEPHONE ENCOUNTER
PER AZALEA FARR PT TO START PRAVASTATIN 40 MG QD FOLLOW UP IN 2 MONTH WITH CMP AND LIPID    PT VERBALIZED UNDERSTANDING   RX SENT TO [facility]

## 2020-05-29 ENCOUNTER — CLINICAL SUPPORT NO REQUIREMENTS (OUTPATIENT)
Dept: CARDIOLOGY | Facility: CLINIC | Age: 50
End: 2020-05-29

## 2020-05-29 DIAGNOSIS — Z95.818 STATUS POST PLACEMENT OF IMPLANTABLE LOOP RECORDER: Primary | ICD-10-CM

## 2020-05-29 PROCEDURE — 93298 REM INTERROG DEV EVAL SCRMS: CPT | Performed by: INTERNAL MEDICINE

## 2020-06-29 ENCOUNTER — CLINICAL SUPPORT NO REQUIREMENTS (OUTPATIENT)
Dept: CARDIOLOGY | Facility: CLINIC | Age: 50
End: 2020-06-29

## 2020-06-29 DIAGNOSIS — Z95.818 STATUS POST PLACEMENT OF IMPLANTABLE LOOP RECORDER: Primary | ICD-10-CM

## 2020-06-29 PROCEDURE — 93298 REM INTERROG DEV EVAL SCRMS: CPT | Performed by: INTERNAL MEDICINE

## 2020-07-07 ENCOUNTER — TELEPHONE (OUTPATIENT)
Dept: CARDIOLOGY | Facility: CLINIC | Age: 50
End: 2020-07-07

## 2020-07-08 NOTE — TELEPHONE ENCOUNTER
Pt states she stopped taking yesterday.     Pt has labs scheduled 7/17/20 and follow up 8/6/20    Per Raheem will wait until labs are resulted and discuss further.  Pt can continue to hold until then

## 2020-07-17 ENCOUNTER — HOSPITAL ENCOUNTER (OUTPATIENT)
Dept: CARDIOLOGY | Facility: HOSPITAL | Age: 50
Discharge: HOME | End: 2020-07-17
Admitting: NURSE PRACTITIONER

## 2020-07-17 DIAGNOSIS — R00.2 PALPITATIONS: ICD-10-CM

## 2020-07-17 DIAGNOSIS — R00.0 TACHYCARDIA: ICD-10-CM

## 2020-07-17 DIAGNOSIS — G45.9 TIA (TRANSIENT ISCHEMIC ATTACK): ICD-10-CM

## 2020-07-17 DIAGNOSIS — E78.5 HYPERLIPIDEMIA, UNSPECIFIED HYPERLIPIDEMIA TYPE: ICD-10-CM

## 2020-07-17 DIAGNOSIS — I48.0 PAROXYSMAL ATRIAL FIBRILLATION (HCC): ICD-10-CM

## 2020-07-17 LAB
ALBUMIN SERPL-MCNC: 4.2 G/DL (ref 3.5–5.2)
ALBUMIN/GLOB SERPL: 1.4 G/DL
ALP SERPL-CCNC: 113 U/L (ref 39–117)
ALT SERPL W P-5'-P-CCNC: 26 U/L (ref 1–33)
ANION GAP SERPL CALCULATED.3IONS-SCNC: 11.2 MMOL/L (ref 5–15)
AST SERPL-CCNC: 20 U/L (ref 1–32)
BILIRUB SERPL-MCNC: 0.3 MG/DL (ref 0–1.2)
BUN SERPL-MCNC: 16 MG/DL (ref 6–20)
BUN/CREAT SERPL: 18.8 (ref 7–25)
CALCIUM SPEC-SCNC: 9.6 MG/DL (ref 8.6–10.5)
CHLORIDE SERPL-SCNC: 106 MMOL/L (ref 98–107)
CHOLEST SERPL-MCNC: 212 MG/DL (ref 0–200)
CO2 SERPL-SCNC: 24.8 MMOL/L (ref 22–29)
CREAT SERPL-MCNC: 0.85 MG/DL (ref 0.57–1)
GFR SERPL CREATININE-BSD FRML MDRD: 70 ML/MIN/1.73
GLOBULIN UR ELPH-MCNC: 2.9 GM/DL
GLUCOSE SERPL-MCNC: 98 MG/DL (ref 65–99)
HDLC SERPL-MCNC: 42 MG/DL (ref 40–60)
LDLC SERPL CALC-MCNC: 155 MG/DL (ref 0–100)
LDLC/HDLC SERPL: 3.7 {RATIO}
POTASSIUM SERPL-SCNC: 4.6 MMOL/L (ref 3.5–5.2)
PROT SERPL-MCNC: 7.1 G/DL (ref 6–8.5)
SODIUM SERPL-SCNC: 142 MMOL/L (ref 136–145)
TRIGL SERPL-MCNC: 73 MG/DL (ref 0–150)
VLDLC SERPL-MCNC: 14.6 MG/DL (ref 5–40)

## 2020-07-17 PROCEDURE — 80053 COMPREHEN METABOLIC PANEL: CPT | Performed by: NURSE PRACTITIONER

## 2020-07-17 PROCEDURE — 36415 COLL VENOUS BLD VENIPUNCTURE: CPT | Performed by: NURSE PRACTITIONER

## 2020-07-17 PROCEDURE — 80061 LIPID PANEL: CPT | Performed by: NURSE PRACTITIONER

## 2020-07-20 ENCOUNTER — TRANSCRIBE ORDERS (OUTPATIENT)
Dept: ADMINISTRATIVE | Facility: HOSPITAL | Age: 50
End: 2020-07-20

## 2020-07-20 DIAGNOSIS — E03.9 HYPOTHYROIDISM, UNSPECIFIED TYPE: Primary | ICD-10-CM

## 2020-07-22 ENCOUNTER — TELEPHONE (OUTPATIENT)
Dept: CARDIOLOGY | Facility: CLINIC | Age: 50
End: 2020-07-22

## 2020-07-22 NOTE — TELEPHONE ENCOUNTER
----- Message from YORDAN Mccloud sent at 7/21/2020  7:21 PM EDT -----  Please let her know her cmp was good. Her ldl was 255 and . She is currently on a statin. Please educate her on diet.  If she continues to have joint pain, then we could try her on repatha.    ThanksRaheem

## 2020-07-23 ENCOUNTER — HOSPITAL ENCOUNTER (OUTPATIENT)
Dept: ULTRASOUND IMAGING | Facility: HOSPITAL | Age: 50
Discharge: HOME | End: 2020-07-23
Admitting: FAMILY MEDICINE

## 2020-07-23 DIAGNOSIS — E03.9 HYPOTHYROIDISM, UNSPECIFIED TYPE: ICD-10-CM

## 2020-07-23 PROCEDURE — 76536 US EXAM OF HEAD AND NECK: CPT

## 2020-08-06 ENCOUNTER — OFFICE VISIT (OUTPATIENT)
Dept: CARDIOLOGY | Facility: CLINIC | Age: 50
End: 2020-08-06

## 2020-08-06 VITALS
HEART RATE: 80 BPM | WEIGHT: 214 LBS | SYSTOLIC BLOOD PRESSURE: 119 MMHG | HEIGHT: 68 IN | BODY MASS INDEX: 32.43 KG/M2 | DIASTOLIC BLOOD PRESSURE: 75 MMHG

## 2020-08-06 DIAGNOSIS — I48.0 PAROXYSMAL ATRIAL FIBRILLATION (HCC): ICD-10-CM

## 2020-08-06 DIAGNOSIS — R00.2 PALPITATIONS: ICD-10-CM

## 2020-08-06 DIAGNOSIS — Z78.9 STATIN INTOLERANCE: Primary | ICD-10-CM

## 2020-08-06 DIAGNOSIS — E78.5 HYPERLIPIDEMIA, UNSPECIFIED HYPERLIPIDEMIA TYPE: ICD-10-CM

## 2020-08-06 PROCEDURE — 99214 OFFICE O/P EST MOD 30 MIN: CPT | Performed by: INTERNAL MEDICINE

## 2020-08-06 PROCEDURE — 93000 ELECTROCARDIOGRAM COMPLETE: CPT | Performed by: INTERNAL MEDICINE

## 2020-08-06 RX ORDER — ROSUVASTATIN CALCIUM 5 MG/1
5 TABLET, COATED ORAL DAILY
Qty: 30 TABLET | Refills: 11 | Status: SHIPPED | OUTPATIENT
Start: 2020-08-06 | End: 2021-09-17 | Stop reason: SDUPTHER

## 2020-08-06 NOTE — PROGRESS NOTES
Subjective:        Amirah Godoy is a 50 y.o. female who here for follow up    CC  hyperlipidemia  HPI  50-year-old female with a history of the palpitations paroxysmal atrial fibrillation hyperlipidemia statin intolerance here for the follow-up with no complaints of chest pains or tightness in the chest     Problem List Items Addressed This Visit        Cardiovascular and Mediastinum    Palpitations    Relevant Orders    Lipid Panel    Comprehensive Metabolic Panel    Thyroid Panel With TSH    Paroxysmal atrial fibrillation (CMS/HCC)    Relevant Orders    Lipid Panel    Comprehensive Metabolic Panel    Thyroid Panel With TSH    Hyperlipidemia    Relevant Medications    rosuvastatin (CRESTOR) 5 MG tablet    Other Relevant Orders    Lipid Panel    Comprehensive Metabolic Panel    Thyroid Panel With TSH       Other    Statin intolerance - Primary    Relevant Orders    Lipid Panel    Comprehensive Metabolic Panel    Thyroid Panel With TSH        .    The following portions of the patient's history were reviewed and updated as appropriate: allergies, current medications, past family history, past medical history, past social history, past surgical history and problem list.    Past Medical History:   Diagnosis Date   • Allergic    • Asthma    • Depression    • History of atrial fibrillation 2011    NO CURRENT MEDICATION   • Hyperlipidemia    • PVC (premature ventricular contraction) 2015   • Reflux esophagitis    • Seasonal allergies    • Sinus infection     STARTED ON ANTIBIOTICS ON 10/30/17   • SVT (supraventricular tachycardia) (CMS/[facility]) 2011    NO CURRENT MEDICATIONS     reports that she has never smoked. She has never used smokeless tobacco. She reports that she does not drink alcohol or use drugs.   Family History   Problem Relation Age of Onset   • Depression Mother    • Diabetes Mother    • Heart disease Mother         +of mi at 68   • Hyperlipidemia Mother    • Hypertension Mother    • Heart disease Father   "       ptca x 5 and mi at 59   • Hyperlipidemia Father    • Hypertension Father    • Kidney disease Brother    • Birth defects Maternal Grandmother    • Heart disease Maternal Grandmother         multiple mi   • Birth defects Maternal Grandfather    • Heart disease Maternal Grandfather         + mi at 68   • Colon cancer Maternal Grandfather    • Heart disease Paternal Grandmother         weak heart   • Asthma Paternal Grandmother    • COPD Paternal Grandmother    • Kidney failure Paternal Grandfather    • Migraines Sister    • Malig Hyperthermia Neg Hx        Review of Systems  Constitutional: No wt loss, fever, fatigue  Gastrointestinal: No nausea, abdominal pain  Behavioral/Psych: No insomnia or anxiety   Cardiovascular no chest pains or tightness in the chest  Objective:       Physical Exam  /75   Pulse 80   Ht 172.7 cm (68\")   Wt 97.1 kg (214 lb)   BMI 32.54 kg/m²   General appearance: No acute changes   Neck: Trachea midline; NECK, supple, no thyromegaly or lymphadenopathy   Lungs: Normal size and shape, normal breath sounds, equal distribution of air, no rales and rhonchi   CV: S1-S2 regular, no murmurs, no rub, no gallop   Abdomen: Soft, non-tender; no masses , no abnormal abdominal sounds   Extremities: No deformity , normal color , no peripheral edema   Skin: Normal temperature, turgor and texture; no rash, ulcers            ECG 12 Lead  Date/Time: 8/6/2020 10:17 AM  Performed by: Niles Koch MD  Authorized by: Niles Koch MD   Comparison: compared with previous ECG   Similar to previous ECG  Rhythm: sinus rhythm  ST Flattening: all    Clinical impression: non-specific ECG          Total Cholesterol  0 - 200 mg/dL 212High     Triglycerides  0 - 150 mg/dL 73    HDL Cholesterol  40 - 60 mg/dL 42    LDL Cholesterol   0 - 100 mg/dL 155High     VLDL Cholesterol  5 - 40 mg/dL 14.6      Glucose  65 - 99 mg/dL 98  136High   103High   104High     BUN  6 - 20 mg/dL 16  15  15  20  "   Creatinine  0.57 - 1.00 mg/dL 0.85  0.72  0.97  0.89    Sodium  136 - 145 mmol/L 142  138  141  140    Potassium  3.5 - 5.2 mmol/L 4.6  3.8  3.7  4.5    Chloride  98 - 107 mmol/L 106  101  105  103    CO2  22.0 - 29.0 mmol/L 24.8  23.9  27.1  26.8    Calcium  8.6 - 10.5 mg/dL 9.6  9.0  9.3  9.3    Total Protein  6.0 - 8.5 g/dL 7.1  7.1  6.7  6.7    Albumin  3.50 - 5.20 g/dL 4.20  4.00  4.00  3.80    ALT (SGPT)  1 - 33 U/L 26  24  45High   133High     AST (SGOT)  1 - 32 U/L 20  23  32  55High     Alkaline Phosphatase  39 - 117 U/L 113  124High   121High   96    Total Bilirubin  0.0 - 1.2 mg/dL 0.3  0.6 R 0.4 R 0.4 R   eGFR Non African Amer  >60 mL/min/1.73 70  85  61  67    Globulin  gm/dL 2.9             Echocardiogram:        Current Outpatient Medications:   •  aspirin 325 MG tablet, Take 325 mg by mouth Daily., Disp: , Rfl:   •  buPROPion XL (WELLBUTRIN XL) 150 MG 24 hr tablet, Take 100 mg by mouth Daily., Disp: , Rfl:   •  cetirizine (ZyrTEC) 10 MG tablet, Take 10 mg by mouth daily., Disp: , Rfl:   •  citalopram (CeleXA) 20 MG tablet, Take 40 mg by mouth Daily., Disp: , Rfl:   •  FASENRA 30 MG/ML solution prefilled syringe, Every 2 (Two) Months., Disp: , Rfl:   •  Fluticasone-Umeclidin-Vilant (TRELEGY ELLIPTA IN), Inhale 1 inhaler Every Morning., Disp: , Rfl:   •  montelukast (SINGULAIR) 10 MG tablet, TAKE ONE TABLET BY MOUTH DAILY, Disp: 30 tablet, Rfl: 4  •  nitroglycerin (NITROSTAT) 0.4 MG SL tablet, 1 under the tongue as needed for angina, may repeat q5mins for up three doses, Disp: 100 tablet, Rfl: 11  •  omeprazole (priLOSEC) 40 MG capsule, Take 40 mg by mouth Daily., Disp: , Rfl:   •  zolpidem (AMBIEN) 5 MG tablet, Take 5 mg by mouth At Night As Needed for Sleep., Disp: , Rfl:   •  ondansetron (ZOFRAN) 4 MG tablet, Take 1 tablet by mouth Every 6 (Six) Hours., Disp: 12 tablet, Rfl: 0  •  pravastatin (Pravachol) 40 MG tablet, Take 1 tablet by mouth Daily., Disp: 30 tablet, Rfl: 2   Assessment:         Patient Active Problem List   Diagnosis   • Chest pain   • Reactive depression   • Seasonal allergic rhinitis due to pollen   • Right ovarian cyst   • History of atrial fibrillation   • Asthma with exacerbation   • Cough   • Weakness   • Tachycardia   • Influenza A   • Palpitations   • Swelling   • Essential hypertension   • Paroxysmal atrial fibrillation (CMS/HCC)   • Hyperlipidemia   • Precordial pain   • Visual loss   • Status post device closure of ASD   • Status post placement of implantable loop recorder   • Episode of recurrent major depressive disorder (CMS/HCC)   • TIA (transient ischemic attack)   • Hemiplegic migraine without status migrainosus, not intractable   • Anxiety   • Screen for colon cancer               Plan:            ICD-10-CM ICD-9-CM   1. Statin intolerance Z78.9 995.27   2. Palpitations R00.2 785.1   3. Paroxysmal atrial fibrillation (CMS/HCC) I48.0 427.31   4. Hyperlipidemia, unspecified hyperlipidemia type E78.5 272.4     1. Statin intolerance  We will discontinue Pravachol and start Crestor  - Lipid Panel  - Comprehensive Metabolic Panel  - Thyroid Panel With TSH    2. Palpitations  Under control  - Lipid Panel  - Comprehensive Metabolic Panel  - Thyroid Panel With TSH    3. Paroxysmal atrial fibrillation (CMS/HCC)  Under control  - Lipid Panel  - Comprehensive Metabolic Panel  - Thyroid Panel With TSH    4. Hyperlipidemia, unspecified hyperlipidemia type  Cholesterol has been started  - Lipid Panel  - Comprehensive Metabolic Panel  - Thyroid Panel With TSH       Dc pravachol    crestor 5 mg po three times a day    See in 1 month with labs  COUNSELING:    Amirah Kevin was given to patient for the following topics: diagnostic results, risk factor reductions, impressions, risks and benefits of treatment options and importance of treatment compliance .       SMOKING COUNSELING:    [unfilled]    Dictated using Dragon dictation

## 2020-09-03 ENCOUNTER — TRANSCRIBE ORDERS (OUTPATIENT)
Dept: ADMINISTRATIVE | Facility: HOSPITAL | Age: 50
End: 2020-09-03

## 2020-09-03 DIAGNOSIS — K11.8 PAROTID MASS: Primary | ICD-10-CM

## 2020-09-10 ENCOUNTER — HOSPITAL ENCOUNTER (OUTPATIENT)
Dept: CT IMAGING | Facility: HOSPITAL | Age: 50
Discharge: HOME | End: 2020-09-10
Admitting: OTOLARYNGOLOGY

## 2020-09-10 DIAGNOSIS — K11.8 PAROTID MASS: ICD-10-CM

## 2020-09-10 PROCEDURE — 25010000002 IOPAMIDOL 61 % SOLUTION: Performed by: OTOLARYNGOLOGY

## 2020-09-10 PROCEDURE — 70491 CT SOFT TISSUE NECK W/DYE: CPT

## 2020-09-10 PROCEDURE — 82565 ASSAY OF CREATININE: CPT

## 2020-09-10 RX ADMIN — IOPAMIDOL 85 ML: 612 INJECTION, SOLUTION INTRAVENOUS at 08:34

## 2020-09-11 LAB — CREAT BLDA-MCNC: 0.8 MG/DL (ref 0.6–1.3)

## 2020-09-14 ENCOUNTER — OFFICE VISIT (OUTPATIENT)
Dept: SLEEP MEDICINE | Facility: HOSPITAL | Age: 50
End: 2020-09-14

## 2020-09-14 VITALS
SYSTOLIC BLOOD PRESSURE: 137 MMHG | BODY MASS INDEX: 31.98 KG/M2 | DIASTOLIC BLOOD PRESSURE: 82 MMHG | WEIGHT: 211 LBS | HEART RATE: 72 BPM | OXYGEN SATURATION: 97 % | HEIGHT: 68 IN

## 2020-09-14 DIAGNOSIS — G47.33 OSA (OBSTRUCTIVE SLEEP APNEA): Primary | ICD-10-CM

## 2020-09-14 PROCEDURE — G0463 HOSPITAL OUTPT CLINIC VISIT: HCPCS

## 2020-09-14 NOTE — PROGRESS NOTES
[facility]      Patient Care Team:  Ruben Kwan Jr., MD as PCP - General (Family Medicine)    Referring Provider: Ruben Kwan Jr., MD    Chief complaint:   Snoring and daytime fatigue.    History of present illness:    Subjective   This is a 50 year old female patient with hx of allergic asthma. She reported that she had a colonoscopy recently and was told that she had a hard time waking up from anesthesia and was having obstructed breathing and sleep apnea testing was recommended.  This was also recommended by her allergist.    She did endorse loud snoring which does awaken her sometimes at night.  She denies periods of apnea but stated that she wakes up with a dry mouth and she feels like she tosses and turns throughout the night.  She also reported excessive daytime fatigue.    Sleep schedule:  -Bedtime: 11 PM  -Sleep latency: 5-10 minutes  -Wake up time: 5-9 AM, does not feel refreshed  -Perceived sleep hours: 6      ESS: Total score: 8.    Asthma seems to be controlled.  She takes Fasenra shots.    Review of Systems  Constitutional: No fever or chills. No changes in appetite.   ENMT: No sinus congestion, postnasal drip, hoarsness  Cardiovascular: No chest pain, palpitation or legs swelling.    Respiratory: No dyspnea, cough or wheezing.  Gastrointestinal: No constipation, diarrhea, abdominal pain or acid reflux  Neurology: No headache, weakness, numbness or dizziness.   Musculoskeletal: Pain muscle and joints but no swelling.  Psychiatry: Anxiety and depression.  Hem/Lymphatic: No swollen glands or easy bruising.  Integumentary: No rash.  Endocrinology: No excessive thirst, cold or warm intolerance.   Urinary: No dysuria, bloody urine or frequent urination.     History  Past Medical History:   Diagnosis Date   • Allergic    • Asthma    • Depression    • History of atrial fibrillation 2011    NO CURRENT MEDICATION   • Hyperlipidemia    • PVC (premature  ventricular contraction)    • Reflux esophagitis    • Seasonal allergies    • Sinus infection     STARTED ON ANTIBIOTICS ON 10/30/17   • SVT (supraventricular tachycardia) (CMS/HCC)     NO CURRENT MEDICATIONS   ,   Past Surgical History:   Procedure Laterality Date   • ATRIAL SEPTAL DEFECT REPAIR     • BRONCHOSCOPY N/A 2018    Procedure: BRONCHOSCOPY;  Surgeon: Scott Dobson MD;  Location:  [facility];  Service:    • CARDIAC ABLATION      DR. NADEEM SCHUMACHER --   • CARDIAC CATHETERIZATION N/A 2018    Procedure: Left Heart Cath;  Surgeon: Niles Koch MD;  Location:  [facility];  Service: Cardiology   • CARDIAC CATHETERIZATION N/A 2018    Procedure: Coronary angiography;  Surgeon: Niles Koch MD;  Location:  [facility];  Service: Cardiology   • CARDIAC CATHETERIZATION N/A 2018    Procedure: Left ventriculography;  Surgeon: Niles Koch MD;  Location:  [facility];  Service: Cardiology   • CARDIAC ELECTROPHYSIOLOGY PROCEDURE N/A 2018    Procedure: Loop insertion;  Surgeon: Niles Koch MD;  Location:  [facility];  Service: Cardiovascular   •  SECTION  2000   • COLONOSCOPY N/A 2019    Procedure: COLONOSCOPY TO CECUM AND TI;  Surgeon: Jignesh Black MD;  [facility];  Service: Gastroenterology   • DIAGNOSTIC LAPAROSCOPY Right 11/3/2017    Procedure: LAPAROSCOPIC RIGHT OOPHERECTOMY ;  Surgeon: Claudine Barron MD;  Location: [facility] MAIN OR;  Service:    • OVARIAN CYST REMOVAL  2017   • WISDOM TOOTH EXTRACTION     ,   Family History   Problem Relation Age of Onset   • Depression Mother    • Diabetes Mother    • Heart disease Mother         +of mi at 68   • Hyperlipidemia Mother    • Hypertension Mother    • Heart disease Father         ptca x 5 and mi at 59   • Hyperlipidemia Father    • Hypertension Father    • Kidney disease Brother    •  Birth defects Maternal Grandmother    • Heart disease Maternal Grandmother         multiple mi   • Birth defects Maternal Grandfather    • Heart disease Maternal Grandfather         + mi at 68   • Colon cancer Maternal Grandfather    • Heart disease Paternal Grandmother         weak heart   • Asthma Paternal Grandmother    • COPD Paternal Grandmother    • Kidney failure Paternal Grandfather    • Migraines Sister    • Malig Hyperthermia Neg Hx    ,   Social History     Tobacco Use   • Smoking status: Never Smoker   • Smokeless tobacco: Never Used   Substance Use Topics   • Alcohol use: No   • Drug use: No     E-cigarette/Vaping     E-cigarette/Vaping Substances     E-cigarette/Vaping Devices        and Allergies:  Demerol [meperidine] and Sulfa antibiotics    Medications:    Current Outpatient Medications:   •  aspirin 325 MG tablet, Take 325 mg by mouth Daily., Disp: , Rfl:   •  buPROPion XL (WELLBUTRIN XL) 150 MG 24 hr tablet, Take 100 mg by mouth Daily., Disp: , Rfl:   •  cetirizine (ZyrTEC) 10 MG tablet, Take 10 mg by mouth daily., Disp: , Rfl:   •  citalopram (CeleXA) 20 MG tablet, Take 40 mg by mouth Daily., Disp: , Rfl:   •  FASENRA 30 MG/ML solution prefilled syringe, Every 2 (Two) Months., Disp: , Rfl:   •  Fluticasone-Umeclidin-Vilant (TRELEGY ELLIPTA IN), Inhale 1 inhaler Every Morning., Disp: , Rfl:   •  montelukast (SINGULAIR) 10 MG tablet, TAKE ONE TABLET BY MOUTH DAILY, Disp: 30 tablet, Rfl: 4  •  nitroglycerin (NITROSTAT) 0.4 MG SL tablet, 1 under the tongue as needed for angina, may repeat q5mins for up three doses, Disp: 100 tablet, Rfl: 11  •  omeprazole (priLOSEC) 40 MG capsule, Take 40 mg by mouth Daily., Disp: , Rfl:   •  ondansetron (ZOFRAN) 4 MG tablet, Take 1 tablet by mouth Every 6 (Six) Hours., Disp: 12 tablet, Rfl: 0  •  rosuvastatin (CRESTOR) 5 MG tablet, Take 1 tablet by mouth Daily., Disp: 30 tablet, Rfl: 11  •  zolpidem (AMBIEN) 5 MG tablet, Take 5 mg by mouth At Night As Needed for  "Sleep., Disp: , Rfl:       Objective   Vital Signs:  Vitals:    09/14/20 1405   BP: 137/82   Pulse: 72   SpO2: 97%   Weight: 95.7 kg (211 lb)   Height: 172.7 cm (68\")     Body mass index is 32.08 kg/m².  Neck Circumference: 14.75 inches     Physical Exam:  Neck Circumference: 14.75 inches     Constitutional: Not in acute distress.  Eyes: Injected conjunctiva, EOMI. pupils equal reactive to light.  ENMT: Stevens score 2. Mallampati score 2. No oral thrush. Tonsils grade 2. Narrow distance in between the posterior pharyngeal pillars (<25%).  Neck: No thyromegaly.  Trachea midline.  Heart: Regular rhythm and rate, no murmur  Lungs: Good and equal air entry bilaterally. No crackles or wheezing.  Nonlabored breathing.       Abdomen: Obese.  Soft.  No tenderness.  Positive bowel sounds.  Extremities: No cyanosis, clubbing or pitting edema.  Warm extremities and well-perfused.  Neuro: Conscious, alert, oriented x3.  Gait is normal.  Strength 5/5 in arms and legs.  Psych: Appropriate mood and affect.    Integumentary: No rash.  Normal skin turgor.  Lymphatic: No palpable cervical or supraclavicular lymph nodes.    Diagnostic data:  Echo on 11/3/2018: EF 62%; RVSP <35    Assessment   1. Snoring, probable BETTY  2. Obesity (BMI 32)  3. Persistent asthma of unknown severity.      Plan:  Check HST. We discussed the pathophysiology of sleep apnea, testing and therapy which include CPAP and weight loss.  Patient is agreeable with testing and would like to have oral appliance if needed due to claustrophobia.    Counseled for weight loss.  Encouraged to exercise regularly and cut down on carbohydrates.  Discussed that losing weight may decrease the severity of sleep apnea and obviate the need of CPAP therapy.          Davidson Cyr MD  09/14/20  14:28 EDT    This note was dictated utilizing Dragon dictation          "

## 2020-09-16 ENCOUNTER — HOSPITAL ENCOUNTER (OUTPATIENT)
Dept: CARDIOLOGY | Facility: HOSPITAL | Age: 50
Discharge: HOME | End: 2020-09-16
Admitting: INTERNAL MEDICINE

## 2020-09-16 LAB
ALBUMIN SERPL-MCNC: 4 G/DL (ref 3.5–5.2)
ALBUMIN/GLOB SERPL: 1.4 G/DL
ALP SERPL-CCNC: 116 U/L (ref 39–117)
ALT SERPL W P-5'-P-CCNC: 22 U/L (ref 1–33)
ANION GAP SERPL CALCULATED.3IONS-SCNC: 6.8 MMOL/L (ref 5–15)
AST SERPL-CCNC: 21 U/L (ref 1–32)
BILIRUB SERPL-MCNC: 0.3 MG/DL (ref 0–1.2)
BUN SERPL-MCNC: 16 MG/DL (ref 6–20)
BUN/CREAT SERPL: 21.9 (ref 7–25)
CALCIUM SPEC-SCNC: 9 MG/DL (ref 8.6–10.5)
CHLORIDE SERPL-SCNC: 104 MMOL/L (ref 98–107)
CHOLEST SERPL-MCNC: 175 MG/DL (ref 0–200)
CO2 SERPL-SCNC: 28.2 MMOL/L (ref 22–29)
CREAT SERPL-MCNC: 0.73 MG/DL (ref 0.57–1)
GFR SERPL CREATININE-BSD FRML MDRD: 84 ML/MIN/1.73
GLOBULIN UR ELPH-MCNC: 2.8 GM/DL
GLUCOSE SERPL-MCNC: 98 MG/DL (ref 65–99)
HDLC SERPL-MCNC: 42 MG/DL (ref 40–60)
LDLC SERPL CALC-MCNC: 123 MG/DL (ref 0–100)
LDLC/HDLC SERPL: 2.92 {RATIO}
POTASSIUM SERPL-SCNC: 4.6 MMOL/L (ref 3.5–5.2)
PROT SERPL-MCNC: 6.8 G/DL (ref 6–8.5)
SODIUM SERPL-SCNC: 139 MMOL/L (ref 136–145)
TRIGL SERPL-MCNC: 52 MG/DL (ref 0–150)
VLDLC SERPL-MCNC: 10.4 MG/DL (ref 5–40)

## 2020-09-16 PROCEDURE — 36415 COLL VENOUS BLD VENIPUNCTURE: CPT | Performed by: INTERNAL MEDICINE

## 2020-09-16 PROCEDURE — 84443 ASSAY THYROID STIM HORMONE: CPT | Performed by: INTERNAL MEDICINE

## 2020-09-16 PROCEDURE — 84436 ASSAY OF TOTAL THYROXINE: CPT | Performed by: INTERNAL MEDICINE

## 2020-09-16 PROCEDURE — 80053 COMPREHEN METABOLIC PANEL: CPT | Performed by: INTERNAL MEDICINE

## 2020-09-16 PROCEDURE — 84479 ASSAY OF THYROID (T3 OR T4): CPT | Performed by: INTERNAL MEDICINE

## 2020-09-16 PROCEDURE — 80061 LIPID PANEL: CPT | Performed by: INTERNAL MEDICINE

## 2020-09-17 LAB
T-UPTAKE NFR SERPL: 1.17 TBI (ref 0.8–1.3)
T4 SERPL-MCNC: 6.51 MCG/DL (ref 4.5–11.7)
TSH SERPL DL<=0.05 MIU/L-ACNC: 4.73 UIU/ML (ref 0.27–4.2)

## 2020-09-21 ENCOUNTER — OFFICE VISIT (OUTPATIENT)
Dept: CARDIOLOGY | Facility: CLINIC | Age: 50
End: 2020-09-21

## 2020-09-21 VITALS
HEART RATE: 56 BPM | WEIGHT: 204 LBS | DIASTOLIC BLOOD PRESSURE: 69 MMHG | SYSTOLIC BLOOD PRESSURE: 150 MMHG | BODY MASS INDEX: 30.92 KG/M2 | HEIGHT: 68 IN

## 2020-09-21 DIAGNOSIS — R07.2 PRECORDIAL PAIN: ICD-10-CM

## 2020-09-21 DIAGNOSIS — R00.2 PALPITATIONS: ICD-10-CM

## 2020-09-21 DIAGNOSIS — R94.39 ABNORMAL STRESS TEST: ICD-10-CM

## 2020-09-21 DIAGNOSIS — I48.0 PAROXYSMAL ATRIAL FIBRILLATION (HCC): ICD-10-CM

## 2020-09-21 DIAGNOSIS — E78.5 HYPERLIPIDEMIA, UNSPECIFIED HYPERLIPIDEMIA TYPE: ICD-10-CM

## 2020-09-21 DIAGNOSIS — I10 ESSENTIAL HYPERTENSION: ICD-10-CM

## 2020-09-21 DIAGNOSIS — R00.0 TACHYCARDIA: ICD-10-CM

## 2020-09-21 DIAGNOSIS — R94.31 ABNORMAL EKG: Primary | ICD-10-CM

## 2020-09-21 PROCEDURE — 99213 OFFICE O/P EST LOW 20 MIN: CPT | Performed by: INTERNAL MEDICINE

## 2020-09-21 PROCEDURE — 93000 ELECTROCARDIOGRAM COMPLETE: CPT | Performed by: INTERNAL MEDICINE

## 2020-09-21 NOTE — PROGRESS NOTES
6wks follow up cardiac clearance    Subjective:        Amirah Godoy is a 50 y.o. female who here for follow up    CC  Follow-up palpitation tachycardia hypertension paroxysmal atrial fibrillation  HPI  50-year-old female with history of palpitation hypertension paroxysmal atrial fibrillation hyperlipidemia here for the follow-up with no complaints of chest pains tightness heaviness or the pressure sensation     Problem List Items Addressed This Visit        Cardiovascular and Mediastinum    Tachycardia    Palpitations    Essential hypertension    Paroxysmal atrial fibrillation (CMS/HCC)    Hyperlipidemia      Other Visit Diagnoses     Abnormal EKG    -  Primary    Relevant Orders    Treadmill Stress Test    Lipid Panel    Comprehensive Metabolic Panel        Units 5d ago   Total Cholesterol   0 - 200 mg/dL 175    Triglycerides   0 - 150 mg/dL 52    HDL Cholesterol   40 - 60 mg/dL 42    LDL Cholesterol    0 - 100 mg/dL 123High     VLDL Cholesterol   5 - 40 mg/dL 10.4    LDL/HDL Ratio  2.92      Component   Ref Range & Units 2mo ago  (7/17/20) 1yr ago  (1/11/19) 1yr ago  (11/4/18)   Total Cholesterol   0 - 200 mg/dL 212High   189  175    Triglycerides   0 - 150 mg/dL 73  103  89    HDL Cholesterol   40 - 60 mg/dL 42  39Low   44    LDL Cholesterol    0 - 100 mg/dL 155High   129High   113High     VLDL Cholesterol   5 - 40 mg/dL 14.6  20.6  17.8    LDL/HDL Ratio  3.70          .  Glucose   65 - 99 mg/dL 98   98    BUN   6 - 20 mg/dL 16   16    Creatinine   0.57 - 1.00 mg/dL 0.73  0.80 R, CM  0.85    Sodium   136 - 145 mmol/L 139   142    Potassium   3.5 - 5.2 mmol/L 4.6   4.6    Chloride   98 - 107 mmol/L 104   106    CO2   22.0 - 29.0 mmol/L 28.2   24.8    Calcium   8.6 - 10.5 mg/dL 9.0   9.6    Total Protein   6.0 - 8.5 g/dL 6.8   7.1    Albumin   3.50 - 5.20 g/dL 4.00   4.20    ALT (SGPT)   1 - 33 U/L 22   26    AST (SGOT)   1 - 32 U/L 21   20    Alkaline Phosphatase   39 - 117 U/L 116   113    Total Bilirubin    0.0 - 1.2 mg/dL 0.3   0.3    eGFR Non African Amer   >60 mL/min/1.73 84   70    Globulin   gm/dL 2.8   2.9    A/G Ratio   g/dL 1.4   1.4    BUN/Creatinine Ratio   7.0 - 25.0 21.9   18.8    Anion Gap   5.0 - 15.0 mmol/L 6.8        Ref Range & Units 5d ago  (9/16/20) 1yr ago  (5/21/19) 1yr ago  (5/21/19)   TSH   0.270 - 4.200 uIU/mL 4.730High    2.240 R    T Uptake   0.80 - 1.30 TBI 1.17      T4, Total   4.50 - 11.70 mcg/dL 6.51            The following portions of the patient's history were reviewed and updated as appropriate: allergies, current medications, past family history, past medical history, past social history, past surgical history and problem list.    Past Medical History:   Diagnosis Date   • Allergic    • Asthma    • Depression    • History of atrial fibrillation 2011    NO CURRENT MEDICATION   • Hyperlipidemia    • PVC (premature ventricular contraction) 2015   • Reflux esophagitis    • Seasonal allergies    • Sinus infection     STARTED ON ANTIBIOTICS ON 10/30/17   • SVT (supraventricular tachycardia) (CMS/[facility]) 2011    NO CURRENT MEDICATIONS     reports that she has never smoked. She has never used smokeless tobacco. She reports that she does not drink alcohol or use drugs.   Family History   Problem Relation Age of Onset   • Depression Mother    • Diabetes Mother    • Heart disease Mother         +of mi at 68   • Hyperlipidemia Mother    • Hypertension Mother    • Heart disease Father         ptca x 5 and mi at 59   • Hyperlipidemia Father    • Hypertension Father    • Kidney disease Brother    • Birth defects Maternal Grandmother    • Heart disease Maternal Grandmother         multiple mi   • Birth defects Maternal Grandfather    • Heart disease Maternal Grandfather         + mi at 68   • Colon cancer Maternal Grandfather    • Heart disease Paternal Grandmother         weak heart   • Asthma Paternal Grandmother    • COPD Paternal Grandmother    • Kidney failure Paternal Grandfather    • Migraines  "Sister    • Flor Hyperthermia Neg Hx        Review of Systems  Constitutional: No wt loss, fever, fatigue  Gastrointestinal: No nausea, abdominal pain  Behavioral/Psych: No insomnia or anxiety   Cardiovascular no chest pains or tightness in the chest  Objective:       Physical Exam  /69   Pulse 56   Ht 172.7 cm (68\")   Wt 92.5 kg (204 lb)   BMI 31.02 kg/m²   General appearance: No acute changes   Neck: Trachea midline; NECK, supple, no thyromegaly or lymphadenopathy   Lungs: Normal size and shape, normal breath sounds, equal distribution of air, no rales and rhonchi   CV: S1-S2 regular, no murmurs, no rub, no gallop   Abdomen: Soft, non-tender; no masses , no abnormal abdominal sounds   Extremities: No deformity , normal color , no peripheral edema   Skin: Normal temperature, turgor and texture; no rash, ulcers            ECG 12 Lead    Date/Time: 9/21/2020 10:42 AM  Performed by: Niles Koch MD  Authorized by: Niles Koch MD   Comparison: compared with previous ECG   Similar to previous ECG  Rhythm: sinus rhythm  ST Flattening: all    Clinical impression: normal ECG              Echocardiogram:        Current Outpatient Medications:   •  aspirin 325 MG tablet, Take 325 mg by mouth Daily., Disp: , Rfl:   •  buPROPion XL (WELLBUTRIN XL) 150 MG 24 hr tablet, Take 100 mg by mouth Daily., Disp: , Rfl:   •  cetirizine (ZyrTEC) 10 MG tablet, Take 10 mg by mouth daily., Disp: , Rfl:   •  citalopram (CeleXA) 20 MG tablet, Take 40 mg by mouth Daily., Disp: , Rfl:   •  FASENRA 30 MG/ML solution prefilled syringe, Every 2 (Two) Months., Disp: , Rfl:   •  Fluticasone-Umeclidin-Vilant (TRELEGY ELLIPTA IN), Inhale 1 inhaler Every Morning., Disp: , Rfl:   •  montelukast (SINGULAIR) 10 MG tablet, TAKE ONE TABLET BY MOUTH DAILY, Disp: 30 tablet, Rfl: 4  •  nitroglycerin (NITROSTAT) 0.4 MG SL tablet, 1 under the tongue as needed for angina, may repeat q5mins for up three doses, Disp: 100 tablet, Rfl: " 11  •  omeprazole (priLOSEC) 40 MG capsule, Take 40 mg by mouth Daily., Disp: , Rfl:   •  rosuvastatin (CRESTOR) 5 MG tablet, Take 1 tablet by mouth Daily., Disp: 30 tablet, Rfl: 11  •  ondansetron (ZOFRAN) 4 MG tablet, Take 1 tablet by mouth Every 6 (Six) Hours., Disp: 12 tablet, Rfl: 0  •  zolpidem (AMBIEN) 5 MG tablet, Take 5 mg by mouth At Night As Needed for Sleep., Disp: , Rfl:    Assessment:        Patient Active Problem List   Diagnosis   • Chest pain   • Reactive depression   • Seasonal allergic rhinitis due to pollen   • Right ovarian cyst   • History of atrial fibrillation   • Asthma with exacerbation   • Cough   • Weakness   • Tachycardia   • Influenza A   • Palpitations   • Swelling   • Essential hypertension   • Paroxysmal atrial fibrillation (CMS/HCC)   • Hyperlipidemia   • Precordial pain   • Visual loss   • Status post device closure of ASD   • Status post placement of implantable loop recorder   • Episode of recurrent major depressive disorder (CMS/HCC)   • TIA (transient ischemic attack)   • Hemiplegic migraine without status migrainosus, not intractable   • Anxiety   • Screen for colon cancer   • Statin intolerance               Plan:            ICD-10-CM ICD-9-CM   1. Abnormal EKG  R94.31 794.31   2. Tachycardia  R00.0 785.0   3. Paroxysmal atrial fibrillation (CMS/HCC)  I48.0 427.31   4. Palpitations  R00.2 785.1   5. Hyperlipidemia, unspecified hyperlipidemia type  E78.5 272.4   6. Essential hypertension  I10 401.9     1. Abnormal EKG  Considering the patient's symptoms as well as clinical situation and  EKG findings, along with cardiac risk factors, ischemic workup is necessary to rule out ischemic cardiomyopathy, stress induced arrhythmias, and functional capacity for diagnosis as well as prognostic consideration    - Treadmill Stress Test  - Lipid Panel  - Comprehensive Metabolic Panel    2. Tachycardia  Under control    3. Paroxysmal atrial fibrillation (CMS/HCC)  Under control    4.  Palpitations  Controlled    5. Hyperlipidemia, unspecified hyperlipidemia type  Continue current treatment    6. Essential hypertension  Controlled       ett for parotid gland surgery    Phone visit    6 months with labs  COUNSELING:    Amirah Kevin was given to patient for the following topics: diagnostic results, risk factor reductions, impressions, risks and benefits of treatment options and importance of treatment compliance .       SMOKING COUNSELING:    [unfilled]    Dictated using Dragon dictation

## 2020-09-25 ENCOUNTER — HOSPITAL ENCOUNTER (OUTPATIENT)
Dept: GENERAL RADIOLOGY | Facility: HOSPITAL | Age: 50
Discharge: HOME | End: 2020-09-25
Admitting: FAMILY MEDICINE

## 2020-09-25 DIAGNOSIS — T14.90XA TRAUMA: ICD-10-CM

## 2020-09-25 DIAGNOSIS — S69.91XS INJURY OF RIGHT HAND, SEQUELA: ICD-10-CM

## 2020-09-25 DIAGNOSIS — M25.531 RIGHT WRIST PAIN: ICD-10-CM

## 2020-09-25 PROCEDURE — 73110 X-RAY EXAM OF WRIST: CPT

## 2020-10-02 ENCOUNTER — HOSPITAL ENCOUNTER (OUTPATIENT)
Dept: CARDIOLOGY | Facility: HOSPITAL | Age: 50
Discharge: HOME | End: 2020-10-02
Admitting: INTERNAL MEDICINE

## 2020-10-02 VITALS — HEART RATE: 91 BPM | DIASTOLIC BLOOD PRESSURE: 70 MMHG | SYSTOLIC BLOOD PRESSURE: 118 MMHG

## 2020-10-02 LAB
BH CV STRESS BP STAGE 1: NORMAL
BH CV STRESS BP STAGE 2: NORMAL
BH CV STRESS DURATION MIN STAGE 1: 3
BH CV STRESS DURATION MIN STAGE 2: 3
BH CV STRESS DURATION SEC STAGE 1: 0
BH CV STRESS DURATION SEC STAGE 2: 0
BH CV STRESS GRADE STAGE 1: 10
BH CV STRESS GRADE STAGE 2: 12
BH CV STRESS HR STAGE 1: 124
BH CV STRESS HR STAGE 2: 149
BH CV STRESS METS STAGE 1: 4.6
BH CV STRESS METS STAGE 2: 7.1
BH CV STRESS PROTOCOL 1: NORMAL
BH CV STRESS RECOVERY BP: NORMAL MMHG
BH CV STRESS RECOVERY HR: 94 BPM
BH CV STRESS SPEED STAGE 1: 1.7
BH CV STRESS SPEED STAGE 2: 2.5
BH CV STRESS STAGE 1: 1
BH CV STRESS STAGE 2: 2
MAXIMAL PREDICTED HEART RATE: 168 BPM
PERCENT MAX PREDICTED HR: 88.69 %
STRESS BASELINE BP: NORMAL MMHG
STRESS BASELINE HR: 81 BPM
STRESS PERCENT HR: 104 %
STRESS POST ESTIMATED WORKLOAD: 7.1 METS
STRESS POST EXERCISE DUR MIN: 6 MIN
STRESS POST EXERCISE DUR SEC: 0 SEC
STRESS POST PEAK BP: NORMAL MMHG
STRESS POST PEAK HR: 149 BPM
STRESS TARGET HR: 143 BPM

## 2020-10-02 PROCEDURE — 93018 CV STRESS TEST I&R ONLY: CPT | Performed by: INTERNAL MEDICINE

## 2020-10-02 PROCEDURE — 93017 CV STRESS TEST TRACING ONLY: CPT

## 2020-10-02 PROCEDURE — 93016 CV STRESS TEST SUPVJ ONLY: CPT | Performed by: INTERNAL MEDICINE

## 2020-10-05 ENCOUNTER — TELEPHONE (OUTPATIENT)
Dept: CARDIOLOGY | Facility: CLINIC | Age: 50
End: 2020-10-05

## 2020-10-05 NOTE — TELEPHONE ENCOUNTER
Discussed abnormal TMT results per Dr Koch.   Discussed need for further testing due to symptoms of squeezing chest discomfort at peak exercise  Explained further testing will include nuclear stress test. Education provided regarding nuclear stress test.   Patient verbalized understanding, all questions answered.

## 2020-10-07 ENCOUNTER — HOSPITAL ENCOUNTER (OUTPATIENT)
Dept: CARDIOLOGY | Facility: HOSPITAL | Age: 50
Discharge: HOME | End: 2020-10-07

## 2020-10-07 ENCOUNTER — HOSPITAL ENCOUNTER (OUTPATIENT)
Dept: SLEEP MEDICINE | Facility: HOSPITAL | Age: 50
Discharge: HOME | End: 2020-10-07
Admitting: INTERNAL MEDICINE

## 2020-10-07 VITALS — SYSTOLIC BLOOD PRESSURE: 134 MMHG | HEART RATE: 69 BPM | DIASTOLIC BLOOD PRESSURE: 80 MMHG

## 2020-10-07 DIAGNOSIS — I10 ESSENTIAL HYPERTENSION: ICD-10-CM

## 2020-10-07 DIAGNOSIS — G47.33 OSA (OBSTRUCTIVE SLEEP APNEA): ICD-10-CM

## 2020-10-07 DIAGNOSIS — I48.0 PAROXYSMAL ATRIAL FIBRILLATION (HCC): ICD-10-CM

## 2020-10-07 DIAGNOSIS — R00.2 PALPITATIONS: ICD-10-CM

## 2020-10-07 DIAGNOSIS — R94.39 ABNORMAL STRESS TEST: ICD-10-CM

## 2020-10-07 DIAGNOSIS — R07.2 PRECORDIAL PAIN: ICD-10-CM

## 2020-10-07 DIAGNOSIS — E78.5 HYPERLIPIDEMIA, UNSPECIFIED HYPERLIPIDEMIA TYPE: ICD-10-CM

## 2020-10-07 DIAGNOSIS — R00.0 TACHYCARDIA: ICD-10-CM

## 2020-10-07 PROCEDURE — 0 TECHNETIUM SESTAMIBI: Performed by: INTERNAL MEDICINE

## 2020-10-07 PROCEDURE — 93016 CV STRESS TEST SUPVJ ONLY: CPT | Performed by: NURSE PRACTITIONER

## 2020-10-07 PROCEDURE — 93017 CV STRESS TEST TRACING ONLY: CPT

## 2020-10-07 PROCEDURE — A9500 TC99M SESTAMIBI: HCPCS | Performed by: INTERNAL MEDICINE

## 2020-10-07 PROCEDURE — 78452 HT MUSCLE IMAGE SPECT MULT: CPT | Performed by: INTERNAL MEDICINE

## 2020-10-07 PROCEDURE — 78452 HT MUSCLE IMAGE SPECT MULT: CPT

## 2020-10-07 PROCEDURE — 93018 CV STRESS TEST I&R ONLY: CPT | Performed by: INTERNAL MEDICINE

## 2020-10-07 PROCEDURE — 95806 SLEEP STUDY UNATT&RESP EFFT: CPT

## 2020-10-07 RX ADMIN — TECHNETIUM TC 99M SESTAMIBI 1 DOSE: 1 INJECTION INTRAVENOUS at 09:19

## 2020-10-07 RX ADMIN — TECHNETIUM TC 99M SESTAMIBI 1 DOSE: 1 INJECTION INTRAVENOUS at 07:48

## 2020-10-08 LAB
BH CV STRESS BP STAGE 1: NORMAL
BH CV STRESS BP STAGE 2: NORMAL
BH CV STRESS DURATION MIN STAGE 1: 3
BH CV STRESS DURATION MIN STAGE 2: 3
BH CV STRESS DURATION MIN STAGE 3: 0
BH CV STRESS DURATION SEC STAGE 1: 0
BH CV STRESS DURATION SEC STAGE 2: 0
BH CV STRESS DURATION SEC STAGE 3: 29
BH CV STRESS GRADE STAGE 1: 10
BH CV STRESS GRADE STAGE 2: 12
BH CV STRESS GRADE STAGE 3: 14
BH CV STRESS HR STAGE 1: 116
BH CV STRESS HR STAGE 2: 142
BH CV STRESS HR STAGE 3: 148
BH CV STRESS METS STAGE 1: 4.6
BH CV STRESS METS STAGE 2: 7.1
BH CV STRESS METS STAGE 3: 7.5
BH CV STRESS PROTOCOL 1: NORMAL
BH CV STRESS RECOVERY BP: NORMAL MMHG
BH CV STRESS RECOVERY HR: 82 BPM
BH CV STRESS SPEED STAGE 1: 1.7
BH CV STRESS SPEED STAGE 2: 2.5
BH CV STRESS SPEED STAGE 3: 3.4
BH CV STRESS STAGE 1: 1
BH CV STRESS STAGE 2: 2
BH CV STRESS STAGE 3: 3
LV EF NUC BP: 67 %
MAXIMAL PREDICTED HEART RATE: 168 BPM
PERCENT MAX PREDICTED HR: 89.29 %
STRESS BASELINE BP: NORMAL MMHG
STRESS BASELINE HR: 72 BPM
STRESS PERCENT HR: 105 %
STRESS POST ESTIMATED WORKLOAD: 7.5 METS
STRESS POST EXERCISE DUR MIN: 6 MIN
STRESS POST EXERCISE DUR SEC: 48 SEC
STRESS POST PEAK BP: NORMAL MMHG
STRESS POST PEAK HR: 150 BPM
STRESS TARGET HR: 143 BPM

## 2020-10-09 ENCOUNTER — TELEPHONE (OUTPATIENT)
Dept: CARDIOLOGY | Facility: CLINIC | Age: 50
End: 2020-10-09

## 2020-10-09 NOTE — TELEPHONE ENCOUNTER
Discussed with dr grey will possibly need cath due to experienceing chest pain during nuclear with pain radiating to Lt shoulder    appt scheduled Monday  ALP

## 2020-10-12 ENCOUNTER — LAB (OUTPATIENT)
Dept: LAB | Facility: HOSPITAL | Age: 50
End: 2020-10-12

## 2020-10-12 ENCOUNTER — OFFICE VISIT (OUTPATIENT)
Dept: CARDIOLOGY | Facility: CLINIC | Age: 50
End: 2020-10-12

## 2020-10-12 ENCOUNTER — TRANSCRIBE ORDERS (OUTPATIENT)
Dept: SLEEP MEDICINE | Facility: HOSPITAL | Age: 50
End: 2020-10-12

## 2020-10-12 VITALS
BODY MASS INDEX: 31.02 KG/M2 | SYSTOLIC BLOOD PRESSURE: 134 MMHG | HEART RATE: 76 BPM | DIASTOLIC BLOOD PRESSURE: 80 MMHG | HEIGHT: 68 IN

## 2020-10-12 DIAGNOSIS — Z01.818 OTHER SPECIFIED PRE-OPERATIVE EXAMINATION: ICD-10-CM

## 2020-10-12 DIAGNOSIS — Z01.818 OTHER SPECIFIED PRE-OPERATIVE EXAMINATION: Primary | ICD-10-CM

## 2020-10-12 DIAGNOSIS — R94.30 ABNORMAL CARDIAC FUNCTION TEST: Primary | ICD-10-CM

## 2020-10-12 DIAGNOSIS — R00.2 PALPITATIONS: ICD-10-CM

## 2020-10-12 DIAGNOSIS — R06.02 SOB (SHORTNESS OF BREATH): ICD-10-CM

## 2020-10-12 LAB
ANION GAP SERPL CALCULATED.3IONS-SCNC: 9.9 MMOL/L (ref 5–15)
APTT PPP: 28.9 SECONDS (ref 22.7–35.4)
BASOPHILS # BLD AUTO: 0.02 10*3/MM3 (ref 0–0.2)
BASOPHILS NFR BLD AUTO: 0.3 % (ref 0–1.5)
BUN SERPL-MCNC: 15 MG/DL (ref 6–20)
BUN/CREAT SERPL: 20.3 (ref 7–25)
CALCIUM SPEC-SCNC: 9.1 MG/DL (ref 8.6–10.5)
CHLORIDE SERPL-SCNC: 104 MMOL/L (ref 98–107)
CO2 SERPL-SCNC: 27.1 MMOL/L (ref 22–29)
CREAT SERPL-MCNC: 0.74 MG/DL (ref 0.57–1)
DEPRECATED RDW RBC AUTO: 42.7 FL (ref 37–54)
EOSINOPHIL # BLD AUTO: 0 10*3/MM3 (ref 0–0.4)
EOSINOPHIL NFR BLD AUTO: 0 % (ref 0.3–6.2)
ERYTHROCYTE [DISTWIDTH] IN BLOOD BY AUTOMATED COUNT: 13 % (ref 12.3–15.4)
GFR SERPL CREATININE-BSD FRML MDRD: 82 ML/MIN/1.73
GLUCOSE SERPL-MCNC: 91 MG/DL (ref 65–99)
HCT VFR BLD AUTO: 39.9 % (ref 34–46.6)
HGB BLD-MCNC: 13.3 G/DL (ref 12–15.9)
IMM GRANULOCYTES # BLD AUTO: 0.03 10*3/MM3 (ref 0–0.05)
IMM GRANULOCYTES NFR BLD AUTO: 0.4 % (ref 0–0.5)
INR PPP: 1.12 (ref 0.9–1.1)
LYMPHOCYTES # BLD AUTO: 1.55 10*3/MM3 (ref 0.7–3.1)
LYMPHOCYTES NFR BLD AUTO: 20.2 % (ref 19.6–45.3)
MCH RBC QN AUTO: 29.6 PG (ref 26.6–33)
MCHC RBC AUTO-ENTMCNC: 33.3 G/DL (ref 31.5–35.7)
MCV RBC AUTO: 88.9 FL (ref 79–97)
MONOCYTES # BLD AUTO: 0.61 10*3/MM3 (ref 0.1–0.9)
MONOCYTES NFR BLD AUTO: 7.9 % (ref 5–12)
NEUTROPHILS NFR BLD AUTO: 5.48 10*3/MM3 (ref 1.7–7)
NEUTROPHILS NFR BLD AUTO: 71.2 % (ref 42.7–76)
NRBC BLD AUTO-RTO: 0 /100 WBC (ref 0–0.2)
PLATELET # BLD AUTO: 309 10*3/MM3 (ref 140–450)
PMV BLD AUTO: 9.2 FL (ref 6–12)
POTASSIUM SERPL-SCNC: 4.3 MMOL/L (ref 3.5–5.2)
PROTHROMBIN TIME: 14.3 SECONDS (ref 11.7–14.2)
RBC # BLD AUTO: 4.49 10*6/MM3 (ref 3.77–5.28)
SODIUM SERPL-SCNC: 141 MMOL/L (ref 136–145)
WBC # BLD AUTO: 7.69 10*3/MM3 (ref 3.4–10.8)

## 2020-10-12 PROCEDURE — 85610 PROTHROMBIN TIME: CPT | Performed by: INTERNAL MEDICINE

## 2020-10-12 PROCEDURE — C9803 HOPD COVID-19 SPEC COLLECT: HCPCS

## 2020-10-12 PROCEDURE — 99213 OFFICE O/P EST LOW 20 MIN: CPT | Performed by: INTERNAL MEDICINE

## 2020-10-12 PROCEDURE — 85025 COMPLETE CBC W/AUTO DIFF WBC: CPT | Performed by: INTERNAL MEDICINE

## 2020-10-12 PROCEDURE — 80048 BASIC METABOLIC PNL TOTAL CA: CPT | Performed by: INTERNAL MEDICINE

## 2020-10-12 PROCEDURE — 36415 COLL VENOUS BLD VENIPUNCTURE: CPT | Performed by: INTERNAL MEDICINE

## 2020-10-12 PROCEDURE — U0004 COV-19 TEST NON-CDC HGH THRU: HCPCS

## 2020-10-12 PROCEDURE — 85730 THROMBOPLASTIN TIME PARTIAL: CPT | Performed by: INTERNAL MEDICINE

## 2020-10-13 LAB — SARS-COV-2 RNA RESP QL NAA+PROBE: NOT DETECTED

## 2020-10-14 ENCOUNTER — HOSPITAL ENCOUNTER (OUTPATIENT)
Facility: HOSPITAL | Age: 50
Setting detail: HOSPITAL OUTPATIENT SURGERY
Discharge: HOME | End: 2020-10-14
Attending: INTERNAL MEDICINE | Admitting: INTERNAL MEDICINE

## 2020-10-14 VITALS
OXYGEN SATURATION: 97 % | TEMPERATURE: 97.6 F | BODY MASS INDEX: 31.37 KG/M2 | WEIGHT: 207 LBS | HEIGHT: 68 IN | SYSTOLIC BLOOD PRESSURE: 116 MMHG | RESPIRATION RATE: 16 BRPM | DIASTOLIC BLOOD PRESSURE: 57 MMHG | HEART RATE: 77 BPM

## 2020-10-14 DIAGNOSIS — R94.30 ABNORMAL CARDIAC FUNCTION TEST: ICD-10-CM

## 2020-10-14 PROCEDURE — 0 IOPAMIDOL PER 1 ML: Performed by: INTERNAL MEDICINE

## 2020-10-14 PROCEDURE — 93458 L HRT ARTERY/VENTRICLE ANGIO: CPT | Performed by: INTERNAL MEDICINE

## 2020-10-14 PROCEDURE — C1769 GUIDE WIRE: HCPCS | Performed by: INTERNAL MEDICINE

## 2020-10-14 PROCEDURE — 25010000002 HEPARIN (PORCINE) PER 1000 UNITS: Performed by: INTERNAL MEDICINE

## 2020-10-14 PROCEDURE — C1894 INTRO/SHEATH, NON-LASER: HCPCS | Performed by: INTERNAL MEDICINE

## 2020-10-14 PROCEDURE — 25010000002 FENTANYL CITRATE (PF) 100 MCG/2ML SOLUTION: Performed by: INTERNAL MEDICINE

## 2020-10-14 PROCEDURE — 25010000002 MIDAZOLAM PER 1 MG: Performed by: INTERNAL MEDICINE

## 2020-10-14 RX ORDER — FENTANYL CITRATE 50 UG/ML
INJECTION, SOLUTION INTRAMUSCULAR; INTRAVENOUS AS NEEDED
Status: DISCONTINUED | OUTPATIENT
Start: 2020-10-14 | End: 2020-10-14 | Stop reason: HOSPADM

## 2020-10-14 RX ORDER — SODIUM CHLORIDE 0.9 % (FLUSH) 0.9 %
3 SYRINGE (ML) INJECTION EVERY 12 HOURS SCHEDULED
Status: DISCONTINUED | OUTPATIENT
Start: 2020-10-14 | End: 2020-10-14 | Stop reason: HOSPADM

## 2020-10-14 RX ORDER — SODIUM CHLORIDE 0.9 % (FLUSH) 0.9 %
10 SYRINGE (ML) INJECTION AS NEEDED
Status: DISCONTINUED | OUTPATIENT
Start: 2020-10-14 | End: 2020-10-14 | Stop reason: HOSPADM

## 2020-10-14 RX ORDER — LIDOCAINE HYDROCHLORIDE 20 MG/ML
INJECTION, SOLUTION INFILTRATION; PERINEURAL AS NEEDED
Status: DISCONTINUED | OUTPATIENT
Start: 2020-10-14 | End: 2020-10-14 | Stop reason: HOSPADM

## 2020-10-14 RX ORDER — ACETAMINOPHEN 325 MG/1
650 TABLET ORAL EVERY 4 HOURS PRN
Status: DISCONTINUED | OUTPATIENT
Start: 2020-10-14 | End: 2020-10-14 | Stop reason: HOSPADM

## 2020-10-14 RX ORDER — SODIUM CHLORIDE 9 MG/ML
75 INJECTION, SOLUTION INTRAVENOUS CONTINUOUS
Status: DISCONTINUED | OUTPATIENT
Start: 2020-10-14 | End: 2020-10-14 | Stop reason: HOSPADM

## 2020-10-14 RX ORDER — MIDAZOLAM HYDROCHLORIDE 1 MG/ML
INJECTION INTRAMUSCULAR; INTRAVENOUS AS NEEDED
Status: DISCONTINUED | OUTPATIENT
Start: 2020-10-14 | End: 2020-10-14 | Stop reason: HOSPADM

## 2020-10-14 RX ADMIN — SODIUM CHLORIDE 75 ML/HR: 9 INJECTION, SOLUTION INTRAVENOUS at 07:40

## 2020-10-15 ENCOUNTER — TELEPHONE (OUTPATIENT)
Dept: SLEEP MEDICINE | Facility: HOSPITAL | Age: 50
End: 2020-10-15

## 2020-10-15 NOTE — TELEPHONE ENCOUNTER
Called pt about sleep study results and faxed orders to [facility] on 10/15/2020.  F/u appt scheduled on 1/11/2021 @ 2:00 with Dr. Cyr.  CV

## 2020-11-10 ENCOUNTER — TELEPHONE (OUTPATIENT)
Dept: CARDIOLOGY | Facility: CLINIC | Age: 50
End: 2020-11-10

## 2020-11-10 NOTE — TELEPHONE ENCOUNTER
----- Message from Debra Artis sent at 11/10/2020  2:04 PM EST -----  Regarding: CARDIAC CLEARANCE FOR CAROTID SX  Contact: [phone]

## 2020-11-11 ENCOUNTER — OFFICE VISIT (OUTPATIENT)
Dept: CARDIOLOGY | Facility: CLINIC | Age: 50
End: 2020-11-11

## 2020-11-11 VITALS
WEIGHT: 219 LBS | HEART RATE: 88 BPM | BODY MASS INDEX: 34.37 KG/M2 | SYSTOLIC BLOOD PRESSURE: 130 MMHG | HEIGHT: 67 IN | DIASTOLIC BLOOD PRESSURE: 71 MMHG

## 2020-11-11 DIAGNOSIS — Z01.818 PREOPERATIVE CLEARANCE: Primary | ICD-10-CM

## 2020-11-11 DIAGNOSIS — R00.2 PALPITATIONS: ICD-10-CM

## 2020-11-11 DIAGNOSIS — R00.0 TACHYCARDIA: ICD-10-CM

## 2020-11-11 DIAGNOSIS — E78.5 HYPERLIPIDEMIA, UNSPECIFIED HYPERLIPIDEMIA TYPE: ICD-10-CM

## 2020-11-11 DIAGNOSIS — I10 ESSENTIAL HYPERTENSION: ICD-10-CM

## 2020-11-11 PROCEDURE — 99213 OFFICE O/P EST LOW 20 MIN: CPT | Performed by: NURSE PRACTITIONER

## 2020-11-11 NOTE — PROGRESS NOTES
Subjective:        Amirah Godoy is a 50 y.o. female who here for follow up    Chief Complaint   Patient presents with   • Follow-up     CATH FOLLOW UP, SURGERY CLEARANCE       HPI      Amirah Godoy is a 50-year-old female, who is current with me.  She has a history which includes notations, hypertension, paroxymal atrial fibrillation, not anticoagulated historically, and hyperlipidemia.  He has a loop in the last evaluation was within normal limits.  No atrial fibrillation noted.  She is here today for a cardiac cath follow-up.  It was noted she had early atherosclerotic plaque otherwise normal coronary arteries.  She will be medical manage.  His recent lipid panel on 9/16/2020 revealed , HDL 42, , and triglycerides 52.  Denies chest pain, shortness of breath, syncope and near syncope.  Right radial cardiac cath site shows no signs or symptoms of infection or hematoma noted.      The following portions of the patient's history were reviewed and updated as appropriate: allergies, current medications, past family history, past medical history, past social history, past surgical history and problem list.    Past Medical History:   Diagnosis Date   • Allergic    • Asthma    • Depression    • History of atrial fibrillation 2011    NO CURRENT MEDICATION   • Hyperlipidemia    • Hypertension     NO LONGER TREATED W/ MEDS   • PVC (premature ventricular contraction) 2015   • Reflux esophagitis    • Seasonal allergies    • Sinus infection     STARTED ON ANTIBIOTICS ON 10/30/17   • Stroke (CMS/[facility])     TIA FOLLOWING CATH 2018   • SVT (supraventricular tachycardia) (CMS/[facility]) 2011    NO CURRENT MEDICATIONS         reports that she has never smoked. She has never used smokeless tobacco. She reports that she does not drink alcohol or use drugs.     Family History   Problem Relation Age of Onset   • Depression Mother    • Diabetes Mother    • Heart disease Mother         +of mi at 68   • Hyperlipidemia Mother    •  Hypertension Mother    • Heart disease Father         ptca x 5 and mi at 59   • Hyperlipidemia Father    • Hypertension Father    • Kidney disease Brother    • Birth defects Maternal Grandmother    • Heart disease Maternal Grandmother         multiple mi   • Birth defects Maternal Grandfather    • Heart disease Maternal Grandfather         + mi at 68   • Colon cancer Maternal Grandfather    • Heart disease Paternal Grandmother         weak heart   • Asthma Paternal Grandmother    • COPD Paternal Grandmother    • Kidney failure Paternal Grandfather    • Migraines Sister    • Malig Hyperthermia Neg Hx        ROS     Review of Systems  Constitutional: No wt loss, fever, fatigue  Gastrointestinal: No nausea, abdominal pain  Behavioral/Psych: No insomnia or anxiety  Cardiovascular: Denies chest pain, shortness of breath, syncope and near syncope.        Objective:           Physical Exam   Constitutional: She is oriented to person, place, and time. She appears well-developed and well-nourished.   HENT:   Head: Normocephalic.   Right Ear: External ear normal.   Left Ear: External ear normal.   Eyes: EOM are normal.   Neck: Normal range of motion. No JVD present.   Cardiovascular: Normal rate, regular rhythm, normal heart sounds and intact distal pulses. Exam reveals no gallop and no friction rub.   No murmur heard.  Right radial site shows no signs or symptoms of infection or hematoma noted.   Pulmonary/Chest: Effort normal and breath sounds normal. No stridor. No respiratory distress. She has no rales.   Abdominal: Soft. Bowel sounds are normal. She exhibits no distension. There is no abdominal tenderness. There is no guarding.   Musculoskeletal: Normal range of motion.         General: No tenderness or edema.   Neurological: She is alert and oriented to person, place, and time. She has normal reflexes.   Skin: Skin is warm.   Psychiatric: She has a normal mood and affect. Judgment normal.   Nursing note and vitals  reviewed.      Procedures      Impression:      1. Early atherosclerotic plaque otherwise normal coronary arteries  2. Normal LV gram     Recommendations:      1. Medical management            I sincerely appreciate the opportunity to participate in your patient's care. Please feel free to contact me anytime if I can be of assistance in this or any other way.     Niles Koch MD  10/14/2020  08:19 EDT  Interpretation Summary       · Moderate risk for ischemic heart disease.  · Findings consistent with an abnormal ECG stress test.  · Left ventricular ejection fraction is normal. (Calculated EF = 67%).  · Myocardial perfusion imaging indicates a small-sized, mildly severe area of ischemia located in the lateral wall.  · Impressions are consistent with an intermediate risk study.     Symptomatic for chest tight/squeezing at peak exercise, radiating to Lt shoulder, resolved within 2 minutes recovery.  No ECG changes suggestive of ischemia.  BP response:Appropriate  Ectopy:Couplet at peak exercise, PVC in recovery  Exercise Tolerance:Fair  Supervised by:  Catalina FARR.          Conclusion       · Successful Linq implantation           Interpretation Summary    · Left ventricular systolic function is normal. Calculated EF = 62%. Estimated EF was in agreement with the calculated EF. Estimated EF = 62%. Normal left ventricular cavity size and wall thickness noted. All left ventricular wall segments contract normally. Left ventricular diastolic function is normal.  · Normal left atrial volume noted. Saline test results are negative.  · Trace tricuspid valve regurgitation is present. Estimated right ventricular systolic pressure from tricuspid regurgitation is normal (<35 mmHg).            Current Outpatient Medications:   •  aspirin 325 MG tablet, Take 325 mg by mouth Daily., Disp: , Rfl:   •  buPROPion XL (WELLBUTRIN XL) 150 MG 24 hr tablet, Take 75 mg by mouth Daily., Disp: , Rfl:   •  cetirizine  (ZyrTEC) 10 MG tablet, Take 10 mg by mouth daily., Disp: , Rfl:   •  citalopram (CeleXA) 20 MG tablet, Take 40 mg by mouth Daily., Disp: , Rfl:   •  FASENRA 30 MG/ML solution prefilled syringe, Every 2 (Two) Months., Disp: , Rfl:   •  Fluticasone-Umeclidin-Vilant (TRELEGY ELLIPTA IN), Inhale 1 inhaler Every Morning., Disp: , Rfl:   •  montelukast (SINGULAIR) 10 MG tablet, TAKE ONE TABLET BY MOUTH DAILY (Patient taking differently: Take 10 mg by mouth Every Night.), Disp: 30 tablet, Rfl: 4  •  nitroglycerin (NITROSTAT) 0.4 MG SL tablet, 1 under the tongue as needed for angina, may repeat q5mins for up three doses, Disp: 100 tablet, Rfl: 11  •  omeprazole (priLOSEC) 40 MG capsule, Take 40 mg by mouth Daily., Disp: , Rfl:   •  ondansetron (ZOFRAN) 4 MG tablet, Take 1 tablet by mouth Every 6 (Six) Hours., Disp: 12 tablet, Rfl: 0  •  rosuvastatin (CRESTOR) 5 MG tablet, Take 1 tablet by mouth Daily. (Patient taking differently: Take 5 mg by mouth Daily. EVERY M-W-F), Disp: 30 tablet, Rfl: 11  •  zolpidem (AMBIEN) 5 MG tablet, Take 5 mg by mouth At Night As Needed for Sleep., Disp: , Rfl:      Assessment:        Patient Active Problem List   Diagnosis   • Chest pain   • Reactive depression   • Seasonal allergic rhinitis due to pollen   • Right ovarian cyst   • History of atrial fibrillation   • Asthma with exacerbation   • Cough   • Weakness   • Tachycardia   • Influenza A   • Palpitations   • Swelling   • Essential hypertension   • Paroxysmal atrial fibrillation (CMS/HCC)   • Hyperlipidemia   • Precordial pain   • Visual loss   • Status post device closure of ASD   • Status post placement of implantable loop recorder   • Episode of recurrent major depressive disorder (CMS/HCC)   • TIA (transient ischemic attack)   • Hemiplegic migraine without status migrainosus, not intractable   • Anxiety   • Screen for colon cancer   • Statin intolerance   • Abnormal cardiac function test               Plan:   1. Hospital follow up: [facility]  is cleared for her surgery nonmodifiable risk factors.   No signs or symptoms of congestive heart failure or angina.   Her right radial site shows no signs or symptoms of infection.  Her cardiac cath showed early signs of atherosclerotic plaque with otherwise normal coronary arteries.  She will be treated medically.    2.  Tachycardia: Controlled.    3.  Palpitations: None currently.    4.  Essential hypertension: Controlled current medications.    Educated patient on exercising for at least 30 minutes a day for 2 to 3 days a week. Importance of controlling hypertension and blood pressure checkup on the regular basis has been explained. Hypertension as a silent killer has been discussed. Risk reduction of the weight and regular exercises to control the hypertension has been explained.    5.  Hyperlipidemia: Previous lipid panel shows improvement.  She is doing well on Repatha without any issues.    Risk of the hyperlipidemia, importance of the treatment has been explained. Pros and cons of the statins has been explained. Regular blood workup as well as side effects including the liver failure, myelopathy death has been explained.         No diagnosis found.    There are no diagnoses linked to this encounter.    COUNSELING: Castro Kevin was given to patient for the following topics: diagnostic results, risk factor reductions, impressions, risks and benefits of treatment options and importance of treatment compliance .       SMOKING COUNSELING: Denies    Counseling given: Not Answered    She will follow-up in 6 months, unless she needs to be seen sooner.      Sincerely,   YORDAN Mccloud  [facility]  11/11/20  09:02 EST         EMR Dragon/Transcription disclaimer:   Much of this encounter note is an electronic transcription/translation of spoken language to printed text. The electronic translation of spoken language may permit erroneous, or at times, nonsensical words or  phrases to be inadvertently transcribed; Although I have reviewed the note for such errors, some may still exist.

## 2020-11-16 ENCOUNTER — TRANSCRIBE ORDERS (OUTPATIENT)
Dept: PREADMISSION TESTING | Facility: HOSPITAL | Age: 50
End: 2020-11-16

## 2020-11-16 DIAGNOSIS — Z01.818 OTHER SPECIFIED PRE-OPERATIVE EXAMINATION: Primary | ICD-10-CM

## 2020-11-18 ENCOUNTER — APPOINTMENT (OUTPATIENT)
Dept: PREADMISSION TESTING | Facility: HOSPITAL | Age: 50
End: 2020-11-18

## 2020-11-18 VITALS
HEART RATE: 85 BPM | WEIGHT: 217 LBS | RESPIRATION RATE: 18 BRPM | TEMPERATURE: 98.2 F | SYSTOLIC BLOOD PRESSURE: 128 MMHG | HEIGHT: 67 IN | OXYGEN SATURATION: 98 % | BODY MASS INDEX: 34.06 KG/M2 | DIASTOLIC BLOOD PRESSURE: 81 MMHG

## 2020-11-18 LAB
ANION GAP SERPL CALCULATED.3IONS-SCNC: 9.7 MMOL/L (ref 5–15)
BUN SERPL-MCNC: 15 MG/DL (ref 6–20)
BUN/CREAT SERPL: 18.1 (ref 7–25)
CALCIUM SPEC-SCNC: 9.2 MG/DL (ref 8.6–10.5)
CHLORIDE SERPL-SCNC: 103 MMOL/L (ref 98–107)
CO2 SERPL-SCNC: 27.3 MMOL/L (ref 22–29)
CREAT SERPL-MCNC: 0.83 MG/DL (ref 0.57–1)
DEPRECATED RDW RBC AUTO: 41.3 FL (ref 37–54)
ERYTHROCYTE [DISTWIDTH] IN BLOOD BY AUTOMATED COUNT: 12.8 % (ref 12.3–15.4)
GFR SERPL CREATININE-BSD FRML MDRD: 72 ML/MIN/1.73
GLUCOSE SERPL-MCNC: 124 MG/DL (ref 65–99)
HCT VFR BLD AUTO: 39.4 % (ref 34–46.6)
HGB BLD-MCNC: 13.1 G/DL (ref 12–15.9)
MCH RBC QN AUTO: 29.3 PG (ref 26.6–33)
MCHC RBC AUTO-ENTMCNC: 33.2 G/DL (ref 31.5–35.7)
MCV RBC AUTO: 88.1 FL (ref 79–97)
PLATELET # BLD AUTO: 336 10*3/MM3 (ref 140–450)
PMV BLD AUTO: 9.1 FL (ref 6–12)
POTASSIUM SERPL-SCNC: 3.5 MMOL/L (ref 3.5–5.2)
RBC # BLD AUTO: 4.47 10*6/MM3 (ref 3.77–5.28)
SODIUM SERPL-SCNC: 140 MMOL/L (ref 136–145)
WBC # BLD AUTO: 8.4 10*3/MM3 (ref 3.4–10.8)

## 2020-11-18 PROCEDURE — 36415 COLL VENOUS BLD VENIPUNCTURE: CPT

## 2020-11-18 PROCEDURE — 80048 BASIC METABOLIC PNL TOTAL CA: CPT

## 2020-11-18 PROCEDURE — 85027 COMPLETE CBC AUTOMATED: CPT

## 2020-11-18 NOTE — DISCHARGE INSTRUCTIONS
Take the following medications the morning of surgery: WELLBUTRIN , CITALOPRAM, TRELEGY, AND OMEPRAZOLE    ARRIVE AT 545AM    If you are on prescription narcotic pain medication to control your pain you may also take that medication the morning of surgery.    General Instructions:  • Do not eat solid food after midnight the night before surgery.  • You may drink clear liquids day of surgery but must stop at least one hour before your hospital arrival time.  • It is beneficial for you to have a clear drink that contains carbohydrates the day of surgery.  We suggest a 12 to 20 ounce bottle of Gatorade or Powerade for non-diabetic patients or a 12 to 20 ounce bottle of G2 or Powerade Zero for diabetic patients. (Pediatric patients, are not advised to drink a 12 to 20 ounce carbohydrate drink)    Clear liquids are liquids you can see through.  Nothing red in color.     Plain water                               Sports drinks  Sodas                                   Gelatin (Jell-O)  Fruit juices without pulp such as white grape juice and apple juice  Popsicles that contain no fruit or yogurt  Tea or coffee (no cream or milk added)  Gatorade / Powerade  G2 / Powerade Zero    • Infants may have breast milk up to four hours before surgery.  • Infants drinking formula may drink formula up to six hours before surgery.   • Patients who avoid smoking, chewing tobacco and alcohol for 4 weeks prior to surgery have a reduced risk of post-operative complications.  Quit smoking as many days before surgery as you can.  • Do not smoke, use chewing tobacco or drink alcohol the day of surgery.   • If applicable bring your C-PAP/ BI-PAP machine.  • Bring any papers given to you in the doctor’s office.  • Wear clean comfortable clothes.  • Do not wear contact lenses, false eyelashes or make-up.  Bring a case for your glasses.   • Bring crutches or walker if applicable.  • Remove all piercings.  Leave jewelry and any other valuables at  home.  • Hair extensions with metal clips must be removed prior to surgery.  • The Pre-Admission Testing nurse will instruct you to bring medications if unable to obtain an accurate list in Pre-Admission Testing.          Preventing a Surgical Site Infection:  • For 2 to 3 days before surgery, avoid shaving with a razor because the razor can irritate skin and make it easier to develop an infection.    • Any areas of open skin can increase the risk of a post-operative wound infection by allowing bacteria to enter and travel throughout the body.  Notify your surgeon if you have any skin wounds / rashes even if it is not near the expected surgical site.  The area will need assessed to determine if surgery should be delayed until it is healed.  • The night prior to surgery shower using a fresh bar of anti-bacterial soap (such as Dial) and clean washcloth.  Sleep in a clean bed with clean clothing.  Do not allow pets to sleep with you.  • Shower on the morning of surgery using a fresh bar of anti-bacterial soap (such as Dial) and clean washcloth.  Dry with a clean towel and dress in clean clothing.  • Ask your surgeon if you will be receiving antibiotics prior to surgery.  • Make sure you, your family, and all healthcare providers clean their hands with soap and water or an alcohol based hand  before caring for you or your wound.    Day of surgery:  Your arrival time is approximately two hours before your scheduled surgery time.  Upon arrival, a Pre-op nurse and Anesthesiologist will review your health history, obtain vital signs, and answer questions you may have.  The only belongings needed at this time will be a list of your home medications and if applicable your C-PAP/BI-PAP machine.  If you are staying overnight your family can leave the rest of your belongings in the car and bring them to your room later.  A Pre-op nurse will start an IV and you may receive medication in preparation for surgery, including  something to help you relax.  While you are in surgery your family should notify the waiting room  if they leave the waiting room area and provide a contact phone number.    Please be aware that surgery does come with discomfort.  We want to make every effort to control your discomfort so please discuss any uncontrolled symptoms with your nurse.   Your doctor will most likely have prescribed pain medications.      If you are going home after surgery you will receive individualized written care instructions before being discharged.  A responsible adult must drive you to and from the hospital on the day of your surgery and stay with you for 24 hours.    If you are staying overnight following surgery, you will be transported to your hospital room following the recovery period.  [facility] has all private rooms.    If you have any questions please call Pre-Admission Testing at [phone].  Deductibles and co-payments are collected on the day of service. Please be prepared to pay the required co-pay, deductible or deposit on the day of service as defined by your plan.    Patient Education for Self-Quarantine Process    Following your COVID testing, we strongly recommend that you do not leave your home after you have been tested for COVID except to get medical care. This includes not going to work, school or to public areas.  If this is not possible for you to do please limit your activities to only required outings.  Be sure to wear a mask when you are with other people, practice social distancing and wash your hands frequently.      The following items provide additional details to keep you safe.  • Wash your hands with soap and water frequently for at least 20 seconds.   • Avoid touching your eyes, nose and mouth with unwashed hands.  • Do not share anything - utensils, towels, food from the same bowl.   • Have your own utensils, drinking glass, dishes, towels and bedding.   • Do not have  visitors.   • Do use FaceTime to stay in touch with family and friends.  • You should stay in a specific room away from others if possible.   • Stay at least 6 feet away from others in the home if you cannot have a dedicated room to yourself.   • Do not snuggle with your pet. While the CDC says there is no evidence that pets can spread COVID-19 or be infected from humans, it is probably best to avoid “petting, snuggling, being kissed or licked and sharing food (during self-quarantine)”, according to the CDC.   • Sanitize household surfaces daily. Include all high touch areas (door handles, light switches, phones, countertops, etc.)  • Do not share a bathroom with others, if possible.   • Wear a mask around others in your home if you are unable to stay in a separate room or 6 feet apart. If  you are unable to wear a mask, have your family member wear a mask if they must be within 6 feet of you.   Call your surgeon immediately if you experience any of the following symptoms:  • Sore Throat  • Shortness of Breath or difficulty breathing  • Cough  • Chills  • Body soreness or muscle pain  • Headache  • Fever  • New loss of taste or smell  • Do not arrive for your surgery ill.  Your procedure will need to be rescheduled to another time.  You will need to call your physician before the day of surgery to avoid any unnecessary exposure to hospital staff as well as other patients.

## 2020-11-20 ENCOUNTER — LAB (OUTPATIENT)
Dept: LAB | Facility: HOSPITAL | Age: 50
End: 2020-11-20

## 2020-11-20 DIAGNOSIS — Z01.818 OTHER SPECIFIED PRE-OPERATIVE EXAMINATION: ICD-10-CM

## 2020-11-20 PROCEDURE — U0004 COV-19 TEST NON-CDC HGH THRU: HCPCS

## 2020-11-20 PROCEDURE — C9803 HOPD COVID-19 SPEC COLLECT: HCPCS

## 2020-11-21 ENCOUNTER — ANESTHESIA EVENT (OUTPATIENT)
Dept: PERIOP | Facility: HOSPITAL | Age: 50
End: 2020-11-21

## 2020-11-21 LAB — SARS-COV-2 RNA RESP QL NAA+PROBE: NOT DETECTED

## 2020-11-23 ENCOUNTER — HOSPITAL ENCOUNTER (OUTPATIENT)
Facility: HOSPITAL | Age: 50
Discharge: HOME | End: 2020-11-24
Attending: OTOLARYNGOLOGY | Admitting: OTOLARYNGOLOGY

## 2020-11-23 ENCOUNTER — ANESTHESIA (OUTPATIENT)
Dept: PERIOP | Facility: HOSPITAL | Age: 50
End: 2020-11-23

## 2020-11-23 DIAGNOSIS — K11.8 PAROTID MASS: ICD-10-CM

## 2020-11-23 PROCEDURE — G0378 HOSPITAL OBSERVATION PER HR: HCPCS

## 2020-11-23 PROCEDURE — 25010000002 ONDANSETRON PER 1 MG: Performed by: NURSE ANESTHETIST, CERTIFIED REGISTERED

## 2020-11-23 PROCEDURE — 25010000002 NEOSTIGMINE PER 0.5 MG: Performed by: NURSE ANESTHETIST, CERTIFIED REGISTERED

## 2020-11-23 PROCEDURE — 25010000002 PROPOFOL 10 MG/ML EMULSION: Performed by: NURSE ANESTHETIST, CERTIFIED REGISTERED

## 2020-11-23 PROCEDURE — 25010000002 FENTANYL CITRATE (PF) 100 MCG/2ML SOLUTION: Performed by: NURSE ANESTHETIST, CERTIFIED REGISTERED

## 2020-11-23 PROCEDURE — 25010000002 DEXAMETHASONE PER 1 MG: Performed by: NURSE ANESTHETIST, CERTIFIED REGISTERED

## 2020-11-23 PROCEDURE — 25010000003 POTASSIUM CHLORIDE PER 2 MEQ: Performed by: OTOLARYNGOLOGY

## 2020-11-23 PROCEDURE — 88307 TISSUE EXAM BY PATHOLOGIST: CPT | Performed by: OTOLARYNGOLOGY

## 2020-11-23 RX ORDER — MONTELUKAST SODIUM 10 MG/1
10 TABLET ORAL NIGHTLY
Status: DISCONTINUED | OUTPATIENT
Start: 2020-11-23 | End: 2020-11-24 | Stop reason: HOSPADM

## 2020-11-23 RX ORDER — OXYCODONE AND ACETAMINOPHEN 7.5; 325 MG/1; MG/1
1 TABLET ORAL EVERY 4 HOURS PRN
Status: DISCONTINUED | OUTPATIENT
Start: 2020-11-23 | End: 2020-11-24 | Stop reason: HOSPADM

## 2020-11-23 RX ORDER — CITALOPRAM 20 MG/1
20 TABLET ORAL DAILY
Status: DISCONTINUED | OUTPATIENT
Start: 2020-11-23 | End: 2020-11-24 | Stop reason: HOSPADM

## 2020-11-23 RX ORDER — ROCURONIUM BROMIDE 10 MG/ML
INJECTION, SOLUTION INTRAVENOUS AS NEEDED
Status: DISCONTINUED | OUTPATIENT
Start: 2020-11-23 | End: 2020-11-23 | Stop reason: SURG

## 2020-11-23 RX ORDER — ACETAMINOPHEN 650 MG/1
650 SUPPOSITORY RECTAL ONCE AS NEEDED
Status: DISCONTINUED | OUTPATIENT
Start: 2020-11-23 | End: 2020-11-23 | Stop reason: HOSPADM

## 2020-11-23 RX ORDER — PANTOPRAZOLE SODIUM 40 MG/1
40 TABLET, DELAYED RELEASE ORAL EVERY MORNING
Status: DISCONTINUED | OUTPATIENT
Start: 2020-11-23 | End: 2020-11-24 | Stop reason: HOSPADM

## 2020-11-23 RX ORDER — ONDANSETRON 2 MG/ML
4 INJECTION INTRAMUSCULAR; INTRAVENOUS ONCE AS NEEDED
Status: DISCONTINUED | OUTPATIENT
Start: 2020-11-23 | End: 2020-11-24 | Stop reason: HOSPADM

## 2020-11-23 RX ORDER — DEXAMETHASONE SODIUM PHOSPHATE 10 MG/ML
INJECTION INTRAMUSCULAR; INTRAVENOUS AS NEEDED
Status: DISCONTINUED | OUTPATIENT
Start: 2020-11-23 | End: 2020-11-23 | Stop reason: SURG

## 2020-11-23 RX ORDER — ONDANSETRON 2 MG/ML
4 INJECTION INTRAMUSCULAR; INTRAVENOUS ONCE AS NEEDED
Status: COMPLETED | OUTPATIENT
Start: 2020-11-23 | End: 2020-11-23

## 2020-11-23 RX ORDER — NITROGLYCERIN 0.4 MG/1
0.4 TABLET SUBLINGUAL
Status: DISCONTINUED | OUTPATIENT
Start: 2020-11-23 | End: 2020-11-24 | Stop reason: HOSPADM

## 2020-11-23 RX ORDER — ONDANSETRON 2 MG/ML
INJECTION INTRAMUSCULAR; INTRAVENOUS AS NEEDED
Status: DISCONTINUED | OUTPATIENT
Start: 2020-11-23 | End: 2020-11-23 | Stop reason: SURG

## 2020-11-23 RX ORDER — NALOXONE HCL 0.4 MG/ML
0.2 VIAL (ML) INJECTION AS NEEDED
Status: DISCONTINUED | OUTPATIENT
Start: 2020-11-23 | End: 2020-11-23 | Stop reason: HOSPADM

## 2020-11-23 RX ORDER — ROSUVASTATIN CALCIUM 5 MG/1
5 TABLET, COATED ORAL 3 TIMES WEEKLY
Status: DISCONTINUED | OUTPATIENT
Start: 2020-11-23 | End: 2020-11-24 | Stop reason: HOSPADM

## 2020-11-23 RX ORDER — LIDOCAINE HYDROCHLORIDE 20 MG/ML
INJECTION, SOLUTION INFILTRATION; PERINEURAL AS NEEDED
Status: DISCONTINUED | OUTPATIENT
Start: 2020-11-23 | End: 2020-11-23 | Stop reason: SURG

## 2020-11-23 RX ORDER — SODIUM CHLORIDE 0.9 % (FLUSH) 0.9 %
3 SYRINGE (ML) INJECTION EVERY 12 HOURS SCHEDULED
Status: DISCONTINUED | OUTPATIENT
Start: 2020-11-23 | End: 2020-11-23 | Stop reason: HOSPADM

## 2020-11-23 RX ORDER — DIPHENHYDRAMINE HCL 25 MG
25 CAPSULE ORAL
Status: DISCONTINUED | OUTPATIENT
Start: 2020-11-23 | End: 2020-11-23 | Stop reason: HOSPADM

## 2020-11-23 RX ORDER — MORPHINE SULFATE 2 MG/ML
6 INJECTION, SOLUTION INTRAMUSCULAR; INTRAVENOUS
Status: DISCONTINUED | OUTPATIENT
Start: 2020-11-23 | End: 2020-11-24 | Stop reason: HOSPADM

## 2020-11-23 RX ORDER — BUPROPION HYDROCHLORIDE 150 MG/1
150 TABLET ORAL DAILY
Status: DISCONTINUED | OUTPATIENT
Start: 2020-11-23 | End: 2020-11-24 | Stop reason: HOSPADM

## 2020-11-23 RX ORDER — ACETAMINOPHEN 325 MG/1
650 TABLET ORAL ONCE AS NEEDED
Status: DISCONTINUED | OUTPATIENT
Start: 2020-11-23 | End: 2020-11-23 | Stop reason: HOSPADM

## 2020-11-23 RX ORDER — HYDROCODONE BITARTRATE AND ACETAMINOPHEN 7.5; 325 MG/1; MG/1
1 TABLET ORAL ONCE AS NEEDED
Status: DISCONTINUED | OUTPATIENT
Start: 2020-11-23 | End: 2020-11-23 | Stop reason: HOSPADM

## 2020-11-23 RX ORDER — FENTANYL CITRATE 50 UG/ML
50 INJECTION, SOLUTION INTRAMUSCULAR; INTRAVENOUS
Status: DISCONTINUED | OUTPATIENT
Start: 2020-11-23 | End: 2020-11-23 | Stop reason: HOSPADM

## 2020-11-23 RX ORDER — DIPHENHYDRAMINE HYDROCHLORIDE 50 MG/ML
12.5 INJECTION INTRAMUSCULAR; INTRAVENOUS
Status: DISCONTINUED | OUTPATIENT
Start: 2020-11-23 | End: 2020-11-23 | Stop reason: HOSPADM

## 2020-11-23 RX ORDER — SODIUM CHLORIDE AND POTASSIUM CHLORIDE 150; 450 MG/100ML; MG/100ML
100 INJECTION, SOLUTION INTRAVENOUS CONTINUOUS
Status: DISCONTINUED | OUTPATIENT
Start: 2020-11-23 | End: 2020-11-24 | Stop reason: HOSPADM

## 2020-11-23 RX ORDER — FLUMAZENIL 0.1 MG/ML
0.2 INJECTION INTRAVENOUS AS NEEDED
Status: DISCONTINUED | OUTPATIENT
Start: 2020-11-23 | End: 2020-11-23 | Stop reason: HOSPADM

## 2020-11-23 RX ORDER — NALOXONE HCL 0.4 MG/ML
0.4 VIAL (ML) INJECTION
Status: DISCONTINUED | OUTPATIENT
Start: 2020-11-23 | End: 2020-11-24 | Stop reason: HOSPADM

## 2020-11-23 RX ORDER — GLYCOPYRROLATE 0.2 MG/ML
INJECTION INTRAMUSCULAR; INTRAVENOUS AS NEEDED
Status: DISCONTINUED | OUTPATIENT
Start: 2020-11-23 | End: 2020-11-23 | Stop reason: SURG

## 2020-11-23 RX ORDER — SODIUM CHLORIDE 0.9 % (FLUSH) 0.9 %
3-10 SYRINGE (ML) INJECTION AS NEEDED
Status: DISCONTINUED | OUTPATIENT
Start: 2020-11-23 | End: 2020-11-23 | Stop reason: HOSPADM

## 2020-11-23 RX ORDER — DROPERIDOL 2.5 MG/ML
1.25 INJECTION, SOLUTION INTRAMUSCULAR; INTRAVENOUS ONCE AS NEEDED
Status: DISCONTINUED | OUTPATIENT
Start: 2020-11-23 | End: 2020-11-23 | Stop reason: HOSPADM

## 2020-11-23 RX ORDER — PROPOFOL 10 MG/ML
VIAL (ML) INTRAVENOUS AS NEEDED
Status: DISCONTINUED | OUTPATIENT
Start: 2020-11-23 | End: 2020-11-23 | Stop reason: SURG

## 2020-11-23 RX ORDER — FENTANYL CITRATE 50 UG/ML
INJECTION, SOLUTION INTRAMUSCULAR; INTRAVENOUS AS NEEDED
Status: DISCONTINUED | OUTPATIENT
Start: 2020-11-23 | End: 2020-11-23 | Stop reason: SURG

## 2020-11-23 RX ORDER — LABETALOL HYDROCHLORIDE 5 MG/ML
5 INJECTION, SOLUTION INTRAVENOUS
Status: DISCONTINUED | OUTPATIENT
Start: 2020-11-23 | End: 2020-11-23 | Stop reason: HOSPADM

## 2020-11-23 RX ORDER — MAGNESIUM HYDROXIDE 1200 MG/15ML
LIQUID ORAL AS NEEDED
Status: DISCONTINUED | OUTPATIENT
Start: 2020-11-23 | End: 2020-11-23 | Stop reason: HOSPADM

## 2020-11-23 RX ORDER — LIDOCAINE HYDROCHLORIDE 10 MG/ML
0.5 INJECTION, SOLUTION EPIDURAL; INFILTRATION; INTRACAUDAL; PERINEURAL ONCE AS NEEDED
Status: DISCONTINUED | OUTPATIENT
Start: 2020-11-23 | End: 2020-11-23 | Stop reason: HOSPADM

## 2020-11-23 RX ORDER — DROPERIDOL 2.5 MG/ML
0.62 INJECTION, SOLUTION INTRAMUSCULAR; INTRAVENOUS ONCE AS NEEDED
Status: DISCONTINUED | OUTPATIENT
Start: 2020-11-23 | End: 2020-11-23 | Stop reason: HOSPADM

## 2020-11-23 RX ORDER — HYDROMORPHONE HYDROCHLORIDE 1 MG/ML
0.5 INJECTION, SOLUTION INTRAMUSCULAR; INTRAVENOUS; SUBCUTANEOUS
Status: DISCONTINUED | OUTPATIENT
Start: 2020-11-23 | End: 2020-11-23 | Stop reason: HOSPADM

## 2020-11-23 RX ORDER — SODIUM CHLORIDE, SODIUM LACTATE, POTASSIUM CHLORIDE, CALCIUM CHLORIDE 600; 310; 30; 20 MG/100ML; MG/100ML; MG/100ML; MG/100ML
9 INJECTION, SOLUTION INTRAVENOUS CONTINUOUS
Status: DISCONTINUED | OUTPATIENT
Start: 2020-11-23 | End: 2020-11-24 | Stop reason: HOSPADM

## 2020-11-23 RX ORDER — PROMETHAZINE HYDROCHLORIDE 25 MG/1
25 SUPPOSITORY RECTAL ONCE AS NEEDED
Status: DISCONTINUED | OUTPATIENT
Start: 2020-11-23 | End: 2020-11-23 | Stop reason: HOSPADM

## 2020-11-23 RX ORDER — HYDRALAZINE HYDROCHLORIDE 20 MG/ML
5 INJECTION INTRAMUSCULAR; INTRAVENOUS
Status: DISCONTINUED | OUTPATIENT
Start: 2020-11-23 | End: 2020-11-23 | Stop reason: HOSPADM

## 2020-11-23 RX ORDER — MIDAZOLAM HYDROCHLORIDE 1 MG/ML
1 INJECTION INTRAMUSCULAR; INTRAVENOUS
Status: DISCONTINUED | OUTPATIENT
Start: 2020-11-23 | End: 2020-11-23 | Stop reason: HOSPADM

## 2020-11-23 RX ORDER — PROMETHAZINE HYDROCHLORIDE 25 MG/1
25 TABLET ORAL ONCE AS NEEDED
Status: DISCONTINUED | OUTPATIENT
Start: 2020-11-23 | End: 2020-11-23 | Stop reason: HOSPADM

## 2020-11-23 RX ADMIN — DEXAMETHASONE SODIUM PHOSPHATE 8 MG: 10 INJECTION INTRAMUSCULAR; INTRAVENOUS at 07:45

## 2020-11-23 RX ADMIN — POTASSIUM CHLORIDE AND SODIUM CHLORIDE 100 ML/HR: 450; 150 INJECTION, SOLUTION INTRAVENOUS at 14:10

## 2020-11-23 RX ADMIN — OXYCODONE HYDROCHLORIDE AND ACETAMINOPHEN 1 TABLET: 7.5; 325 TABLET ORAL at 13:03

## 2020-11-23 RX ADMIN — Medication 3 MG: at 09:42

## 2020-11-23 RX ADMIN — OXYCODONE HYDROCHLORIDE AND ACETAMINOPHEN 1 TABLET: 7.5; 325 TABLET ORAL at 21:59

## 2020-11-23 RX ADMIN — GLYCOPYRROLATE 0.4 MG: 0.2 INJECTION INTRAMUSCULAR; INTRAVENOUS at 09:42

## 2020-11-23 RX ADMIN — ROCURONIUM BROMIDE 30 MG: 10 INJECTION, SOLUTION INTRAVENOUS at 07:31

## 2020-11-23 RX ADMIN — FENTANYL CITRATE 50 MCG: 50 INJECTION, SOLUTION INTRAMUSCULAR; INTRAVENOUS at 10:40

## 2020-11-23 RX ADMIN — FENTANYL CITRATE 50 MCG: 50 INJECTION INTRAMUSCULAR; INTRAVENOUS at 07:31

## 2020-11-23 RX ADMIN — ONDANSETRON 4 MG: 2 INJECTION INTRAMUSCULAR; INTRAVENOUS at 10:08

## 2020-11-23 RX ADMIN — SODIUM CHLORIDE, POTASSIUM CHLORIDE, SODIUM LACTATE AND CALCIUM CHLORIDE: 600; 310; 30; 20 INJECTION, SOLUTION INTRAVENOUS at 09:22

## 2020-11-23 RX ADMIN — OXYCODONE HYDROCHLORIDE AND ACETAMINOPHEN 1 TABLET: 7.5; 325 TABLET ORAL at 17:32

## 2020-11-23 RX ADMIN — FENTANYL CITRATE 50 MCG: 50 INJECTION INTRAMUSCULAR; INTRAVENOUS at 07:55

## 2020-11-23 RX ADMIN — LIDOCAINE HYDROCHLORIDE 60 MG: 20 INJECTION, SOLUTION INFILTRATION; PERINEURAL at 07:31

## 2020-11-23 RX ADMIN — SODIUM CHLORIDE, POTASSIUM CHLORIDE, SODIUM LACTATE AND CALCIUM CHLORIDE: 600; 310; 30; 20 INJECTION, SOLUTION INTRAVENOUS at 07:27

## 2020-11-23 RX ADMIN — ONDANSETRON HYDROCHLORIDE 4 MG: 2 SOLUTION INTRAMUSCULAR; INTRAVENOUS at 09:20

## 2020-11-23 RX ADMIN — PROPOFOL 200 MG: 10 INJECTION, EMULSION INTRAVENOUS at 07:31

## 2020-11-23 RX ADMIN — ROSUVASTATIN CALCIUM 5 MG: 5 TABLET, FILM COATED ORAL at 14:20

## 2020-11-23 RX ADMIN — MONTELUKAST SODIUM 10 MG: 10 TABLET, FILM COATED ORAL at 20:10

## 2020-11-23 RX ADMIN — FENTANYL CITRATE 50 MCG: 50 INJECTION INTRAMUSCULAR; INTRAVENOUS at 08:46

## 2020-11-23 RX ADMIN — FENTANYL CITRATE 50 MCG: 50 INJECTION, SOLUTION INTRAMUSCULAR; INTRAVENOUS at 10:17

## 2020-11-23 NOTE — ADDENDUM NOTE
Addendum  created 11/23/20 1343 by Reilly Amos MD    Attestation recorded in Intraprocedure, Intraprocedure Attestations filed

## 2020-11-23 NOTE — ANESTHESIA PROCEDURE NOTES
Airway  Urgency: elective    Date/Time: 11/23/2020 7:36 AM  Airway not difficult    General Information and Staff    Patient location during procedure: OR  Anesthesiologist: Homar Wong MD  CRNA: Jacki Billingsley CRNA    Indications and Patient Condition  Indications for airway management: airway protection    Preoxygenated: yes (pt pre-O2 with 100% O2)  Mask difficulty assessment: 2 - vent by mask + OA or adjuvant +/- NMBA (easy BMV )    Final Airway Details  Final airway type: endotracheal airway      Successful airway: ETT  Cuffed: yes   Successful intubation technique: direct laryngoscopy  Facilitating devices/methods: anterior pressure/BURP  Endotracheal tube insertion site: oral  Blade: Vanessa  Blade size: 3  ETT size (mm): 7.0  Cormack-Lehane Classification: grade I - full view of glottis  Placement verified by: chest auscultation and capnometry   Cuff volume (mL): 7  Measured from: lips  ETT/EBT  to lips (cm): 20  Number of attempts at approach: 1  Assessment: lips, teeth, and gum same as pre-op and atraumatic intubation    Additional Comments  ATOETx1. No change in dentition.

## 2020-11-23 NOTE — ANESTHESIA POSTPROCEDURE EVALUATION
Patient: Amirah Godoy    Procedure Summary     Date: 11/23/20 Room / Location:  [facility]  [facility]    Anesthesia Start: 0727 Anesthesia Stop: 1002    Procedure: RIGHT PAROTIDECTOMY (Right Neck) Diagnosis:     Surgeon: Lul Saavedra MD Provider: Homar Wong MD    Anesthesia Type: general ASA Status: 3          Anesthesia Type: general    Vitals  Vitals Value Taken Time   /73 11/23/20 1100   Temp 36.9 °C (98.5 °F) 11/23/20 0958   Pulse 81 11/23/20 1101   Resp 16 11/23/20 1045   SpO2 97 % 11/23/20 1101   Vitals shown include unvalidated device data.        Post Anesthesia Care and Evaluation    Patient location during evaluation: bedside  Patient participation: complete - patient participated  Level of consciousness: awake and alert  Pain management: adequate  Airway patency: patent  Anesthetic complications: No anesthetic complications    Cardiovascular status: acceptable  Respiratory status: acceptable  Hydration status: acceptable    Comments: /67 (BP Location: Left arm, Patient Position: Lying)   Pulse 75   Temp 36.9 °C (98.5 °F) (Temporal)   Resp 16   LMP 10/25/2017   SpO2 97%

## 2020-11-23 NOTE — PLAN OF CARE
Goal Outcome Evaluation:  Plan of Care Reviewed With: patient     Outcome Summary: VSS. IV fluids infusing, can be SL with good PO intake. Incision to right side of face, DANIELA with dermabond. Complained of pain to right side of face and ear, Percocet given. IS up to 1500. Voided. Discharge home tomorrow.

## 2020-11-23 NOTE — BRIEF OP NOTE
PAROTIDECTOMY  Progress Note    Amirah Godoy  11/23/2020    Pre-op Diagnosis:   Right parotid mass       Post-Op Diagnosis Codes:   same    Procedure/CPT® Codes:        Procedure(s):  RIGHT PAROTIDECTOMY    Surgeon(s):  Lul Saavedra MD    Anesthesia: General    Staff:   Circulator: Dora Siddiqi RN  Scrub Person: Fátima Paul         Estimated Blood Loss: 50    Urine Voided: * No values recorded between 11/23/2020  7:27 AM and 11/23/2020  9:45 AM *    Specimens:                Specimens     ID Source Type Tests Collected By Collected At Frozen?      A Parotid Tissue · TISSUE PATHOLOGY EXAM   Lul Saavedra MD 11/23/20 0758                  Drains: * No LDAs found *    Findings: 2.5 x 2 cm mass compressing the nerve medially    Complications: none          Lul Saavedra MD     Date: 11/23/2020  Time: 09:59 EST

## 2020-11-23 NOTE — OP NOTE
PREOPERATIVE DIAGNOSIS:  Right parotid mass.    POSTOPERATIVE DIAGNOSIS:  Right parotid mass.    PROCEDURE:  Right parotidectomy with facial nerve dissection.    INDICATIONS:  Right parotid mass.    SURGEON:  Lul Saavedra MD    ASSISTANT:  EVGENY Sanabria.    FINDINGS:  A 2-1/2 x 2 cm firm parotid tail mass displacing the facial nerve medially.  The facial nerve was completely intact postoperatively.      DESCRIPTION OF PROCEDURE: The patient was taken to the operating room and placed in the supine position. Satisfactory general endotracheal anesthesia was administered.  The right face and neck were prepped and draped with Betadine in the usual sterile fashion.  A modified Robetro incision was outlined and executed with a #15 blade scalpel and carried through subcutaneous fat.  An anteriorly based subcutaneous flap was elevated.  The parotid tail was dissected off the sternocleidomastoid muscle and reflected anteriorly to expose the tragal cartilage.  Working down to the tip of the tragal cartilage the mastoid process and the vaginal process of the tympanic bone were identified.  The main trunk of the nerve was identified in classic location.  The tumor was basically abutting this facial nerve structure on its deep aspect.  Once it was  from the facial nerve, I was then able to perform a pericapsular dissection with delicate technique using the Stanley dissector.  Then the mass was completely circumscribed.  It was thus removed. A few small vessels were controlled with 4-0 silk ties but most of the unnamed tissues in the parenchyma were controlled with delicate electrocautery.  The wound was then thoroughly irrigated with normal saline.  Given the pericapsular dissection and the relatively limited dense space, I decided to close the parotideomasseteric fascia with 4-0 Vicryl and to not place a drain.  After closure of the fascia the skin was closed in layers with 6-0 vicryl in the dermis and dermabond for  skin EBL was minimal.

## 2020-11-23 NOTE — ANESTHESIA PREPROCEDURE EVALUATION
Anesthesia Evaluation     Patient summary reviewed and Nursing notes reviewed   no history of anesthetic complications:  NPO Solid Status: > 8 hours  NPO Liquid Status: > 2 hours           Airway   Mallampati: II  TM distance: >3 FB  Neck ROM: full  no difficulty expected  Dental - normal exam     Pulmonary - normal exam   (+) asthma,sleep apnea on CPAP,   (-) COPD, not a smoker, lung cancer  Cardiovascular - normal exam  Exercise tolerance: good (4-7 METS)    ECG reviewed  Rhythm: regular  Rate: normal    (+) hypertension well controlled less than 2 medications, dysrhythmias Paroxysmal Atrial Fib, PVC, Tachycardia, hyperlipidemia,   (-) valvular problems/murmurs, past MI, CAD, cardiac stents, CABG    ROS comment: Hx/o PAF, flutter and SVT, pt is s/p multiple prior cardiac ablations.  Recently had a benign heart cath secondary to questionable stress ECHO results.  Cath was ruled negative.      Neuro/Psych  (+) TIA, CVA residual symptoms, headaches, weakness, psychiatric history Anxiety and Depression,     GI/Hepatic/Renal/Endo    (+) obesity,  GERD well controlled,    (-) PUD, hepatitis, liver disease, no renal disease, diabetes, GI bleed, no thyroid disorder    Musculoskeletal (-) negative ROS    Abdominal  - normal exam   Substance History - negative use     OB/GYN negative ob/gyn ROS         Other - negative ROS                       Anesthesia Plan    ASA 3     general     intravenous induction     Anesthetic plan, all risks, benefits, and alternatives have been provided, discussed and informed consent has been obtained with: patient.    Plan discussed with CRNA and attending.

## 2020-11-24 VITALS
DIASTOLIC BLOOD PRESSURE: 70 MMHG | TEMPERATURE: 97.2 F | HEART RATE: 64 BPM | OXYGEN SATURATION: 95 % | SYSTOLIC BLOOD PRESSURE: 117 MMHG | RESPIRATION RATE: 16 BRPM

## 2020-11-24 LAB
CYTO UR: NORMAL
LAB AP CASE REPORT: NORMAL
PATH REPORT.FINAL DX SPEC: NORMAL
PATH REPORT.GROSS SPEC: NORMAL

## 2020-11-24 PROCEDURE — G0378 HOSPITAL OBSERVATION PER HR: HCPCS

## 2020-11-24 RX ADMIN — OXYCODONE HYDROCHLORIDE AND ACETAMINOPHEN 1 TABLET: 7.5; 325 TABLET ORAL at 06:10

## 2020-11-24 RX ADMIN — OXYCODONE HYDROCHLORIDE AND ACETAMINOPHEN 1 TABLET: 7.5; 325 TABLET ORAL at 10:30

## 2020-11-24 RX ADMIN — OXYCODONE HYDROCHLORIDE AND ACETAMINOPHEN 1 TABLET: 7.5; 325 TABLET ORAL at 02:24

## 2020-11-24 RX ADMIN — CITALOPRAM 20 MG: 20 TABLET, FILM COATED ORAL at 08:15

## 2020-11-24 RX ADMIN — BUPROPION HYDROCHLORIDE 150 MG: 150 TABLET, FILM COATED, EXTENDED RELEASE ORAL at 08:15

## 2020-11-24 RX ADMIN — PANTOPRAZOLE SODIUM 40 MG: 40 TABLET, DELAYED RELEASE ORAL at 06:08

## 2020-11-24 NOTE — PROGRESS NOTES
Case Management Discharge Note      Final Note: Discharged home. Aggie Pandey, [employer]         Transportation Services  Private: Car    Final Discharge Disposition Code: 01 - home or self-care

## 2020-11-24 NOTE — PROGRESS NOTES
Continued Stay Note  [facility]     Patient Name: Amirah Godoy  MRN: [id number]  Today's Date: 11/24/2020    Admit Date: 11/23/2020    Discharge Plan     Row Name 11/24/20 1001       Plan    Plan  Home no needs    Plan Comments  Discharged order noted. Met with patient who confirmed DC plan is home. Stated spouse will assist as needed and will provide transportation at DC. Denies any needs/equipment.        Discharge Codes    No documentation.       Expected Discharge Date and Time     Expected Discharge Date Expected Discharge Time    Nov 24, 2020             Aggie Pandey RN

## 2021-01-11 ENCOUNTER — OFFICE VISIT (OUTPATIENT)
Dept: SLEEP MEDICINE | Facility: HOSPITAL | Age: 51
End: 2021-01-11

## 2021-01-11 VITALS
DIASTOLIC BLOOD PRESSURE: 82 MMHG | OXYGEN SATURATION: 97 % | SYSTOLIC BLOOD PRESSURE: 169 MMHG | BODY MASS INDEX: 34.4 KG/M2 | HEIGHT: 68 IN | HEART RATE: 74 BPM | WEIGHT: 227 LBS

## 2021-01-11 DIAGNOSIS — G47.33 OBSTRUCTIVE SLEEP APNEA, ADULT: Primary | ICD-10-CM

## 2021-01-11 PROCEDURE — G0463 HOSPITAL OUTPT CLINIC VISIT: HCPCS

## 2021-01-11 NOTE — PROGRESS NOTES
[facility]                          Chief Complaint:   Follow up for obstructive sleep apnea and obesity    History of present illness:   Subjective   Summary:   Patient is a 50 y.o. female patient with history of asthma.  BMI 32.  She complains about loud snoring and daytime fatigue.  She was noted to have obstructed breathing following anesthesia for colonoscopy.    BETTY:  HST 10/8/20: JENNIFER= 11.5/h; supine JENNIFER 13.8/h; GABRIEL= 10/h; hypoxic burden= 10 min.    She was placed on auto CPAP therapy.  She returns to the clinic today for first CPAP follow-up.  She reported that after she started CPAP therapy, she felt really much better the first 1-2 weeks with significant improvement in daytime sleepiness but the improvement fade away gradually.  However, she is still feeling better than when she was not on CPAP treatment.    Sleep schedule:  -Bedtime: 11 PM  -Sleep latency: 10-15 minutes  -Wake up time: 630-8 AM, does feel refreshed  -Nocturnal awakenin times because of uncomfortable.  No difficulties going back to sleep.    Mask: Nasal mask which does fit well.  No significant air leak or dry mouth  DME: [facility]    ESS: Total score: 5.    Comorbid conditions include hx of stroke 2 years ago and anxiety.  No symptoms of such.    Concerning blood pressure, she stated that she checks it at home and it usually runs well less than 130/90    REVIEW OF SYSTEMS:   HEENT: No nasal congestion or postnasal drip   CARDIOVASCULAR: No chest pain, chest pressure or chest discomfort. No palpitations or edema.   RESPIRATORY: No shortness of breath, cough or sputum.   GASTROINTESTINAL: No abdominal bloating or reflux   NEUROLOGICAL/PSYCHOATRY: Anxiety and depression    Past Medical History:  Past Medical History:   Diagnosis Date   • Asthma    • Depression    • Enlarged parotid gland    • GERD (gastroesophageal reflux disease)    • History of atrial fibrillation     NO  CURRENT MEDICATION   • Hyperlipidemia    • Hypertension     NO LONGER TREATED W/ MEDS   • PVC (premature ventricular contraction) 2015   • Reflux esophagitis    • Seasonal allergies    • Sinus infection     STARTED ON ANTIBIOTICS ON 10/30/17   • Sleep apnea     CPAP   • Stroke (CMS/[facility])     TIA FOLLOWING CATH 2018   • SVT (supraventricular tachycardia) (CMS/[facility]) 2011    NO CURRENT MEDICATIONS   ,   Past Surgical History:   Procedure Laterality Date   • ATRIAL SEPTAL DEFECT REPAIR     • BRONCHOSCOPY N/A 1/11/2018    Procedure: BRONCHOSCOPY;  Surgeon: Scott Dobson MD;  [facility];  Service:    • CARDIAC ABLATION  2011    DR. NADEEM SCHUMACHER --A-FIB    • CARDIAC CATHETERIZATION N/A 11/2/2018    Procedure: Left Heart Cath;  Surgeon: Niles Koch MD;  Location: [facility] CATH INVASIVE LOCATION;  Service: Cardiology   • CARDIAC CATHETERIZATION N/A 11/2/2018    Procedure: Coronary angiography;  Surgeon: Niles Koch MD;  Location: [facility] CATH INVASIVE LOCATION;  Service: Cardiology   • CARDIAC CATHETERIZATION N/A 11/2/2018    Procedure: Left ventriculography;  Surgeon: Niles Koch MD;  Location: [facility] CATH INVASIVE LOCATION;  Service: Cardiology   • CARDIAC CATHETERIZATION N/A 10/14/2020    Procedure: Left Heart Cath;  Surgeon: Niles Koch MD;  Location: [facility] CATH INVASIVE LOCATION;  Service: Cardiology;  Laterality: N/A;   • CARDIAC CATHETERIZATION N/A 10/14/2020    Procedure: Left ventriculography;  Surgeon: Niles Koch MD;  Location: [facility] CATH INVASIVE LOCATION;  Service: Cardiology;  Laterality: N/A;   • CARDIAC CATHETERIZATION N/A 10/14/2020    Procedure: Coronary angiography;  Surgeon: Niles Koch MD;  Location: [facility] CATH INVASIVE LOCATION;  Service: Cardiology;  Laterality: N/A;   • CARDIAC ELECTROPHYSIOLOGY PROCEDURE N/A 11/4/2018    Procedure: Loop insertion;  Surgeon: Niles Koch MD;  Location: [facility] CATH INVASIVE  LOCATION;  Service: Cardiovascular   •  SECTION  2000   • COLONOSCOPY N/A 2019    Procedure: COLONOSCOPY TO CECUM AND TI;  Surgeon: Jignesh Black MD;  Location: [facility] ENDOSCOPY;  Service: Gastroenterology   • DIAGNOSTIC LAPAROSCOPY Right 11/3/2017    Procedure: LAPAROSCOPIC RIGHT OOPHERECTOMY ;  Surgeon: Claudine Barron MD;  Location: [facility] MAIN OR;  Service:    • OVARIAN CYST REMOVAL  2017   • PAROTIDECTOMY Right 2020    Procedure: RIGHT PAROTIDECTOMY;  Surgeon: Lul Saavedra MD;  Location: [facility] OR OSC;  Service: ENT;  Laterality: Right;   • WISDOM TOOTH EXTRACTION     ,   Social History     Tobacco Use   • Smoking status: Never Smoker   • Smokeless tobacco: Never Used   Substance Use Topics   • Alcohol use: No   • Drug use: No     E-cigarette/Vaping     E-cigarette/Vaping Substances     E-cigarette/Vaping Devices        and Allergies:  Demerol [meperidine] and Sulfa antibiotics    Medication Review:     Current Outpatient Medications:   •  buPROPion XL (WELLBUTRIN XL) 150 MG 24 hr tablet, Take 75 mg by mouth Daily., Disp: , Rfl:   •  cetirizine (ZyrTEC) 10 MG tablet, Take 10 mg by mouth daily., Disp: , Rfl:   •  citalopram (CeleXA) 20 MG tablet, Take 20 mg by mouth Daily., Disp: , Rfl:   •  FASENRA 30 MG/ML solution prefilled syringe, Every 2 (Two) Months., Disp: , Rfl:   •  Fluticasone-Umeclidin-Vilant (TRELEGY ELLIPTA IN), Inhale 1 inhaler Every Morning., Disp: , Rfl:   •  montelukast (SINGULAIR) 10 MG tablet, TAKE ONE TABLET BY MOUTH DAILY (Patient taking differently: Take 10 mg by mouth Daily.), Disp: 30 tablet, Rfl: 4  •  nitroglycerin (NITROSTAT) 0.4 MG SL tablet, 1 under the tongue as needed for angina, may repeat q5mins for up three doses (Patient taking differently: Place 0.4 mg under the tongue Every 5 (Five) Minutes As Needed. 1 under the tongue as needed for angina, may repeat q5mins for up three doses), Disp: 100 tablet, Rfl: 11  •  Omeprazole 20 MG tablet  "delayed-release, Take 20 mg by mouth Daily., Disp: , Rfl:   •  rosuvastatin (CRESTOR) 5 MG tablet, Take 1 tablet by mouth Daily. (Patient taking differently: Take 5 mg by mouth 3 (Three) Times a Week. EVERY M-W-F), Disp: 30 tablet, Rfl: 11        Objective   Vital Signs:  Vitals:    01/11/21 1300   BP: 169/82   Pulse: 74   SpO2: 97%   Weight: 103 kg (227 lb)   Height: 172.7 cm (68\")     Body mass index is 34.52 kg/m².          Physical Exam:   General Appearance:    Alert, cooperative, in no acute distress   ENMT:  Stevens score 2. Mallampati score 2. No oral thrush. Tonsils grade 2. Narrow distance in between the posterior pharyngeal pillars (<25%).   Neck:   Trachea midline. No thyromegaly.   Lungs:     Clear to auscultation,respirations regular, even and                  unlabored    Heart:    Regular rhythm and normal rate, normal S1 and S2, no            Murmur.   Abdomen:     Obese.  Soft.  No tenderness.  No HSM    Neuro:   Conscious, alert, oriented x3. Appropriate mood and affect.    Extremities:   Moves all extremities well, no edema, no cyanosis, no             Redness          Diagnostic data:  HST 10/8/20: JENNIFER= 11.5/h; supine JENNIFER 13.8/h; GABRIEL= 10/h; hypoxic burden= 10 min.    CPAP download showed:  Date: Last 30 days  Usage (days): 100%  Days used>4h: 100%  AHI: 4.2/h  Leak: 1 min and 2 seconds  Usage: 6 h and 44 min  P 90% : 9  Auto CPAP: 7-12 cm H2O    Assessment   1. BETTY, on CPAP  2. Obesity (BMI = 32)  3. Persistent asthma of unknown severity  4. Elevated blood pressure        PLAN:    Discussed the result of the download.   Patient is compliant with therapy and clinically benefit from treatment.  Patient was encouraged to continue using his CPAP.  Refill supplies.    Counseled for weight loss.  Encouraged to exercise regularly and cut down on carbohydrates.  Discussed that losing weight may decrease the severity of sleep apnea and obviate the need of CPAP therapy.    Concerning the elevated blood " pressure, I suggested to continue ambulatory monitoring for blood pressure and explained the optimal time to check her blood pressure and asked her to call her PCP if her BP is higher than 140/90 frequently.              This note was dictated utilizing Dragon dictation

## 2021-01-12 ENCOUNTER — APPOINTMENT (OUTPATIENT)
Dept: GENERAL RADIOLOGY | Facility: HOSPITAL | Age: 51
End: 2021-01-12

## 2021-01-12 ENCOUNTER — HOSPITAL ENCOUNTER (EMERGENCY)
Facility: HOSPITAL | Age: 51
Discharge: HOME | End: 2021-01-12
Attending: EMERGENCY MEDICINE | Admitting: EMERGENCY MEDICINE

## 2021-01-12 VITALS
OXYGEN SATURATION: 97 % | HEART RATE: 70 BPM | WEIGHT: 220 LBS | BODY MASS INDEX: 33.34 KG/M2 | SYSTOLIC BLOOD PRESSURE: 169 MMHG | RESPIRATION RATE: 16 BRPM | TEMPERATURE: 98.5 F | HEIGHT: 68 IN | DIASTOLIC BLOOD PRESSURE: 88 MMHG

## 2021-01-12 DIAGNOSIS — I10 HYPERTENSION NOT AT GOAL: Primary | ICD-10-CM

## 2021-01-12 LAB
ALBUMIN SERPL-MCNC: 4.1 G/DL (ref 3.5–5.2)
ALBUMIN/GLOB SERPL: 1.6 G/DL
ALP SERPL-CCNC: 110 U/L (ref 39–117)
ALT SERPL W P-5'-P-CCNC: 22 U/L (ref 1–33)
ANION GAP SERPL CALCULATED.3IONS-SCNC: 7.4 MMOL/L (ref 5–15)
AST SERPL-CCNC: 23 U/L (ref 1–32)
BASOPHILS # BLD AUTO: 0.03 10*3/MM3 (ref 0–0.2)
BASOPHILS NFR BLD AUTO: 0.4 % (ref 0–1.5)
BILIRUB SERPL-MCNC: 0.4 MG/DL (ref 0–1.2)
BUN SERPL-MCNC: 14 MG/DL (ref 6–20)
BUN/CREAT SERPL: 22.6 (ref 7–25)
CALCIUM SPEC-SCNC: 8.8 MG/DL (ref 8.6–10.5)
CHLORIDE SERPL-SCNC: 106 MMOL/L (ref 98–107)
CO2 SERPL-SCNC: 26.6 MMOL/L (ref 22–29)
CREAT SERPL-MCNC: 0.62 MG/DL (ref 0.57–1)
DEPRECATED RDW RBC AUTO: 39.8 FL (ref 37–54)
EOSINOPHIL # BLD AUTO: 0 10*3/MM3 (ref 0–0.4)
EOSINOPHIL NFR BLD AUTO: 0 % (ref 0.3–6.2)
ERYTHROCYTE [DISTWIDTH] IN BLOOD BY AUTOMATED COUNT: 12.6 % (ref 12.3–15.4)
GFR SERPL CREATININE-BSD FRML MDRD: 101 ML/MIN/1.73
GLOBULIN UR ELPH-MCNC: 2.5 GM/DL
GLUCOSE SERPL-MCNC: 91 MG/DL (ref 65–99)
HCT VFR BLD AUTO: 37.5 % (ref 34–46.6)
HGB BLD-MCNC: 12.5 G/DL (ref 12–15.9)
HOLD SPECIMEN: NORMAL
HOLD SPECIMEN: NORMAL
IMM GRANULOCYTES # BLD AUTO: 0.04 10*3/MM3 (ref 0–0.05)
IMM GRANULOCYTES NFR BLD AUTO: 0.5 % (ref 0–0.5)
LYMPHOCYTES # BLD AUTO: 1.76 10*3/MM3 (ref 0.7–3.1)
LYMPHOCYTES NFR BLD AUTO: 21.7 % (ref 19.6–45.3)
MCH RBC QN AUTO: 28.9 PG (ref 26.6–33)
MCHC RBC AUTO-ENTMCNC: 33.3 G/DL (ref 31.5–35.7)
MCV RBC AUTO: 86.6 FL (ref 79–97)
MONOCYTES # BLD AUTO: 0.83 10*3/MM3 (ref 0.1–0.9)
MONOCYTES NFR BLD AUTO: 10.2 % (ref 5–12)
NEUTROPHILS NFR BLD AUTO: 5.45 10*3/MM3 (ref 1.7–7)
NEUTROPHILS NFR BLD AUTO: 67.2 % (ref 42.7–76)
NRBC BLD AUTO-RTO: 0 /100 WBC (ref 0–0.2)
NT-PROBNP SERPL-MCNC: 424.4 PG/ML (ref 0–900)
PLATELET # BLD AUTO: 358 10*3/MM3 (ref 140–450)
PMV BLD AUTO: 9.3 FL (ref 6–12)
POTASSIUM SERPL-SCNC: 3.7 MMOL/L (ref 3.5–5.2)
PROT SERPL-MCNC: 6.6 G/DL (ref 6–8.5)
QT INTERVAL: 372 MS
RBC # BLD AUTO: 4.33 10*6/MM3 (ref 3.77–5.28)
SODIUM SERPL-SCNC: 140 MMOL/L (ref 136–145)
TROPONIN T SERPL-MCNC: <0.01 NG/ML (ref 0–0.03)
TROPONIN T SERPL-MCNC: <0.01 NG/ML (ref 0–0.03)
WBC # BLD AUTO: 8.11 10*3/MM3 (ref 3.4–10.8)
WHOLE BLOOD HOLD SPECIMEN: NORMAL
WHOLE BLOOD HOLD SPECIMEN: NORMAL

## 2021-01-12 PROCEDURE — 71045 X-RAY EXAM CHEST 1 VIEW: CPT

## 2021-01-12 PROCEDURE — 93005 ELECTROCARDIOGRAM TRACING: CPT

## 2021-01-12 PROCEDURE — 96374 THER/PROPH/DIAG INJ IV PUSH: CPT

## 2021-01-12 PROCEDURE — 83880 ASSAY OF NATRIURETIC PEPTIDE: CPT | Performed by: PHYSICIAN ASSISTANT

## 2021-01-12 PROCEDURE — 85025 COMPLETE CBC W/AUTO DIFF WBC: CPT | Performed by: PHYSICIAN ASSISTANT

## 2021-01-12 PROCEDURE — 93010 ELECTROCARDIOGRAM REPORT: CPT | Performed by: INTERNAL MEDICINE

## 2021-01-12 PROCEDURE — 84484 ASSAY OF TROPONIN QUANT: CPT | Performed by: EMERGENCY MEDICINE

## 2021-01-12 PROCEDURE — 84484 ASSAY OF TROPONIN QUANT: CPT | Performed by: PHYSICIAN ASSISTANT

## 2021-01-12 PROCEDURE — 80053 COMPREHEN METABOLIC PANEL: CPT | Performed by: PHYSICIAN ASSISTANT

## 2021-01-12 PROCEDURE — 99284 EMERGENCY DEPT VISIT MOD MDM: CPT

## 2021-01-12 PROCEDURE — 93005 ELECTROCARDIOGRAM TRACING: CPT | Performed by: EMERGENCY MEDICINE

## 2021-01-12 RX ORDER — LABETALOL HYDROCHLORIDE 5 MG/ML
10 INJECTION, SOLUTION INTRAVENOUS ONCE
Status: COMPLETED | OUTPATIENT
Start: 2021-01-12 | End: 2021-01-12

## 2021-01-12 RX ORDER — ASPIRIN 325 MG
325 TABLET ORAL DAILY
COMMUNITY

## 2021-01-12 RX ADMIN — LABETALOL HYDROCHLORIDE 10 MG: 5 INJECTION, SOLUTION INTRAVENOUS at 16:51

## 2021-01-12 NOTE — ED PROVIDER NOTES
EMERGENCY DEPARTMENT ENCOUNTER    Room Number: 41/41  Date seen: 1/12/2021  Time seen: 16:44 EST  PCP: Ruben Kwan Jr., MD    Spoken Language:  English  Language interpretation services not needed     CHIEF COMPLAINT: elevated blood pressure    HPI: Amirah Godoy is a 50 y.o. female with PMH of newly diagnosed HTN presenting to the ED for evaluation of elevated blood pressure. The history is being obtained by the patient and by review of the medical chart.  The patient states that she was just given a prescription for lisinopril by her primary care provider.  She states that she was supposed to pick this prescription up and started today, but has yet to do so.  She presents to the emergency department concerned because her blood pressure at home was 199/96 earlier today.  The patient presents complaining of a headache as well as pain between her shoulder blades and pain radiating to the right jaw.  She complains of shortness of breath which has been present over the past 24 hours.  She denies chest pain or pressure.  She denies changes in vision.  She denies fever, cough, abdominal pain, nausea, vomiting or diarrhea.    MEDICAL RECORD REVIEW:  Reviewed in [employer].     PAST MEDICAL HISTORY  Past Medical History:   Diagnosis Date   • Asthma    • Depression    • Enlarged parotid gland    • GERD (gastroesophageal reflux disease)    • History of atrial fibrillation 2011    NO CURRENT MEDICATION   • Hyperlipidemia    • Hypertension     NO LONGER TREATED W/ MEDS   • PVC (premature ventricular contraction) 2015   • Reflux esophagitis    • Seasonal allergies    • Sinus infection     STARTED ON ANTIBIOTICS ON 10/30/17   • Sleep apnea     CPAP   • Stroke (CMS/[facility])     TIA FOLLOWING CATH 2018   • SVT (supraventricular tachycardia) (CMS/[facility]) 2011    NO CURRENT MEDICATIONS       PAST SURGICAL HISTORY  Past Surgical History:   Procedure Laterality Date   • ATRIAL SEPTAL DEFECT REPAIR     • BRONCHOSCOPY N/A 1/11/2018    Procedure:  BRONCHOSCOPY;  Surgeon: Scott Dobson MD;  Location: [facility] ENDOSCOPY;  Service:    • CARDIAC ABLATION      DR. NADEEM SCHUMACHER --A-FIB    • CARDIAC CATHETERIZATION N/A 2018    Procedure: Left Heart Cath;  Surgeon: Niles Koch MD;  Location: [facility] CATH INVASIVE LOCATION;  Service: Cardiology   • CARDIAC CATHETERIZATION N/A 2018    Procedure: Coronary angiography;  Surgeon: Niles Koch MD;  Location: [facility] CATH INVASIVE LOCATION;  Service: Cardiology   • CARDIAC CATHETERIZATION N/A 2018    Procedure: Left ventriculography;  Surgeon: Niles Koch MD;  Location: [facility] CATH INVASIVE LOCATION;  Service: Cardiology   • CARDIAC CATHETERIZATION N/A 10/14/2020    Procedure: Left Heart Cath;  Surgeon: Niles Koch MD;  Location: [facility] CATH INVASIVE LOCATION;  Service: Cardiology;  Laterality: N/A;   • CARDIAC CATHETERIZATION N/A 10/14/2020    Procedure: Left ventriculography;  Surgeon: Niles Koch MD;  Location: [facility] CATH INVASIVE LOCATION;  Service: Cardiology;  Laterality: N/A;   • CARDIAC CATHETERIZATION N/A 10/14/2020    Procedure: Coronary angiography;  Surgeon: Niles Koch MD;  Location: [facility] CATH INVASIVE LOCATION;  Service: Cardiology;  Laterality: N/A;   • CARDIAC ELECTROPHYSIOLOGY PROCEDURE N/A 2018    Procedure: Loop insertion;  Surgeon: Niles Koch MD;  Location: [facility] CATH INVASIVE LOCATION;  Service: Cardiovascular   •  SECTION  2000   • COLONOSCOPY N/A 2019    Procedure: COLONOSCOPY TO CECUM AND TI;  Surgeon: Jignesh Black MD;  Location: [facility] ENDOSCOPY;  Service: Gastroenterology   • DIAGNOSTIC LAPAROSCOPY Right 11/3/2017    Procedure: LAPAROSCOPIC RIGHT OOPHERECTOMY ;  Surgeon: Claudine Barron MD;  Location: [facility] MAIN OR;  Service:    • MASS EXCISION      right jaw. no cancer per pt. -parotid gland.   • OVARIAN CYST REMOVAL  2017   • PAROTIDECTOMY Right 2020    Procedure:  RIGHT PAROTIDECTOMY;  Surgeon: Lul Saavedra MD;  Location: [facility];  Service: ENT;  Laterality: Right;   • WISDOM TOOTH EXTRACTION         FAMILY HISTORY  Family History   Problem Relation Age of Onset   • Depression Mother    • Diabetes Mother    • Heart disease Mother         +of mi at 68   • Hyperlipidemia Mother    • Hypertension Mother    • Heart disease Father         ptca x 5 and mi at 59   • Hyperlipidemia Father    • Hypertension Father    • Kidney disease Brother    • Birth defects Maternal Grandmother    • Heart disease Maternal Grandmother         multiple mi   • Birth defects Maternal Grandfather    • Heart disease Maternal Grandfather         + mi at 68   • Colon cancer Maternal Grandfather    • Heart disease Paternal Grandmother         weak heart   • Asthma Paternal Grandmother    • COPD Paternal Grandmother    • Kidney failure Paternal Grandfather    • Migraines Sister    • Malig Hyperthermia Neg Hx        SOCIAL HISTORY  Social History     Socioeconomic History   • Marital status:      Spouse name: Not on file   • Number of children: 2   • Years of education: Not on file   • Highest education level: Some college, no degree   Tobacco Use   • Smoking status: Never Smoker   • Smokeless tobacco: Never Used   Substance and Sexual Activity   • Alcohol use: No   • Drug use: No   • Sexual activity: Defer       CURRENT MEDICATIONS  Prior to Admission medications    Medication Sig Start Date End Date Taking? Authorizing Provider   buPROPion XL (WELLBUTRIN XL) 150 MG 24 hr tablet Take 75 mg by mouth Daily. 7/1/19   Shelley Salcedo MD   cetirizine (ZyrTEC) 10 MG tablet Take 10 mg by mouth daily.    Shelley Salcedo MD   citalopram (CeleXA) 20 MG tablet Take 20 mg by mouth Daily.    Shelley Salcedo MD   FASENRA 30 MG/ML solution prefilled syringe Every 2 (Two) Months. 6/19/19   Shelley Salcedo MD   Fluticasone-Umeclidin-Vilant (TRELEGY ELLIPTA IN) Inhale 1 inhaler Every  Morning.    Provider, MD Shelley   montelukast (SINGULAIR) 10 MG tablet TAKE ONE TABLET BY MOUTH DAILY  Patient taking differently: Take 10 mg by mouth Daily. 10/25/17   Benoit Mukherjee Jr., MD   nitroglycerin (NITROSTAT) 0.4 MG SL tablet 1 under the tongue as needed for angina, may repeat q5mins for up three doses  Patient taking differently: Place 0.4 mg under the tongue Every 5 (Five) Minutes As Needed. 1 under the tongue as needed for angina, may repeat q5mins for up three doses 10/24/18   Niles Koch MD   Omeprazole 20 MG tablet delayed-release Take 20 mg by mouth Daily.    Provider, MD Shelley   rosuvastatin (CRESTOR) 5 MG tablet Take 1 tablet by mouth Daily.  Patient taking differently: Take 5 mg by mouth 3 (Three) Times a Week. EVERY M-W-F 8/6/20   Niles Koch MD       ALLERGIES  Demerol [meperidine] and Sulfa antibiotics    REVIEW OF SYSTEMS  All systems reviewed and negative except for those discussed in HPI.     PHYSICAL EXAM  ED Triage Vitals   Temp Heart Rate Resp BP SpO2   01/12/21 1530 01/12/21 1530 01/12/21 1625 01/12/21 1625 01/12/21 1530   98.5 °F (36.9 °C) 98 16 (!) 182/100 97 %      Temp src Heart Rate Source Patient Position BP Location FiO2 (%)   01/12/21 1530 01/12/21 1625 01/12/21 1625 01/12/21 1625 --   Tympanic Monitor Lying Left arm        Physical Exam  Constitutional:       Appearance: Normal appearance.   HENT:      Head: Normocephalic and atraumatic.      Mouth/Throat:      Mouth: Mucous membranes are moist.   Eyes:      Extraocular Movements: Extraocular movements intact.      Pupils: Pupils are equal, round, and reactive to light.   Neck:      Musculoskeletal: Normal range of motion.   Cardiovascular:      Rate and Rhythm: Normal rate and regular rhythm.   Pulmonary:      Effort: Pulmonary effort is normal.      Breath sounds: Normal breath sounds.   Abdominal:      General: There is no distension.      Palpations: Abdomen is soft.      Tenderness:  There is no abdominal tenderness.   Skin:     General: Skin is warm and dry.   Neurological:      General: No focal deficit present.      Mental Status: She is alert and oriented to person, place, and time.   Psychiatric:         Mood and Affect: Mood normal.         Behavior: Behavior normal.         Thought Content: Thought content normal.         Judgment: Judgment normal.         PROCEDURES  Procedures  None    LABS  Recent Results (from the past 24 hour(s))   ECG 12 Lead    Collection Time: 01/12/21  3:35 PM   Result Value Ref Range    QT Interval 372 ms   Light Blue Top    Collection Time: 01/12/21  4:33 PM   Result Value Ref Range    Extra Tube hold for add-on    Green Top (Gel)    Collection Time: 01/12/21  4:33 PM   Result Value Ref Range    Extra Tube Hold for add-ons.    Lavender Top    Collection Time: 01/12/21  4:33 PM   Result Value Ref Range    Extra Tube hold for add-on    Gold Top - SST    Collection Time: 01/12/21  4:33 PM   Result Value Ref Range    Extra Tube Hold for add-ons.    Comprehensive Metabolic Panel    Collection Time: 01/12/21  4:33 PM    Specimen: Blood   Result Value Ref Range    Glucose 91 65 - 99 mg/dL    BUN 14 6 - 20 mg/dL    Creatinine 0.62 0.57 - 1.00 mg/dL    Sodium 140 136 - 145 mmol/L    Potassium 3.7 3.5 - 5.2 mmol/L    Chloride 106 98 - 107 mmol/L    CO2 26.6 22.0 - 29.0 mmol/L    Calcium 8.8 8.6 - 10.5 mg/dL    Total Protein 6.6 6.0 - 8.5 g/dL    Albumin 4.10 3.50 - 5.20 g/dL    ALT (SGPT) 22 1 - 33 U/L    AST (SGOT) 23 1 - 32 U/L    Alkaline Phosphatase 110 39 - 117 U/L    Total Bilirubin 0.4 0.0 - 1.2 mg/dL    eGFR Non African Amer 101 >60 mL/min/1.73    Globulin 2.5 gm/dL    A/G Ratio 1.6 g/dL    BUN/Creatinine Ratio 22.6 7.0 - 25.0    Anion Gap 7.4 5.0 - 15.0 mmol/L   Troponin    Collection Time: 01/12/21  4:33 PM    Specimen: Blood   Result Value Ref Range    Troponin T <0.010 0.000 - 0.030 ng/mL   BNP    Collection Time: 01/12/21  4:33 PM    Specimen: Blood   Result  Value Ref Range    proBNP 424.4 0.0 - 900.0 pg/mL   CBC Auto Differential    Collection Time: 01/12/21  4:33 PM    Specimen: Blood   Result Value Ref Range    WBC 8.11 3.40 - 10.80 10*3/mm3    RBC 4.33 3.77 - 5.28 10*6/mm3    Hemoglobin 12.5 12.0 - 15.9 g/dL    Hematocrit 37.5 34.0 - 46.6 %    MCV 86.6 79.0 - 97.0 fL    MCH 28.9 26.6 - 33.0 pg    MCHC 33.3 31.5 - 35.7 g/dL    RDW 12.6 12.3 - 15.4 %    RDW-SD 39.8 37.0 - 54.0 fl    MPV 9.3 6.0 - 12.0 fL    Platelets 358 140 - 450 10*3/mm3    Neutrophil % 67.2 42.7 - 76.0 %    Lymphocyte % 21.7 19.6 - 45.3 %    Monocyte % 10.2 5.0 - 12.0 %    Eosinophil % 0.0 (L) 0.3 - 6.2 %    Basophil % 0.4 0.0 - 1.5 %    Immature Grans % 0.5 0.0 - 0.5 %    Neutrophils, Absolute 5.45 1.70 - 7.00 10*3/mm3    Lymphocytes, Absolute 1.76 0.70 - 3.10 10*3/mm3    Monocytes, Absolute 0.83 0.10 - 0.90 10*3/mm3    Eosinophils, Absolute 0.00 0.00 - 0.40 10*3/mm3    Basophils, Absolute 0.03 0.00 - 0.20 10*3/mm3    Immature Grans, Absolute 0.04 0.00 - 0.05 10*3/mm3    nRBC 0.0 0.0 - 0.2 /100 WBC   Troponin    Collection Time: 01/12/21  6:25 PM    Specimen: Blood   Result Value Ref Range    Troponin T <0.010 0.000 - 0.030 ng/mL       RADIOLOGY  XR Chest 1 View   Final Result          MEDICATIONS GIVEN IN THE ER  Medications   labetalol (NORMODYNE,TRANDATE) injection 10 mg (10 mg Intravenous Given 1/12/21 1651)       MEDICAL DECISION MAKING, CONSULTS AND PROGRESS NOTES  All labs and all radiology studies were have been viewed and interpreted by me.   Discussion below represents my analysis of pertinent findings related to patient's condition, differential diagnosis, treatment plan and final disposition.    PPE: The patient was placed in a face mask in first look. Patient was wearing facemask when I entered the room and throughout our encounter. I wore full protective equipment throughout this patient encounter including a face mask, eye shield and gloves. Hand hygiene was performed before donning  protective equipment and after removal when leaving the room.    AS OF 19:05 EST VITALS:    BP - 169/88  HR - 70  TEMP - 98.5 °F (36.9 °C) (Tympanic)  O2 SATS - 97%    ED Course as of Jan 12 1905 Tue Jan 12, 2021   1636 EKG          EKG time: 1535  Rhythm/Rate: Sinus rhythm, 81  P waves and HI: Normal  QRS, axis: Normal axis  ST and T waves: No acute ischemic changes    Interpreted Contemporaneously by me, independently viewed  Similar compared to prior 12/12/2019          [JR]      ED Course User Index  [JR] Chilango Mahoney MD     The patient's history, physical exam, and lab findings were discussed with the physician, who also performed a face to face history and physical exam.  I discussed all results and noted any abnormalities with patient.  Discussed absoute need to recheck abnormalities with their family physician.  I answered any of the patient's questions.  Discussed plan for discharge, as there is no emergent indication for admission.  Pt is agreeable and understands need for follow up and repeat testing.  Pt is aware that discharge does not mean that nothing is wrong but it indicates no emergency is present and they must continue care with their family physician.  Pt is discharged with instructions to follow up with primary care doctor to have their blood pressure rechecked.     DIAGNOSIS   Diagnosis Plan   1. Hypertension not at goal         DISPOSITION  ED Disposition     ED Disposition Condition Comment    Discharge Stable           FOLLOW UP  Ruben Kwan Jr., MD  [address] [address]  [phone]    Schedule an appointment as soon as possible for a visit       Provider Attestation:  I personally reviewed the past medical history, past surgical history, social history, family history, current medications and allergies as they appear in the chart. I reviewed the patient's history, physical, lab/imaging results and overall care with Dr. Mahoney who is in  agreement with the patient's treatment plan.    EMR Dragon/Transcription disclaimer:  Some of this encounter note is an electronic transcription/translation of spoken language to printed text. The electronic translation of spoken language may permit erroneous, or at times, nonsensical words or phrases to be inadvertently transcribed; Although I have reviewed the note for such errors, some may still exist.    Provider note signed by:       Victoria Guerra PA  01/12/21 [id number]

## 2021-01-12 NOTE — ED NOTES
"Patient to er from home with c/o jaw pain/ pain between her shoulder blades/ headache/ and hypertension. Patient reported past stroke.\" patient reported her b/p was 199/96 at home. Patient reported she is due to started b/p medication this week.\" patient has mask on in triage along with staff.      Spike Amaya RN  01/12/21 [id number]    "

## 2021-01-12 NOTE — ED PROVIDER NOTES
Pt presents to the ED c/o  high blood pressure with associated headache, pain between her shoulder blades and pain radiating to the right jaw.  She denies chest pain.  She was recently prescribed lisinopril but has not yet picked this up from the pharmacy.     On exam,   Awake and alert, no acute distress.  Normal work of breathing.  Cranial nerves II through XII grossly intact.     Plan: EKG, initial troponin, other labs reviewed and reassuring.  Repeat troponin is currently pending.  I anticipate discharge home.  Patient instructed to begin her lisinopril as prescribed and follow-up with PCP in 1 week for blood pressure recheck and titration of medication as necessary.  Return precautions were discussed.      I wore a surgical mask, gloves, and eye protection during this patient encounter.  Patient also wearing a surgical mask.  Hand hygeine performed before and after seeing the patient.     Attestation:  The JOSEMANUEL and I have discussed this patient's history, physical exam, and treatment plan.  I have reviewed the documentation and personally had a face to face interaction with the patient. I affirm the documentation and agree with the treatment and plan.  The attached note describes my personal findings.            Chilango Mahoney MD  01/12/21 3507

## 2021-01-12 NOTE — DISCHARGE INSTRUCTIONS
Although you are being discharged from the ED today, I encourage you to return for worsening symptoms. Things can, and do, change such that treatment at home with medication may not be adequate. Specifically I recommend returning for chest pain or discomfort, difficulty breathing, persistent vomiting or difficulty holding down liquids or medications, fever > 102.0 F or any other worsening or alarming symptoms.     Rest. Drink plenty of fluids.  Follow up with primary care provider for further management and to have blood pressure rechecked.    Be sure to  your lisinopril from the pharmacy and begin taking it as instructed.    Keep a log of your blood pressures at home and take this log to your primary care provider for further evaluation of your blood pressure.

## 2021-03-12 ENCOUNTER — APPOINTMENT (OUTPATIENT)
Dept: CARDIOLOGY | Facility: HOSPITAL | Age: 51
End: 2021-03-12

## 2021-03-19 ENCOUNTER — HOSPITAL ENCOUNTER (OUTPATIENT)
Dept: CARDIOLOGY | Facility: HOSPITAL | Age: 51
Discharge: HOME | End: 2021-03-19
Admitting: INTERNAL MEDICINE

## 2021-03-19 LAB
ALBUMIN SERPL-MCNC: 4.1 G/DL (ref 3.5–5.2)
ALBUMIN/GLOB SERPL: 1.3 G/DL
ALP SERPL-CCNC: 111 U/L (ref 39–117)
ALT SERPL W P-5'-P-CCNC: 24 U/L (ref 1–33)
ANION GAP SERPL CALCULATED.3IONS-SCNC: 9.5 MMOL/L (ref 5–15)
AST SERPL-CCNC: 23 U/L (ref 1–32)
BILIRUB SERPL-MCNC: 0.4 MG/DL (ref 0–1.2)
BUN SERPL-MCNC: 17 MG/DL (ref 6–20)
BUN/CREAT SERPL: 18.5 (ref 7–25)
CALCIUM SPEC-SCNC: 9.1 MG/DL (ref 8.6–10.5)
CHLORIDE SERPL-SCNC: 104 MMOL/L (ref 98–107)
CHOLEST SERPL-MCNC: 174 MG/DL (ref 0–200)
CO2 SERPL-SCNC: 26.5 MMOL/L (ref 22–29)
CREAT SERPL-MCNC: 0.92 MG/DL (ref 0.57–1)
GFR SERPL CREATININE-BSD FRML MDRD: 64 ML/MIN/1.73
GLOBULIN UR ELPH-MCNC: 3.1 GM/DL
GLUCOSE SERPL-MCNC: 111 MG/DL (ref 65–99)
HDLC SERPL-MCNC: 45 MG/DL (ref 40–60)
LDLC SERPL CALC-MCNC: 118 MG/DL (ref 0–100)
LDLC/HDLC SERPL: 2.61 {RATIO}
POTASSIUM SERPL-SCNC: 4.5 MMOL/L (ref 3.5–5.2)
PROT SERPL-MCNC: 7.2 G/DL (ref 6–8.5)
SODIUM SERPL-SCNC: 140 MMOL/L (ref 136–145)
TRIGL SERPL-MCNC: 57 MG/DL (ref 0–150)
VLDLC SERPL-MCNC: 11 MG/DL (ref 5–40)

## 2021-03-19 PROCEDURE — 80053 COMPREHEN METABOLIC PANEL: CPT | Performed by: INTERNAL MEDICINE

## 2021-03-19 PROCEDURE — 80061 LIPID PANEL: CPT | Performed by: INTERNAL MEDICINE

## 2021-03-19 PROCEDURE — 36415 COLL VENOUS BLD VENIPUNCTURE: CPT | Performed by: INTERNAL MEDICINE

## 2021-03-19 RX ORDER — LISINOPRIL 10 MG/1
10 TABLET ORAL DAILY
COMMUNITY
Start: 2021-03-11 | End: 2023-04-05 | Stop reason: SDUPTHER

## 2021-03-19 NOTE — PROGRESS NOTES
4 MONTH FOLLOW UP   LAB RESULTS   Subjective:        Amirah Godoy is a 51 y.o. female who here for follow up    CC  Follow-up for hypertension hyperlipidemia  HPI  51-year-old female with known history of benign essential arterial hypertension, hypercholesterolemia here for the follow-up denies any chest pains or tightness in the chest had a blood work-up done     Problems Addressed this Visit        Cardiac and Vasculature    Tachycardia    Relevant Orders    Lipid Panel    Comprehensive Metabolic Panel    Essential hypertension    Relevant Medications    lisinopril (PRINIVIL,ZESTRIL) 10 MG tablet    Other Relevant Orders    Lipid Panel    Comprehensive Metabolic Panel    Borderline hypercholesterolemia    Relevant Orders    Lipid Panel    Comprehensive Metabolic Panel    Status post placement of implantable loop recorder - Primary    Relevant Orders    Lipid Panel    Comprehensive Metabolic Panel       Endocrine and Metabolic    Borderline diabetes    Relevant Orders    Lipid Panel    Comprehensive Metabolic Panel      Diagnoses       Codes Comments    Status post placement of implantable loop recorder    -  Primary ICD-10-CM: Z95.818  ICD-9-CM: V45.09     Tachycardia     ICD-10-CM: R00.0  ICD-9-CM: 785.0     Essential hypertension     ICD-10-CM: I10  ICD-9-CM: 401.9     Borderline diabetes     ICD-10-CM: R73.03  ICD-9-CM: 790.29     Borderline hypercholesterolemia     ICD-10-CM: E78.00  ICD-9-CM: 272.0         .    The following portions of the patient's history were reviewed and updated as appropriate: allergies, current medications, past family history, past medical history, past social history, past surgical history and problem list.    Past Medical History:   Diagnosis Date   • Asthma    • Depression    • Enlarged parotid gland    • GERD (gastroesophageal reflux disease)    • History of atrial fibrillation 2011    NO CURRENT MEDICATION   • Hyperlipidemia    • Hypertension     NO LONGER TREATED W/ MEDS   •  "PVC (premature ventricular contraction) 2015   • Reflux esophagitis    • Seasonal allergies    • Sinus infection     STARTED ON ANTIBIOTICS ON 10/30/17   • Sleep apnea     CPAP   • Stroke (CMS/[facility])     TIA FOLLOWING CATH 2018   • SVT (supraventricular tachycardia) (CMS/[facility]) 2011    NO CURRENT MEDICATIONS     reports that she has never smoked. She has never used smokeless tobacco. She reports that she does not drink alcohol and does not use drugs.   Family History   Problem Relation Age of Onset   • Depression Mother    • Diabetes Mother    • Heart disease Mother         +of mi at 68   • Hyperlipidemia Mother    • Hypertension Mother    • Heart disease Father         ptca x 5 and mi at 59   • Hyperlipidemia Father    • Hypertension Father    • Kidney disease Brother    • Birth defects Maternal Grandmother    • Heart disease Maternal Grandmother         multiple mi   • Birth defects Maternal Grandfather    • Heart disease Maternal Grandfather         + mi at 68   • Colon cancer Maternal Grandfather    • Heart disease Paternal Grandmother         weak heart   • Asthma Paternal Grandmother    • COPD Paternal Grandmother    • Kidney failure Paternal Grandfather    • Migraines Sister    • Malig Hyperthermia Neg Hx        Review of Systems  Constitutional: No wt loss, fever, fatigue  Gastrointestinal: No nausea, abdominal pain  Behavioral/Psych: No insomnia or anxiety   Cardiovascular no chest pains or tightness in the chest  Objective:       Physical Exam  /74   Pulse 84   Ht 172.7 cm (68\")   Wt 106 kg (234 lb)   LMP 10/25/2017   BMI 35.58 kg/m²   General appearance: No acute changes   Neck: Trachea midline; NECK, supple, no thyromegaly or lymphadenopathy   Lungs: Normal size and shape, normal breath sounds, equal distribution of air, no rales and rhonchi   CV: S1-S2 regular, no murmurs, no rub, no gallop   Abdomen: Soft, non-tender; no masses , no abnormal abdominal sounds   Extremities: No deformity , normal " color , no peripheral edema   Skin: Normal temperature, turgor and texture; no rash, ulcers          Procedures      Echocardiogram:        Current Outpatient Medications:   •  Albuterol (PROVENTIL IN), Inhale Daily As Needed., Disp: , Rfl:   •  aspirin 325 MG tablet, Take 325 mg by mouth Daily., Disp: , Rfl:   •  buPROPion XL (WELLBUTRIN XL) 150 MG 24 hr tablet, Take 50 mg by mouth Daily., Disp: , Rfl:   •  cetirizine (ZyrTEC) 10 MG tablet, Take 10 mg by mouth daily., Disp: , Rfl:   •  citalopram (CeleXA) 20 MG tablet, Take 20 mg by mouth Daily., Disp: , Rfl:   •  FASENRA 30 MG/ML solution prefilled syringe, Every 2 (Two) Months., Disp: , Rfl:   •  Fluticasone-Umeclidin-Vilant (TRELEGY ELLIPTA IN), Inhale 1 inhaler Every Morning., Disp: , Rfl:   •  lisinopril (PRINIVIL,ZESTRIL) 10 MG tablet, Take 10 mg by mouth Daily., Disp: , Rfl:   •  montelukast (SINGULAIR) 10 MG tablet, TAKE ONE TABLET BY MOUTH DAILY (Patient taking differently: Take 10 mg by mouth Daily.), Disp: 30 tablet, Rfl: 4  •  Omeprazole 20 MG tablet delayed-release, Take 20 mg by mouth Daily., Disp: , Rfl:   •  rosuvastatin (CRESTOR) 5 MG tablet, Take 1 tablet by mouth Daily. (Patient taking differently: Take 5 mg by mouth 3 (Three) Times a Week. EVERY M-W-F), Disp: 30 tablet, Rfl: 11  •  nitroglycerin (NITROSTAT) 0.4 MG SL tablet, 1 under the tongue as needed for angina, may repeat q5mins for up three doses (Patient taking differently: Place 0.4 mg under the tongue Every 5 (Five) Minutes As Needed. 1 under the tongue as needed for angina, may repeat q5mins for up three doses), Disp: 100 tablet, Rfl: 11   Assessment:        Patient Active Problem List   Diagnosis   • Chest pain   • Reactive depression   • Seasonal allergic rhinitis due to pollen   • Right ovarian cyst   • History of atrial fibrillation   • Asthma with exacerbation   • Cough   • Weakness   • Tachycardia   • Influenza A   • Palpitations   • Swelling   • Essential hypertension   •  Paroxysmal atrial fibrillation (CMS/HCC)   • Hyperlipidemia   • Precordial pain   • Visual loss   • Status post device closure of ASD   • Status post placement of implantable loop recorder   • Episode of recurrent major depressive disorder (CMS/HCC)   • TIA (transient ischemic attack)   • Hemiplegic migraine without status migrainosus, not intractable   • Anxiety   • Screen for colon cancer   • Statin intolerance   • Abnormal cardiac function test   • Mass of right parotid gland     Conclusion       · Successful right and left coronary angiogram and LV gram  · Early atherosclerotic plaque otherwise normal coronary arteries  · Normal LV gram        Ref Range & Units 3 d ago   Total Cholesterol   0 - 200 mg/dL 174    Triglycerides   0 - 150 mg/dL 57    HDL Cholesterol   40 - 60 mg/dL 45    LDL Cholesterol    0 - 100 mg/dL 118High     VLDL Cholesterol   5 - 40 mg/dL 11    LDL/HDL Ratio  2.61      Glucose   65 - 99 mg/dL 111High   91    BUN   6 - 20 mg/dL 17  14    Creatinine   0.57 - 1.00 mg/dL 0.92  0.62    Sodium   136 - 145 mmol/L 140  140    Potassium   3.5 - 5.2 mmol/L 4.5  3.7    Chloride   98 - 107 mmol/L 104  106    CO2   22.0 - 29.0 mmol/L 26.5  26.6    Calcium   8.6 - 10.5 mg/dL 9.1  8.8    Total Protein   6.0 - 8.5 g/dL 7.2  6.6    Albumin   3.50 - 5.20 g/dL 4.10  4.10    ALT (SGPT)   1 - 33 U/L 24  22    AST (SGOT)   1 - 32 U/L 23  23    Alkaline Phosphatase   39 - 117 U/L 111  110    Total Bilirubin   0.0 - 1.2 mg/dL 0.4  0.4    eGFR Non African Amer   >60 mL/min/1.73 64  101    Globulin   gm/dL 3.1  2.5    A/G Ratio   g/dL 1.3  1.6    BUN/Creatinine Ratio   7.0 - 25.0 18.5                 Plan:            ICD-10-CM ICD-9-CM   1. Status post placement of implantable loop recorder  Z95.818 V45.09   2. Tachycardia  R00.0 785.0   3. Essential hypertension  I10 401.9   4. Borderline diabetes  R73.03 790.29   5. Borderline hypercholesterolemia  E78.00 272.0     1. Status post placement of implantable loop  recorder  Being monitored regularly  - Lipid Panel; Future  - Comprehensive Metabolic Panel; Future    2. Tachycardia  Under control  - Lipid Panel; Future  - Comprehensive Metabolic Panel; Future    3. Essential hypertension  Blood pressure under control  - Lipid Panel; Future  - Comprehensive Metabolic Panel; Future    4. Borderline diabetes  Repeat the labs  - Lipid Panel; Future  - Comprehensive Metabolic Panel; Future    5. Borderline hypercholesterolemia  Repeat the labs  - Lipid Panel; Future  - Comprehensive Metabolic Panel; Future       1 YR WITH LABS  COUNSELING:    Amirah Kevin was given to patient for the following topics: diagnostic results, risk factor reductions, impressions, risks and benefits of treatment options and importance of treatment compliance .       SMOKING COUNSELING:    [unfilled]    Dictated using Dragon dictation

## 2021-03-22 ENCOUNTER — OFFICE VISIT (OUTPATIENT)
Dept: CARDIOLOGY | Facility: CLINIC | Age: 51
End: 2021-03-22

## 2021-03-22 VITALS
BODY MASS INDEX: 35.46 KG/M2 | SYSTOLIC BLOOD PRESSURE: 114 MMHG | HEIGHT: 68 IN | WEIGHT: 234 LBS | DIASTOLIC BLOOD PRESSURE: 74 MMHG | HEART RATE: 84 BPM

## 2021-03-22 DIAGNOSIS — R73.03 BORDERLINE DIABETES: ICD-10-CM

## 2021-03-22 DIAGNOSIS — E78.00 BORDERLINE HYPERCHOLESTEROLEMIA: ICD-10-CM

## 2021-03-22 DIAGNOSIS — I10 ESSENTIAL HYPERTENSION: ICD-10-CM

## 2021-03-22 DIAGNOSIS — R00.0 TACHYCARDIA: ICD-10-CM

## 2021-03-22 DIAGNOSIS — Z95.818 STATUS POST PLACEMENT OF IMPLANTABLE LOOP RECORDER: Primary | ICD-10-CM

## 2021-03-22 PROCEDURE — 99213 OFFICE O/P EST LOW 20 MIN: CPT | Performed by: INTERNAL MEDICINE

## 2021-03-24 ENCOUNTER — BULK ORDERING (OUTPATIENT)
Dept: CASE MANAGEMENT | Facility: OTHER | Age: 51
End: 2021-03-24

## 2021-03-24 DIAGNOSIS — Z23 IMMUNIZATION DUE: ICD-10-CM

## 2021-04-14 PROCEDURE — G2066 INTER DEVC REMOTE 30D: HCPCS | Performed by: INTERNAL MEDICINE

## 2021-04-14 PROCEDURE — 93298 REM INTERROG DEV EVAL SCRMS: CPT | Performed by: INTERNAL MEDICINE

## 2021-05-05 ENCOUNTER — TELEPHONE (OUTPATIENT)
Dept: CARDIOLOGY | Facility: CLINIC | Age: 51
End: 2021-05-05

## 2021-05-06 NOTE — TELEPHONE ENCOUNTER
PER DR. SÁNCHEZ, PT HAS A LOOP RECORDER AND IS BEING MONITORED. SHOULD AFIB SHOW, ANTICOAGULATION WILL BE DISCUSSED.

## 2021-06-15 PROCEDURE — 93298 REM INTERROG DEV EVAL SCRMS: CPT | Performed by: INTERNAL MEDICINE

## 2021-06-15 PROCEDURE — G2066 INTER DEVC REMOTE 30D: HCPCS | Performed by: INTERNAL MEDICINE

## 2021-07-16 PROCEDURE — G2066 INTER DEVC REMOTE 30D: HCPCS | Performed by: INTERNAL MEDICINE

## 2021-07-16 PROCEDURE — 93298 REM INTERROG DEV EVAL SCRMS: CPT | Performed by: INTERNAL MEDICINE

## 2021-08-16 PROCEDURE — 93298 REM INTERROG DEV EVAL SCRMS: CPT | Performed by: INTERNAL MEDICINE

## 2021-08-16 PROCEDURE — G2066 INTER DEVC REMOTE 30D: HCPCS | Performed by: INTERNAL MEDICINE

## 2021-09-01 ENCOUNTER — TRANSCRIBE ORDERS (OUTPATIENT)
Dept: ADMINISTRATIVE | Facility: HOSPITAL | Age: 51
End: 2021-09-01

## 2021-09-01 DIAGNOSIS — M79.9 DISORDER OF SOFT TISSUE: Primary | ICD-10-CM

## 2021-09-17 RX ORDER — ROSUVASTATIN CALCIUM 5 MG/1
5 TABLET, COATED ORAL 3 TIMES WEEKLY
Qty: 15 TABLET | Refills: 1 | Status: SHIPPED | OUTPATIENT
Start: 2021-09-17 | End: 2021-12-09 | Stop reason: SDUPTHER

## 2021-09-28 ENCOUNTER — APPOINTMENT (OUTPATIENT)
Dept: CT IMAGING | Facility: HOSPITAL | Age: 51
End: 2021-09-28

## 2021-11-09 ENCOUNTER — TELEPHONE (OUTPATIENT)
Dept: CARDIOLOGY | Facility: CLINIC | Age: 51
End: 2021-11-09

## 2021-11-09 NOTE — TELEPHONE ENCOUNTER
PATIENT NEEDS A LETTER FROM DR HART STATING THAT IT IS OK FOR HER TO PARTICIPATE IN HER 50+ EXERCISE CLASS.  PLEASE MAIL TO HER ADDRESS ON FILE.  THANKS.

## 2021-11-17 PROCEDURE — 93298 REM INTERROG DEV EVAL SCRMS: CPT | Performed by: INTERNAL MEDICINE

## 2021-11-17 PROCEDURE — G2066 INTER DEVC REMOTE 30D: HCPCS | Performed by: INTERNAL MEDICINE

## 2021-12-09 RX ORDER — ROSUVASTATIN CALCIUM 5 MG/1
5 TABLET, COATED ORAL 3 TIMES WEEKLY
Qty: 15 TABLET | Refills: 5 | Status: SHIPPED | OUTPATIENT
Start: 2021-12-10 | End: 2022-07-20

## 2021-12-10 ENCOUNTER — TELEPHONE (OUTPATIENT)
Dept: CARDIOLOGY | Facility: CLINIC | Age: 51
End: 2021-12-10

## 2021-12-10 DIAGNOSIS — R00.0 TACHYCARDIA: ICD-10-CM

## 2021-12-10 DIAGNOSIS — I10 ESSENTIAL HYPERTENSION: ICD-10-CM

## 2021-12-10 DIAGNOSIS — R00.2 PALPITATIONS: Primary | ICD-10-CM

## 2021-12-10 NOTE — TELEPHONE ENCOUNTER
----- Message from Debra Artis sent at 12/9/2021 12:55 PM EST -----  Regarding: FW: CARDIAC CLEARANCE FOR KNEE SX ON 12/30 AND ALSO ASKING ABOUT LETTER FOR EXERCISE CLASS SHE CALLED 4 WKS AGO  Contact: [phone]  TERA WATERS W/DR DRAPER'S CALLING RE STOPPING ASA PRIOR TO SX  [phone]  FAX [phone]  ----- Message -----  From: ArtisDebra  Sent: 12/9/2021   8:58 AM EST  To: [facility] Clinical Pool  Subject: CARDIAC CLEARANCE FOR KNEE SX ON 12/30 AND A#      Per Dr. HART patient will need a walking roro and echo before being cleared.     Pt informed

## 2021-12-10 NOTE — TELEPHONE ENCOUNTER
----- Message from Debra Artis sent at 12/9/2021 12:55 PM EST -----  Regarding: FW: CARDIAC CLEARANCE FOR KNEE SX ON 12/30 AND ALSO ASKING ABOUT LETTER FOR EXERCISE CLASS SHE CALLED 4 WKS AGO  Contact: [phone]  TERA MONTENEGRO/DR DRAPER'S CALLING RE STOPPING ASA PRIOR TO SX  [phone]  FAX [phone]  ----- Message -----  From: Chandra Debra GOLDEN  Sent: 12/9/2021   8:58 AM EST  To: [facility] Clinical Pool  Subject: CARDIAC CLEARANCE FOR KNEE SX ON 12/30 AND A#        Per Dr. HART patient will need a walking Melani and Echo before her knee surgery clearance.

## 2021-12-13 ENCOUNTER — TELEPHONE (OUTPATIENT)
Dept: CARDIOLOGY | Facility: CLINIC | Age: 51
End: 2021-12-13

## 2021-12-13 ENCOUNTER — APPOINTMENT (OUTPATIENT)
Dept: SLEEP MEDICINE | Facility: HOSPITAL | Age: 51
End: 2021-12-13

## 2021-12-13 NOTE — TELEPHONE ENCOUNTER
----- Message from Jacki Farnsworth sent at 12/13/2021 12:23 PM EST -----  Regarding: Blood thinner  Patient is scheduled for a Cardiolite/Echo/OV on 12/27 for Cardiac Clearance on surgery scheduled 12/30. When does she need to stop her blood thinner?    Will discuss with Dr. HART

## 2021-12-14 ENCOUNTER — HOSPITAL ENCOUNTER (OUTPATIENT)
Dept: CARDIOLOGY | Facility: HOSPITAL | Age: 51
Discharge: HOME | End: 2021-12-14

## 2021-12-14 ENCOUNTER — TELEPHONE (OUTPATIENT)
Dept: CARDIOLOGY | Facility: CLINIC | Age: 51
End: 2021-12-14

## 2021-12-14 ENCOUNTER — HOSPITAL ENCOUNTER (OUTPATIENT)
Dept: CARDIOLOGY | Facility: HOSPITAL | Age: 51
Discharge: HOME | End: 2021-12-14
Admitting: INTERNAL MEDICINE

## 2021-12-14 VITALS
WEIGHT: 229 LBS | SYSTOLIC BLOOD PRESSURE: 125 MMHG | HEIGHT: 68 IN | DIASTOLIC BLOOD PRESSURE: 82 MMHG | BODY MASS INDEX: 34.71 KG/M2 | HEART RATE: 75 BPM

## 2021-12-14 VITALS
RESPIRATION RATE: 16 BRPM | SYSTOLIC BLOOD PRESSURE: 128 MMHG | WEIGHT: 229 LBS | BODY MASS INDEX: 34.71 KG/M2 | HEART RATE: 78 BPM | DIASTOLIC BLOOD PRESSURE: 78 MMHG | HEIGHT: 68 IN

## 2021-12-14 DIAGNOSIS — I10 ESSENTIAL HYPERTENSION: ICD-10-CM

## 2021-12-14 DIAGNOSIS — R00.0 TACHYCARDIA: ICD-10-CM

## 2021-12-14 DIAGNOSIS — R00.2 PALPITATIONS: ICD-10-CM

## 2021-12-14 LAB
AORTIC ARCH: 2.9 CM
AORTIC DIMENSIONLESS INDEX: 0.6 (DI)
BH CV ECHO MEAS - AO ARCH DIAM (PROXIMAL TRANS.): 2.9 CM
BH CV ECHO MEAS - AO MAX PG (FULL): 5.9 MMHG
BH CV ECHO MEAS - AO MAX PG: 9.8 MMHG
BH CV ECHO MEAS - AO MEAN PG (FULL): 3.1 MMHG
BH CV ECHO MEAS - AO MEAN PG: 5 MMHG
BH CV ECHO MEAS - AO V2 MAX: 156.9 CM/SEC
BH CV ECHO MEAS - AO V2 MEAN: 104.8 CM/SEC
BH CV ECHO MEAS - AO V2 VTI: 32.6 CM
BH CV ECHO MEAS - AVA(I,A): 1.7 CM^2
BH CV ECHO MEAS - AVA(I,D): 1.7 CM^2
BH CV ECHO MEAS - AVA(V,A): 1.7 CM^2
BH CV ECHO MEAS - AVA(V,D): 1.7 CM^2
BH CV ECHO MEAS - BSA(HAYCOCK): 2.3 M^2
BH CV ECHO MEAS - BSA: 2.2 M^2
BH CV ECHO MEAS - BZI_BMI: 34.8 KILOGRAMS/M^2
BH CV ECHO MEAS - BZI_METRIC_HEIGHT: 172.7 CM
BH CV ECHO MEAS - BZI_METRIC_WEIGHT: 103.9 KG
BH CV ECHO MEAS - EDV(CUBED): 80.6 ML
BH CV ECHO MEAS - EDV(MOD-SP2): 65 ML
BH CV ECHO MEAS - EDV(MOD-SP4): 63 ML
BH CV ECHO MEAS - EDV(TEICH): 84 ML
BH CV ECHO MEAS - EF(CUBED): 71.9 %
BH CV ECHO MEAS - EF(MOD-BP): 65.5 %
BH CV ECHO MEAS - EF(MOD-SP2): 64.6 %
BH CV ECHO MEAS - EF(MOD-SP4): 66.7 %
BH CV ECHO MEAS - EF(TEICH): 63.9 %
BH CV ECHO MEAS - ESV(CUBED): 22.7 ML
BH CV ECHO MEAS - ESV(MOD-SP2): 23 ML
BH CV ECHO MEAS - ESV(MOD-SP4): 21 ML
BH CV ECHO MEAS - ESV(TEICH): 30.3 ML
BH CV ECHO MEAS - FS: 34.5 %
BH CV ECHO MEAS - IVS/LVPW: 1
BH CV ECHO MEAS - IVSD: 0.78 CM
BH CV ECHO MEAS - LAT PEAK E' VEL: 12.7 CM/SEC
BH CV ECHO MEAS - LV DIASTOLIC VOL/BSA (35-75): 29.1 ML/M^2
BH CV ECHO MEAS - LV MASS(C)D: 102 GRAMS
BH CV ECHO MEAS - LV MASS(C)DI: 47.1 GRAMS/M^2
BH CV ECHO MEAS - LV MAX PG: 4 MMHG
BH CV ECHO MEAS - LV MEAN PG: 1.9 MMHG
BH CV ECHO MEAS - LV SYSTOLIC VOL/BSA (12-30): 9.7 ML/M^2
BH CV ECHO MEAS - LV V1 MAX: 99.4 CM/SEC
BH CV ECHO MEAS - LV V1 MEAN: 64.3 CM/SEC
BH CV ECHO MEAS - LV V1 VTI: 21.2 CM
BH CV ECHO MEAS - LVIDD: 4.3 CM
BH CV ECHO MEAS - LVIDS: 2.8 CM
BH CV ECHO MEAS - LVLD AP2: 7.7 CM
BH CV ECHO MEAS - LVLD AP4: 7.6 CM
BH CV ECHO MEAS - LVLS AP2: 6.1 CM
BH CV ECHO MEAS - LVLS AP4: 6 CM
BH CV ECHO MEAS - LVOT AREA (M): 2.5 CM^2
BH CV ECHO MEAS - LVOT AREA: 2.7 CM^2
BH CV ECHO MEAS - LVOT DIAM: 1.8 CM
BH CV ECHO MEAS - LVPWD: 0.77 CM
BH CV ECHO MEAS - MED PEAK E' VEL: 10.6 CM/SEC
BH CV ECHO MEAS - MV A DUR: 0.13 SEC
BH CV ECHO MEAS - MV A MAX VEL: 83.9 CM/SEC
BH CV ECHO MEAS - MV DEC TIME: 0.21 SEC
BH CV ECHO MEAS - MV E MAX VEL: 88.3 CM/SEC
BH CV ECHO MEAS - MV E/A: 1.1
BH CV ECHO MEAS - PA ACC TIME: 0.07 SEC
BH CV ECHO MEAS - PA MAX PG (FULL): 1.6 MMHG
BH CV ECHO MEAS - PA MAX PG: 3.9 MMHG
BH CV ECHO MEAS - PA PR(ACCEL): 45.7 MMHG
BH CV ECHO MEAS - PA V2 MAX: 98.8 CM/SEC
BH CV ECHO MEAS - PULM A REVS DUR: 0.12 SEC
BH CV ECHO MEAS - PULM A REVS VEL: 32.3 CM/SEC
BH CV ECHO MEAS - PULM DIAS VEL: 51.9 CM/SEC
BH CV ECHO MEAS - PULM S/D: 1.2
BH CV ECHO MEAS - PULM SYS VEL: 61.7 CM/SEC
BH CV ECHO MEAS - RAP SYSTOLE: 3 MMHG
BH CV ECHO MEAS - RV MAX PG: 2.3 MMHG
BH CV ECHO MEAS - RV MEAN PG: 1.4 MMHG
BH CV ECHO MEAS - RV V1 MAX: 76.6 CM/SEC
BH CV ECHO MEAS - RV V1 MEAN: 55 CM/SEC
BH CV ECHO MEAS - RV V1 VTI: 17.4 CM
BH CV ECHO MEAS - RVSP: 24 MMHG
BH CV ECHO MEAS - SI(CUBED): 26.8 ML/M^2
BH CV ECHO MEAS - SI(LVOT): 26 ML/M^2
BH CV ECHO MEAS - SI(MOD-SP2): 19.4 ML/M^2
BH CV ECHO MEAS - SI(MOD-SP4): 19.4 ML/M^2
BH CV ECHO MEAS - SI(TEICH): 24.8 ML/M^2
BH CV ECHO MEAS - SV(CUBED): 57.9 ML
BH CV ECHO MEAS - SV(LVOT): 56.3 ML
BH CV ECHO MEAS - SV(MOD-SP2): 42 ML
BH CV ECHO MEAS - SV(MOD-SP4): 42 ML
BH CV ECHO MEAS - SV(TEICH): 53.6 ML
BH CV ECHO MEAS - TAPSE (>1.6): 1.9 CM
BH CV ECHO MEAS - TR MAX PG: 21 MMHG
BH CV ECHO MEAS - TR MAX VEL: 230.8 CM/SEC
BH CV ECHO MEASUREMENTS AVERAGE E/E' RATIO: 7.58
BH CV XLRA - TDI S': 11.6 CM/SEC
LEFT ATRIUM VOLUME INDEX: 15.6 ML/M2
MAXIMAL PREDICTED HEART RATE: 167 BPM
STRESS TARGET HR: 142 BPM

## 2021-12-14 PROCEDURE — A9500 TC99M SESTAMIBI: HCPCS | Performed by: INTERNAL MEDICINE

## 2021-12-14 PROCEDURE — 78452 HT MUSCLE IMAGE SPECT MULT: CPT

## 2021-12-14 PROCEDURE — 0 TECHNETIUM SESTAMIBI: Performed by: INTERNAL MEDICINE

## 2021-12-14 PROCEDURE — 78452 HT MUSCLE IMAGE SPECT MULT: CPT | Performed by: INTERNAL MEDICINE

## 2021-12-14 PROCEDURE — 93018 CV STRESS TEST I&R ONLY: CPT | Performed by: INTERNAL MEDICINE

## 2021-12-14 PROCEDURE — 25010000002 REGADENOSON 0.4 MG/5ML SOLUTION: Performed by: INTERNAL MEDICINE

## 2021-12-14 PROCEDURE — 93017 CV STRESS TEST TRACING ONLY: CPT

## 2021-12-14 PROCEDURE — 93306 TTE W/DOPPLER COMPLETE: CPT

## 2021-12-14 PROCEDURE — 93306 TTE W/DOPPLER COMPLETE: CPT | Performed by: INTERNAL MEDICINE

## 2021-12-14 RX ADMIN — TECHNETIUM TC 99M SESTAMIBI 1 DOSE: 1 INJECTION INTRAVENOUS at 09:25

## 2021-12-14 RX ADMIN — REGADENOSON 0.4 MG: 0.08 INJECTION, SOLUTION INTRAVENOUS at 09:25

## 2021-12-14 RX ADMIN — TECHNETIUM TC 99M SESTAMIBI 1 DOSE: 1 INJECTION INTRAVENOUS at 07:25

## 2021-12-14 NOTE — TELEPHONE ENCOUNTER
----- Message from Evonne Cerna RN sent at 12/14/2021  9:17 AM EST -----  Regarding: surgical clearance  Here today for nuclear  and echo.   To see Dr HART on 12/20/21  For surgical clearance  [facility]   She says dropped clearance paper off last week to the ladies at the     Will discuss with Dr. HART   Per Dr. HART typically up to your surgeon that is doing the surgery on hold ASA.     Patient informed

## 2021-12-16 LAB
BH CV IMMEDIATE POST TECH DATA BLOOD PRESSURE: NORMAL MMHG
BH CV IMMEDIATE POST TECH DATA HEART RATE: 115 BPM
BH CV REST NUCLEAR ISOTOPE DOSE: 10.9 MCI
BH CV STRESS BP STAGE 1: NORMAL
BH CV STRESS COMMENTS STAGE 1: NORMAL
BH CV STRESS DOSE REGADENOSON STAGE 1: 0.4
BH CV STRESS DURATION MIN STAGE 1: 2
BH CV STRESS DURATION SEC STAGE 1: 6
BH CV STRESS GRADE STAGE 1: 0
BH CV STRESS HR STAGE 1: 133
BH CV STRESS METS STAGE 1: 1.9
BH CV STRESS NUCLEAR ISOTOPE DOSE: 32.9 MCI
BH CV STRESS PROTOCOL 1: NORMAL
BH CV STRESS RECOVERY BP: NORMAL MMHG
BH CV STRESS RECOVERY HR: 95 BPM
BH CV STRESS SPEED STAGE 1: 1.3
BH CV STRESS STAGE 1: 1
BH CV THREE MINUTE POST TECH DATA BLOOD PRESSURE: NORMAL MMHG
BH CV THREE MINUTE POST TECH DATA HEART RATE: 106 BPM
LV EF NUC BP: 70 %
MAXIMAL PREDICTED HEART RATE: 167 BPM
PERCENT MAX PREDICTED HR: 79.64 %
STRESS BASELINE BP: NORMAL MMHG
STRESS BASELINE HR: 76 BPM
STRESS PERCENT HR: 94 %
STRESS POST ESTIMATED WORKLOAD: 1.9 METS
STRESS POST EXERCISE DUR MIN: 2 MIN
STRESS POST EXERCISE DUR SEC: 6 SEC
STRESS POST PEAK BP: NORMAL MMHG
STRESS POST PEAK HR: 133 BPM
STRESS TARGET HR: 142 BPM

## 2021-12-20 ENCOUNTER — OFFICE VISIT (OUTPATIENT)
Dept: CARDIOLOGY | Facility: CLINIC | Age: 51
End: 2021-12-20

## 2021-12-20 VITALS
BODY MASS INDEX: 34.86 KG/M2 | HEIGHT: 68 IN | SYSTOLIC BLOOD PRESSURE: 118 MMHG | DIASTOLIC BLOOD PRESSURE: 81 MMHG | HEART RATE: 65 BPM | WEIGHT: 230 LBS

## 2021-12-20 DIAGNOSIS — E78.00 BORDERLINE HYPERCHOLESTEROLEMIA: Primary | ICD-10-CM

## 2021-12-20 DIAGNOSIS — Z78.9 STATIN INTOLERANCE: ICD-10-CM

## 2021-12-20 DIAGNOSIS — R07.89 OTHER CHEST PAIN: ICD-10-CM

## 2021-12-20 DIAGNOSIS — Z01.818 PREOPERATIVE EVALUATION OF A MEDICAL CONDITION TO RULE OUT SURGICAL CONTRAINDICATIONS (TAR REQUIRED): ICD-10-CM

## 2021-12-20 PROCEDURE — 99213 OFFICE O/P EST LOW 20 MIN: CPT | Performed by: INTERNAL MEDICINE

## 2021-12-20 RX ORDER — ALBUTEROL SULFATE 2.5 MG/3ML
SOLUTION RESPIRATORY (INHALATION)
COMMUNITY
Start: 2021-12-03

## 2021-12-20 NOTE — PROGRESS NOTES
TEST RESULTS, CLEARANCE FOR KNEE SURGERY DR. JOSR FIELD   Subjective:        Amirah Godoy is a 51 y.o. female who here for follow up    CC  PRE OP  HPI  51-year-old female with chest pain statin intolerance here for the preop evaluation    Patient denies any chest pains tightness heaviness or the pressure sensation     Problems Addressed this Visit        Allergies and Adverse Reactions    Statin intolerance    Relevant Orders    Comprehensive Metabolic Panel    Lipid Panel       Cardiac and Vasculature    Chest pain    Borderline hypercholesterolemia - Primary    Relevant Orders    Comprehensive Metabolic Panel    Lipid Panel      Other Visit Diagnoses     Preoperative evaluation of a medical condition to rule out surgical contraindications (TAR required)          Diagnoses       Codes Comments    Borderline hypercholesterolemia    -  Primary ICD-10-CM: E78.00  ICD-9-CM: 272.0     Preoperative evaluation of a medical condition to rule out surgical contraindications (TAR required)     ICD-10-CM: Z01.818  ICD-9-CM: V72.83     Statin intolerance     ICD-10-CM: Z78.9  ICD-9-CM: 995.27     Other chest pain     ICD-10-CM: R07.89  ICD-9-CM: 786.59       Interpretation Summary       · Findings consistent with a normal ECG stress test.  · Left ventricular ejection fraction is normal. (Calculated EF = 70%).  · Myocardial perfusion imaging indicates a normal myocardial perfusion study with no evidence of ischemia.  · Impressions are consistent with a low risk study.    Interpretation Summary    · Estimated right ventricular systolic pressure from tricuspid regurgitation is normal (<35 mmHg).  · Calculated left ventricular EF = 65.5% Estimated left ventricular EF was in agreement with the calculated left ventricular EF.  · Left ventricular diastolic function was normal.  · There is no evidence of pericardial effusion. .      .    The following portions of the patient's history were reviewed and updated as appropriate:  allergies, current medications, past family history, past medical history, past social history, past surgical history and problem list.    Past Medical History:   Diagnosis Date   • Acute torn meniscus     with ligament damage, to have surgery 12/30/21   • Asthma    • Depression    • Enlarged parotid gland    • GERD (gastroesophageal reflux disease)    • History of atrial fibrillation 2011    NO CURRENT MEDICATION   • Hyperlipidemia    • Hypertension     NO LONGER TREATED W/ MEDS   • PVC (premature ventricular contraction) 2015   • Reflux esophagitis    • Seasonal allergies    • Sinus infection     STARTED ON ANTIBIOTICS ON 10/30/17   • Sleep apnea     CPAP   • Stroke (HCC)     TIA FOLLOWING CATH 2018   • SVT (supraventricular tachycardia) (HCC) 2011    NO CURRENT MEDICATIONS     reports that she has never smoked. She has never used smokeless tobacco. She reports that she does not drink alcohol and does not use drugs.   Family History   Problem Relation Age of Onset   • Depression Mother    • Diabetes Mother    • Heart disease Mother         +of mi at 68   • Hyperlipidemia Mother    • Hypertension Mother    • Heart disease Father         ptca x 5 and mi at 59   • Hyperlipidemia Father    • Hypertension Father    • Kidney disease Brother    • Birth defects Maternal Grandmother    • Heart disease Maternal Grandmother         multiple mi   • Birth defects Maternal Grandfather    • Heart disease Maternal Grandfather         + mi at 68   • Colon cancer Maternal Grandfather    • Heart disease Paternal Grandmother         weak heart   • Asthma Paternal Grandmother    • COPD Paternal Grandmother    • Kidney failure Paternal Grandfather    • Migraines Sister    • Malig Hyperthermia Neg Hx        Review of Systems  Constitutional: No wt loss, fever, fatigue  Gastrointestinal: No nausea, abdominal pain  Behavioral/Psych: No insomnia or anxiety   Cardiovascular no chest pains or tightness in the chest  Objective:       Physical  "Exam  /81   Pulse 65   Ht 172.7 cm (68\")   Wt 104 kg (230 lb)   LMP 10/25/2017   BMI 34.97 kg/m²   General appearance: No acute changes   Neck: Trachea midline; NECK, supple, no thyromegaly or lymphadenopathy   Lungs: Normal size and shape, normal breath sounds, equal distribution of air, no rales and rhonchi   CV: S1-S2 regular, no murmurs, no rub, no gallop   Abdomen: Soft, nontender; no masses , no abnormal abdominal sounds   Extremities: No deformity , normal color , no peripheral edema   Skin: Normal temperature, turgor and texture; no rash, ulcers          Procedures      Echocardiogram:        Current Outpatient Medications:   •  Albuterol (PROVENTIL IN), Inhale Daily As Needed., Disp: , Rfl:   •  albuterol (PROVENTIL) (2.5 MG/3ML) 0.083% nebulizer solution, , Disp: , Rfl:   •  aspirin 325 MG tablet, Take 325 mg by mouth Daily., Disp: , Rfl:   •  cetirizine (ZyrTEC) 10 MG tablet, Take 10 mg by mouth daily., Disp: , Rfl:   •  citalopram (CeleXA) 20 MG tablet, Take 20 mg by mouth Daily., Disp: , Rfl:   •  FASENRA 30 MG/ML solution prefilled syringe, Every 2 (Two) Months., Disp: , Rfl:   •  Fluticasone-Umeclidin-Vilant (TRELEGY ELLIPTA IN), Inhale 1 inhaler Every Morning., Disp: , Rfl:   •  lisinopril (PRINIVIL,ZESTRIL) 10 MG tablet, Take 10 mg by mouth Daily., Disp: , Rfl:   •  montelukast (SINGULAIR) 10 MG tablet, TAKE ONE TABLET BY MOUTH DAILY (Patient taking differently: Take 10 mg by mouth Daily.), Disp: 30 tablet, Rfl: 4  •  Omeprazole 20 MG tablet delayed-release, Take 20 mg by mouth Daily., Disp: , Rfl:   •  rosuvastatin (CRESTOR) 5 MG tablet, Take 1 tablet by mouth 3 (Three) Times a Week. EVERY M-W-F, Disp: 15 tablet, Rfl: 5  •  nitroglycerin (NITROSTAT) 0.4 MG SL tablet, 1 under the tongue as needed for angina, may repeat q5mins for up three doses (Patient taking differently: Place 0.4 mg under the tongue Every 5 (Five) Minutes As Needed. 1 under the tongue as needed for angina, may repeat " q5mins for up three doses), Disp: 100 tablet, Rfl: 11   Assessment:        Patient Active Problem List   Diagnosis   • Chest pain   • Reactive depression   • Seasonal allergic rhinitis due to pollen   • Right ovarian cyst   • History of atrial fibrillation   • Asthma with exacerbation   • Cough   • Weakness   • Tachycardia   • Influenza A   • Palpitations   • Swelling   • Essential hypertension   • Paroxysmal atrial fibrillation (HCC)   • Borderline hypercholesterolemia   • Precordial pain   • Visual loss   • Status post device closure of ASD   • Status post placement of implantable loop recorder   • Episode of recurrent major depressive disorder (HCC)   • TIA (transient ischemic attack)   • Hemiplegic migraine without status migrainosus, not intractable   • Anxiety   • Screen for colon cancer   • Statin intolerance   • Abnormal cardiac function test   • Mass of right parotid gland   • Borderline diabetes               Plan:            ICD-10-CM ICD-9-CM   1. Borderline hypercholesterolemia  E78.00 272.0   2. Preoperative evaluation of a medical condition to rule out surgical contraindications (TAR required)  Z01.818 V72.83   3. Statin intolerance  Z78.9 995.27   4. Other chest pain  R07.89 786.59     1. Preoperative evaluation of a medical condition to rule out surgical contraindications (TAR required)  Patient is cleared for the surgery    2. Statin intolerance  No change  - Comprehensive Metabolic Panel; Future  - Lipid Panel; Future    3. Borderline hypercholesterolemia  Continue current treatment  - Comprehensive Metabolic Panel; Future  - Lipid Panel; Future    4. Other chest pain  Under control       Specificity and sensitivity of the stress test/ cardiac workup has been explained. Pt has been explained if  Symptoms continue please go to ER, and further w/p will be required.    Also explained this does not rule out coronary artery disease or the future events, continue to emphasize on risk reductions for  coronary artery disease    Pt also advised to contact PCP for other causes of symptoms    Amirah Godoy seen and examined with no clinical signs of angina or chf, pt is cleared for surgery with non modifiable risk factors.  Amirah Godoy has been advised to take cardiac meds with sip of water on the day of surgery.    Please use beta blocker for tachycardia perioperatively    Anticoagulation to be managed appropriately    Watch for chest pain, shortness of breath, palpitations, arrhythmias, and significant change in the blood pressure perioperatively,     Please check EKG preop and postop if any questions, notify us if any change in patient's cardiovascular conditions    6 MONTHS WITH FLP, CMP  COUNSELING:    Amirah GodoyCounseling was given to patient for the following topics: diagnostic results, risk factor reductions, impressions, risks and benefits of treatment options and importance of treatment compliance .       SMOKING COUNSELING:    [unfilled]    Dictated using Dragon dictation

## 2021-12-27 ENCOUNTER — APPOINTMENT (OUTPATIENT)
Dept: CARDIOLOGY | Facility: HOSPITAL | Age: 51
End: 2021-12-27

## 2022-03-16 ENCOUNTER — APPOINTMENT (OUTPATIENT)
Dept: CARDIOLOGY | Facility: HOSPITAL | Age: 52
End: 2022-03-16

## 2022-03-30 ENCOUNTER — HOSPITAL ENCOUNTER (OUTPATIENT)
Dept: CARDIOLOGY | Facility: HOSPITAL | Age: 52
Discharge: HOME | End: 2022-03-30
Admitting: INTERNAL MEDICINE

## 2022-03-30 DIAGNOSIS — E78.00 BORDERLINE HYPERCHOLESTEROLEMIA: ICD-10-CM

## 2022-03-30 DIAGNOSIS — Z78.9 STATIN INTOLERANCE: ICD-10-CM

## 2022-03-30 LAB
ALBUMIN SERPL-MCNC: 4.3 G/DL (ref 3.5–5.2)
ALBUMIN/GLOB SERPL: 1.6 G/DL
ALP SERPL-CCNC: 94 U/L (ref 39–117)
ALT SERPL W P-5'-P-CCNC: 15 U/L (ref 1–33)
ANION GAP SERPL CALCULATED.3IONS-SCNC: 7 MMOL/L (ref 5–15)
AST SERPL-CCNC: 6 U/L (ref 1–32)
BILIRUB SERPL-MCNC: 0.5 MG/DL (ref 0–1.2)
BUN SERPL-MCNC: 18 MG/DL (ref 6–20)
BUN/CREAT SERPL: 26.1 (ref 7–25)
CALCIUM SPEC-SCNC: 9.3 MG/DL (ref 8.6–10.5)
CHLORIDE SERPL-SCNC: 104 MMOL/L (ref 98–107)
CHOLEST SERPL-MCNC: 174 MG/DL (ref 0–200)
CO2 SERPL-SCNC: 28 MMOL/L (ref 22–29)
CREAT SERPL-MCNC: 0.69 MG/DL (ref 0.57–1)
EGFRCR SERPLBLD CKD-EPI 2021: 103.3 ML/MIN/1.73
GLOBULIN UR ELPH-MCNC: 2.7 GM/DL
GLUCOSE SERPL-MCNC: 111 MG/DL (ref 65–99)
HDLC SERPL-MCNC: 39 MG/DL (ref 40–60)
LDLC SERPL CALC-MCNC: 122 MG/DL (ref 0–100)
LDLC/HDLC SERPL: 3.12 {RATIO}
POTASSIUM SERPL-SCNC: 4.7 MMOL/L (ref 3.5–5.2)
PROT SERPL-MCNC: 7 G/DL (ref 6–8.5)
SODIUM SERPL-SCNC: 139 MMOL/L (ref 136–145)
TRIGL SERPL-MCNC: 67 MG/DL (ref 0–150)
VLDLC SERPL-MCNC: 13 MG/DL (ref 5–40)

## 2022-03-30 PROCEDURE — 80053 COMPREHEN METABOLIC PANEL: CPT | Performed by: INTERNAL MEDICINE

## 2022-03-30 PROCEDURE — 80061 LIPID PANEL: CPT | Performed by: INTERNAL MEDICINE

## 2022-03-30 PROCEDURE — 36415 COLL VENOUS BLD VENIPUNCTURE: CPT | Performed by: INTERNAL MEDICINE

## 2022-04-07 ENCOUNTER — OFFICE VISIT (OUTPATIENT)
Dept: CARDIOLOGY | Facility: CLINIC | Age: 52
End: 2022-04-07

## 2022-04-07 VITALS
HEIGHT: 67 IN | SYSTOLIC BLOOD PRESSURE: 112 MMHG | DIASTOLIC BLOOD PRESSURE: 66 MMHG | BODY MASS INDEX: 33.43 KG/M2 | HEART RATE: 78 BPM | WEIGHT: 213 LBS

## 2022-04-07 DIAGNOSIS — I10 ESSENTIAL HYPERTENSION: ICD-10-CM

## 2022-04-07 DIAGNOSIS — R07.2 PRECORDIAL PAIN: ICD-10-CM

## 2022-04-07 DIAGNOSIS — Z86.79 HISTORY OF ATRIAL FIBRILLATION: ICD-10-CM

## 2022-04-07 DIAGNOSIS — E78.00 BORDERLINE HYPERCHOLESTEROLEMIA: Primary | ICD-10-CM

## 2022-04-07 PROCEDURE — 99214 OFFICE O/P EST MOD 30 MIN: CPT | Performed by: INTERNAL MEDICINE

## 2022-04-07 PROCEDURE — 93000 ELECTROCARDIOGRAM COMPLETE: CPT | Performed by: INTERNAL MEDICINE

## 2022-04-07 NOTE — PROGRESS NOTES
1 YR FOLLOW UP, LAB RESULTS, LOOP [facility]   Subjective:        Amirah Godoy is a 52 y.o. female who here for follow up    CC  HTN  HYPERLIPIDEMIA  HPI  52-year-old female with paroxysmal atrial fibrillation, hypertension, hypercholesterolemia also has a loop recorder at [facility] denies any chest pains or tightness in the chest     Problems Addressed this Visit        Cardiac and Vasculature    Chest pain    History of atrial fibrillation    Essential hypertension    Borderline hypercholesterolemia - Primary    Relevant Orders    Lipid Panel    Comprehensive Metabolic Panel      Diagnoses       Codes Comments    Borderline hypercholesterolemia    -  Primary ICD-10-CM: E78.00  ICD-9-CM: 272.0     Precordial pain     ICD-10-CM: R07.2  ICD-9-CM: 786.51     History of atrial fibrillation     ICD-10-CM: Z86.79  ICD-9-CM: V12.59     Essential hypertension     ICD-10-CM: I10  ICD-9-CM: 401.9         .    The following portions of the patient's history were reviewed and updated as appropriate: allergies, current medications, past family history, past medical history, past social history, past surgical history and problem list.    Past Medical History:   Diagnosis Date   • Acute torn meniscus     with ligament damage, to have surgery 12/30/21   • Asthma    • Depression    • Enlarged parotid gland    • GERD (gastroesophageal reflux disease)    • History of atrial fibrillation 2011    NO CURRENT MEDICATION   • Hyperlipidemia    • Hypertension     NO LONGER TREATED W/ MEDS   • PVC (premature ventricular contraction) 2015   • Reflux esophagitis    • Seasonal allergies    • Sinus infection     STARTED ON ANTIBIOTICS ON 10/30/17   • Sleep apnea     CPAP   • Stroke (HCC)     TIA FOLLOWING CATH 2018   • SVT (supraventricular tachycardia) (HCC) 2011    NO CURRENT MEDICATIONS     reports that she has never smoked. She has never used smokeless tobacco. She reports that she does not drink alcohol and does not use drugs.   Family History  "  Problem Relation Age of Onset   • Depression Mother    • Diabetes Mother    • Heart disease Mother         +of mi at 68   • Hyperlipidemia Mother    • Hypertension Mother    • Heart disease Father         ptca x 5 and mi at 59   • Hyperlipidemia Father    • Hypertension Father    • Kidney disease Brother    • Birth defects Maternal Grandmother    • Heart disease Maternal Grandmother         multiple mi   • Birth defects Maternal Grandfather    • Heart disease Maternal Grandfather         + mi at 68   • Colon cancer Maternal Grandfather    • Heart disease Paternal Grandmother         weak heart   • Asthma Paternal Grandmother    • COPD Paternal Grandmother    • Kidney failure Paternal Grandfather    • Migraines Sister    • Malig Hyperthermia Neg Hx        Review of Systems  Constitutional: No wt loss, fever, fatigue  Gastrointestinal: No nausea, abdominal pain  Behavioral/Psych: No insomnia or anxiety   Cardiovascular no chest pains or tightness in the chest  Objective:       Physical Exam  /66   Pulse 78   Ht 170.2 cm (67\")   Wt 96.6 kg (213 lb)   LMP 10/25/2017   BMI 33.36 kg/m²   General appearance: No acute changes   Neck: Trachea midline; NECK, supple, no thyromegaly or lymphadenopathy   Lungs: Normal size and shape, normal breath sounds, equal distribution of air, no rales and rhonchi   CV: S1-S2 regular, no murmurs, no rub, no gallop   Abdomen: Soft, nontender; no masses , no abnormal abdominal sounds   Extremities: No deformity , normal color , no peripheral edema   Skin: Normal temperature, turgor and texture; no rash, ulcers            ECG 12 Lead    Date/Time: 4/7/2022 10:26 AM  Performed by: Niles Koch MD  Authorized by: Niles Koch MD   Comparison: compared with previous ECG   Similar to previous ECG  Rhythm: sinus rhythm  ST Flattening: all    Clinical impression: non-specific ECG          Total Cholesterol   0 - 200 mg/dL 174  174  175  212 High   189  175  184  "   Triglycerides   0 - 150 mg/dL 67  57  52  73  103  89  87    HDL Cholesterol   40 - 60 mg/dL 39 Low   45  42  42  39 Low   44  46    LDL Cholesterol    0 - 100 mg/dL 122 High   118 High   123 High   155 High   129 High   113 High   121 High     VLDL Cholesterol   5 - 40 mg/dL 13              6 - 20 mg/dL 18  17  14  15  15  16     Creatinine   0.57 - 1.00 mg/dL 0.69  0.92  0.62  0.83  0.74  0.73  0.80 R, CM    Sodium   136 - 145 mmol/L 139  140  140  140  141  139     Potassium   3.5 - 5.2 mmol/L 4.7  4.5  3.7  3.5  4.3  4.6     Chloride   98 - 107 mmol/L 104  104  106  103  104  104     CO2   22.0 - 29.0 mmol/L 28.0  26.5  26.6  27.3  27.1  28.2     Calcium   8.6 - 10.5 mg/dL 9.3  9.1  8.8  9.2  9.1  9.0     Total Protein   6.0 - 8.5 g/dL 7.0  7.2  6.6    6.8     Albumin   3.50 - 5.20 g/dL 4.30  4.10  4.10    4.00     ALT (SGPT)   1 - 33 U/L 15  24  22    22     AST (SGOT)   1 - 32 U/L 6  23  23    21     Alkaline Phosphatase   39 - 117 U/L 94  111  110    116     Total Bilirubin   0.0 - 1.2 mg/dL 0.5  0.4  0.4    0.3     Globulin   gm/dL 2.7  3.1  2.5    2.8     A/G Ratio   g/dL 1.6  1.3  1.6    1.4     BUN/Creatinine Ratio   7.0 - 25.0 26.1 High   18                Echocardiogram:        Current Outpatient Medications:   •  Albuterol (PROVENTIL IN), Inhale Daily As Needed., Disp: , Rfl:   •  albuterol (PROVENTIL) (2.5 MG/3ML) 0.083% nebulizer solution, , Disp: , Rfl:   •  aspirin 325 MG tablet, Take 325 mg by mouth Daily., Disp: , Rfl:   •  cetirizine (ZyrTEC) 10 MG tablet, Take 10 mg by mouth daily., Disp: , Rfl:   •  citalopram (CeleXA) 20 MG tablet, Take 20 mg by mouth Daily., Disp: , Rfl:   •  FASENRA 30 MG/ML solution prefilled syringe, Every 2 (Two) Months., Disp: , Rfl:   •  Fluticasone-Umeclidin-Vilant (TRELEGY ELLIPTA IN), Inhale 1 inhaler Every Morning., Disp: , Rfl:   •  lisinopril (PRINIVIL,ZESTRIL) 10 MG tablet, Take 10 mg by mouth Daily., Disp: , Rfl:   •  montelukast (SINGULAIR) 10 MG tablet,  TAKE ONE TABLET BY MOUTH DAILY (Patient taking differently: Take 10 mg by mouth Daily.), Disp: 30 tablet, Rfl: 4  •  Omeprazole 20 MG tablet delayed-release, Take 20 mg by mouth Daily., Disp: , Rfl:   •  rosuvastatin (CRESTOR) 5 MG tablet, Take 1 tablet by mouth 3 (Three) Times a Week. EVERY M-W-F, Disp: 15 tablet, Rfl: 5  •  nitroglycerin (NITROSTAT) 0.4 MG SL tablet, 1 under the tongue as needed for angina, may repeat q5mins for up three doses (Patient taking differently: Place 0.4 mg under the tongue Every 5 (Five) Minutes As Needed. 1 under the tongue as needed for angina, may repeat q5mins for up three doses), Disp: 100 tablet, Rfl: 11   Assessment:        Patient Active Problem List   Diagnosis   • Chest pain   • Reactive depression   • Seasonal allergic rhinitis due to pollen   • Right ovarian cyst   • History of atrial fibrillation   • Asthma with exacerbation   • Cough   • Weakness   • Tachycardia   • Influenza A   • Palpitations   • Swelling   • Essential hypertension   • Paroxysmal atrial fibrillation (HCC)   • Borderline hypercholesterolemia   • Precordial pain   • Visual loss   • Status post device closure of ASD   • Status post placement of implantable loop recorder   • Episode of recurrent major depressive disorder (HCC)   • TIA (transient ischemic attack)   • Hemiplegic migraine without status migrainosus, not intractable   • Anxiety   • Screen for colon cancer   • Statin intolerance   • Abnormal cardiac function test   • Mass of right parotid gland   • Borderline diabetes               Plan:            ICD-10-CM ICD-9-CM   1. Borderline hypercholesterolemia  E78.00 272.0   2. Precordial pain  R07.2 786.51   3. History of atrial fibrillation  Z86.79 V12.59   4. Essential hypertension  I10 401.9     1. Borderline hypercholesterolemia  Continue current treatment  - Lipid Panel; Future  - Comprehensive Metabolic Panel; Future    2. Precordial pain  Atypical    3. History of atrial fibrillation  Under  control    4. Essential hypertension  Blood pressure under control     LOOP REMOVAL  SEE IN 1 YR WITH LABS  COUNSELING:    Amirah Kevin was given to patient for the following topics: diagnostic results, risk factor reductions, impressions, risks and benefits of treatment options and importance of treatment compliance .       SMOKING COUNSELING:    [unfilled]    Dictated using Dragon dictation

## 2022-07-20 RX ORDER — ROSUVASTATIN CALCIUM 5 MG/1
TABLET, COATED ORAL
Qty: 12 TABLET | Refills: 3 | Status: SHIPPED | OUTPATIENT
Start: 2022-07-20 | End: 2022-11-08 | Stop reason: SDUPTHER

## 2022-11-08 RX ORDER — ROSUVASTATIN CALCIUM 5 MG/1
5 TABLET, COATED ORAL DAILY
Qty: 30 TABLET | Refills: 3 | Status: SHIPPED | OUTPATIENT
Start: 2022-11-08

## 2023-04-05 ENCOUNTER — OFFICE VISIT (OUTPATIENT)
Dept: CARDIOLOGY | Facility: CLINIC | Age: 53
End: 2023-04-05
Payer: COMMERCIAL

## 2023-04-05 VITALS
SYSTOLIC BLOOD PRESSURE: 117 MMHG | WEIGHT: 217 LBS | HEART RATE: 78 BPM | DIASTOLIC BLOOD PRESSURE: 75 MMHG | HEIGHT: 70 IN | BODY MASS INDEX: 31.07 KG/M2

## 2023-04-05 DIAGNOSIS — R00.2 PALPITATIONS: Primary | ICD-10-CM

## 2023-04-05 DIAGNOSIS — Z78.9 STATIN INTOLERANCE: ICD-10-CM

## 2023-04-05 DIAGNOSIS — I10 PRIMARY HYPERTENSION: ICD-10-CM

## 2023-04-05 DIAGNOSIS — R00.0 TACHYCARDIA: ICD-10-CM

## 2023-04-05 DIAGNOSIS — E78.5 HYPERLIPIDEMIA, UNSPECIFIED HYPERLIPIDEMIA TYPE: ICD-10-CM

## 2023-04-05 PROCEDURE — 99214 OFFICE O/P EST MOD 30 MIN: CPT | Performed by: NURSE PRACTITIONER

## 2023-04-05 PROCEDURE — 93000 ELECTROCARDIOGRAM COMPLETE: CPT | Performed by: NURSE PRACTITIONER

## 2023-04-05 RX ORDER — LISINOPRIL 10 MG/1
10 TABLET ORAL DAILY
Qty: 30 TABLET | Refills: 12 | Status: SHIPPED | OUTPATIENT
Start: 2023-04-05

## 2023-04-05 RX ORDER — NITROGLYCERIN 0.4 MG/1
0.4 TABLET SUBLINGUAL
Qty: 20 TABLET | Refills: 12 | Status: SHIPPED | OUTPATIENT
Start: 2023-04-05

## 2023-04-05 NOTE — PROGRESS NOTES
Subjective:        Amirah Godoy is a 53 y.o. female who here for follow up    No chief complaint on file.    1 year follow-up for hypertension     HPI     This is a 53-year-old female who is current  this provider.  She has a history to include hypertension, palpitations, status post device closure of ASD, history of atrial fibrillation, history of stroke, and statin intolerance.    Stress test in 2021 indicating a normal myocardial perfusion study with no evidence of ischemia.  Echo in 2021 revealed EF 65.5%, RV systolic pressure from TV regurgitation is normal.  LV diastolic function was normal.  No evidence of pericardial effusion.  Cardiac cath in 2020 indicated early atherosclerotic plaque otherwise normal coronary arteries.    The following portions of the patient's history were reviewed and updated as appropriate: allergies, current medications, past family history, past medical history, past social history, past surgical history and problem list.    Past Medical History:   Diagnosis Date   • Acute torn meniscus     with ligament damage, to have surgery 12/30/21   • Asthma    • Depression    • Enlarged parotid gland    • GERD (gastroesophageal reflux disease)    • History of atrial fibrillation 2011    NO CURRENT MEDICATION   • Hyperlipidemia    • Hypertension     NO LONGER TREATED W/ MEDS   • PVC (premature ventricular contraction) 2015   • Reflux esophagitis    • Seasonal allergies    • Sinus infection     STARTED ON ANTIBIOTICS ON 10/30/17   • Sleep apnea     CPAP   • Stroke     TIA FOLLOWING CATH 2018   • SVT (supraventricular tachycardia) 2011    NO CURRENT MEDICATIONS         reports that she has never smoked. She has never used smokeless tobacco. She reports that she does not drink alcohol and does not use drugs.     Family History   Problem Relation Age of Onset   • Depression Mother    • Diabetes Mother    • Heart disease Mother         +of mi at 68   • Hyperlipidemia Mother    • Hypertension  Mother    • Heart disease Father         ptca x 5 and mi at 59   • Hyperlipidemia Father    • Hypertension Father    • Kidney disease Brother    • Birth defects Maternal Grandmother    • Heart disease Maternal Grandmother         multiple mi   • Birth defects Maternal Grandfather    • Heart disease Maternal Grandfather         + mi at 68   • Colon cancer Maternal Grandfather    • Heart disease Paternal Grandmother         weak heart   • Asthma Paternal Grandmother    • COPD Paternal Grandmother    • Kidney failure Paternal Grandfather    • Migraines Sister    • Malig Hyperthermia Neg Hx        ROS     Review of Systems  Constitutional: No wt loss, fever, fatigue  Gastrointestinal: No nausea, abdominal pain  Behavioral/Psych: No insomnia or anxiety  Cardiovascular: Denies chest pain, and shortness of breath.      Objective:           Vitals and nursing note reviewed.   Constitutional:       Appearance: Well-developed.   HENT:      Head: Normocephalic.      Right Ear: External ear normal.      Left Ear: External ear normal.   Neck:      Vascular: No JVD.   Pulmonary:      Effort: Pulmonary effort is normal. No respiratory distress.      Breath sounds: Normal breath sounds. No stridor. No rales.   Cardiovascular:      Normal rate. Regular rhythm.      No gallop.   Pulses:     Intact distal pulses.   Abdominal:      General: Bowel sounds are normal. There is no distension.      Palpations: Abdomen is soft.      Tenderness: There is no abdominal tenderness. There is no guarding.   Musculoskeletal: Normal range of motion.         General: No tenderness.      Cervical back: Normal range of motion. Skin:     General: Skin is warm.   Neurological:      Mental Status: Alert and oriented to person, place, and time.      Deep Tendon Reflexes: Reflexes are normal and symmetric.   Psychiatric:         Judgment: Judgment normal.           ECG 12 Lead    Date/Time: 4/5/2023 1:42 PM  Performed by: Catalina Ferrell  YORDAN  Authorized by: Catalina Ferrell APRN   Comparison: compared with previous ECG from 4/7/2022  Rhythm: sinus rhythm  Rate: normal  BPM: 73    Clinical impression: non-specific ECG          2021    Interpretation Summary       • Findings consistent with a normal ECG stress test.  • Left ventricular ejection fraction is normal. (Calculated EF = 70%).  • Myocardial perfusion imaging indicates a normal myocardial perfusion study with no evidence of ischemia.  • Impressions are consistent with a low risk study.     Asymptomatic for chest pain. ECG is negative for ischemia.   Ectopy: occasional PVC at baseline,none with exercise and recovery.   B/P is appropriate.Baseline 128/78, Peak (post Lexiscan) 128/68,  recovery:124//64   Pharmacologic study due to inability to tolerate increasing speed and grade of treadmill due to orthopedic pain.  Participated in Low Level exercise and tolerance is fair.      Supervised by:  Vijaya FARR.    Interpretation Summary    • Estimated right ventricular systolic pressure from tricuspid regurgitation is normal (<35 mmHg).  • Calculated left ventricular EF = 65.5% Estimated left ventricular EF was in agreement with the calculated left ventricular EF.  • Left ventricular diastolic function was normal.  • There is no evidence of pericardial effusion. .  2020    Impression:      1. Early atherosclerotic plaque otherwise normal coronary arteries  2. Normal LV gram     Recommendations:      1. Medical management            I sincerely appreciate the opportunity to participate in your patient's care. Please feel free to contact me anytime if I can be of assistance in this or any other way.     Niles Koch MD  10/14/2020  08:19 EDT            Current Outpatient Medications:   •  Albuterol (PROVENTIL IN), Inhale Daily As Needed., Disp: , Rfl:   •  albuterol (PROVENTIL) (2.5 MG/3ML) 0.083% nebulizer solution, , Disp: , Rfl:   •  aspirin 325 MG tablet, Take 325 mg by  mouth Daily., Disp: , Rfl:   •  cetirizine (ZyrTEC) 10 MG tablet, Take 10 mg by mouth daily., Disp: , Rfl:   •  citalopram (CeleXA) 20 MG tablet, Take 20 mg by mouth Daily., Disp: , Rfl:   •  FASENRA 30 MG/ML solution prefilled syringe, Every 2 (Two) Months., Disp: , Rfl:   •  Fluticasone-Umeclidin-Vilant (TRELEGY ELLIPTA IN), Inhale 1 inhaler Every Morning., Disp: , Rfl:   •  lisinopril (PRINIVIL,ZESTRIL) 10 MG tablet, Take 10 mg by mouth Daily., Disp: , Rfl:   •  montelukast (SINGULAIR) 10 MG tablet, TAKE ONE TABLET BY MOUTH DAILY (Patient taking differently: Take 10 mg by mouth Daily.), Disp: 30 tablet, Rfl: 4  •  nitroglycerin (NITROSTAT) 0.4 MG SL tablet, 1 under the tongue as needed for angina, may repeat q5mins for up three doses (Patient taking differently: Place 0.4 mg under the tongue Every 5 (Five) Minutes As Needed. 1 under the tongue as needed for angina, may repeat q5mins for up three doses), Disp: 100 tablet, Rfl: 11  •  Omeprazole 20 MG tablet delayed-release, Take 20 mg by mouth Daily., Disp: , Rfl:   •  rosuvastatin (CRESTOR) 5 MG tablet, Take 1 tablet by mouth Daily., Disp: 30 tablet, Rfl: 3     Assessment:        Patient Active Problem List   Diagnosis   • Chest pain   • Reactive depression   • Seasonal allergic rhinitis due to pollen   • Right ovarian cyst   • History of atrial fibrillation   • Asthma with exacerbation   • Cough   • Weakness   • Tachycardia   • Influenza A   • Palpitations   • Swelling   • Essential hypertension   • Paroxysmal atrial fibrillation   • Borderline hypercholesterolemia   • Precordial pain   • Visual loss   • Status post device closure of ASD   • Status post placement of implantable loop recorder   • Episode of recurrent major depressive disorder   • TIA (transient ischemic attack)   • Hemiplegic migraine without status migrainosus, not intractable   • Anxiety   • Screen for colon cancer   • Statin intolerance   • Abnormal cardiac function test   • Mass of right  parotid gland   • Borderline diabetes               Plan:   1. Hypertension: Controlled on current medications.    Educated patient on exercising for at least 30 minutes a day for 2 to 3 days a week. Importance of controlling hypertension and blood pressure checkup on the regular basis has been explained. Hypertension as a silent killer has been discussed. Risk reduction of the weight and regular exercises to control the hypertension has been explained.    2.  Hyperlipidemia: Lipid panel March 2022 revealed , HDL 39,  and triglycerides 67 I would like her LDL to be less than 70%.  She states her primary care manages her cholesterol labs.  On Repatha without any issues.  Continue current management.    Risk of the hyperlipidemia, importance of the treatment has been explained. Pros and cons of the statins has been explained. Regular blood workup as well as side effects including the liver failure, myelopathy death has been explained.    3. Palpitations: I will have her loop explanted and replaced. Will need stress and echo.       .I have reviewed pacemaker/AICD/Biventricular/ AICD/loop recorder implant procedure/risks/benefits (allergy,arrythmia,bleeding,infection,pnuemothorax,CVA,death) with the patient/family/POA. All questions have been answered. Pt/family/POA voice understanding and agreement with treatment plan.           No diagnosis found.    There are no diagnoses linked to this encounter.    COUNSELING: Castro Kevin was given to patient for the following topics: diagnostic results, risk factor reductions, impressions, risks and benefits of treatment options and importance of treatment compliance .       SMOKING COUNSELING: Denies    1. Palpitations: None today. explant and replace with loop due to history of stroke.     2.  She will need a stress test and echo after loop placement due to increased palpitations.       Sincerely,   YORDAN Mccloud  [facility]  04/05/23  12:53 EDT    EMR Dragon/Transcription disclaimer:   Much of this encounter note is an electronic transcription/translation of spoken language to printed text. The electronic translation of spoken language may permit erroneous, or at times, nonsensical words or phrases to be inadvertently transcribed; Although I have reviewed the note for such errors, some may still exist.

## 2023-04-05 NOTE — H&P (VIEW-ONLY)
Subjective:        Amirah Godoy is a 53 y.o. female who here for follow up    No chief complaint on file.    1 year follow-up for hypertension     HPI     This is a 53-year-old female who is current  this provider.  She has a history to include hypertension, palpitations, status post device closure of ASD, history of atrial fibrillation, history of stroke, and statin intolerance.    Stress test in 2021 indicating a normal myocardial perfusion study with no evidence of ischemia.  Echo in 2021 revealed EF 65.5%, RV systolic pressure from TV regurgitation is normal.  LV diastolic function was normal.  No evidence of pericardial effusion.  Cardiac cath in 2020 indicated early atherosclerotic plaque otherwise normal coronary arteries.    The following portions of the patient's history were reviewed and updated as appropriate: allergies, current medications, past family history, past medical history, past social history, past surgical history and problem list.    Past Medical History:   Diagnosis Date   • Acute torn meniscus     with ligament damage, to have surgery 12/30/21   • Asthma    • Depression    • Enlarged parotid gland    • GERD (gastroesophageal reflux disease)    • History of atrial fibrillation 2011    NO CURRENT MEDICATION   • Hyperlipidemia    • Hypertension     NO LONGER TREATED W/ MEDS   • PVC (premature ventricular contraction) 2015   • Reflux esophagitis    • Seasonal allergies    • Sinus infection     STARTED ON ANTIBIOTICS ON 10/30/17   • Sleep apnea     CPAP   • Stroke     TIA FOLLOWING CATH 2018   • SVT (supraventricular tachycardia) 2011    NO CURRENT MEDICATIONS         reports that she has never smoked. She has never used smokeless tobacco. She reports that she does not drink alcohol and does not use drugs.     Family History   Problem Relation Age of Onset   • Depression Mother    • Diabetes Mother    • Heart disease Mother         +of mi at 68   • Hyperlipidemia Mother    • Hypertension  Mother    • Heart disease Father         ptca x 5 and mi at 59   • Hyperlipidemia Father    • Hypertension Father    • Kidney disease Brother    • Birth defects Maternal Grandmother    • Heart disease Maternal Grandmother         multiple mi   • Birth defects Maternal Grandfather    • Heart disease Maternal Grandfather         + mi at 68   • Colon cancer Maternal Grandfather    • Heart disease Paternal Grandmother         weak heart   • Asthma Paternal Grandmother    • COPD Paternal Grandmother    • Kidney failure Paternal Grandfather    • Migraines Sister    • Malig Hyperthermia Neg Hx        ROS     Review of Systems  Constitutional: No wt loss, fever, fatigue  Gastrointestinal: No nausea, abdominal pain  Behavioral/Psych: No insomnia or anxiety  Cardiovascular: Denies chest pain, and shortness of breath.      Objective:           Vitals and nursing note reviewed.   Constitutional:       Appearance: Well-developed.   HENT:      Head: Normocephalic.      Right Ear: External ear normal.      Left Ear: External ear normal.   Neck:      Vascular: No JVD.   Pulmonary:      Effort: Pulmonary effort is normal. No respiratory distress.      Breath sounds: Normal breath sounds. No stridor. No rales.   Cardiovascular:      Normal rate. Regular rhythm.      No gallop.   Pulses:     Intact distal pulses.   Abdominal:      General: Bowel sounds are normal. There is no distension.      Palpations: Abdomen is soft.      Tenderness: There is no abdominal tenderness. There is no guarding.   Musculoskeletal: Normal range of motion.         General: No tenderness.      Cervical back: Normal range of motion. Skin:     General: Skin is warm.   Neurological:      Mental Status: Alert and oriented to person, place, and time.      Deep Tendon Reflexes: Reflexes are normal and symmetric.   Psychiatric:         Judgment: Judgment normal.           ECG 12 Lead    Date/Time: 4/5/2023 1:42 PM  Performed by: Catalina Ferrell  YORDAN  Authorized by: Catalina Ferrell APRN   Comparison: compared with previous ECG from 4/7/2022  Rhythm: sinus rhythm  Rate: normal  BPM: 73    Clinical impression: non-specific ECG          2021    Interpretation Summary       • Findings consistent with a normal ECG stress test.  • Left ventricular ejection fraction is normal. (Calculated EF = 70%).  • Myocardial perfusion imaging indicates a normal myocardial perfusion study with no evidence of ischemia.  • Impressions are consistent with a low risk study.     Asymptomatic for chest pain. ECG is negative for ischemia.   Ectopy: occasional PVC at baseline,none with exercise and recovery.   B/P is appropriate.Baseline 128/78, Peak (post Lexiscan) 128/68,  recovery:124//64   Pharmacologic study due to inability to tolerate increasing speed and grade of treadmill due to orthopedic pain.  Participated in Low Level exercise and tolerance is fair.      Supervised by:  Vijaya FARR.    Interpretation Summary    • Estimated right ventricular systolic pressure from tricuspid regurgitation is normal (<35 mmHg).  • Calculated left ventricular EF = 65.5% Estimated left ventricular EF was in agreement with the calculated left ventricular EF.  • Left ventricular diastolic function was normal.  • There is no evidence of pericardial effusion. .  2020    Impression:      1. Early atherosclerotic plaque otherwise normal coronary arteries  2. Normal LV gram     Recommendations:      1. Medical management            I sincerely appreciate the opportunity to participate in your patient's care. Please feel free to contact me anytime if I can be of assistance in this or any other way.     Niles Koch MD  10/14/2020  08:19 EDT            Current Outpatient Medications:   •  Albuterol (PROVENTIL IN), Inhale Daily As Needed., Disp: , Rfl:   •  albuterol (PROVENTIL) (2.5 MG/3ML) 0.083% nebulizer solution, , Disp: , Rfl:   •  aspirin 325 MG tablet, Take 325 mg by  mouth Daily., Disp: , Rfl:   •  cetirizine (ZyrTEC) 10 MG tablet, Take 10 mg by mouth daily., Disp: , Rfl:   •  citalopram (CeleXA) 20 MG tablet, Take 20 mg by mouth Daily., Disp: , Rfl:   •  FASENRA 30 MG/ML solution prefilled syringe, Every 2 (Two) Months., Disp: , Rfl:   •  Fluticasone-Umeclidin-Vilant (TRELEGY ELLIPTA IN), Inhale 1 inhaler Every Morning., Disp: , Rfl:   •  lisinopril (PRINIVIL,ZESTRIL) 10 MG tablet, Take 10 mg by mouth Daily., Disp: , Rfl:   •  montelukast (SINGULAIR) 10 MG tablet, TAKE ONE TABLET BY MOUTH DAILY (Patient taking differently: Take 10 mg by mouth Daily.), Disp: 30 tablet, Rfl: 4  •  nitroglycerin (NITROSTAT) 0.4 MG SL tablet, 1 under the tongue as needed for angina, may repeat q5mins for up three doses (Patient taking differently: Place 0.4 mg under the tongue Every 5 (Five) Minutes As Needed. 1 under the tongue as needed for angina, may repeat q5mins for up three doses), Disp: 100 tablet, Rfl: 11  •  Omeprazole 20 MG tablet delayed-release, Take 20 mg by mouth Daily., Disp: , Rfl:   •  rosuvastatin (CRESTOR) 5 MG tablet, Take 1 tablet by mouth Daily., Disp: 30 tablet, Rfl: 3     Assessment:        Patient Active Problem List   Diagnosis   • Chest pain   • Reactive depression   • Seasonal allergic rhinitis due to pollen   • Right ovarian cyst   • History of atrial fibrillation   • Asthma with exacerbation   • Cough   • Weakness   • Tachycardia   • Influenza A   • Palpitations   • Swelling   • Essential hypertension   • Paroxysmal atrial fibrillation   • Borderline hypercholesterolemia   • Precordial pain   • Visual loss   • Status post device closure of ASD   • Status post placement of implantable loop recorder   • Episode of recurrent major depressive disorder   • TIA (transient ischemic attack)   • Hemiplegic migraine without status migrainosus, not intractable   • Anxiety   • Screen for colon cancer   • Statin intolerance   • Abnormal cardiac function test   • Mass of right  parotid gland   • Borderline diabetes               Plan:   1. Hypertension: Controlled on current medications.    Educated patient on exercising for at least 30 minutes a day for 2 to 3 days a week. Importance of controlling hypertension and blood pressure checkup on the regular basis has been explained. Hypertension as a silent killer has been discussed. Risk reduction of the weight and regular exercises to control the hypertension has been explained.    2.  Hyperlipidemia: Lipid panel March 2022 revealed , HDL 39,  and triglycerides 67 I would like her LDL to be less than 70%.  She states her primary care manages her cholesterol labs.  On Repatha without any issues.  Continue current management.    Risk of the hyperlipidemia, importance of the treatment has been explained. Pros and cons of the statins has been explained. Regular blood workup as well as side effects including the liver failure, myelopathy death has been explained.    3. Palpitations: I will have her loop explanted and replaced. Will need stress and echo.       .I have reviewed pacemaker/AICD/Biventricular/ AICD/loop recorder implant procedure/risks/benefits (allergy,arrythmia,bleeding,infection,pnuemothorax,CVA,death) with the patient/family/POA. All questions have been answered. Pt/family/POA voice understanding and agreement with treatment plan.           No diagnosis found.    There are no diagnoses linked to this encounter.    COUNSELING: Castro Kevin was given to patient for the following topics: diagnostic results, risk factor reductions, impressions, risks and benefits of treatment options and importance of treatment compliance .       SMOKING COUNSELING: Denies    1. Palpitations: None today. explant and replace with loop due to history of stroke.     2.  She will need a stress test and echo after loop placement due to increased palpitations.       Sincerely,   YORDAN Mccloud  [facility] [place]  [facility]  04/05/23  12:53 EDT    EMR Dragon/Transcription disclaimer:   Much of this encounter note is an electronic transcription/translation of spoken language to printed text. The electronic translation of spoken language may permit erroneous, or at times, nonsensical words or phrases to be inadvertently transcribed; Although I have reviewed the note for such errors, some may still exist.

## 2023-04-25 RX ORDER — VALSARTAN 160 MG/1
160 TABLET ORAL DAILY
COMMUNITY

## 2023-04-26 ENCOUNTER — HOSPITAL ENCOUNTER (OUTPATIENT)
Facility: HOSPITAL | Age: 53
Setting detail: HOSPITAL OUTPATIENT SURGERY
Discharge: HOME | End: 2023-04-26
Attending: INTERNAL MEDICINE | Admitting: INTERNAL MEDICINE
Payer: COMMERCIAL

## 2023-04-26 VITALS
HEIGHT: 67 IN | HEART RATE: 71 BPM | SYSTOLIC BLOOD PRESSURE: 107 MMHG | WEIGHT: 213 LBS | RESPIRATION RATE: 16 BRPM | BODY MASS INDEX: 33.43 KG/M2 | OXYGEN SATURATION: 96 % | TEMPERATURE: 98.7 F | DIASTOLIC BLOOD PRESSURE: 56 MMHG

## 2023-04-26 DIAGNOSIS — R00.2 PALPITATIONS: ICD-10-CM

## 2023-04-26 DIAGNOSIS — R00.0 TACHYCARDIA: ICD-10-CM

## 2023-04-26 PROCEDURE — C1764 EVENT RECORDER, CARDIAC: HCPCS | Performed by: INTERNAL MEDICINE

## 2023-04-26 PROCEDURE — 25010000002 CEFAZOLIN IN DEXTROSE 2-4 GM/100ML-% SOLUTION: Performed by: INTERNAL MEDICINE

## 2023-04-26 PROCEDURE — 25010000002 FENTANYL CITRATE (PF) 50 MCG/ML SOLUTION: Performed by: INTERNAL MEDICINE

## 2023-04-26 PROCEDURE — 33285 INSJ SUBQ CAR RHYTHM MNTR: CPT | Performed by: INTERNAL MEDICINE

## 2023-04-26 PROCEDURE — 25010000002 MIDAZOLAM PER 1 MG: Performed by: INTERNAL MEDICINE

## 2023-04-26 DEVICE — ICM LP/RECRD BIOMONITOR3M: Type: IMPLANTABLE DEVICE | Site: CHEST | Status: FUNCTIONAL

## 2023-04-26 RX ORDER — SODIUM CHLORIDE 9 MG/ML
75 INJECTION, SOLUTION INTRAVENOUS CONTINUOUS
Status: DISCONTINUED | OUTPATIENT
Start: 2023-04-26 | End: 2023-04-26 | Stop reason: HOSPADM

## 2023-04-26 RX ORDER — FENTANYL CITRATE 50 UG/ML
INJECTION, SOLUTION INTRAMUSCULAR; INTRAVENOUS
Status: DISCONTINUED | OUTPATIENT
Start: 2023-04-26 | End: 2023-04-26 | Stop reason: HOSPADM

## 2023-04-26 RX ORDER — CEFAZOLIN SODIUM 2 G/100ML
INJECTION, SOLUTION INTRAVENOUS
Status: COMPLETED | OUTPATIENT
Start: 2023-04-26 | End: 2023-04-26

## 2023-04-26 RX ORDER — MIDAZOLAM HYDROCHLORIDE 1 MG/ML
INJECTION INTRAMUSCULAR; INTRAVENOUS
Status: DISCONTINUED | OUTPATIENT
Start: 2023-04-26 | End: 2023-04-26 | Stop reason: HOSPADM

## 2023-04-26 RX ORDER — LIDOCAINE HYDROCHLORIDE AND EPINEPHRINE 10; 10 MG/ML; UG/ML
INJECTION, SOLUTION INFILTRATION; PERINEURAL
Status: DISCONTINUED | OUTPATIENT
Start: 2023-04-26 | End: 2023-04-26 | Stop reason: HOSPADM

## 2023-04-26 RX ORDER — SODIUM CHLORIDE 0.9 % (FLUSH) 0.9 %
10 SYRINGE (ML) INJECTION EVERY 12 HOURS SCHEDULED
Status: DISCONTINUED | OUTPATIENT
Start: 2023-04-26 | End: 2023-04-26 | Stop reason: HOSPADM

## 2023-04-26 RX ADMIN — SODIUM CHLORIDE 75 ML/HR: 9 INJECTION, SOLUTION INTRAVENOUS at 10:24

## 2023-04-27 ENCOUNTER — TELEPHONE (OUTPATIENT)
Dept: CARDIOLOGY | Facility: CLINIC | Age: 53
End: 2023-04-27
Payer: COMMERCIAL

## 2023-04-27 NOTE — TELEPHONE ENCOUNTER
Spoke with patient she states that she took a shower this morning and took the bandage of the site and the strips came off as well. She states that the site is closed and not bleeding.     Per Gabby FARR patient can stop by to get it checked out and we can put a strip on it.       Patient informed and understood. She is coming today.

## 2023-05-05 ENCOUNTER — TELEPHONE (OUTPATIENT)
Dept: CARDIOLOGY | Facility: CLINIC | Age: 53
End: 2023-05-05
Payer: COMMERCIAL

## 2023-05-05 NOTE — TELEPHONE ENCOUNTER
Remote transmission reviewed.  Patient had loop recorder removal and implant on 4/26/23 d/t increased palpitations and history of a-fib.  Alerted for HVR.  Sinus tach and possible SVT noted. Longest duration 1 minute, 16 seconds.     She has a follow up appt with on 5/11/23.

## 2023-05-11 ENCOUNTER — OFFICE VISIT (OUTPATIENT)
Dept: CARDIOLOGY | Facility: CLINIC | Age: 53
End: 2023-05-11
Payer: COMMERCIAL

## 2023-05-11 VITALS
HEIGHT: 67 IN | HEART RATE: 80 BPM | BODY MASS INDEX: 33.93 KG/M2 | SYSTOLIC BLOOD PRESSURE: 108 MMHG | WEIGHT: 216.2 LBS | DIASTOLIC BLOOD PRESSURE: 71 MMHG

## 2023-05-11 DIAGNOSIS — R07.2 PRECORDIAL PAIN: ICD-10-CM

## 2023-05-11 DIAGNOSIS — Z78.9 STATIN INTOLERANCE: ICD-10-CM

## 2023-05-11 DIAGNOSIS — R00.2 PALPITATIONS: Primary | ICD-10-CM

## 2023-05-11 DIAGNOSIS — E78.5 HYPERLIPIDEMIA, UNSPECIFIED HYPERLIPIDEMIA TYPE: ICD-10-CM

## 2023-05-11 DIAGNOSIS — I10 PRIMARY HYPERTENSION: ICD-10-CM

## 2023-05-11 RX ORDER — METOPROLOL SUCCINATE 25 MG/1
25 TABLET, EXTENDED RELEASE ORAL DAILY
Qty: 30 TABLET | Refills: 11 | Status: SHIPPED | OUTPATIENT
Start: 2023-05-11

## 2023-05-11 NOTE — PROGRESS NOTES
Subjective:        Amirah Godoy is a 53 y.o. female who here for follow up    CC  PALPITATION, SINUS TACH  LOOP  HPI  53-year-old female with precordial pain palpitation and hypertension here for the follow-up     Problems Addressed this Visit        Allergies and Adverse Reactions    Statin intolerance       Cardiac and Vasculature    Precordial pain    Palpitations - Primary    Primary hypertension    Relevant Medications    metoprolol succinate XL (TOPROL-XL) 25 MG 24 hr tablet    Hyperlipidemia   Diagnoses       Codes Comments    Palpitations    -  Primary ICD-10-CM: R00.2  ICD-9-CM: 785.1     Primary hypertension     ICD-10-CM: I10  ICD-9-CM: 401.9     Statin intolerance     ICD-10-CM: Z78.9  ICD-9-CM: 995.27     Hyperlipidemia, unspecified hyperlipidemia type     ICD-10-CM: E78.5  ICD-9-CM: 272.4     Precordial pain     ICD-10-CM: R07.2  ICD-9-CM: 786.51         .    The following portions of the patient's history were reviewed and updated as appropriate: allergies, current medications, past family history, past medical history, past social history, past surgical history and problem list.    Past Medical History:   Diagnosis Date   • Acute torn meniscus     with ligament damage, to have surgery 12/30/21   • Asthma    • Depression    • Enlarged parotid gland    • GERD (gastroesophageal reflux disease)    • History of atrial fibrillation 2011    NO CURRENT MEDICATION   • Hyperlipidemia    • Hypertension     NO LONGER TREATED W/ MEDS   • PVC (premature ventricular contraction) 2015   • Reflux esophagitis    • Seasonal allergies    • Sinus infection     STARTED ON ANTIBIOTICS ON 10/30/17   • Sleep apnea     CPAP   • Stroke     TIA FOLLOWING CATH 2018   • SVT (supraventricular tachycardia) 2011    NO CURRENT MEDICATIONS     reports that she has never smoked. She has never used smokeless tobacco. She reports that she does not drink alcohol and does not use drugs.   Family History   Problem Relation Age of Onset  "  • Depression Mother    • Diabetes Mother    • Heart disease Mother         +of mi at 68   • Hyperlipidemia Mother    • Hypertension Mother    • Heart disease Father         ptca x 5 and mi at 59   • Hyperlipidemia Father    • Hypertension Father    • Kidney disease Brother    • Birth defects Maternal Grandmother    • Heart disease Maternal Grandmother         multiple mi   • Birth defects Maternal Grandfather    • Heart disease Maternal Grandfather         + mi at 68   • Colon cancer Maternal Grandfather    • Heart disease Paternal Grandmother         weak heart   • Asthma Paternal Grandmother    • COPD Paternal Grandmother    • Kidney failure Paternal Grandfather    • Migraines Sister    • Malig Hyperthermia Neg Hx        Review of Systems  Constitutional: No wt loss, fever, fatigue  Gastrointestinal: No nausea, abdominal pain  Behavioral/Psych: No insomnia or anxiety   Cardiovascular no chest pains or tightness in the chest  Objective:       Physical Exam           Physical Exam  /71 (BP Location: Left arm)   Pulse 80   Ht 170.2 cm (67\")   Wt 98.1 kg (216 lb 3.2 oz)   LMP 10/25/2017   BMI 33.86 kg/m²     General appearance: No acute changes   Eyes: Sclerae conjunctivae normal, pupils reactive   HENT: Atraumatic; oropharynx clear with moist mucous membranes and no mucosal ulcerations;  Neck: Trachea midline; NECK, supple, no thyromegaly or lymphadenopathy   Lungs: Normal size and shape, normal breath sounds, equal distribution of air, no rales and rhonchi   CV: S1-S2 regular, no murmurs, no rub, no gallop   Abdomen: Soft, nontender; no masses , no abnormal abdominal sounds   Extremities: No deformity , normal color , no peripheral edema   Skin: Normal temperature, turgor and texture; no rash, ulcers  Psych: Appropriate affect, alert and oriented to person, place and time           Procedures      Echocardiogram:        Current Outpatient Medications:   •  Albuterol (PROVENTIL IN), Inhale Daily As " Needed., Disp: , Rfl:   •  albuterol (PROVENTIL) (2.5 MG/3ML) 0.083% nebulizer solution, , Disp: , Rfl:   •  aspirin 325 MG tablet, Take 1 tablet by mouth Daily., Disp: , Rfl:   •  cetirizine (ZyrTEC) 10 MG tablet, Take 1 tablet by mouth Daily., Disp: , Rfl:   •  citalopram (CeleXA) 20 MG tablet, Take 1 tablet by mouth Daily., Disp: , Rfl:   •  FASENRA 30 MG/ML solution prefilled syringe, Every 2 (Two) Months., Disp: , Rfl:   •  Fluticasone-Umeclidin-Vilant (TRELEGY ELLIPTA IN), Inhale 1 inhaler Every Morning., Disp: , Rfl:   •  montelukast (SINGULAIR) 10 MG tablet, TAKE ONE TABLET BY MOUTH DAILY (Patient taking differently: Take 1 tablet by mouth Daily.), Disp: 30 tablet, Rfl: 4  •  nitroglycerin (NITROSTAT) 0.4 MG SL tablet, Place 1 tablet under the tongue Every 5 (Five) Minutes As Needed for Chest Pain. 1 under the tongue as needed for angina, may repeat q5mins for up three doses, Disp: 20 tablet, Rfl: 12  •  Omeprazole 20 MG tablet delayed-release, Take 20 mg by mouth Daily., Disp: , Rfl:   •  rosuvastatin (CRESTOR) 5 MG tablet, Take 1 tablet by mouth Daily., Disp: 30 tablet, Rfl: 3  •  valsartan (DIOVAN) 160 MG tablet, Take 1 tablet by mouth Daily., Disp: , Rfl:   •  metoprolol succinate XL (TOPROL-XL) 25 MG 24 hr tablet, Take 1 tablet by mouth Daily., Disp: 30 tablet, Rfl: 11   Assessment:        Patient Active Problem List   Diagnosis   • Chest pain   • Reactive depression   • Seasonal allergic rhinitis due to pollen   • Right ovarian cyst   • History of atrial fibrillation   • Asthma with exacerbation   • Cough   • Weakness   • Tachycardia   • Influenza A   • Palpitations   • Swelling   • Primary hypertension   • Paroxysmal atrial fibrillation   • Hyperlipidemia   • Precordial pain   • Visual loss   • Status post device closure of ASD   • Status post placement of implantable loop recorder   • Episode of recurrent major depressive disorder   • TIA (transient ischemic attack)   • Hemiplegic migraine without  status migrainosus, not intractable   • Anxiety   • Screen for colon cancer   • Statin intolerance   • Abnormal cardiac function test   • Mass of right parotid gland   • Borderline diabetes               Plan:            ICD-10-CM ICD-9-CM   1. Palpitations  R00.2 785.1   2. Primary hypertension  I10 401.9   3. Statin intolerance  Z78.9 995.27   4. Hyperlipidemia, unspecified hyperlipidemia type  E78.5 272.4   5. Precordial pain  R07.2 786.51     1. Palpitations  Add beta-blockers Toprol-XL    2. Primary hypertension  Add beta-blockers Toprol-XL    3. Statin intolerance      4. Hyperlipidemia, unspecified hyperlipidemia type  Continue current treatment    5. Precordial pain  Atypical     ADD TOPROL XL 25 MG    Pros and cons of this new medication / change medication has been explained to  the patient    Possible side effects has been explained    Associated need of the blood  Work has been explained    Need for the compliance of the medication has been explained    3 MONTHS  COUNSELING:    Amirah Kevin was given to patient for the following topics: diagnostic results, risk factor reductions, impressions, risks and benefits of treatment options and importance of treatment compliance .       SMOKING COUNSELING:        Dictated using Dragon dictation

## 2023-06-01 ENCOUNTER — OFFICE VISIT (OUTPATIENT)
Dept: SURGERY | Facility: CLINIC | Age: 53
End: 2023-06-01

## 2023-06-01 VITALS
WEIGHT: 221 LBS | DIASTOLIC BLOOD PRESSURE: 70 MMHG | HEIGHT: 67 IN | BODY MASS INDEX: 34.69 KG/M2 | SYSTOLIC BLOOD PRESSURE: 120 MMHG

## 2023-06-01 DIAGNOSIS — D17.1 LIPOMA OF TORSO: Primary | ICD-10-CM

## 2023-06-01 PROCEDURE — 99202 OFFICE O/P NEW SF 15 MIN: CPT | Performed by: PHYSICIAN ASSISTANT

## 2023-06-02 NOTE — PROGRESS NOTES
ASSESSMENT/PLAN:    This is a 53-year-old lady presenting with a small left clavicular lipoma that is asymptomatic.  Dr. Camarena and I confirmed that this was indeed a lipoma and not a lymph node and informed her that nothing need be done about it at this time.  If it begins to enlarge or become painful then she may contact us and we will discuss excising it at that time.  All questions were answered and she was willing to proceed with all recommendations.    CC:     Left shoulder nodule    HPI:    This is a 53-year-old lady presenting to the office today at the request of Dr. Dejuan Krause Jr. for consultation.  For approximately 4 weeks now she has noticed a small mobile mass located above her left collarbone.  This never causes her any pain nor does she believe that has enlarged.  She has no other palpable masses throughout her neck, axilla or clavicles.    ENDOSCOPY:   • Colonoscopy: 2019 normal    SOCIAL HISTORY:   • Denies tobacco use  • Occasional alcohol use    FAMILY HISTORY:    • Colorectal cancer: Maternal grandfather    PREVIOUS ABDOMINAL SURGERY    •   • Ovarian cyst removal    OTHER SURGERY  Past Surgical History:   Procedure Laterality Date   • ATRIAL SEPTAL DEFECT REPAIR     • BRONCHOSCOPY N/A 2018    Procedure: BRONCHOSCOPY;  Surgeon: Scott Dobson MD;  Location: [facility] ENDOSCOPY;  Service:    • CARDIAC ABLATION      DR. NADEEM SCHUMACHER --A-FIB    • CARDIAC CATHETERIZATION N/A 2018    Procedure: Left Heart Cath;  Surgeon: Niles Koch MD;  Location: [facility] CATH INVASIVE LOCATION;  Service: Cardiology   • CARDIAC CATHETERIZATION N/A 2018    Procedure: Coronary angiography;  Surgeon: Niles Koch MD;  Location: [facility] CATH INVASIVE LOCATION;  Service: Cardiology   • CARDIAC CATHETERIZATION N/A 2018    Procedure: Left ventriculography;  Surgeon: Niles Koch MD;  Location: [facility] CATH INVASIVE LOCATION;  Service: Cardiology   • CARDIAC  CATHETERIZATION N/A 10/14/2020    Procedure: Left Heart Cath;  Surgeon: Niles Koch MD;  Location: [facility] CATH INVASIVE LOCATION;  Service: Cardiology;  Laterality: N/A;   • CARDIAC CATHETERIZATION N/A 10/14/2020    Procedure: Left ventriculography;  Surgeon: Niles Koch MD;  [facility];  Service: Cardiology;  Laterality: N/A;   • CARDIAC CATHETERIZATION N/A 10/14/2020    Procedure: Coronary angiography;  Surgeon: Niles Koch MD;  Location: [facility] CATH INVASIVE LOCATION;  Service: Cardiology;  Laterality: N/A;   • CARDIAC ELECTROPHYSIOLOGY PROCEDURE N/A 2018    Procedure: Loop insertion;  Surgeon: Niles Koch MD;  Location: [facility] CATH INVASIVE LOCATION;  Service: Cardiovascular   • CARDIAC ELECTROPHYSIOLOGY PROCEDURE N/A 2023    Procedure: Loop recorder removal;  Surgeon: Niles Koch MD;  Location: [facility] CATH INVASIVE LOCATION;  Service: Cardiovascular;  Laterality: N/A;   • CARDIAC ELECTROPHYSIOLOGY PROCEDURE N/A 2023    Procedure: Loop insertion  BIOTRONIK;  Surgeon: Niles Koch MD;  Location: [facility] CATH INVASIVE LOCATION;  Service: Cardiovascular;  Laterality: N/A;   •  SECTION  2000   • COLONOSCOPY N/A 2019    Procedure: COLONOSCOPY TO CECUM AND TI;  Surgeon: Jignesh Black MD;  Location: [facility] ENDOSCOPY;  Service: Gastroenterology   • DIAGNOSTIC LAPAROSCOPY Right 11/3/2017    Procedure: LAPAROSCOPIC RIGHT OOPHERECTOMY ;  Surgeon: Claudine Barron MD;  Location: [facility] OR;  Service:    • MASS EXCISION      right jaw. no cancer per pt. -parotid gland.   • OVARIAN CYST REMOVAL  2017   • PAROTIDECTOMY Right 2020    Procedure: RIGHT PAROTIDECTOMY;  Surgeon: Lul Saavedra MD;  Location: [facility] OR OSC;  Service: ENT;  Laterality: Right;   • WISDOM TOOTH EXTRACTION         PAST MEDICAL HISTORY:    Past Medical History:   Diagnosis Date   • Abnormal ECG    • Acute torn  meniscus     with ligament damage, to have surgery 12/30/21   • Asthma    • COPD (chronic obstructive pulmonary disease)    • Deep vein thrombosis    • Depression    • Enlarged parotid gland    • Fibrocystic breast    • GERD (gastroesophageal reflux disease)    • History of atrial fibrillation 2011    NO CURRENT MEDICATION   • Hyperlipidemia    • Hypertension     NO LONGER TREATED W/ MEDS   • PONV (postoperative nausea and vomiting)    • PVC (premature ventricular contraction) 2015   • Reflux esophagitis    • Seasonal allergies    • Sinus infection     STARTED ON ANTIBIOTICS ON 10/30/17   • Sleep apnea     CPAP   • Stroke     TIA FOLLOWING CATH 2018   • SVT (supraventricular tachycardia) 2011    NO CURRENT MEDICATIONS       MEDICATIONS:     Current Outpatient Medications:   •  Albuterol (PROVENTIL IN), Inhale Daily As Needed., Disp: , Rfl:   •  albuterol (PROVENTIL) (2.5 MG/3ML) 0.083% nebulizer solution, , Disp: , Rfl:   •  aspirin 325 MG tablet, Take 1 tablet by mouth Daily., Disp: , Rfl:   •  cetirizine (ZyrTEC) 10 MG tablet, Take 1 tablet by mouth Daily., Disp: , Rfl:   •  citalopram (CeleXA) 20 MG tablet, Take 1 tablet by mouth Daily., Disp: , Rfl:   •  FASENRA 30 MG/ML solution prefilled syringe, Every 2 (Two) Months., Disp: , Rfl:   •  Fluticasone-Umeclidin-Vilant (TRELEGY ELLIPTA IN), Inhale 1 inhaler Every Morning., Disp: , Rfl:   •  metoprolol succinate XL (TOPROL-XL) 25 MG 24 hr tablet, Take 1 tablet by mouth Daily., Disp: 30 tablet, Rfl: 11  •  montelukast (SINGULAIR) 10 MG tablet, TAKE ONE TABLET BY MOUTH DAILY (Patient taking differently: Take 1 tablet by mouth Daily.), Disp: 30 tablet, Rfl: 4  •  nitroglycerin (NITROSTAT) 0.4 MG SL tablet, Place 1 tablet under the tongue Every 5 (Five) Minutes As Needed for Chest Pain. 1 under the tongue as needed for angina, may repeat q5mins for up three doses, Disp: 20 tablet, Rfl: 12  •  Omeprazole 20 MG tablet delayed-release, Take 20 mg by mouth Daily., Disp: ,  "Rfl:   •  rosuvastatin (CRESTOR) 5 MG tablet, Take 1 tablet by mouth Daily., Disp: 30 tablet, Rfl: 3  •  valsartan (DIOVAN) 160 MG tablet, Take 1 tablet by mouth Daily., Disp: , Rfl:     ALLERGIES:   Allergies   Allergen Reactions   • Demerol [Meperidine] Hives   • Lisinopril Cough   • Sulfa Antibiotics GI Intolerance       PHYSICAL EXAM:   • Constitutional: Well-developed well-nourished, no acute distress  • Vital signs:   o Height 67\"  o Weight 221  o BMI 34.6  • Skin: Small 1 cm lipoma overlying the left clavicle that is mobile soft and nontender  • Lymphatics: No palpable lymphadenopathy within her cervical chains bilaterally, supraclavicular region or axilla  • Psychiatric: Alert and oriented ×3, normal affect       John Madsen PA-C    [facility] - General Surgery   [address], [address]  [place] [address]     [address]  [place] [id number]     Office: [phone]  Fax: [phone]    "
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
## 2023-06-17 PROBLEM — R41.0 EPISODE OF CONFUSION: Status: ACTIVE | Noted: 2023-06-17

## 2023-06-17 PROBLEM — S20.212A CONTUSION OF LEFT CHEST WALL: Status: ACTIVE | Noted: 2023-06-17

## 2023-06-17 PROBLEM — R07.9 LEFT-SIDED CHEST PAIN: Status: ACTIVE | Noted: 2023-06-17

## 2023-06-18 PROBLEM — S16.1XXA CERVICAL STRAIN, ACUTE: Status: ACTIVE | Noted: 2023-06-18

## 2023-06-18 PROBLEM — M54.12 BRACHIAL NEURALGIA: Status: ACTIVE | Noted: 2023-06-18

## 2023-06-18 PROBLEM — R20.0 LEFT ARM NUMBNESS: Status: ACTIVE | Noted: 2023-06-18

## 2023-06-18 PROBLEM — S06.0XAA CONCUSSION WITH UNKNOWN LOSS OF CONSCIOUSNESS STATUS: Status: ACTIVE | Noted: 2023-06-18

## 2023-07-24 ENCOUNTER — TREATMENT (OUTPATIENT)
Dept: PHYSICAL THERAPY | Facility: CLINIC | Age: 53
End: 2023-07-24
Payer: COMMERCIAL

## 2023-07-24 DIAGNOSIS — S06.0X1D CONCUSSION WITH LOSS OF CONSCIOUSNESS OF 30 MINUTES OR LESS, SUBSEQUENT ENCOUNTER: Primary | ICD-10-CM

## 2023-07-24 PROCEDURE — 97530 THERAPEUTIC ACTIVITIES: CPT | Performed by: PHYSICAL THERAPIST

## 2023-07-24 PROCEDURE — 97112 NEUROMUSCULAR REEDUCATION: CPT | Performed by: PHYSICAL THERAPIST

## 2023-07-24 NOTE — PROGRESS NOTES
Physical Therapy Daily Progress Note-Vestibular Rehab  [facility] [place]  [address]  [facility] [address]      Visit#:4  Subjective   After last visit I had a headache and felt tired for about 1 hour.   Objective                             PROCEDURES AND MODALITIES:  See Exercise, Manual, and Modality Logs for complete treatment.     Paraffin:   pre-rx  Moist Heat:    Ice:    post-rx  E-Stim:    Ultrasound:    Ionto:   Traction:      NMR 86569 21 minutes and Ther Act 65351 10 minutes    Timed Code Treatment: 31 Minutes  Total Treatment Time: 31 Minutes    Assessment & Plan   Patient continues to make good progress. She reported fatigue and HA pain post treatment session which is not unexpected. Symptoms did resolve within 1 hour. Progressed difficulty of exercises today. She is hesitant to ambulate and move her head simultaneously. Her oculomotor system is working much better due to her motivation and dedication to her HEP performance.      Progress per Plan of Care    Maria E Beltre, PT, DPT, CHT, SONGN  Physical Therapist  [place] license # [id number]

## 2023-07-31 ENCOUNTER — TREATMENT (OUTPATIENT)
Dept: PHYSICAL THERAPY | Facility: CLINIC | Age: 53
End: 2023-07-31
Payer: COMMERCIAL

## 2023-07-31 DIAGNOSIS — R42 DIZZINESS: ICD-10-CM

## 2023-07-31 DIAGNOSIS — S06.0X1D CONCUSSION WITH LOSS OF CONSCIOUSNESS OF 30 MINUTES OR LESS, SUBSEQUENT ENCOUNTER: Primary | ICD-10-CM

## 2023-07-31 PROCEDURE — 97530 THERAPEUTIC ACTIVITIES: CPT | Performed by: PHYSICAL THERAPIST

## 2023-07-31 PROCEDURE — 97112 NEUROMUSCULAR REEDUCATION: CPT | Performed by: PHYSICAL THERAPIST

## 2023-08-03 ENCOUNTER — OFFICE VISIT (OUTPATIENT)
Dept: CARDIOLOGY | Facility: CLINIC | Age: 53
End: 2023-08-03
Payer: COMMERCIAL

## 2023-08-03 VITALS
WEIGHT: 222.4 LBS | HEIGHT: 67 IN | BODY MASS INDEX: 34.91 KG/M2 | SYSTOLIC BLOOD PRESSURE: 129 MMHG | HEART RATE: 75 BPM | DIASTOLIC BLOOD PRESSURE: 80 MMHG

## 2023-08-03 DIAGNOSIS — Z95.818 STATUS POST PLACEMENT OF IMPLANTABLE LOOP RECORDER: Primary | ICD-10-CM

## 2023-08-03 DIAGNOSIS — I10 PRIMARY HYPERTENSION: ICD-10-CM

## 2023-08-03 DIAGNOSIS — E78.5 HYPERLIPIDEMIA, UNSPECIFIED HYPERLIPIDEMIA TYPE: ICD-10-CM

## 2023-08-03 DIAGNOSIS — Z78.9 STATIN INTOLERANCE: ICD-10-CM

## 2023-08-03 DIAGNOSIS — R00.2 PALPITATIONS: ICD-10-CM

## 2023-08-06 PROCEDURE — 93298 REM INTERROG DEV EVAL SCRMS: CPT | Performed by: INTERNAL MEDICINE

## 2023-08-06 PROCEDURE — G2066 INTER DEVC REMOTE 30D: HCPCS | Performed by: INTERNAL MEDICINE

## 2023-08-07 ENCOUNTER — TREATMENT (OUTPATIENT)
Dept: PHYSICAL THERAPY | Facility: CLINIC | Age: 53
End: 2023-08-07
Payer: COMMERCIAL

## 2023-08-07 DIAGNOSIS — S06.0X1D CONCUSSION WITH LOSS OF CONSCIOUSNESS OF 30 MINUTES OR LESS, SUBSEQUENT ENCOUNTER: Primary | ICD-10-CM

## 2023-08-07 DIAGNOSIS — R42 DIZZINESS: ICD-10-CM

## 2023-08-07 PROCEDURE — 97530 THERAPEUTIC ACTIVITIES: CPT | Performed by: PHYSICAL THERAPIST

## 2023-08-07 PROCEDURE — 97112 NEUROMUSCULAR REEDUCATION: CPT | Performed by: PHYSICAL THERAPIST

## 2023-08-07 NOTE — PROGRESS NOTES
Physical Therapy Daily Progress Note-Vestibular Rehab  [facility] Physical Therapy [place]  [address]  [facility] [address]      Visit#:6  Subjective   I am still having daily headaches. I have not tried to drive yet and I don't think I am ready to. Overall I am about 50% improved.   Objective                             PROCEDURES AND MODALITIES:  See Exercise, Manual, and Modality Logs for complete treatment.     Paraffin:   pre-rx  Moist Heat:    Ice:    post-rx  E-Stim:    Ultrasound:    Ionto:   Traction:      NMR 99665 20 minutes and Ther Act 87208 11 minutes    Timed Code Treatment: 31 Minutes  Total Treatment Time: 31 Minutes    Assessment & Plan   Her balance and willingness to move head while in motion is improving. She was able to walk on foam in tandem without loss of balance. She continues to have dizziness with repeated trunk flex and ext. Headaches and fear or driving persist. She is still having some cognitive issues but that is not part of our plan of care for therapy. Pt is progressing well under current treatment plan and will continue to improve with skilled PT and HEP performance.       Progress per Plan of Care    Maria E Beltre, PT, DPT, CHT, CIDN  Physical Therapist  [place] license # [id number]

## 2023-08-11 ENCOUNTER — TREATMENT (OUTPATIENT)
Dept: PHYSICAL THERAPY | Facility: CLINIC | Age: 53
End: 2023-08-11
Payer: COMMERCIAL

## 2023-08-11 DIAGNOSIS — R42 DIZZINESS: ICD-10-CM

## 2023-08-11 DIAGNOSIS — S06.0X1D CONCUSSION WITH LOSS OF CONSCIOUSNESS OF 30 MINUTES OR LESS, SUBSEQUENT ENCOUNTER: Primary | ICD-10-CM

## 2023-08-11 PROCEDURE — 97112 NEUROMUSCULAR REEDUCATION: CPT | Performed by: PHYSICAL THERAPIST

## 2023-08-11 PROCEDURE — 97530 THERAPEUTIC ACTIVITIES: CPT | Performed by: PHYSICAL THERAPIST

## 2023-08-11 NOTE — PROGRESS NOTES
Physical Therapy Daily Progress Note-Vestibular Rehab  [facility] Physical Therapy [place]  [address]  [facility] [address]      Visit#:7  Subjective   I am frustrated. My family is not being as supportive of my issue. They think I should be further along in my recovery than I am. I had a bad day with HA yesterday and did not feel like talking or eating. I have bad days and good days.   Objective                             PROCEDURES AND MODALITIES:  See Exercise, Manual, and Modality Logs for complete treatment.     Paraffin:   pre-rx  Moist Heat:    Ice:    post-rx  E-Stim:    Ultrasound:    Ionto:   Traction:      NMR 52660 22 minutes and Ther Act 82722 8 minutes    Timed Code Treatment: 30 Minutes  Total Treatment Time: 30 Minutes    Assessment & Plan   Concussion recovery is not a linear progress. There are going to be good days and bad days. I educated patient and her daughter today on how concussion progresses and the prognosis. Encouraged support from her daughter. Patient is demonstrating improvement in balance and dizziness. She was able to perform SL balance better today. Increased dizziness and nausea post initiation of ball circles which is a higher level vestibular exercise.     Progress per Plan of Care    Maria E Beltre, PT, DPT, CHT, SONGN  Physical Therapist  [place] license # [id number]

## 2023-08-14 ENCOUNTER — TREATMENT (OUTPATIENT)
Dept: PHYSICAL THERAPY | Facility: CLINIC | Age: 53
End: 2023-08-14
Payer: COMMERCIAL

## 2023-08-14 DIAGNOSIS — R42 DIZZINESS: ICD-10-CM

## 2023-08-14 DIAGNOSIS — S06.0X1D CONCUSSION WITH LOSS OF CONSCIOUSNESS OF 30 MINUTES OR LESS, SUBSEQUENT ENCOUNTER: Primary | ICD-10-CM

## 2023-08-14 PROCEDURE — 97112 NEUROMUSCULAR REEDUCATION: CPT | Performed by: PHYSICAL THERAPIST

## 2023-08-14 PROCEDURE — 97530 THERAPEUTIC ACTIVITIES: CPT | Performed by: PHYSICAL THERAPIST

## 2023-08-14 NOTE — PROGRESS NOTES
Physical Therapy Daily Progress Note-Vestibular Rehab  [facility] Physical Therapy [place]  [address]  [facility] [address]      Visit#:8  Subjective   My family has been more understanding. I had a bad headache this morning [place] Takes about 1 hour to recover from PT sessions.   Objective                             PROCEDURES AND MODALITIES:  See Exercise, Manual, and Modality Logs for complete treatment.     Paraffin:   pre-rx  Moist Heat:    Ice:    post-rx  E-Stim:    Ultrasound:    Ionto:   Traction:      NMR 38442 21 minutes and Ther Act 09182 9 minutes    Timed Code Treatment: 30 Minutes  Total Treatment Time: 30 Minutes    Assessment & Plan   CORTEZ is still the primary issue for this patient. The dizziness and nausea are present but are not nearly as strong. She is more tolerant to VRT exercises with each visit. This also is dependent on what kind of day she is having symptom wise. Good days and bad days are still normal at this time. Showing slower progress at this time but she is making progress. She remains compliant with HEP and is motivated to improve.     Progress per Plan of Care    Maria E Beltre, PT, DPT, CHT, CIDN  Physical Therapist  [place] license # [id number]

## 2023-08-17 RX ORDER — ROSUVASTATIN CALCIUM 5 MG/1
5 TABLET, COATED ORAL DAILY
Qty: 30 TABLET | Refills: 11 | Status: SHIPPED | OUTPATIENT
Start: 2023-08-17

## 2023-08-18 ENCOUNTER — TREATMENT (OUTPATIENT)
Dept: PHYSICAL THERAPY | Facility: CLINIC | Age: 53
End: 2023-08-18
Payer: COMMERCIAL

## 2023-08-18 DIAGNOSIS — R42 DIZZINESS: ICD-10-CM

## 2023-08-18 DIAGNOSIS — S06.0X1D CONCUSSION WITH LOSS OF CONSCIOUSNESS OF 30 MINUTES OR LESS, SUBSEQUENT ENCOUNTER: Primary | ICD-10-CM

## 2023-08-18 NOTE — PROGRESS NOTES
Physical Therapy Daily Progress Note-Vestibular Rehab  [facility] Physical Therapy [place]  [address]  [facility] [address]      Visit#:9  Subjective   I have had some bad headaches this week. We got stuck in traffic today which did not help. I feel like my progress has slowed. I am still having some issues with my memory at work. I am not sleeping well at night.   Objective                             PROCEDURES AND MODALITIES:  See Exercise, Manual, and Modality Logs for complete treatment.     Paraffin:   pre-rx  Moist Heat:    Ice:    post-rx  E-Stim:    Ultrasound:    Ionto:   Traction:      NMR 23229 17 minutes and Ther Act 22948 13 minutes    Timed Code Treatment: 30 Minutes  Total Treatment Time: 30 Minutes    Assessment & Plan   Educated patient on importance of quality sleep. She reports she is able to sleep through the night. She could try magnesium supplement. If she is not getting enough sleep at night I encouraged naps during the day for brain rest. She continues to have cognitive issues. She has ongoing dizziness with bending over and fast head movement. She still has not been able to drive. Her oculomotor function and balance has improved. She is able to stand on one leg without issues. Progress has slowed but there is still progress that can be made in VRT. She warrants additional PT.     Progress per Plan of Care    Maria E Beltre, PT, DPT, CHT, SONGN  Physical Therapist  [place] license # [id number]

## 2023-08-21 ENCOUNTER — TREATMENT (OUTPATIENT)
Dept: PHYSICAL THERAPY | Facility: CLINIC | Age: 53
End: 2023-08-21
Payer: COMMERCIAL

## 2023-08-21 DIAGNOSIS — R42 DIZZINESS: ICD-10-CM

## 2023-08-21 DIAGNOSIS — S06.0X1D CONCUSSION WITH LOSS OF CONSCIOUSNESS OF 30 MINUTES OR LESS, SUBSEQUENT ENCOUNTER: Primary | ICD-10-CM

## 2023-08-21 PROCEDURE — 97530 THERAPEUTIC ACTIVITIES: CPT | Performed by: PHYSICAL THERAPIST

## 2023-08-21 PROCEDURE — 97112 NEUROMUSCULAR REEDUCATION: CPT | Performed by: PHYSICAL THERAPIST

## 2023-08-21 NOTE — PROGRESS NOTES
Physical Therapy Daily Progress Note-Vestibular Rehab  [facility] Physical Therapy [place]  [address]  [facility] [address]      Visit#:10  Subjective   I slept ok over the weekend. No headache or dizziness today.   Objective          See [employer] custom training report.                    PROCEDURES AND MODALITIES:  See Exercise, Manual, and Modality Logs for complete treatment.     Paraffin:   pre-rx  Moist Heat:    Ice:    post-rx  E-Stim:    Ultrasound:    Ionto:   Traction:      NMR 77067 20 minutes and Ther Act 79133 13 minutes    Timed Code Treatment: 33 Minutes  Total Treatment Time: 33 Minutes    Assessment & Plan   Patient was treated in the Neuroc with LOS training. She tolerated movement of the surround and floor fairly well. She continues to have dizziness with repeated trunk flexion activity. Balance is improving. HA and cognitive issues seem to be the primary issue.     Progress per Plan of Care    Maria E Beltre, PT, DPT, CHT, SONGN  Physical Therapist  [place] license # [id number]

## 2023-08-25 ENCOUNTER — TREATMENT (OUTPATIENT)
Dept: PHYSICAL THERAPY | Facility: CLINIC | Age: 53
End: 2023-08-25
Payer: COMMERCIAL

## 2023-08-25 DIAGNOSIS — S06.0X1D CONCUSSION WITH LOSS OF CONSCIOUSNESS OF 30 MINUTES OR LESS, SUBSEQUENT ENCOUNTER: Primary | ICD-10-CM

## 2023-08-25 DIAGNOSIS — R42 DIZZINESS: ICD-10-CM

## 2023-08-25 PROCEDURE — 97112 NEUROMUSCULAR REEDUCATION: CPT | Performed by: PHYSICAL THERAPIST

## 2023-08-25 PROCEDURE — 97530 THERAPEUTIC ACTIVITIES: CPT | Performed by: PHYSICAL THERAPIST

## 2023-08-25 NOTE — PROGRESS NOTES
Physical Therapy Daily Progress Note-Vestibular Rehab  [facility] Physical Therapy [place]  [address]  [facility] [address]      Visit#:11  Subjective   I had a bad headache yesterday. I just went to bed and I am still not sleeping well. I feel ok today, no headache or dizziness. I still have dizziness and nausea when I bend over.  Objective                             PROCEDURES AND MODALITIES:  See Exercise, Manual, and Modality Logs for complete treatment.     Paraffin:   pre-rx  Moist Heat:    Ice:    post-rx  E-Stim:    Ultrasound:    Ionto:   Traction:      NMR 79420 21 minutes and Ther Act 32867 10 minutes    Timed Code Treatment: 31 Minutes  Total Treatment Time: 31 Minutes    Assessment & Plan   Patient is progressing slowly at this time. She is reporting frustration with her progress at this point. She has improved significantly since onset of concussion. She is tolerating increased difficulty of vestibular exercises and less rest breaks in clinic. It takes about an hour for her symptoms to settle post PT sessions. Pt is progressing well under current treatment plan and will continue to improve with skilled PT and HEP performance.       Progress per Plan of Care    Maria E Beltre, PT, DPT, CHT, CIDN  Physical Therapist  [place] license # [id number]

## 2023-08-28 ENCOUNTER — TREATMENT (OUTPATIENT)
Dept: PHYSICAL THERAPY | Facility: CLINIC | Age: 53
End: 2023-08-28
Payer: COMMERCIAL

## 2023-08-28 DIAGNOSIS — S06.0X1D CONCUSSION WITH LOSS OF CONSCIOUSNESS OF 30 MINUTES OR LESS, SUBSEQUENT ENCOUNTER: Primary | ICD-10-CM

## 2023-08-28 DIAGNOSIS — R42 DIZZINESS: ICD-10-CM
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
## 2023-09-01 ENCOUNTER — TREATMENT (OUTPATIENT)
Dept: PHYSICAL THERAPY | Facility: CLINIC | Age: 53
End: 2023-09-01
Payer: COMMERCIAL

## 2023-09-01 DIAGNOSIS — R42 DIZZINESS: ICD-10-CM

## 2023-09-01 DIAGNOSIS — S06.0X1D CONCUSSION WITH LOSS OF CONSCIOUSNESS OF 30 MINUTES OR LESS, SUBSEQUENT ENCOUNTER: Primary | ICD-10-CM

## 2023-09-01 PROCEDURE — 97112 NEUROMUSCULAR REEDUCATION: CPT | Performed by: PHYSICAL THERAPIST

## 2023-09-01 PROCEDURE — 97530 THERAPEUTIC ACTIVITIES: CPT | Performed by: PHYSICAL THERAPIST

## 2023-09-01 NOTE — PROGRESS NOTES
Physical Therapy Daily Progress Note-Vestibular Rehab  [facility] Physical Therapy [place]  [address] [place] [address]  [facility] [address]      Visit#:13  Subjective   No new compliants. I am working on sodoku and word puzzles  Objective                             PROCEDURES AND MODALITIES:  See Exercise, Manual, and Modality Logs for complete treatment.     Paraffin:   pre-rx  Moist Heat:    Ice:    post-rx  E-Stim:    Ultrasound:    Ionto:   Traction:      NMR 50795 18 minutes and Ther Act 71434 12 minutes    Timed Code Treatment: 30 Minutes  Total Treatment Time: 30 Minutes    Assessment & Plan   Patient is demonstrating improved balance and tolerance to vestibular strengthening exercises. There are times when she still has dizziness and nausea but it takes more activity before onset of those symptoms. She continues to make slow progress under the current treatment plan. Patient is still not driving.     Progress per Plan of Care    Maria E Beltre, PT, DPT, CHT, SONGN  Physical Therapist  [place] license # [id number]

## 2023-09-22 ENCOUNTER — TREATMENT (OUTPATIENT)
Dept: PHYSICAL THERAPY | Facility: CLINIC | Age: 53
End: 2023-09-22
Payer: COMMERCIAL

## 2023-09-22 DIAGNOSIS — S06.0X1D CONCUSSION WITH LOSS OF CONSCIOUSNESS OF 30 MINUTES OR LESS, SUBSEQUENT ENCOUNTER: Primary | ICD-10-CM

## 2023-09-22 DIAGNOSIS — R42 DIZZINESS: ICD-10-CM
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
## 2023-09-25 ENCOUNTER — TREATMENT (OUTPATIENT)
Dept: PHYSICAL THERAPY | Facility: CLINIC | Age: 53
End: 2023-09-25
Payer: COMMERCIAL

## 2023-09-25 DIAGNOSIS — R42 DIZZINESS: ICD-10-CM

## 2023-09-25 DIAGNOSIS — S06.0X1D CONCUSSION WITH LOSS OF CONSCIOUSNESS OF 30 MINUTES OR LESS, SUBSEQUENT ENCOUNTER: Primary | ICD-10-CM

## 2023-09-25 PROCEDURE — 97530 THERAPEUTIC ACTIVITIES: CPT | Performed by: PHYSICAL THERAPIST

## 2023-09-25 PROCEDURE — 97112 NEUROMUSCULAR REEDUCATION: CPT | Performed by: PHYSICAL THERAPIST

## 2023-09-26 ENCOUNTER — TELEPHONE (OUTPATIENT)
Dept: NEUROLOGY | Facility: CLINIC | Age: 53
End: 2023-09-26
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
## 2023-09-26 NOTE — PROGRESS NOTES
Physical Therapy Daily Progress Note-Vestibular Rehab  [facility] [place]  [address] [place] [address]  [facility] [address]      Visit#:15  Subjective   I had a bad day yesterday. I was emotional all day. I am getting more depressed. I did some ball toss with my daughter standing on one leg.  Objective                             PROCEDURES AND MODALITIES:  See Exercise, Manual, and Modality Logs for complete treatment.     Paraffin:   pre-rx  Moist Heat:    Ice:    post-rx  E-Stim:    Ultrasound:    Ionto:   Traction:      NMR 13816 18 minutes and Ther Act 75641 12 minutes    Timed Code Treatment: 30 Minutes  Total Treatment Time: 30 Minutes    Assessment & Plan   Patient was encouraged to see the MD that referred her as she cannot see her PCP for this issuedue to [facility]. Patient is not making as fast of progress as she did initially and that is causing her emotional status to decline. She is still not able to drive. Dizziness and balance are better but she is lacking management of all other aspects of a concussion.     Progress per Plan of Care    Maria E Beltre, PT, DPT, CHT, SONGN  Physical Therapist  [place] license # [id number]

## 2023-09-29 ENCOUNTER — TREATMENT (OUTPATIENT)
Dept: PHYSICAL THERAPY | Facility: CLINIC | Age: 53
End: 2023-09-29
Payer: COMMERCIAL

## 2023-09-29 ENCOUNTER — OFFICE VISIT (OUTPATIENT)
Dept: NEUROLOGY | Facility: CLINIC | Age: 53
End: 2023-09-29
Payer: COMMERCIAL

## 2023-09-29 VITALS
HEART RATE: 84 BPM | HEIGHT: 67 IN | DIASTOLIC BLOOD PRESSURE: 68 MMHG | WEIGHT: 223 LBS | OXYGEN SATURATION: 94 % | SYSTOLIC BLOOD PRESSURE: 110 MMHG | BODY MASS INDEX: 35 KG/M2

## 2023-09-29 DIAGNOSIS — R42 DIZZINESS: ICD-10-CM

## 2023-09-29 DIAGNOSIS — F41.9 ANXIETY: ICD-10-CM

## 2023-09-29 DIAGNOSIS — S06.0X1D CONCUSSION WITH LOSS OF CONSCIOUSNESS OF 30 MINUTES OR LESS, SUBSEQUENT ENCOUNTER: Primary | ICD-10-CM

## 2023-09-29 DIAGNOSIS — F33.0 MILD RECURRENT MAJOR DEPRESSION: ICD-10-CM

## 2023-09-29 DIAGNOSIS — F07.81 POSTCONCUSSIVE SYNDROME: Primary | ICD-10-CM

## 2023-09-29 PROBLEM — J45.909 ASTHMA: Status: ACTIVE | Noted: 2023-09-29

## 2023-09-29 PROCEDURE — 97112 NEUROMUSCULAR REEDUCATION: CPT | Performed by: PHYSICAL THERAPIST

## 2023-09-29 PROCEDURE — 97530 THERAPEUTIC ACTIVITIES: CPT | Performed by: PHYSICAL THERAPIST

## 2023-09-29 RX ORDER — VALSARTAN 160 MG/1
TABLET ORAL
COMMUNITY
Start: 2023-09-18

## 2023-09-29 RX ORDER — LANOLIN ALCOHOL/MO/W.PET/CERES
400 CREAM (GRAM) TOPICAL DAILY
COMMUNITY

## 2023-09-29 RX ORDER — IBUPROFEN 600 MG/1
TABLET ORAL
COMMUNITY
Start: 2023-07-25

## 2023-09-29 RX ORDER — TOPIRAMATE 25 MG/1
25 TABLET ORAL NIGHTLY
Qty: 30 TABLET | Refills: 2 | Status: SHIPPED | OUTPATIENT
Start: 2023-09-29 | End: 2024-09-28
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
## 2023-09-29 NOTE — PATIENT INSTRUCTIONS
MRI brain with and without contrast, [place], someone will call to schedule  Talk with Dr Krause about other antidepressants which may be better for dizziness/h/a such as elavil, effexor, sertraline  Referral to speech therapy for cognitive therapy  Consider occipital nerve blocks  Referral to psychology for cognitive behavioral therapy  Start low dose Topamax 25 mg nightly for headache - side effects including trouble concentrating, tingling fingers, change in taste of carbonated drinks
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
## 2023-09-29 NOTE — PROGRESS NOTES
Physical Therapy Daily Progress Note-Vestibular Rehab  [facility] [place]  [address]  [facility] [address]      Visit#:16  Subjective   I have an appt with a PA in neurology. I am really working hard at home to feel better. I am not having as much nausea as before. I still feel it as a passenger in a car. Using phone and computer, watching TV are still bothersome. I just have to look away.   Objective                             PROCEDURES AND MODALITIES:  See Exercise, Manual, and Modality Logs for complete treatment.     Paraffin:   pre-rx  Moist Heat:    Ice:    post-rx  E-Stim:    Ultrasound:    Ionto:   Traction:      NMR 31434 21 minutes and Ther Act 53980 10 minutes    Timed Code Treatment: 31 Minutes  Total Treatment Time: 31 Minutes    Assessment & Plan   Patient continues to demonstrate improvements in balance. She was able to stand on SL and toss and catch ball without loss of balance today. She has ongoing dizziness with oculomotor exercises, mostly with head circles with fixation. Vertical and horizontal head movement vs. Eye movement is improved. Hopefully she can get management of headaches and depression from other practitioners.     Progress per Plan of Care    Maria E Beltre, PT, DPT, CHT, SONGN  Physical Therapist  [place] license # [id number]

## 2023-10-02 ENCOUNTER — TREATMENT (OUTPATIENT)
Dept: PHYSICAL THERAPY | Facility: CLINIC | Age: 53
End: 2023-10-02
Payer: COMMERCIAL

## 2023-10-02 DIAGNOSIS — S06.0X1D CONCUSSION WITH LOSS OF CONSCIOUSNESS OF 30 MINUTES OR LESS, SUBSEQUENT ENCOUNTER: Primary | ICD-10-CM

## 2023-10-02 DIAGNOSIS — R42 DIZZINESS: ICD-10-CM

## 2023-10-02 PROCEDURE — 97112 NEUROMUSCULAR REEDUCATION: CPT | Performed by: PHYSICAL THERAPIST

## 2023-10-02 PROCEDURE — 97530 THERAPEUTIC ACTIVITIES: CPT | Performed by: PHYSICAL THERAPIST

## 2023-10-02 NOTE — PROGRESS NOTES
Physical Therapy Daily Progress Note-Vestibular Rehab  [facility] Physical Therapy [place]  [address]  [facility] [address]      Visit#:17  Subjective   I saw neurology and they prescribed me a med for headaches and said I needed a MRI. They are sending my to speech therapy for cognitive therapy. I don't think she understood that I was already doing vestibular rehab. I did not want to change my antidepressant med no do I want to do an occipital nerve block.    Objective             See [employer] custom training report.                 PROCEDURES AND MODALITIES:  See Exercise, Manual, and Modality Logs for complete treatment.     Paraffin:   pre-rx  Moist Heat:    Ice:    post-rx  E-Stim:    Ultrasound:    Ionto:   Traction:      NMR 91696 21 minutes and Ther Act 98135 10 minutes    Timed Code Treatment: 31 Minutes  Total Treatment Time: 31 Minutes    Assessment & Plan   Patient continues to demonstrate progression of improved balance and oculomotor function. She continues to lack management of other aspects of concussion that vestibular rehab does not cover. She was frustrated post neuro appt. I encouraged her to do the cognitive rehab and to take the Topamax for her headaches. Patient continues to have little interest in the things that she used to enjoy. He has a poor appetite, has not returned to work in a full capacity and is still not driving. However, she was able to perform higher level balance tasks with head and eye movement in clinic today.    Progress per Plan of Care    Maria E Beltre PT, DPT, CHT, ANGIE  Physical Therapist  [place] license # [id number]

## 2023-10-06 ENCOUNTER — TREATMENT (OUTPATIENT)
Dept: PHYSICAL THERAPY | Facility: CLINIC | Age: 53
End: 2023-10-06
Payer: COMMERCIAL

## 2023-10-06 DIAGNOSIS — S06.0X1D CONCUSSION WITH LOSS OF CONSCIOUSNESS OF 30 MINUTES OR LESS, SUBSEQUENT ENCOUNTER: Primary | ICD-10-CM

## 2023-10-06 DIAGNOSIS — R42 DIZZINESS: ICD-10-CM

## 2023-10-06 PROCEDURE — 97112 NEUROMUSCULAR REEDUCATION: CPT | Performed by: PHYSICAL THERAPIST

## 2023-10-06 PROCEDURE — 97530 THERAPEUTIC ACTIVITIES: CPT | Performed by: PHYSICAL THERAPIST

## 2023-10-06 NOTE — PROGRESS NOTES
Physical Therapy Daily Progress Note-Vestibular Rehab  [facility] [place]  [address]  [facility] [address]      Visit#:18  Subjective   This has been a bad week. I have had a lot of things happen that frustrated me and it was the anniversary of my mothers death. No significant changes in my symptoms.   Objective                             PROCEDURES AND MODALITIES:  See Exercise, Manual, and Modality Logs for complete treatment.     Paraffin:   pre-rx  Moist Heat:    Ice:    post-rx  E-Stim:    Ultrasound:    Ionto:   Traction:      NMR 58068 21 minutes and Ther Act 47727 10 minutes    Timed Code Treatment: 31 Minutes  Total Treatment Time: 31 Minutes    Assessment & Plan   Patient has her  with her today. This has not been a good week for her symptom wise. She continues to have dizziness, headaches, memory issues, flat affect, and fear of driving. She has demonstrated less improvement in her symptoms in the past couple of weeks. However, she is demonstrating improved balance. She remains motivated to improve.     Progress per Plan of Care    Maria E Beltre, PT, DPT, CHT, SONGN  Physical Therapist  [place] license # [id number]

## 2023-10-07 PROCEDURE — G2066 INTER DEVC REMOTE 30D: HCPCS | Performed by: INTERNAL MEDICINE

## 2023-10-07 PROCEDURE — 93298 REM INTERROG DEV EVAL SCRMS: CPT | Performed by: INTERNAL MEDICINE

## 2023-10-09 ENCOUNTER — TELEPHONE (OUTPATIENT)
Dept: NEUROLOGY | Facility: CLINIC | Age: 53
End: 2023-10-09
Payer: COMMERCIAL

## 2023-10-09 NOTE — TELEPHONE ENCOUNTER
Called Alex Grissom the auto  regarding patients PIP. I left a detailed voice mail asking him to call me back.

## 2023-10-10 ENCOUNTER — TREATMENT (OUTPATIENT)
Dept: PHYSICAL THERAPY | Facility: CLINIC | Age: 53
End: 2023-10-10
Payer: COMMERCIAL

## 2023-10-10 DIAGNOSIS — R42 DIZZINESS: ICD-10-CM

## 2023-10-10 DIAGNOSIS — S06.0X1D CONCUSSION WITH LOSS OF CONSCIOUSNESS OF 30 MINUTES OR LESS, SUBSEQUENT ENCOUNTER: Primary | ICD-10-CM

## 2023-10-10 PROCEDURE — 97112 NEUROMUSCULAR REEDUCATION: CPT | Performed by: PHYSICAL THERAPIST

## 2023-10-10 PROCEDURE — 97530 THERAPEUTIC ACTIVITIES: CPT | Performed by: PHYSICAL THERAPIST
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
## 2023-10-13 ENCOUNTER — TELEPHONE (OUTPATIENT)
Dept: PHYSICAL THERAPY | Facility: CLINIC | Age: 53
End: 2023-10-13

## 2023-10-13 NOTE — TELEPHONE ENCOUNTER
Caller: Amirah Godoy    Relationship: Self         What was the call regarding: COLD OR ALLERGY IN HER HEAD

## 2023-10-17 ENCOUNTER — TREATMENT (OUTPATIENT)
Dept: PHYSICAL THERAPY | Facility: CLINIC | Age: 53
End: 2023-10-17
Payer: COMMERCIAL

## 2023-10-17 DIAGNOSIS — R42 DIZZINESS: ICD-10-CM

## 2023-10-17 DIAGNOSIS — S06.0X1D CONCUSSION WITH LOSS OF CONSCIOUSNESS OF 30 MINUTES OR LESS, SUBSEQUENT ENCOUNTER: Primary | ICD-10-CM

## 2023-10-17 PROCEDURE — 97530 THERAPEUTIC ACTIVITIES: CPT | Performed by: PHYSICAL THERAPIST

## 2023-10-17 PROCEDURE — 97112 NEUROMUSCULAR REEDUCATION: CPT | Performed by: PHYSICAL THERAPIST

## 2023-10-20 ENCOUNTER — TREATMENT (OUTPATIENT)
Dept: PHYSICAL THERAPY | Facility: CLINIC | Age: 53
End: 2023-10-20
Payer: COMMERCIAL

## 2023-10-20 DIAGNOSIS — S06.0X1D CONCUSSION WITH LOSS OF CONSCIOUSNESS OF 30 MINUTES OR LESS, SUBSEQUENT ENCOUNTER: Primary | ICD-10-CM

## 2023-10-20 DIAGNOSIS — R42 DIZZINESS: ICD-10-CM

## 2023-10-20 PROCEDURE — 97112 NEUROMUSCULAR REEDUCATION: CPT | Performed by: PHYSICAL THERAPIST

## 2023-10-20 PROCEDURE — 97530 THERAPEUTIC ACTIVITIES: CPT | Performed by: PHYSICAL THERAPIST

## 2023-10-20 NOTE — PROGRESS NOTES
Physical Therapy Daily Progress Note-Vestibular Rehab  [facility] Physical Therapy [place]  [address]  [facility] [address]      Visit#:21  Subjective    reports she has had more stress today and is not having a good day. Patient reports she is feeling off today and is forgetful.   Objective                             PROCEDURES AND MODALITIES:  See Exercise, Manual, and Modality Logs for complete treatment.     Paraffin:   pre-rx  Moist Heat:    Ice:    post-rx  E-Stim:    Ultrasound:    Ionto:   Traction:      NMR 51595 21 minutes and Ther Act 30424 9 minutes    Timed Code Treatment: 30 Minutes  Total Treatment Time: 30 Minutes    Assessment & Plan   Patient had to stop twice during X2 exercise as she forgot what she was doing mid exercise. She is waitlisted for speech/cognitive therapy. She had MRI scheduled for next week. Treatment was focused on more balance tasks than oculomotor or cognitive.     Progress per Plan of Care    Maria E Beltre, PT, DPT, CHT, SONGN  Physical Therapist  [place] license # [id number]

## 2023-10-24 ENCOUNTER — TELEPHONE (OUTPATIENT)
Dept: NEUROLOGY | Facility: CLINIC | Age: 53
End: 2023-10-24

## 2023-10-24 DIAGNOSIS — F41.9 ANXIETY: Primary | ICD-10-CM

## 2023-10-24 RX ORDER — LORAZEPAM 1 MG/1
TABLET ORAL
Qty: 1 TABLET | Refills: 0 | Status: SHIPPED | OUTPATIENT
Start: 2023-10-24

## 2023-10-24 NOTE — TELEPHONE ENCOUNTER
Provider: JOHNSON    Caller: STANFORD    Pharmacy: [facility]    Phone Number: [phone]     Reason for Call: PT CALLED AND STATES THAT SHE IS HAVING A MRI TOMORROW 10/25/23 AND IS NEEDING MEDICATION CALLED IN TO PHARMACY AS SHE IS CLAUSTROPHOBIC .    PLEASE REVIEW AND ADVISE.  THANK YOU

## 2023-10-25 ENCOUNTER — TELEPHONE (OUTPATIENT)
Dept: NEUROLOGY | Facility: CLINIC | Age: 53
End: 2023-10-25

## 2023-10-25 NOTE — TELEPHONE ENCOUNTER
Caller: Amirah Godoy    Relationship: Self    Best call back number: [phone]    What was the call regarding: PT WENT IN FOR HER MRI SCAN TODAY AT [facility]; PT WAS ONLY IN THE MACHINE FOR 2 MINUTES BEFORE SHE HAD TO BE REMOVED FROM THE MACHINE DUE TO CLAUSTROPHOBIA/STRESS REACTION. THE MRI TECHNICIAN ADVISED AGAINST PT TRYING AGAIN TODAY AND SUGGESTED PT DISCUSS POSSIBLY TAKING A STRONGER MEDICATION IN ADVANCE TO MRI (AS LORAZEPAM WAS NOT VERY EFFECTIVE) OR POTENTIALLY ORDERING A CT SCAN IN PLACE OF THE MRI.    Do you require a callback: YES, PLEASE.    Is it okay if the provider responds through [employer]?: YES    PLEASE REVIEW AND ADVISE.

## 2023-10-26 ENCOUNTER — TREATMENT (OUTPATIENT)
Dept: PHYSICAL THERAPY | Facility: CLINIC | Age: 53
End: 2023-10-26
Payer: COMMERCIAL

## 2023-10-26 DIAGNOSIS — S06.0X1D CONCUSSION WITH LOSS OF CONSCIOUSNESS OF 30 MINUTES OR LESS, SUBSEQUENT ENCOUNTER: Primary | ICD-10-CM

## 2023-10-26 DIAGNOSIS — R42 DIZZINESS: ICD-10-CM

## 2023-10-26 PROCEDURE — 97112 NEUROMUSCULAR REEDUCATION: CPT | Performed by: PHYSICAL THERAPIST

## 2023-10-26 PROCEDURE — 97530 THERAPEUTIC ACTIVITIES: CPT | Performed by: PHYSICAL THERAPIST

## 2023-10-26 NOTE — PROGRESS NOTES
Physical Therapy Daily Progress Note-Vestibular Rehab  [facility] [place]  [address]  [place] [facility] [place] [address]      Visit#:22  Subjective   I was supposed to have an MRI a couple of days ago and I could not stand more than 2 min. I was feeling so hot, my back was burning. I could not finish it. Topomax is not really helping with the headaches. I am working on my memory games.   Objective                See [employer] custom training report.              PROCEDURES AND MODALITIES:  See Exercise, Manual, and Modality Logs for complete treatment.     Paraffin:   pre-rx  Moist Heat:    Ice:    post-rx  E-Stim:    Ultrasound:    Ionto:   Traction:      NMR 26002 19 minutes and Ther Act 10029 12 minutes    Timed Code Treatment: 31 Minutes  Total Treatment Time: 32 Minutes    Assessment & Plan   Patient continues to demonstrate improved balance, especially with SL balance tasks. Patient performed better today in terms of performance of oculomotor exercises and not forgetting what she is doing mid exercise. She remains on wait list for speech/cognitive therapy.    Progress per Plan of Care    Maria E Beltre, PT, DPT, CHT, SONGN  Physical Therapist  [place] license # [id number]

## 2023-10-30 ENCOUNTER — TELEMEDICINE (OUTPATIENT)
Dept: PSYCHIATRY | Facility: CLINIC | Age: 53
End: 2023-10-30
Payer: COMMERCIAL

## 2023-10-30 DIAGNOSIS — F33.1 MAJOR DEPRESSIVE DISORDER, RECURRENT EPISODE, MODERATE: ICD-10-CM

## 2023-10-30 DIAGNOSIS — F43.10 POST TRAUMATIC STRESS DISORDER (PTSD): Primary | ICD-10-CM

## 2023-10-30 PROCEDURE — 90791 PSYCH DIAGNOSTIC EVALUATION: CPT | Performed by: COUNSELOR
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
## 2023-11-01 DIAGNOSIS — F07.81 POSTCONCUSSIVE SYNDROME: ICD-10-CM

## 2023-11-06 ENCOUNTER — TREATMENT (OUTPATIENT)
Dept: PHYSICAL THERAPY | Facility: CLINIC | Age: 53
End: 2023-11-06
Payer: COMMERCIAL

## 2023-11-06 DIAGNOSIS — S06.0X1D CONCUSSION WITH LOSS OF CONSCIOUSNESS OF 30 MINUTES OR LESS, SUBSEQUENT ENCOUNTER: Primary | ICD-10-CM

## 2023-11-06 DIAGNOSIS — R42 DIZZINESS: ICD-10-CM

## 2023-11-06 PROCEDURE — 97112 NEUROMUSCULAR REEDUCATION: CPT | Performed by: PHYSICAL THERAPIST

## 2023-11-06 PROCEDURE — 97530 THERAPEUTIC ACTIVITIES: CPT | Performed by: PHYSICAL THERAPIST

## 2023-11-09 ENCOUNTER — TELEMEDICINE (OUTPATIENT)
Dept: PSYCHIATRY | Facility: CLINIC | Age: 53
End: 2023-11-09
Payer: COMMERCIAL

## 2023-11-09 ENCOUNTER — TELEPHONE (OUTPATIENT)
Dept: ORTHOPEDICS | Facility: OTHER | Age: 53
End: 2023-11-09
Payer: COMMERCIAL

## 2023-11-09 DIAGNOSIS — F33.1 MAJOR DEPRESSIVE DISORDER, RECURRENT EPISODE, MODERATE: Primary | ICD-10-CM

## 2023-11-09 DIAGNOSIS — F43.10 POST TRAUMATIC STRESS DISORDER (PTSD): ICD-10-CM

## 2023-11-09 RX ORDER — CITALOPRAM 20 MG/1
30 TABLET ORAL DAILY
Qty: 135 TABLET | Refills: 0 | Status: SHIPPED | OUTPATIENT
Start: 2023-11-09 | End: 2024-02-07

## 2023-11-09 NOTE — TREATMENT PLAN
Multi-Disciplinary Problems (from Behavioral Health Treatment Plan)      Active Problems       Problem: Depression  Start Date: 11/09/23      Problem Details:           Goal Priority Start Date Expected End Date End Date    Patient will demonstrate the ability to initiate new constructive life skills outside of sessions on a consistent basis. -- 11/09/23 -- --    Goal Details: Progress toward goal:  Not appropriate to rate progress toward goal since this is the initial treatment plan.          Goal Intervention Frequency Start Date End Date    Assist patient in setting attainable activities of daily living goals. PRN 11/09/23 --      Goal Intervention Frequency Start Date End Date    Provide education about depression Weekly 11/09/23 --    Intervention Details: Duration of treatment until until remission of symptoms.          Goal Intervention Frequency Start Date End Date    Assist patient in developing healthy coping strategies. Weekly 11/09/23 --    Intervention Details: Duration of treatment until until remission of symptoms.                  Problem: Post Traumatic Stress  Start Date: 11/09/23      Problem Details:           Goal Priority Start Date Expected End Date End Date    Patient will process and move through trauma in a way that improves self regard and the patients ability to function optimally in the world around them. -- 11/09/23 -- --    Goal Details: Progress toward goal:  Not appropriate to rate progress toward goal since this is the initial treatment plan.          Goal Intervention Frequency Start Date End Date    Assist patient in identifying ways that trauma has negatively impacted their view of themselves and the world. Weekly 11/09/23 --    Intervention Details: Duration of treatment until until remission of symptoms.          Goal Intervention Frequency Start Date End Date    Process trauma in the context of the safe session environment. Weekly 11/09/23 --    Intervention Details: Duration  of treatment until until remission of symptoms.          Goal Intervention Frequency Start Date End Date    Develop a plan of behavior changes that will reduce the stress of the trauma. Weekly 11/09/23 --    Intervention Details: Duration of treatment until until remission of symptoms.                          Reviewed By       Jignesh Elias APRN 11/09/23 1419    Jignesh Elias APRN 11/09/23 1419                     I have discussed and reviewed this treatment plan with the patient.
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
## 2023-11-13 ENCOUNTER — TREATMENT (OUTPATIENT)
Dept: PHYSICAL THERAPY | Facility: CLINIC | Age: 53
End: 2023-11-13
Payer: COMMERCIAL

## 2023-11-13 DIAGNOSIS — S06.0X1D CONCUSSION WITH LOSS OF CONSCIOUSNESS OF 30 MINUTES OR LESS, SUBSEQUENT ENCOUNTER: Primary | ICD-10-CM

## 2023-11-13 DIAGNOSIS — R42 DIZZINESS: ICD-10-CM

## 2023-11-13 PROCEDURE — 97112 NEUROMUSCULAR REEDUCATION: CPT | Performed by: PHYSICAL THERAPIST

## 2023-11-13 PROCEDURE — 97530 THERAPEUTIC ACTIVITIES: CPT | Performed by: PHYSICAL THERAPIST

## 2023-11-13 PROCEDURE — 97110 THERAPEUTIC EXERCISES: CPT | Performed by: PHYSICAL THERAPIST
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
## 2023-11-16 ENCOUNTER — TREATMENT (OUTPATIENT)
Dept: PHYSICAL THERAPY | Facility: CLINIC | Age: 53
End: 2023-11-16
Payer: COMMERCIAL

## 2023-11-16 DIAGNOSIS — S06.0X1D CONCUSSION WITH LOSS OF CONSCIOUSNESS OF 30 MINUTES OR LESS, SUBSEQUENT ENCOUNTER: ICD-10-CM

## 2023-11-16 DIAGNOSIS — R42 DIZZINESS: Primary | ICD-10-CM

## 2023-11-16 PROCEDURE — 97112 NEUROMUSCULAR REEDUCATION: CPT | Performed by: PHYSICAL THERAPIST

## 2023-11-16 PROCEDURE — 97530 THERAPEUTIC ACTIVITIES: CPT | Performed by: PHYSICAL THERAPIST

## 2023-11-16 NOTE — PROGRESS NOTES
Physical Therapy Daily Progress Note-Vestibular Rehab  [facility] [place]  [address]  [place] [address]      Visit#:25  Subjective   I have been feeling better this week. I am not having many headaches anymore.   Objective                             PROCEDURES AND MODALITIES:  See Exercise, Manual, and Modality Logs for complete treatment.     Paraffin:   pre-rx  Moist Heat:    Ice:    post-rx  E-Stim:    Ultrasound:    Ionto:   Traction:      NMR 71027 21 minutes and Ther Act 18305 9 minutes    Timed Code Treatment: 30 Minutes  Total Treatment Time: 30 Minutes    Assessment & Plan   Patient had a noticeably better affect today. I believe her visits with psychiatry are helping. She has minimal symptoms with VRT treatments at this time. Her balance is normal. Dizziness is not yet totally resolved and she continues to limit her activity level.    Progress per Plan of Care    Maria E Beltre PT, DPT, CHT, SONGN  Physical Therapist  [place] license # [id number]

## 2023-12-01 ENCOUNTER — TREATMENT (OUTPATIENT)
Dept: PHYSICAL THERAPY | Facility: CLINIC | Age: 53
End: 2023-12-01
Payer: COMMERCIAL

## 2023-12-01 DIAGNOSIS — R42 DIZZINESS: ICD-10-CM

## 2023-12-01 DIAGNOSIS — S06.0X1D CONCUSSION WITH LOSS OF CONSCIOUSNESS OF 30 MINUTES OR LESS, SUBSEQUENT ENCOUNTER: Primary | ICD-10-CM

## 2023-12-01 PROCEDURE — 97112 NEUROMUSCULAR REEDUCATION: CPT | Performed by: PHYSICAL THERAPIST

## 2023-12-01 PROCEDURE — 97530 THERAPEUTIC ACTIVITIES: CPT | Performed by: PHYSICAL THERAPIST
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
## 2023-12-06 ENCOUNTER — HOSPITAL ENCOUNTER (OUTPATIENT)
Dept: SPEECH THERAPY | Facility: HOSPITAL | Age: 53
Setting detail: THERAPIES SERIES
Discharge: HOME | End: 2023-12-06
Payer: COMMERCIAL

## 2023-12-06 DIAGNOSIS — G45.9 TIA (TRANSIENT ISCHEMIC ATTACK): ICD-10-CM
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
## 2023-12-06 DIAGNOSIS — R41.841 COGNITIVE COMMUNICATION DEFICIT: Primary | ICD-10-CM

## 2023-12-06 PROCEDURE — 96125 COGNITIVE TEST BY HC PRO: CPT
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
## 2023-12-07 ENCOUNTER — TELEMEDICINE (OUTPATIENT)
Dept: PSYCHIATRY | Facility: CLINIC | Age: 53
End: 2023-12-07
Payer: COMMERCIAL

## 2023-12-07 DIAGNOSIS — F43.10 POST TRAUMATIC STRESS DISORDER (PTSD): ICD-10-CM

## 2023-12-07 DIAGNOSIS — F33.1 MAJOR DEPRESSIVE DISORDER, RECURRENT EPISODE, MODERATE: Primary | ICD-10-CM

## 2023-12-07 NOTE — PROGRESS NOTES
"This provider is located at the [facility] (through [facility]), [address], [facility], [address] using a secure [employer] Video Visit through [employer]. Patient is being seen remotely via telehealth at their home address in [place], and stated they are in a secure environment for this session. The patient's condition being diagnosed/treated is appropriate for telemedicine. The provider identified himself as well as his credentials.   The patient consent to be seen remotely, and when consent is given they understand that the consent allows for patient identifiable information to be sent to a third party as needed.   They may refuse to be seen remotely at any time. The electronic data is encrypted and password protected, and the patient  has been advised of the potential risks to privacy not withstanding such measures.    You have chosen to receive care through a telehealth visit.  Do you consent to use a video/audio connection for your medical care today? Yes    Patient identifiers utilized: Name and date of birth.    Patient verbally confirmed consent for today's encounter- yes    Subjective   Amirah Godoy is a 53 y.o. female who presents today for follow-up appointment.     Chief Complaint:  Depression, anxiety, PTSD    History of Present Illness:     Depression/mood  Medication helping  \"Great match right now\"  \"Moods better\"  Anxiety  Has improved  Denies excessive  Working on positive coping skills  PTSD: denies s/sx   Sleep disturbance: \"it's getting better\"  Low energy: n  Not as many naps  Substance use: denies   Medication compliant  Side effects: denies  Refills needed    Prior Psychiatric Medications:  Citalopram    The following portions of the patient's history were reviewed and updated as appropriate: allergies, current medications, past family history, past medical history, past social history, past surgical history and problem list.    Allergy:   Allergies "   Allergen Reactions    Demerol [Meperidine] Hives    Lisinopril Cough    Sulfa Antibiotics GI Intolerance        Current Medications:   Current Outpatient Medications   Medication Sig Dispense Refill    acetaminophen (TYLENOL) 325 MG tablet Take 2 tablets by mouth Every 4 (Four) Hours As Needed for Mild Pain.      albuterol (PROVENTIL) (2.5 MG/3ML) 0.083% nebulizer solution       aspirin 325 MG tablet Take 1 tablet by mouth Daily.      cetirizine (ZyrTEC) 10 MG tablet Take 1 tablet by mouth Daily.      citalopram (CeleXA) 20 MG tablet Take 1.5 tablets by mouth Daily for 90 days. 135 tablet 0    FASENRA 30 MG/ML solution prefilled syringe Every 2 (Two) Months.      Fluticasone-Umeclidin-Vilant (TRELEGY ELLIPTA IN) Inhale 1 inhaler Every Morning.      folic acid (FOLVITE) 400 MCG tablet Take 1 tablet by mouth Daily.      ibuprofen (ADVIL,MOTRIN) 600 MG tablet       LORazepam (Ativan) 1 MG tablet Take 30 min prior to MRI. 1 tablet 0    metoprolol succinate XL (TOPROL-XL) 25 MG 24 hr tablet Take 1 tablet by mouth Daily. 30 tablet 11    montelukast (SINGULAIR) 10 MG tablet TAKE ONE TABLET BY MOUTH DAILY (Patient taking differently: Take 1 tablet by mouth Daily.) 30 tablet 4    nitroglycerin (NITROSTAT) 0.4 MG SL tablet Place 1 tablet under the tongue Every 5 (Five) Minutes As Needed for Chest Pain. 1 under the tongue as needed for angina, may repeat q5mins for up three doses 20 tablet 12    Omeprazole 20 MG tablet delayed-release Take 20 mg by mouth Daily.      rosuvastatin (CRESTOR) 5 MG tablet Take 1 tablet by mouth Daily. 30 tablet 11    topiramate (Topamax) 25 MG tablet Take 1 tablet by mouth Every Night. 30 tablet 2    valsartan (DIOVAN) 160 MG tablet        No current facility-administered medications for this visit.       Mental Status Exam:   Hygiene:   good  Cooperation:  Cooperative  Eye Contact:  Good  Psychomotor Behavior:  Appropriate  Affect:  Full range  Mood: normal  Hopelessness: Denies  Speech:   Normal  Thought Process:  Goal directed  Thought Content:  Normal  Suicidal:  None  Homicidal:  None  Hallucinations:  None  Delusion:  None  Memory:  Deficits- working with speech pathology- Raymond Yost  Orientation:  Grossly intact  Reliability:  good  Insight:  Good  Judgement:  Good  Impulse Control:  Good  Physical/Medical Issues:  No      Physical Exam:   Last menstrual period 10/25/2017. There is no height or weight on file to calculate BMI.   Due to the remote nature of this encounter (virtual encounter), vitals were unable to be obtained.  Weight change: n    PHQ-9 Depression Screening  Little interest or pleasure in doing things? (P) 2-->more than half the days   Feeling down, depressed, or hopeless? (P) 2-->more than half the days   Trouble falling or staying asleep, or sleeping too much? (P) 2-->more than half the days   Feeling tired or having little energy? (P) 2-->more than half the days   Poor appetite or overeating? (P) 1-->several days   Feeling bad about yourself - or that you are a failure or have let yourself or your family down? (P) 2-->more than half the days   Trouble concentrating on things, such as reading the newspaper or watching television? (P) 3-->nearly every day   Moving or speaking so slowly that other people could have noticed? Or the opposite - being so fidgety or restless that you have been moving around a lot more than usual? (P) 2-->more than half the days   Thoughts that you would be better off dead, or of hurting yourself in some way? (P) 0-->not at all   PHQ-9 Total Score (P) 16   If you checked off any problems, how difficult have these problems made it for you to do your work, take care of things at home, or get along with other people? (P) extremely difficult     PHQ-9 Total Score: (P) 16    Feeling nervous, anxious or on edge: (P) More than half the days  Not being able to stop or control worrying: (P) More than half the days  Worrying too much about different things: (P)  More than half the days  Trouble Relaxing: (P) More than half the days  Being so restless that it is hard to sit still: (P) Not at all  Feeling afraid as if something awful might happen: (P) Several days  Becoming easily annoyed or irritable: (P) Several days  OTTONIEL 7 Total Score: (P) 10  If you checked any problems, how difficult have these problems made it for you to do your work, take care of things at home, or get along with other people: (P) Somewhat difficult    Previous available Provider notes and records reviewed by this APRN at today's encounter.     Visit Diagnoses:    ICD-10-CM ICD-9-CM   1. Major depressive disorder, recurrent episode, moderate  F33.1 296.32   2. Post traumatic stress disorder (PTSD)  F43.10 309.81       TREATMENT PLAN: Continue supportive psychotherapy efforts and medications.  Medication and treatment options, both pharmacological and non-pharmacological treatment options, discussed during today's visit, including any off label use of medication. Patient acknowledged and verbally consented with current treatment plan and was educated on the importance of compliance with treatment and follow-up appointments.      - Continue citalopram 30mg po qday to target depression, anxiety and PTSD    Labs: None ordered at this time  Therapy: Lisa Ribeiro    MEDICATION ISSUES:  Discussed treatment plan and medication options of prescribed medication as well as the risks, benefits, any black box warnings, and side effects including potential falls, possible impaired driving, and metabolic adversities among others, including any off label use of medication. Patient is agreeable to call the office with any worsening of symptoms or onset of side effects, or if any concerns or questions arise.  The contact information for the office is made available to the patient. Patient is agreeable to call 911 or go to the nearest ER should they begin having any SI/HI, or if any urgent concerns arise. No medication side  effects or related complaints today. KAM reviewed as expected.    RISK ASSESSMENT:  Risk of self-harm acutely is moderate.  Risk factors include anxiety disorder, mood disorder, and recent psychosocial stressors (pandemic). Protective factors include no family history, denies access to guns/weapons, no present SI, no history of suicide attempts or self-harm in the past, minimal AODA, healthcare seeking, future orientation, willingness to engage in care.  Risk of self-harm chronically is also moderate, but could be further elevated in the event of treatment noncompliance and/or AODA.    VERBAL INFORMED CONSENT FOR MEDICATION:  The patient was educated that their proposed/prescribed psychotropic medication(s) has potential risks, side effects, adverse effects, and black box warnings; and these have been discussed with the patient.  The patient has been informed that their treatment and medication dosage is to be individualized, and may even be above or below the recommended range/dosage due to patient individualization and response, but medication is prescribed using a shared decision making approach, and no medication or dosage will be prescribed without the patient's verbal consent.  The reason for the use of the medication including any off label use and alternative modes of treatment other than or in addition to medication has been considered and discussed, the probable consequences of not receiving the proposed treatment have been discussed, and any treatment side effects, black box warnings, and cautions associated with treatment have been discussed with the patient.  The patient is allowed ample time to openly discuss and ask questions regarding the proposed medication(s) and treatment plan and the patient verbalizes understanding the reasons for the use of the medication, its potential risks and benefits, other alternative treatment(s), and the probable consequences that may occur if the proposed medication  is not given.  The patient has been given ample time to ask questions and study the information and find the information to be specific, accurate, and complete.  The patient gives verbal consent for the medication(s) proposed/prescribed, they verbalized understanding that they can refuse and withdraw consent at any time with the assistance of this APRN, and the patient has verbally confirmed that they are aware, and are willing, to take the prescribed medication and follow the treatment plan with the known possible risks, side effect, black box warnings, and any potential medication interactions, and the patient reports they will be worse off without this medication and treatment plan.  The patient is advised to contact this APRN/this office if any questions or concerns arise at any time (at [phone]), or call 911/go to the closest emergency department if needed or outside of office hours.      [facility] No Show Policy:  We understand unexpected circumstances arise; however, anytime you miss your appointment we are unable to provide you appropriate care.  In addition, each appointment missed could have been used to provide care for others.  We ask that you call at least 24 hours in advance to cancel or reschedule an appointment.  We would like to take this opportunity to remind you of our policy stating patients who miss THREE or more appointments without cancelling or rescheduling 24 hours in advance of the appointment may be subject to cancellation of any further visits with our clinic and recommendation to seek in-person services/visits.    Please call [phone] to reschedule your appointment. If there are reasons that make it difficult for you to keep the appointments, please call and let us know how we can help.  Please understand that medication prescribing will not continue without seeing your provider.      [facility]'s No Show Policy reviewed with  patient at today's visit. Patient verbalized understanding of this policy. Discussed with patient that in the event that there are three or more no show visits, it will be recommended that they pursue in-person services/visits as noncompliance with telehealth visits indicates that patient is not an appropriate candidate for telemedicine and would likely be more appropriate for in-person services/visits. Patient verbalizes understanding and is agreeable to this.    MEDS ORDERED DURING VISIT:  No orders of the defined types were placed in this encounter.      Return in about 8 weeks (around 2/1/2024) for Next scheduled follow up.         Progress toward goal: At goal    Functional Status: No impairment    Prognosis: Good with Ongoing Treatment     This document has been electronically signed by YORDAN Hendrickson  December 7, 2023 14:07 EST      Please note that portions of this note were completed with a voice recognition program.

## 2023-12-08 ENCOUNTER — TREATMENT (OUTPATIENT)
Dept: PHYSICAL THERAPY | Facility: CLINIC | Age: 53
End: 2023-12-08
Payer: COMMERCIAL

## 2023-12-08 DIAGNOSIS — S06.0X1D CONCUSSION WITH LOSS OF CONSCIOUSNESS OF 30 MINUTES OR LESS, SUBSEQUENT ENCOUNTER: Primary | ICD-10-CM

## 2023-12-08 DIAGNOSIS — R42 DIZZINESS: ICD-10-CM

## 2023-12-08 PROCEDURE — 97530 THERAPEUTIC ACTIVITIES: CPT | Performed by: PHYSICAL THERAPIST

## 2023-12-08 PROCEDURE — 97112 NEUROMUSCULAR REEDUCATION: CPT | Performed by: PHYSICAL THERAPIST

## 2023-12-08 NOTE — PROGRESS NOTES
Physical Therapy Daily Progress Note-Vestibular Rehab  [facility]  [address]  [place] [place] [facility] [address]      Visit#:27  Subjective   I am still having some dizziness when I bend over to  something. I have started cognitive therapy one time a week.   Objective                             PROCEDURES AND MODALITIES:  See Exercise, Manual, and Modality Logs for complete treatment.     Paraffin:   pre-rx  Moist Heat:    Ice:    post-rx  E-Stim:    Ultrasound:    Ionto:   Traction:      NMR 56783 20 minutes and Ther Act 53498 11 minutes    Timed Code Treatment: 31 Minutes  Total Treatment Time: 31 Minutes    Assessment & Plan   Patient has demonstrated significant improvement in her balance in the past 6 months. Her dizziness is all but resolved with trunk flexion, which is a common symptom of concussion. She has started cognitive therapy. We will transition from VRT to that. I believe that with time her symptoms will likely resolve. Anticipate DC after next visit.     Progress per Plan of Care    Maria E Beltre, PT, DPT, CHT, SONGN  Physical Therapist  [place] license # [id number]

## 2023-12-12 ENCOUNTER — TELEMEDICINE (OUTPATIENT)
Dept: PSYCHIATRY | Facility: CLINIC | Age: 53
End: 2023-12-12
Payer: COMMERCIAL

## 2023-12-12 DIAGNOSIS — F33.1 MAJOR DEPRESSIVE DISORDER, RECURRENT EPISODE, MODERATE: Primary | ICD-10-CM

## 2023-12-12 DIAGNOSIS — F43.10 POST TRAUMATIC STRESS DISORDER (PTSD): ICD-10-CM
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
## 2023-12-13 ENCOUNTER — HOSPITAL ENCOUNTER (OUTPATIENT)
Dept: SPEECH THERAPY | Facility: HOSPITAL | Age: 53
Setting detail: THERAPIES SERIES
Discharge: HOME | End: 2023-12-13
Payer: COMMERCIAL

## 2023-12-13 DIAGNOSIS — R41.841 COGNITIVE COMMUNICATION DEFICIT: Primary | ICD-10-CM
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
## 2023-12-13 PROCEDURE — 97129 THER IVNTJ 1ST 15 MIN: CPT

## 2023-12-13 PROCEDURE — 97130 THER IVNTJ EA ADDL 15 MIN: CPT
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
## 2023-12-15 ENCOUNTER — TREATMENT (OUTPATIENT)
Dept: PHYSICAL THERAPY | Facility: CLINIC | Age: 53
End: 2023-12-15
Payer: COMMERCIAL

## 2023-12-15 DIAGNOSIS — R42 DIZZINESS: ICD-10-CM

## 2023-12-15 DIAGNOSIS — S06.0X1D CONCUSSION WITH LOSS OF CONSCIOUSNESS OF 30 MINUTES OR LESS, SUBSEQUENT ENCOUNTER: Primary | ICD-10-CM

## 2023-12-15 NOTE — PROGRESS NOTES
Physical Therapy Daily Progress Note-Vestibular Rehab  [facility] [place]  [address]  [facility] [address]      Visit#:28  Subjective   I have a low grade HA today. I have been driving a little more. Cognitive therapy makes my head hurt.   Objective                             PROCEDURES AND MODALITIES:  See Exercise, Manual, and Modality Logs for complete treatment.     Paraffin:   pre-rx  Moist Heat:    Ice:    post-rx  E-Stim:    Ultrasound:    Ionto:   Traction:      NMR 79494 21 minutes and Ther Act 77646 10 minutes    Timed Code Treatment: 31 Minutes  Total Treatment Time: 31 Minutes    Assessment & Plan   Patient is demonstrating significant improvement in her balance and oculomotor function. She continues to report HA pain and dizziness with bending over. Her vestibular function is testing normal. At this time patient is appropriate for DC to HEP. She was encouraged to continue cognitive therapy. All goals met with the exception of LTG #3.     Progress per Plan of Care    Maria E Beltre, PT, DPT, CHT, SONGN  Physical Therapist  [place] license # [id number]

## 2023-12-20 ENCOUNTER — HOSPITAL ENCOUNTER (OUTPATIENT)
Dept: SPEECH THERAPY | Facility: HOSPITAL | Age: 53
Setting detail: THERAPIES SERIES
Discharge: HOME | End: 2023-12-20
Payer: COMMERCIAL

## 2023-12-20 ENCOUNTER — TELEPHONE (OUTPATIENT)
Dept: NEUROLOGY | Facility: CLINIC | Age: 53
End: 2023-12-20
Payer: COMMERCIAL

## 2023-12-20 DIAGNOSIS — R41.841 COGNITIVE COMMUNICATION DEFICIT: Primary | ICD-10-CM
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
## 2023-12-20 PROCEDURE — 97129 THER IVNTJ 1ST 15 MIN: CPT

## 2023-12-20 PROCEDURE — 97130 THER IVNTJ EA ADDL 15 MIN: CPT
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
## 2023-12-20 NOTE — TELEPHONE ENCOUNTER
Spoke w/ Pt regarding reschedule due to provider being out of the office & mailed reminder sent per pt.

## 2023-12-26 ENCOUNTER — TELEMEDICINE (OUTPATIENT)
Dept: PSYCHIATRY | Facility: CLINIC | Age: 53
End: 2023-12-26
Payer: COMMERCIAL

## 2023-12-26 DIAGNOSIS — F33.1 MAJOR DEPRESSIVE DISORDER, RECURRENT EPISODE, MODERATE: Primary | ICD-10-CM

## 2023-12-26 DIAGNOSIS — F43.10 POST TRAUMATIC STRESS DISORDER (PTSD): ICD-10-CM
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
## 2023-12-27 ENCOUNTER — HOSPITAL ENCOUNTER (OUTPATIENT)
Dept: SPEECH THERAPY | Facility: HOSPITAL | Age: 53
Setting detail: THERAPIES SERIES
Discharge: HOME | End: 2023-12-27
Payer: COMMERCIAL
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
## 2023-12-27 DIAGNOSIS — R41.841 COGNITIVE COMMUNICATION DEFICIT: Primary | ICD-10-CM

## 2023-12-27 PROCEDURE — 97130 THER IVNTJ EA ADDL 15 MIN: CPT

## 2023-12-27 PROCEDURE — 97129 THER IVNTJ 1ST 15 MIN: CPT
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
## 2024-01-03 ENCOUNTER — APPOINTMENT (OUTPATIENT)
Dept: SPEECH THERAPY | Facility: HOSPITAL | Age: 54
End: 2024-01-03
Payer: COMMERCIAL

## 2024-01-05 RX ORDER — TOPIRAMATE 25 MG/1
25 TABLET ORAL NIGHTLY
Qty: 30 TABLET | Refills: 1 | Status: SHIPPED | OUTPATIENT
Start: 2024-01-05 | End: 2025-01-04

## 2024-01-08 RX ORDER — TOPIRAMATE 25 MG/1
25 TABLET ORAL NIGHTLY
Qty: 90 TABLET | OUTPATIENT
Start: 2024-01-08 | End: 2025-01-07

## 2024-01-10 ENCOUNTER — HOSPITAL ENCOUNTER (OUTPATIENT)
Dept: SPEECH THERAPY | Facility: HOSPITAL | Age: 54
Setting detail: THERAPIES SERIES
Discharge: HOME | End: 2024-01-10
Payer: COMMERCIAL

## 2024-01-10 DIAGNOSIS — G45.9 TIA (TRANSIENT ISCHEMIC ATTACK): ICD-10-CM

## 2024-01-10 DIAGNOSIS — R41.841 COGNITIVE COMMUNICATION DEFICIT: Primary | ICD-10-CM
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
## 2024-01-10 PROCEDURE — 97129 THER IVNTJ 1ST 15 MIN: CPT

## 2024-01-10 PROCEDURE — 97130 THER IVNTJ EA ADDL 15 MIN: CPT
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
## 2024-01-15 ENCOUNTER — TELEMEDICINE (OUTPATIENT)
Dept: PSYCHIATRY | Facility: CLINIC | Age: 54
End: 2024-01-15
Payer: COMMERCIAL

## 2024-01-15 DIAGNOSIS — F33.1 MAJOR DEPRESSIVE DISORDER, RECURRENT EPISODE, MODERATE: Primary | ICD-10-CM

## 2024-01-15 DIAGNOSIS — F43.10 POST TRAUMATIC STRESS DISORDER (PTSD): ICD-10-CM

## 2024-01-15 PROCEDURE — 90834 PSYTX W PT 45 MINUTES: CPT | Performed by: COUNSELOR
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
## 2024-01-17 ENCOUNTER — HOSPITAL ENCOUNTER (OUTPATIENT)
Dept: SPEECH THERAPY | Facility: HOSPITAL | Age: 54
Setting detail: THERAPIES SERIES
Discharge: HOME | End: 2024-01-17
Payer: COMMERCIAL

## 2024-01-17 DIAGNOSIS — R41.841 COGNITIVE COMMUNICATION DEFICIT: Primary | ICD-10-CM

## 2024-01-17 DIAGNOSIS — G45.9 TIA (TRANSIENT ISCHEMIC ATTACK): ICD-10-CM
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
## 2024-01-17 PROCEDURE — 97129 THER IVNTJ 1ST 15 MIN: CPT

## 2024-01-17 PROCEDURE — 97130 THER IVNTJ EA ADDL 15 MIN: CPT
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
## 2024-01-24 ENCOUNTER — HOSPITAL ENCOUNTER (OUTPATIENT)
Dept: SPEECH THERAPY | Facility: HOSPITAL | Age: 54
Setting detail: THERAPIES SERIES
Discharge: HOME | End: 2024-01-24
Payer: COMMERCIAL

## 2024-01-24 DIAGNOSIS — G45.9 TIA (TRANSIENT ISCHEMIC ATTACK): ICD-10-CM
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
## 2024-01-24 DIAGNOSIS — R41.841 COGNITIVE COMMUNICATION DEFICIT: Primary | ICD-10-CM

## 2024-01-24 PROCEDURE — 97129 THER IVNTJ 1ST 15 MIN: CPT

## 2024-01-24 PROCEDURE — 97130 THER IVNTJ EA ADDL 15 MIN: CPT
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
## 2024-01-25 ENCOUNTER — OFFICE VISIT (OUTPATIENT)
Dept: NEUROLOGY | Facility: CLINIC | Age: 54
End: 2024-01-25
Payer: COMMERCIAL

## 2024-01-25 VITALS
WEIGHT: 229 LBS | OXYGEN SATURATION: 96 % | HEART RATE: 75 BPM | HEIGHT: 67 IN | SYSTOLIC BLOOD PRESSURE: 110 MMHG | BODY MASS INDEX: 35.94 KG/M2 | DIASTOLIC BLOOD PRESSURE: 72 MMHG

## 2024-01-25 DIAGNOSIS — F07.81 POST CONCUSSION SYNDROME: Primary | ICD-10-CM

## 2024-01-25 PROCEDURE — 99214 OFFICE O/P EST MOD 30 MIN: CPT | Performed by: NURSE PRACTITIONER

## 2024-01-25 RX ORDER — BETAMETHASONE DIPROPIONATE 0.5 MG/G
CREAM TOPICAL
COMMUNITY
Start: 2023-10-05

## 2024-01-25 RX ORDER — TRIAMCINOLONE ACETONIDE 1 MG/G
CREAM TOPICAL
COMMUNITY
Start: 2023-12-13

## 2024-01-25 RX ORDER — MAGNESIUM OXIDE 400 MG/1
400 TABLET ORAL DAILY
Qty: 30 TABLET | Refills: 5 | Status: SHIPPED | OUTPATIENT
Start: 2024-01-25

## 2024-01-25 NOTE — PATIENT INSTRUCTIONS
Starting magnesium and riboflavin for headache. Magnesium can cause loose bowels, cut back on dose (1/2 daily or every other day)    Riboflavin is a B vitamin, can make urine yellow    It will take a while for these to build up in your system so try to be patient.
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
## 2024-01-31 ENCOUNTER — HOSPITAL ENCOUNTER (OUTPATIENT)
Dept: SPEECH THERAPY | Facility: HOSPITAL | Age: 54
Setting detail: THERAPIES SERIES
Discharge: HOME | End: 2024-01-31
Payer: COMMERCIAL
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
## 2024-01-31 DIAGNOSIS — R41.841 COGNITIVE COMMUNICATION DEFICIT: Primary | ICD-10-CM

## 2024-01-31 DIAGNOSIS — G45.9 TIA (TRANSIENT ISCHEMIC ATTACK): ICD-10-CM

## 2024-01-31 PROCEDURE — 97129 THER IVNTJ 1ST 15 MIN: CPT

## 2024-01-31 PROCEDURE — 97130 THER IVNTJ EA ADDL 15 MIN: CPT
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
## 2024-02-01 ENCOUNTER — TELEMEDICINE (OUTPATIENT)
Dept: PSYCHIATRY | Facility: CLINIC | Age: 54
End: 2024-02-01
Payer: COMMERCIAL

## 2024-02-01 DIAGNOSIS — F33.1 MAJOR DEPRESSIVE DISORDER, RECURRENT EPISODE, MODERATE: Primary | ICD-10-CM

## 2024-02-01 DIAGNOSIS — F43.10 POST TRAUMATIC STRESS DISORDER (PTSD): ICD-10-CM

## 2024-02-01 NOTE — PATIENT INSTRUCTIONS
Here is a link to the Patient Health Questionnaire - 9 (PHQ-9).    https://www.apa.org/depression-guideline/patient-health-questionnaire.pdf            Here is the link for the OTTONIEL-7 scale    https://adaa.org/sites/default/files/EFT-1_Mphjnmb-cgjxldu_8.pdf    These guides have some good information in it that may help.     https://www.kafce.org/LSN_Gaslighting_Leaders_Guide.pdf    https://www.emotionalaffair.org/wp-content/uploads/2019/07/A-Quick-Guide-to-Recognizing-and-Uyfrtllpef-yi-Vscgldfjzvw.pdf    https://www.thehotline.org/resources/what-is-gaslighting/    Overall, if any of these signs ring true for you, call the National Domestic Violence Hotline at 1-719.288.9646 or chat with with them online 24/7/365. They have advocates to support and listen to you.

## 2024-02-01 NOTE — PROGRESS NOTES
Date: February 4, 2024  Time In: 10:37  Time out: 11:29      This provider is located at the [facility]), [address], [place] [address] using a secure [employer] Video Visit through [employer]. Patient is being seen remotely via telehealth at home address in [place] and stated they are in a secure environment for this session. The patient's condition being diagnosed/treated is appropriate for telemedicine. The provider identified herself as well as her credentials. The patient, and/or patients guardian, consent to be seen remotely, and when consent is given they understand that the consent allows for patient identifiable information to be sent to a third party as needed. They may refuse to be seen remotely at any time. The electronic data is encrypted and password protected, and the patient and/or guardian has been advised of the potential risks to privacy not withstanding such measures.     You have chosen to receive care through a telehealth visit.  Do you consent to use a video/audio connection for your medical care today? Yes    Subjective   Amirah Godoy is a 54 y.o. female who presents today for follow up    Chief Complaint:   Chief Complaint   Patient presents with    Anxiety    Depression    PTSD        History of Present Illness: Rapport was established through conversation and unconditional positive regard. Recent events were discussed and how these events impact Pt's emotional health.   Pt states they have been using coping skills successfully 4 out of 5 times since last report.  Pt reports continuation of symptoms and states the intensity and duration of symptoms has improved over the past 2 weeks. Pt rates anxiety at 5/10 and depression at 5. PHQ-9 results were reviewed with Pt.  Pt states she is doing better at cognitive therapy.  Pt reports she continues to struggle with recovering her cognitive functioning, but she is doing much better  "with her mood. Pt states she has been getting out more often and is getting more comfortable managing stressors such as driving and household tasks. Pt states, \"I have learned to let some things go.\" Pt reports she has begun setting boundaries with her family and others and this is helpful. Pt states she feels her progress is \"going a little better\" and she has is \"a little\" more hopeful she will recover. Pt was allowed to process some feelings of hurt over her family's comments toward her.    Pt denies SI/HI/SIB but states she has had a couplf of times where she felt overwhelmed by daily life  And how her brain injury is \"taking so long to get over.\"   Pt states she often gets over stimulated.   Pt was allowed to process some feeling associatged with marital issues.    Pt reports continued sleep issues and she states she will \"toss and turn\" at night and she wakes 10 or more times during the night.  Pt will address with PCP at next visit if not improved.     Clinical Maneuvering/Intervention:  CBT was utilized to encourage Pt to identify maladaptive thoughts and behaviors and replace with more affirming and positive. CBT was used to encourage Pt to write down activating events, related thoughts and emotions, and ways to improve outcome should a negative situation present again. Pt will acknowledge their empowerment over their situation, no matter how small it may seem. Pt will identify positive and affirming things in their life each morning and acknowledge throughout the day. Pt encouraged to utilize a motto to bolster positivity and to display the motto in conspicuous areas such as bathroom mirror and refrigerator. Pt will repeat motto to self as needed. CBT used to assist Pt with managing anxiety and mood issues through controlled breathing, muscle relaxation, and mindfulness. Pt encouraged to set and maintain appropriate and healthy boundaries with others.        Assisted patient in processing above session " content; acknowledged and normalized patient’s thoughts, feelings, and concerns.  Rationalized patient thought process regarding concerns presented at session.  Discussed triggers associated with patient's  anxiety  and depression  Also discussed coping skills for patient to implement such as self care , positive self talk , and setting boundaries.    Allowed patient to freely discuss issues without interruption or judgment. Provided safe, confidential environment to facilitate the development of positive therapeutic relationship and encourage open, honest communication. Assisted patient in identifying risk factors which would indicate the need for higher level of care including thoughts to harm self or others and/or self-harming behavior and encouraged patient to contact this office, call 911, or present to the nearest emergency room should any of these events occur. Discussed crisis intervention services and means to access. Patient adamantly and convincingly denies current suicidal or homicidal ideation or perceptual disturbance.    Assessment:     Patient appears to maintain relative stability as compared to their baseline.  However, patient continues to struggle with anxiety and depression, which continues to cause impairment in important areas of functioning.  A result, they can be reasonably expected to continue to benefit from treatment and would likely be at increased risk for decompensation otherwise.      PHQ-Score Total:  PHQ-9 Total Score: 20    OTTONIEL-7 Score Total:         Mental Status Exam:   Hygiene:   good  Cooperation:  Cooperative  Eye Contact:  Good  Psychomotor Behavior:  Appropriate  Affect:  Full range  Mood: anxious  Speech:  Normal  Thought Process:  Linear  Thought Content:  Normal  Suicidal:  None  Homicidal:  None  Hallucinations:  None  Delusion:  None  Memory:  Intact  Orientation:  Grossly intact  Reliability:  good  Insight:  Good  Judgement:  Good  Impulse Control:   Good  Physical/Medical Issues:  No        Patient's Support Network Includes:      Functional Status: Moderate impairment     Progress toward goal: Not at goal    Prognosis: Good with Ongoing Treatment         Plan:    Patient will continue in individual outpatient therapy with focus on improved functioning and coping skills, maintaining stability, and avoiding decompensation and the need for higher level of care.    Patient will adhere to medication regimen as prescribed and report any side effects. Patient will contact this office, call 911 or present to the nearest emergency room should suicidal or homicidal ideations occur. Provide Cognitive Behavioral Therapy and Solution Focused Therapy to improve functioning, maintain stability, and avoid decompensation and the need for higher level of care.     Return in about 1 week (around 2/8/2024).      VISIT DIAGNOSIS:    Diagnosis Plan   1. Major depressive disorder, recurrent episode, moderate        2. Post traumatic stress disorder (PTSD)         10:36 EST       This document has been electronically signed by ISRA Liang  February 4, 2024      Part of this note may be an electronic transcription/translation of spoken language to printed text using the Dragon Dictation System.

## 2024-02-05 ENCOUNTER — TELEMEDICINE (OUTPATIENT)
Dept: PSYCHIATRY | Facility: CLINIC | Age: 54
End: 2024-02-05
Payer: COMMERCIAL

## 2024-02-05 DIAGNOSIS — F33.1 MAJOR DEPRESSIVE DISORDER, RECURRENT EPISODE, MODERATE: Primary | ICD-10-CM

## 2024-02-05 DIAGNOSIS — F43.10 POST TRAUMATIC STRESS DISORDER (PTSD): ICD-10-CM

## 2024-02-05 PROCEDURE — 99214 OFFICE O/P EST MOD 30 MIN: CPT | Performed by: NURSE PRACTITIONER

## 2024-02-05 RX ORDER — CITALOPRAM 20 MG/1
30 TABLET ORAL DAILY
Qty: 135 TABLET | Refills: 0 | Status: SHIPPED | OUTPATIENT
Start: 2024-02-05 | End: 2024-05-05

## 2024-02-05 NOTE — PROGRESS NOTES
"This provider is located at the [facility] ([facility]), [address], [facility], [address] using a secure [employer] Video Visit through [employer]. Patient is being seen remotely via telehealth at their home address in [place], and stated they are in a secure environment for this session. The patient's condition being diagnosed/treated is appropriate for telemedicine. The provider identified himself as well as his credentials.   The patient consent to be seen remotely, and when consent is given they understand that the consent allows for patient identifiable information to be sent to a third party as needed.   They may refuse to be seen remotely at any time. The electronic data is encrypted and password protected, and the patient  has been advised of the potential risks to privacy not withstanding such measures.    You have chosen to receive care through a telehealth visit.  Do you consent to use a video/audio connection for your medical care today? Yes    Patient identifiers utilized: Name and date of birth.    Patient verbally confirmed consent for today's encounter- yes    Subjective   Amirah Godoy is a 54 y.o. female who presents today for follow-up appointment.     Chief Complaint:  Depression, PTSD    History of Present Illness:     Patient reports her mood has been \"better\". Feels as though the citalopram is helping. Endorses feeling down and low interest at times. Has had trouble sleeping, but this is more so because she has a hard time getting comfortable with neck pain. Energy levels low. PTSD- has had trouble sleeping. Denies nightmares.     Prior Psychiatric Medications:  Citalopram     The following portions of the patient's history were reviewed and updated as appropriate: allergies, current medications, past family history, past medical history, past social history, past surgical history and problem list.    Allergy:   Allergies   Allergen Reactions "    Demerol [Meperidine] Hives    Lisinopril Cough    Sulfa Antibiotics GI Intolerance        Current Medications:   Current Outpatient Medications   Medication Sig Dispense Refill    citalopram (CeleXA) 20 MG tablet Take 1.5 tablets by mouth Daily for 90 days. 135 tablet 0    acetaminophen (TYLENOL) 325 MG tablet Take 2 tablets by mouth Every 4 (Four) Hours As Needed for Mild Pain.      albuterol (PROVENTIL) (2.5 MG/3ML) 0.083% nebulizer solution       aspirin 325 MG tablet Take 1 tablet by mouth Daily.      cetirizine (ZyrTEC) 10 MG tablet Take 1 tablet by mouth Daily.      FASENRA 30 MG/ML solution prefilled syringe Every 2 (Two) Months.      Fluticasone-Umeclidin-Vilant (TRELEGY ELLIPTA IN) Inhale 1 inhaler Every Morning.      folic acid (FOLVITE) 400 MCG tablet Take 1 tablet by mouth Daily.      ibuprofen (ADVIL,MOTRIN) 600 MG tablet       magnesium oxide (MAG-OX) 400 MG tablet Take 1 tablet by mouth Daily. 30 tablet 5    metoprolol succinate XL (TOPROL-XL) 25 MG 24 hr tablet Take 1 tablet by mouth Daily. 30 tablet 11    montelukast (SINGULAIR) 10 MG tablet TAKE ONE TABLET BY MOUTH DAILY (Patient taking differently: Take 1 tablet by mouth Daily.) 30 tablet 4    nitroglycerin (NITROSTAT) 0.4 MG SL tablet Place 1 tablet under the tongue Every 5 (Five) Minutes As Needed for Chest Pain. 1 under the tongue as needed for angina, may repeat q5mins for up three doses 20 tablet 12    Omeprazole 20 MG tablet delayed-release Take 20 mg by mouth Daily.      Riboflavin 100 MG capsule Take 200 mg by mouth 2 (Two) Times a Day for 30 days. 120 each 5    rosuvastatin (CRESTOR) 5 MG tablet Take 1 tablet by mouth Daily. 30 tablet 11    triamcinolone (KENALOG) 0.1 % cream APPLY TOPICALLY TO THE AFFECTED AREA DAILY FOR 1 WEEK. STOP TIMES 1 WEEK. START TIMES 1 WEEK      valsartan (DIOVAN) 160 MG tablet        No current facility-administered medications for this visit.       Mental Status Exam:   Hygiene:   good  Cooperation:   Cooperative  Eye Contact:  Good  Psychomotor Behavior:  Appropriate  Affect:  Full range  Mood: normal  Hopelessness: Denies  Speech:  Normal  Thought Process:  Goal directed  Thought Content:  Normal  Suicidal:  None  Homicidal:  None  Hallucinations:  None  Delusion:  None  Memory:  Intact  Orientation:  Grossly intact  Reliability:  good  Insight:  Good  Judgement:  Good  Impulse Control:  Good  Physical/Medical Issues:  No      Physical Exam:   Last menstrual period 10/25/2017. There is no height or weight on file to calculate BMI.   Due to the remote nature of this encounter (virtual encounter), vitals were unable to be obtained.  Weight change: n    PHQ-9 Depression Screening  Little interest or pleasure in doing things? (P) 2-->more than half the days   Feeling down, depressed, or hopeless? (P) 2-->more than half the days   Trouble falling or staying asleep, or sleeping too much? (P) 3-->nearly every day   Feeling tired or having little energy? (P) 3-->nearly every day   Poor appetite or overeating? (P) 2-->more than half the days   Feeling bad about yourself - or that you are a failure or have let yourself or your family down? (P) 2-->more than half the days   Trouble concentrating on things, such as reading the newspaper or watching television? (P) 3-->nearly every day   Moving or speaking so slowly that other people could have noticed? Or the opposite - being so fidgety or restless that you have been moving around a lot more than usual? (P) 2-->more than half the days   Thoughts that you would be better off dead, or of hurting yourself in some way? (P) 0-->not at all   PHQ-9 Total Score (P) 19   If you checked off any problems, how difficult have these problems made it for you to do your work, take care of things at home, or get along with other people? (P) very difficult     PHQ-9 Total Score: (P) 19    Feeling nervous, anxious or on edge: (P) Several days  Not being able to stop or control worrying: (P)  Several days  Worrying too much about different things: (P) Several days  Trouble Relaxing: (P) Several days  Being so restless that it is hard to sit still: (P) Not at all  Feeling afraid as if something awful might happen: (P) Several days  Becoming easily annoyed or irritable: (P) More than half the days  OTTONIEL 7 Total Score: (P) 7  If you checked any problems, how difficult have these problems made it for you to do your work, take care of things at home, or get along with other people: (P) Very difficult    Previous available Provider notes and records reviewed by this APRN at today's encounter.     Visit Diagnoses:    ICD-10-CM ICD-9-CM   1. Major depressive disorder, recurrent episode, moderate  F33.1 296.32   2. Post traumatic stress disorder (PTSD)  F43.10 309.81       TREATMENT PLAN: Continue supportive psychotherapy efforts and medications.  Medication and treatment options, both pharmacological and non-pharmacological treatment options, discussed during today's visit, including any off label use of medication. Patient acknowledged and verbally consented with current treatment plan and was educated on the importance of compliance with treatment and follow-up appointments.      - Continue citalopram 30mg po qday to target depression and PTSD    Labs: None ordered at this time  Therapy: Lisa Ribeiro    MEDICATION ISSUES:  Discussed treatment plan and medication options of prescribed medication as well as the risks, benefits, any black box warnings, and side effects including potential falls, possible impaired driving, and metabolic adversities among others, including any off label use of medication. Patient is agreeable to call the office with any worsening of symptoms or onset of side effects, or if any concerns or questions arise.  The contact information for the office is made available to the patient. Patient is agreeable to call 911 or go to the nearest ER should they begin having any SI/HI, or if any urgent  concerns arise. No medication side effects or related complaints today. KAM reviewed as expected.    RISK ASSESSMENT:  Risk of self-harm acutely is moderate.  Risk factors include anxiety disorder, mood disorder, and recent psychosocial stressors (pandemic). Protective factors include no family history, denies access to guns/weapons, no present SI, no history of suicide attempts or self-harm in the past, minimal AODA, healthcare seeking, future orientation, willingness to engage in care.  Risk of self-harm chronically is also moderate, but could be further elevated in the event of treatment noncompliance and/or AODA.    VERBAL INFORMED CONSENT FOR MEDICATION:  The patient was educated that their proposed/prescribed psychotropic medication(s) has potential risks, side effects, adverse effects, and black box warnings; and these have been discussed with the patient.  The patient has been informed that their treatment and medication dosage is to be individualized, and may even be above or below the recommended range/dosage due to patient individualization and response, but medication is prescribed using a shared decision making approach, and no medication or dosage will be prescribed without the patient's verbal consent.  The reason for the use of the medication including any off label use and alternative modes of treatment other than or in addition to medication has been considered and discussed, the probable consequences of not receiving the proposed treatment have been discussed, and any treatment side effects, black box warnings, and cautions associated with treatment have been discussed with the patient.  The patient is allowed ample time to openly discuss and ask questions regarding the proposed medication(s) and treatment plan and the patient verbalizes understanding the reasons for the use of the medication, its potential risks and benefits, other alternative treatment(s), and the probable consequences that  may occur if the proposed medication is not given.  The patient has been given ample time to ask questions and study the information and find the information to be specific, accurate, and complete.  The patient gives verbal consent for the medication(s) proposed/prescribed, they verbalized understanding that they can refuse and withdraw consent at any time with the assistance of this APRN, and the patient has verbally confirmed that they are aware, and are willing, to take the prescribed medication and follow the treatment plan with the known possible risks, side effect, black box warnings, and any potential medication interactions, and the patient reports they will be worse off without this medication and treatment plan.  The patient is advised to contact this APRN/this office if any questions or concerns arise at any time (at [phone]), or call 911/go to the closest emergency department if needed or outside of office hours.      [facility] No Show Policy:  We understand unexpected circumstances arise; however, anytime you miss your appointment we are unable to provide you appropriate care.  In addition, each appointment missed could have been used to provide care for others.  We ask that you call at least 24 hours in advance to cancel or reschedule an appointment.  We would like to take this opportunity to remind you of our policy stating patients who miss THREE or more appointments without cancelling or rescheduling 24 hours in advance of the appointment may be subject to cancellation of any further visits with our clinic and recommendation to seek in-person services/visits.    Please call [phone] to reschedule your appointment. If there are reasons that make it difficult for you to keep the appointments, please call and let us know how we can help.  Please understand that medication prescribing will not continue without seeing your provider.      [facility]  Cl[facility]'s No Show Policy reviewed with patient at today's visit. Patient verbalized understanding of this policy. Discussed with patient that in the event that there are three or more no show visits, it will be recommended that they pursue in-person services/visits as noncompliance with telehealth visits indicates that patient is not an appropriate candidate for telemedicine and would likely be more appropriate for in-person services/visits. Patient verbalizes understanding and is agreeable to this.    MEDS ORDERED DURING VISIT:  New Medications Ordered This Visit   Medications    citalopram (CeleXA) 20 MG tablet     Sig: Take 1.5 tablets by mouth Daily for 90 days.     Dispense:  135 tablet     Refill:  0       Return in about 12 weeks (around 4/29/2024) for Next scheduled follow up.         Progress toward goal: Not at goal    Functional Status: No impairment    Prognosis: Good with Ongoing Treatment     This document has been electronically signed by YORDAN Hendrickson  February 5, 2024 14:10 EST      Please note that portions of this note were completed with a voice recognition program.

## 2024-02-07 ENCOUNTER — HOSPITAL ENCOUNTER (OUTPATIENT)
Dept: SPEECH THERAPY | Facility: HOSPITAL | Age: 54
Setting detail: THERAPIES SERIES
Discharge: HOME | End: 2024-02-07
Payer: COMMERCIAL

## 2024-02-07 DIAGNOSIS — R41.841 COGNITIVE COMMUNICATION DEFICIT: Primary | ICD-10-CM
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
## 2024-02-07 DIAGNOSIS — G45.9 TIA (TRANSIENT ISCHEMIC ATTACK): ICD-10-CM

## 2024-02-07 PROCEDURE — 97130 THER IVNTJ EA ADDL 15 MIN: CPT

## 2024-02-07 PROCEDURE — 97129 THER IVNTJ 1ST 15 MIN: CPT
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
## 2024-02-14 ENCOUNTER — HOSPITAL ENCOUNTER (OUTPATIENT)
Dept: SPEECH THERAPY | Facility: HOSPITAL | Age: 54
Setting detail: THERAPIES SERIES
Discharge: HOME | End: 2024-02-14
Payer: COMMERCIAL
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
## 2024-02-14 DIAGNOSIS — G45.9 TIA (TRANSIENT ISCHEMIC ATTACK): ICD-10-CM

## 2024-02-14 DIAGNOSIS — R41.841 COGNITIVE COMMUNICATION DEFICIT: Primary | ICD-10-CM

## 2024-02-14 PROCEDURE — 97129 THER IVNTJ 1ST 15 MIN: CPT

## 2024-02-14 PROCEDURE — 97130 THER IVNTJ EA ADDL 15 MIN: CPT

## 2024-02-16 ENCOUNTER — TELEMEDICINE (OUTPATIENT)
Dept: PSYCHIATRY | Facility: CLINIC | Age: 54
End: 2024-02-16
Payer: COMMERCIAL

## 2024-02-16 DIAGNOSIS — F43.10 POST TRAUMATIC STRESS DISORDER (PTSD): ICD-10-CM

## 2024-02-16 DIAGNOSIS — F33.1 MAJOR DEPRESSIVE DISORDER, RECURRENT EPISODE, MODERATE: Primary | ICD-10-CM

## 2024-02-16 PROCEDURE — 90834 PSYTX W PT 45 MINUTES: CPT | Performed by: COUNSELOR

## 2024-02-16 NOTE — PROGRESS NOTES
Date: February 16, 2024  Time In: 9:10  Time out: 9:59      This provider is located at the [facility] ([facility] ), [address], [place] [address] using a secure [facility] Visit through [email or web address]. Patient is being seen remotely via telehealth at home address in [place] and stated they are in a secure environment for this session. The patient's condition being diagnosed/treated is appropriate for telemedicine. The provider identified herself as well as her credentials. The patient, and/or patients guardian, consent to be seen remotely, and when consent is given they understand that the consent allows for patient identifiable information to be sent to a third party as needed. They may refuse to be seen remotely at any time. The electronic data is encrypted and password protected, and the patient and/or guardian has been advised of the potential risks to privacy not withstanding such measures.     You have chosen to receive care through a telehealth visit.  Do you consent to use a video/audio connection for your medical care today? Yes    Subjective   Amirah Godoy is a 54 y.o. female who presents today for follow up    Chief Complaint:   Chief Complaint   Patient presents with    Anxiety    Depression    Memory Loss    PTSD        History of Present Illness: Rapport was established through conversation and unconditional positive regard. Recent events were discussed and how these events impact Pt's emotional health.   Pt states they have been using coping skills successfully 3 out of 5 times since last report.  Pt reports continuation of symptoms and states the intensity and duration of symptoms has remained unchanged over the past 2 weeks. Pt rates anxiety at 7/10 and depression at 8. PHQ-9 results were reviewed with Pt.    Pt reports she has been struggling with feeling defeated and down after attending cognitive therapy. Pt states she was told her current  "cognitive status is \"the best I'll be\" and this has contributed to Pt's anxiety and depression over past week. Pt's feelings were normalized and validated. Discussed the importance of setting her own achievable daily goals and acknowledging success every day. Pt makes a daily list and crossed things off the list.  Tries to keep things in perspective.  Acknowledging that she will have good and bad days despite her cognitive status.  Pt states she and her adult daughter were having a conversation and Pt d/c the call when daughter's comments became negative.  Pt praised for her setting boundaries    Pt states her medications are managing symptoms.      Clinical Maneuvering/Intervention: CBT was utilized to encourage Pt to identify maladaptive thoughts and behaviors and replace with more affirming and positive.CBT used to assist Pt with managing anxiety and mood issues through controlled breathing, muscle relaxation, and mindfulness.CBT was used to encourage Pt to write down activating events, related thoughts and emotions, and ways to improve outcome should a negative situation present again. Pt will acknowledge their empowerment over their situation, no matter how small it may seem. Pt will identify positive and affirming things in their life each morning and acknowledge throughout the day. Pt encouraged to utilize a motto to bolster positivity and to display the motto in conspicuous areas such as bathroom mirror and refrigerator. Pt will repeat motto to self as needed. Pt encouraged to set and maintain appropriate and healthy boundaries with others.        Assisted patient in processing above session content; acknowledged and normalized patient’s thoughts, feelings, and concerns.  Rationalized patient thought process regarding concerns presented at session.  Discussed triggers associated with patient's  anxiety , depression , and PTSD Also discussed coping skills for patient to implement such as self care , positive " self talk , and setting boundaries with others and family    Allowed patient to freely discuss issues without interruption or judgment. Provided safe, confidential environment to facilitate the development of positive therapeutic relationship and encourage open, honest communication. Assisted patient in identifying risk factors which would indicate the need for higher level of care including thoughts to harm self or others and/or self-harming behavior and encouraged patient to contact this office, call 911, or present to the nearest emergency room should any of these events occur. Discussed crisis intervention services and means to access. Patient adamantly and convincingly denies current suicidal or homicidal ideation or perceptual disturbance.    Assessment:     Patient appears to maintain relative stability as compared to their baseline.  However, patient continues to struggle with anxiety and depression, which continues to cause impairment in important areas of functioning.  A result, they can be reasonably expected to continue to benefit from treatment and would likely be at increased risk for decompensation otherwise.      PHQ-Score Total:  PHQ-9 Total Score: 23    OTTONIEL-7 Score Total:         Mental Status Exam:   Hygiene:   good  Cooperation:  Cooperative  Eye Contact:  Good  Psychomotor Behavior:  Appropriate  Affect:  Restricted  Mood: normal  Speech:  Normal  Thought Process:  Goal directed  Thought Content:  Normal  Suicidal:  None  Homicidal:  None  Hallucinations:  None  Delusion:  None  Memory:  Deficits  Orientation:  Grossly intact  Reliability:  good  Insight:  Good  Judgement:  Good  Impulse Control:  Good  Physical/Medical Issues:  No        Patient's Support Network Includes:  children     Functional Status: Moderate impairment     Progress toward goal: Not at goal    Prognosis: Good with Ongoing Treatment         Plan:    Patient will continue in individual outpatient therapy with focus on  improved functioning and coping skills, maintaining stability, and avoiding decompensation and the need for higher level of care.    Patient will adhere to medication regimen as prescribed and report any side effects. Patient will contact this office, call 911 or present to the nearest emergency room should suicidal or homicidal ideations occur. Provide Cognitive Behavioral Therapy and Solution Focused Therapy to improve functioning, maintain stability, and avoid decompensation and the need for higher level of care.     Return in about 2 weeks (around 3/1/2024).      VISIT DIAGNOSIS:    Diagnosis Plan   1. Major depressive disorder, recurrent episode, moderate        2. Post traumatic stress disorder (PTSD)         09:08 EST       This document has been electronically signed by ISRA Liang  February 16, 2024      Part of this note may be an electronic transcription/translation of spoken language to printed text using the Dragon Dictation System.

## 2024-02-19 NOTE — TREATMENT PLAN
Multi-Disciplinary Problems (from Behavioral Health Treatment Plan)      Active Problems       Problem: Anxiety  Start Date: 02/19/24      Problem Details: The patient self-scales this problem as a 7 with 10 being the worst.          Goal Priority Start Date Expected End Date End Date    Patient will develop and implement behavioral and cognitive strategies to reduce anxiety and irrational fears. -- 02/19/24 -- --    Goal Details: Progress toward goal:  Not appropriate to rate progress toward goal since this is the initial treatment plan.          Goal Intervention Frequency Start Date End Date    Help patient explore past emotional issues in relation to present anxiety. Weekly 02/19/24 --    Intervention Details: Duration of treatment until until remission of symptoms.          Goal Intervention Frequency Start Date End Date    Help patient develop an awareness of their cognitive and physical responses to anxiety. Weekly 02/19/24 --    Intervention Details: Duration of treatment until until remission of symptoms.                  Problem: Depression  Start Date: 02/19/24      Problem Details: The patient self-scales this problem as a 8 with 10 being the worst.          Goal Priority Start Date Expected End Date End Date    Patient will demonstrate the ability to initiate new constructive life skills outside of sessions on a consistent basis. -- 02/19/24 -- --    Goal Details: Progress toward goal:  Not appropriate to rate progress toward goal since this is the initial treatment plan.          Goal Intervention Frequency Start Date End Date    Assist patient in setting attainable activities of daily living goals. Weekly 02/19/24 --    Intervention Details: Pt will break down tasks into manageable increments  Pt will ask for help when needed.          Goal Intervention Frequency Start Date End Date    Provide education about depression Weekly 02/19/24 --    Intervention Details: Duration of treatment until until  remission of symptoms.          Goal Intervention Frequency Start Date End Date    Assist patient in developing healthy coping strategies. Weekly 02/19/24 --    Intervention Details: Duration of treatment until until remission of symptoms.                  Problem: Medical Issue  Start Date: 02/19/24      Problem Details: The patient self-scales this problem as a 6 with 10 being the worst.          Goal Priority Start Date Expected End Date End Date    Patient will verbalize emotional, cognitive and behavioral changes needed to address health issues. -- 02/19/24 -- --    Goal Details: Progress toward goal:  Not appropriate to rate progress toward goal since this is the initial treatment plan.          Goal Intervention Frequency Start Date End Date    Reinforce emotional stability, behavioral responsibility and positive self talk that reduces risk to health. Weekly 02/19/24 --    Intervention Details: Duration of treatment until until remission of symptoms.                  Problem: Post Traumatic Stress  Start Date: 02/19/24      Problem Details: The patient self-scales this problem as a 5 with 10 being the worst.          Goal Priority Start Date Expected End Date End Date    Patient will process and move through trauma in a way that improves self regard and the patients ability to function optimally in the world around them. -- 02/19/24 -- --    Goal Details: Progress toward goal:  Not appropriate to rate progress toward goal since this is the initial treatment plan.          Goal Intervention Frequency Start Date End Date    Assist patient in identifying ways that trauma has negatively impacted their view of themselves and the world. Weekly 02/19/24 --    Intervention Details: Duration of treatment until until remission of symptoms.          Goal Intervention Frequency Start Date End Date    Process trauma in the context of the safe session environment. Weekly 02/19/24 --    Intervention Details: Duration of  treatment until until remission of symptoms.          Goal Intervention Frequency Start Date End Date    Develop a plan of behavior changes that will reduce the stress of the trauma. Weekly 02/19/24 --    Intervention Details: Duration of treatment until until remission of symptoms.                                 I have discussed and reviewed this treatment plan with the patient.

## 2024-02-21 ENCOUNTER — HOSPITAL ENCOUNTER (OUTPATIENT)
Dept: SPEECH THERAPY | Facility: HOSPITAL | Age: 54
Setting detail: THERAPIES SERIES
Discharge: HOME | End: 2024-02-21
Payer: COMMERCIAL

## 2024-02-21 PROCEDURE — 97129 THER IVNTJ 1ST 15 MIN: CPT

## 2024-02-21 PROCEDURE — 97130 THER IVNTJ EA ADDL 15 MIN: CPT
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
## 2024-02-26 ENCOUNTER — TELEMEDICINE (OUTPATIENT)
Dept: PSYCHIATRY | Facility: CLINIC | Age: 54
End: 2024-02-26
Payer: COMMERCIAL

## 2024-02-26 DIAGNOSIS — F33.1 MAJOR DEPRESSIVE DISORDER, RECURRENT EPISODE, MODERATE: Primary | ICD-10-CM

## 2024-02-26 DIAGNOSIS — F43.10 POST TRAUMATIC STRESS DISORDER (PTSD): ICD-10-CM

## 2024-02-26 PROCEDURE — 90837 PSYTX W PT 60 MINUTES: CPT | Performed by: COUNSELOR

## 2024-02-26 NOTE — PROGRESS NOTES
Date: February 26, 2024  Time In: 8:31  Time out: 9:32      This provider is located at the [facility]), [address], [place] [address] using a secure [employer] Video Visit through [employer]. Patient is being seen remotely via telehealth at home address in [place] and stated they are in a secure environment for this session. The patient's condition being diagnosed/treated is appropriate for telemedicine. The provider identified herself as well as her credentials. The patient, and/or patients guardian, consent to be seen remotely, and when consent is given they understand that the consent allows for patient identifiable information to be sent to a third party as needed. They may refuse to be seen remotely at any time. The electronic data is encrypted and password protected, and the patient and/or guardian has been advised of the potential risks to privacy not withstanding such measures.     You have chosen to receive care through a telehealth visit.  Do you consent to use a video/audio connection for your medical care today? Yes    Subjective   Amirah Godoy is a 54 y.o. female who presents today for follow up    Chief Complaint:   Chief Complaint   Patient presents with    Anxiety    Depression    PTSD        History of Present Illness: Rapport was established through conversation and unconditional positive regard. Recent events were discussed and how these events impact Pt's emotional health.   Pt reports successful use coping skills successfully 5 out of 10 times since last report.  Pt reports continuation of symptoms and states the intensity and duration of symptoms has remained unchanged since last report.. Pt rates anxiety at 8/10 and depression at 7/10. PHQ-9 results were reviewed with Pt.    Pt states she has been frustrated with not being able to or wanting to complete tasks and do activities such [place] the [place], and this has affected Pt's  "mood over the past few days. Pt d/c her cognitive therapy and she talked to her cognitive therapist about this. Pt states she continues to feel anxiety about limitations on her ability to tolerate stressors. Pt gave example of wanting to take a trip with her daughter to a grad school visit and Pt immediately felt defeat at not being able to drive through busy cities such as and [place]. Pt also gave example of wanting to attend a concert but declining due to anxiety about the crowd and stress of the event. Pt encouraged to find ways to tolerate stressors and do things she wants to do.  Pt encouraged to do things like taking turns with her daughter and Pt only driving in light or limited lanes of traffic. This would make it more enjoyable and Pt could be more \"in the moment\" during the trip. Pt encouraged to look into box seats and limiting bathroom and drink visits when crowds are not as big for attending crowded events like concerts.  Pt allowed to process some feelings associated with limitations as well as difficulty with setting attainable and realistic goals. Pt encouraged to refrain from comparing \"the old me\" to her current status.  Pt states this was helpful in gaining insight how she can be more forward thinking and in the moment.    Anxiety 0-10 rate: 8    Depression: 7      Clinical Maneuvering/Intervention: CBT was utilized to encourage Pt to identify maladaptive thoughts and behaviors and replace with more affirming and positive.CBT used to assist Pt with managing anxiety and mood issues through controlled breathing, muscle relaxation, and mindfulness.Dialectical Behavior Therapy techniques employed to encourage Pt to use mindfulness to decrease likelihood of being out of touch with self and others.Assisted patient in processing above session content; acknowledged and normalized patient’s thoughts, feelings, and concerns.  Rationalized patient thought process regarding ability to establish boundaries " and maintain them. Patient identified that doing so would be helpful in managing symptoms.  Discussed triggers associated with patient's anxiety.  Also discussed coping skills for patient to implement such as ways to re-frame and replace unpleasant and maladaptive thoughts with positive life-affirming schema.        Assisted patient in processing above session content; acknowledged and normalized patient’s thoughts, feelings, and concerns.  Rationalized patient thought process regarding concerns presented at session.  Discussed triggers associated with patient's  anxiety  and depression  Also discussed coping skills for patient to implement such as mindfulness , self care , and positive self talk     Allowed patient to freely discuss issues without interruption or judgment. Provided safe, confidential environment to facilitate the development of positive therapeutic relationship and encourage open, honest communication. Assisted patient in identifying risk factors which would indicate the need for higher level of care including thoughts to harm self or others and/or self-harming behavior and encouraged patient to contact this office, call 911, or present to the nearest emergency room should any of these events occur. Discussed crisis intervention services and means to access. Patient adamantly and convincingly denies current suicidal or homicidal ideation or perceptual disturbance.    Assessment:     Patient appears to maintain relative stability as compared to their baseline.  However, patient continues to struggle with anxiety and depression, which continues to cause impairment in important areas of functioning.  A result, they can be reasonably expected to continue to benefit from treatment and would likely be at increased risk for decompensation otherwise.      PHQ-Score Total:  PHQ-9 Total Score: 23     OTTONIEL-7 Score Total:         Mental Status Exam:   Hygiene:   good  Cooperation:  Cooperative  Eye Contact:   Good  Psychomotor Behavior:  Appropriate  Affect:  Full range  Mood: depressed and anxious  Speech:  Normal  Thought Process:  Goal directed  Thought Content:  Normal  Suicidal:  None  Homicidal:  None  Hallucinations:  None  Delusion:  None  Memory:  Intact  Orientation:  Grossly intact  Reliability:  good  Insight:  Good  Judgement:  Good  Impulse Control:  Good  Physical/Medical Issues:  No        Patient's Support Network Includes:      Functional Status: Moderate impairment     Progress toward goal: Not at goal    Prognosis: Good with Ongoing Treatment         Plan:    Patient will continue in individual outpatient therapy with focus on improved functioning and coping skills, maintaining stability, and avoiding decompensation and the need for higher level of care.    Patient will adhere to medication regimen as prescribed and report any side effects. Patient will contact this office, call 911 or present to the nearest emergency room should suicidal or homicidal ideations occur. Provide Cognitive Behavioral Therapy and Solution Focused Therapy to improve functioning, maintain stability, and avoid decompensation and the need for higher level of care.     Return in about 1 week (around 3/4/2024).      VISIT DIAGNOSIS:    Diagnosis Plan   1. Major depressive disorder, recurrent episode, moderate        2. Post traumatic stress disorder (PTSD)         08:31 EST       This document has been electronically signed by ISRA Liang  February 26, 2024      Part of this note may be an electronic transcription/translation of spoken language to printed text using the Dragon Dictation System.

## 2024-02-26 NOTE — PATIENT INSTRUCTIONS
"Amirah,    Here is a book I have used to gain some \"in the moment\" perspective.  I know it's for teens but I find the exercises are short and succinct and honestly very helpful.  It's on [employer]  Mindfulness for Teens in 10 Minutes a Day: Exercises to Feel Calm, Stay Focused & Be Your Best Self Paperback - by Radha Meadows, LMFT  "

## 2024-02-26 NOTE — TREATMENT PLAN
Multi-Disciplinary Problems (from Behavioral Health Treatment Plan)      Active Problems       Problem: Anxiety  Start Date: 02/26/24      Problem Details: The patient self-scales this problem as a 8 with 10 being the worst.          Goal Priority Start Date Expected End Date End Date    Patient will develop and implement behavioral and cognitive strategies to reduce anxiety and irrational fears. -- 02/26/24 -- --    Goal Details: Progress toward goal:  Not appropriate to rate progress toward goal since this is the initial treatment plan.          Goal Intervention Frequency Start Date End Date    Help patient explore past emotional issues in relation to present anxiety. Weekly 02/26/24 --    Intervention Details: Duration of treatment until until remission of symptoms.          Goal Intervention Frequency Start Date End Date    Help patient develop an awareness of their cognitive and physical responses to anxiety. Weekly 02/26/24 --    Intervention Details: Duration of treatment until until remission of symptoms.                  Problem: Depression  Start Date: 02/26/24      Problem Details: The patient self-scales this problem as a 7 with 10 being the worst.          Goal Priority Start Date Expected End Date End Date    Patient will demonstrate the ability to initiate new constructive life skills outside of sessions on a consistent basis. -- 02/26/24 -- --    Goal Details: Progress toward goal:  Not appropriate to rate progress toward goal since this is the initial treatment plan.          Goal Intervention Frequency Start Date End Date    Assist patient in setting attainable activities of daily living goals. Weekly 02/26/24 --    Intervention Details: Pt will set SMART goals and acknowledge progress and achievements.        Goal Intervention Frequency Start Date End Date    Provide education about depression Weekly 02/26/24 --    Intervention Details: Duration of treatment until until remission of symptoms.           Goal Intervention Frequency Start Date End Date    Assist patient in developing healthy coping strategies. Weekly 02/26/24 --    Intervention Details: Duration of treatment until until remission of symptoms.                  Problem: Ineffective Communication  Start Date: 02/26/24      Problem Details: The patient self-scales this problem as a 4 with 10 being the worst.          Goal Priority Start Date Expected End Date End Date    Patient will demonstrate the ability to initiate new communication patterns outside of sessions on a consistent basis. -- 02/26/24 -- --    Goal Details: Progress toward goal:  Not appropriate to rate progress toward goal since this is the initial treatment plan.          Goal Intervention Frequency Start Date End Date    Encourage understanding of past experiences that affect current communication style and educate about healthy communication patterns. Weekly 02/26/24 --    Intervention Details: Duration of treatment until until remission of symptoms.                  Problem: Post Traumatic Stress  Start Date: 02/26/24      Problem Details: The patient self-scales this problem as a 5 with 10 being the worst.          Goal Priority Start Date Expected End Date End Date    Patient will process and move through trauma in a way that improves self regard and the patients ability to function optimally in the world around them. -- 02/26/24 -- --    Goal Details: Progress toward goal:  Not appropriate to rate progress toward goal since this is the initial treatment plan.          Goal Intervention Frequency Start Date End Date    Assist patient in identifying ways that trauma has negatively impacted their view of themselves and the world. Weekly 02/26/24 --    Intervention Details: Duration of treatment until until remission of symptoms.          Goal Intervention Frequency Start Date End Date    Process trauma in the context of the safe session environment. Weekly 02/26/24 --     Intervention Details: Duration of treatment until until remission of symptoms.          Goal Intervention Frequency Start Date End Date    Develop a plan of behavior changes that will reduce the stress of the trauma. Weekly 02/26/24 --    Intervention Details: Duration of treatment until until remission of symptoms.                                 I have discussed and reviewed this treatment plan with the patient.

## 2024-03-22 DIAGNOSIS — F33.1 MAJOR DEPRESSIVE DISORDER, RECURRENT EPISODE, MODERATE: ICD-10-CM

## 2024-03-25 RX ORDER — CITALOPRAM 20 MG/1
30 TABLET ORAL DAILY
Qty: 135 TABLET | Refills: 0 | Status: SHIPPED | OUTPATIENT
Start: 2024-03-25 | End: 2024-06-23

## 2024-04-05 ENCOUNTER — TELEMEDICINE (OUTPATIENT)
Dept: PSYCHIATRY | Facility: CLINIC | Age: 54
End: 2024-04-05
Payer: COMMERCIAL

## 2024-04-05 DIAGNOSIS — F41.1 GENERALIZED ANXIETY DISORDER: ICD-10-CM

## 2024-04-05 DIAGNOSIS — F33.1 MAJOR DEPRESSIVE DISORDER, RECURRENT EPISODE, MODERATE: Primary | ICD-10-CM

## 2024-04-05 NOTE — PROGRESS NOTES
Date: April 5, 2024  Time In: 8:04  Time out: 9:19      This provider is located at the [facility] [place] [facility]), [address], [place] [address] using a secure [employer] Video Visit through [employer]. Patient is being seen remotely via telehealth at home address in [place] and stated they are in a secure environment for this session. The patient's condition being diagnosed/treated is appropriate for telemedicine. The provider identified herself as well as her credentials. The patient, and/or patients guardian, consent to be seen remotely, and when consent is given they understand that the consent allows for patient identifiable information to be sent to a third party as needed. They may refuse to be seen remotely at any time. The electronic data is encrypted and password protected, and the patient and/or guardian has been advised of the potential risks to privacy not withstanding such measures.     You have chosen to receive care through a telehealth visit.  Do you consent to use a video/audio connection for your medical care today? Yes    Subjective   Amirah Godoy is a 54 y.o. female who presents today for follow up appointment.  Chief Complaint:   Chief Complaint   Patient presents with    Anxiety    Depression        History of Present Illness: Rapport was established through conversation and unconditional positive regard. Recent events were discussed and how these events impact Pt's emotional health.   Pt reports successful use of learned coping skills 2   out of 10 times since last report.  Pt reports continuation of symptoms and states the intensity and duration of symptoms has worsened since last report. Pt rates anxiety at 7/10 and depression at 6/10.     Sleep: Pt reports sleep has improved since last report. Healthy sleep habits were discussed such as maintaining a schedule, routine, avoiding caffeine, and limiting screen time before bed.  I go to sleep  "but then toss and turn.    Appetite:  Pt reports appetite has been good since last report.  Discussed the importance of hydration and eating a healthy diet for overall and mental health.  Pt reports overeating and believes she has gained weight due to the antidepressant.    NOTE:  Message sent via confidential chat to LISSA Hicks, with Pt's concerns.    Daily Functioning:  Since last report, Pt states symptoms are causing Moderate difficulty in daily functioning.      Subjective Report:  Pt states she feels like her family treats her like an employee and they are not considerate of her feelings. Pt states she cannot do the things she used to do and they often gas-light her and use that against her.  Pt encouraged to set and maintain firm boundaries and resist urge to comply to unreasonable requests such as dropping what she is doing at home and taking them lunch at the [employer]. Pt states they often leave personal items around the house and expect Pt to take care of it and then complain when the house is messy. Pt states she has always \"just done it all\" in the past and she no longer can nor has the desire to do these tasks now. Normalized Pt's feeling about feeling overwhelmed by having a \"too full plate.\"  Pt was encouraged to set specific boundaries such as telling daughter that when her comments become offensive, Pt will ask daughter to stop and if the talk continues Pt will end the call.  Pt encouraged to allow others to be responsible for their own anger and frustration and to not borrow or allow them to give their own to Pt.  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  Pt encouraged to hire outside help to get caught up, and to place responsibilities back onto adult children.       Reviewed coping skills and encouraged Pt to " continue to practicing daily when not under distress. Pt was praised for their attempts to decrease symptoms and mitigate activating events.    Pt is going to [place] with daughter and friends and Pt is excited about this.  Pt daughter is stressing her about the trip.      Medication compliance: Pt reports medications are being taken daily as prescribed. Discussed the importance of compliance with prescriber's directions and Pt was instructed to report questions/concerns, as well as missed doses or discontinuation of medication by Pt.     Clinical Maneuvering/Intervention:  CBT was utilized to encourage Pt to identify maladaptive thoughts and behaviors and replace with more affirming and positive.Pt encouraged to set and maintain appropriate and healthy boundaries with others. Pt was instructed to practice daily using appropriate and specific words to communicate feelings to others  Encouraged Pt to gain a feeling of empowerment over their situation by being cognizant of the things they are able to manipulate and control, such as choosing an outfit for the day or what they will have for lunch. Motivational interviewing used to encourage Pt to identify strengths which can be utilized in working toward treatment goals. Pt encouraged to practice daily learned skills such as controlled breathing, grounding, and mindfulness. Pt was encouraged to ask for help from support persons to assist them in maintaining stability and alleviate symptoms. Discussed the importance of being one's own mental health advocate and to refrain from seeing the need to ask for help as a weakness. Pt was encouraged to formulate a plan of action to be more proactive in managing stressors and refrain from using reactive and automatic heightened emotional responses.     Solution-focused therapy employed to identify how Pt would like their life to be if they were to make positive changes. Pt encouraged to identify effective coping skills and  strengths they can use to continue utilizing those skills. Pt encouraged to discontinue utilizing non-effective coping mechanisms. Pt provided with feedback to highlight achievements and personal and other resources. Encouraged use of SMART goals    Assisted patient in processing above session content; acknowledged and normalized patient’s thoughts, feelings, and concerns.  Rationalized patient thought process regarding concerns presented at session.  Discussed triggers associated with patient's  anxiety  and depression  Also discussed coping skills for patient to implement such as self care , positive self talk , and setting boundaries, and getting help from outside resources.    Allowed patient to freely discuss issues without interruption or judgment. Provided safe, confidential environment to facilitate the development of positive therapeutic relationship and encourage open, honest communication. Assisted patient in identifying risk factors which would indicate the need for higher level of care including thoughts to harm self or others and/or self-harming behavior and encouraged patient to contact this office, call 911, or present to the nearest emergency room should any of these events occur. Discussed crisis intervention services and means to access. Patient adamantly and convincingly denies current suicidal or homicidal ideation or perceptual disturbance.    Assessment:     Patient appears to maintain relative stability as compared to their baseline.  However, patient persistently struggles with symptoms which continue to cause impairment in important areas of functioning.  As a result, they can be reasonably expected to continue to benefit from treatment and would likely be at increased risk for decompensation otherwise.      PHQ-Score Total:  PHQ-9 Total Score: 21    OTTONIEL-7 Score Total:  Over the last two weeks, how often have you been bothered by the following problems?  Feeling nervous, anxious or on edge:  Nearly every day  Not being able to stop or control worrying: Nearly every day  Worrying too much about different things: Nearly every day  Trouble Relaxing: Nearly every day  Being so restless that it is hard to sit still: More than half the days  Becoming easily annoyed or irritable: Nearly every day  Feeling afraid as if something awful might happen: Nearly every day  OTTONIEL 7 Total Score: 20  If you checked any problems, how difficult have these problems made it for you to do your work, take care of things at home, or get along with other people: Extremely difficult      Mental Status Exam:   Hygiene:   good  Cooperation:  Cooperative  Eye Contact:  Good  Psychomotor Behavior:  Restless  Affect:  Restricted  Mood: depressed, anxious, and irritable  Speech:  Normal  Thought Process:  Goal directed  Thought Content:  Normal  Suicidal:  None  Homicidal:  None  Hallucinations:  None  Delusion:  None  Memory:  Intact  Orientation:  Grossly intact  Reliability:  good  Insight:  Good  Judgement:  Good  Impulse Control:  Good  Physical/Medical Issues:  No        Functional Status: Moderate impairment     Progress toward goal: Not at goal    Prognosis: Good with continued therapeutic intervention        Plan:    Patient will continue in individual outpatient therapy with focus on improved functioning and coping skills, maintaining stability, and avoiding decompensation and the need for higher level of care.    Patient will adhere to medication regimen as prescribed and report any side effects. Patient will contact this office, call 911 or present to the nearest emergency room should suicidal or homicidal ideations occur. Provide Cognitive Behavioral Therapy and Solution Focused Therapy to improve functioning, maintain stability, and avoid decompensation and the need for higher level of care.     Return in about 2 years (around 4/5/2026).      VISIT DIAGNOSIS:    Diagnosis Plan   1. Major depressive disorder, recurrent  episode, moderate        2. Generalized anxiety disorder         08:04 EDT       This document has been electronically signed by ISRA Liang  April 5, 2024      Part of this note may be an electronic transcription/translation of spoken language to printed text using the Dragon Dictation System.

## 2024-04-13 ENCOUNTER — APPOINTMENT (OUTPATIENT)
Dept: GENERAL RADIOLOGY | Facility: HOSPITAL | Age: 54
End: 2024-04-13
Payer: COMMERCIAL

## 2024-04-13 ENCOUNTER — HOSPITAL ENCOUNTER (EMERGENCY)
Facility: HOSPITAL | Age: 54
Discharge: HOME | End: 2024-04-13
Attending: EMERGENCY MEDICINE
Payer: COMMERCIAL

## 2024-04-13 VITALS
TEMPERATURE: 97.1 F | SYSTOLIC BLOOD PRESSURE: 119 MMHG | HEIGHT: 67 IN | HEART RATE: 70 BPM | DIASTOLIC BLOOD PRESSURE: 64 MMHG | BODY MASS INDEX: 35.99 KG/M2 | WEIGHT: 229.28 LBS | RESPIRATION RATE: 18 BRPM | OXYGEN SATURATION: 93 %

## 2024-04-13 DIAGNOSIS — S99.911A INJURY OF RIGHT ANKLE, INITIAL ENCOUNTER: Primary | ICD-10-CM

## 2024-04-13 DIAGNOSIS — S99.921A INJURY OF RIGHT FOOT, INITIAL ENCOUNTER: ICD-10-CM

## 2024-04-13 PROCEDURE — 99283 EMERGENCY DEPT VISIT LOW MDM: CPT

## 2024-04-13 PROCEDURE — 73630 X-RAY EXAM OF FOOT: CPT

## 2024-04-13 PROCEDURE — 73610 X-RAY EXAM OF ANKLE: CPT

## 2024-04-13 RX ORDER — HYDROCODONE BITARTRATE AND ACETAMINOPHEN 5; 325 MG/1; MG/1
1 TABLET ORAL EVERY 6 HOURS PRN
Qty: 12 TABLET | Refills: 0 | Status: SHIPPED | OUTPATIENT
Start: 2024-04-13

## 2024-04-13 RX ORDER — OXYCODONE AND ACETAMINOPHEN 7.5; 325 MG/1; MG/1
1 TABLET ORAL ONCE
Status: COMPLETED | OUTPATIENT
Start: 2024-04-13 | End: 2024-04-13

## 2024-04-13 RX ORDER — HYDROCODONE BITARTRATE AND ACETAMINOPHEN 5; 325 MG/1; MG/1
1 TABLET ORAL EVERY 6 HOURS PRN
Qty: 12 TABLET | Refills: 0 | Status: SHIPPED | OUTPATIENT
Start: 2024-04-13 | End: 2024-04-13

## 2024-04-13 RX ADMIN — OXYCODONE HYDROCHLORIDE AND ACETAMINOPHEN 1 TABLET: 7.5; 325 TABLET ORAL at 20:46

## 2024-04-13 NOTE — ED NOTES
Pt arrived via wheelchair from home w/ c/o mechanical fall going down two steps. Pt reports R ankle/ foot pain @ this time.

## 2024-04-14 NOTE — ED PROVIDER NOTES
EMERGENCY DEPARTMENT MD ATTESTATION NOTE    Room Number:  06/06  PCP: Dejuan Krause Jr., MD  Independent Historians: Patient    HPI:  A complete HPI/ROS/PMH/PSH/SH/FH are unobtainable due to: None    Chronic or social conditions impacting patient care (Social Determinants of Health): None      Context: Amirah Godoy is a 54 y.o. female with a medical history of asthma, hypertension and paroxysmal atrial fibrillation who presents to the ED c/o acute injury to the right foot and ankle after an accidental fall down stairs.  The pain has increased throughout the day to the point that she has had difficulties bearing weight on the foot and ankle now.  She denies any other areas of injury or concern.  Did not hit her head or have any loss of consciousness.        Review of prior external notes (non-ED) -and- Review of prior external test results outside of this encounter: I independently reviewed the neurology office progress note from January 25, 2024 when she had follow-up for concussion injury.    Prescription drug monitoring program review:     N/A and KMA query complete and reviewed. Treatment plan to include limited course of prescribed controlled substance. Risks including addiction, benefits, and alternatives presented to patient.      PHYSICAL EXAM    I have reviewed the triage vital signs and nursing notes.    ED Triage Vitals [04/13/24 1958]   Temp Heart Rate Resp BP SpO2   97.1 °F (36.2 °C) 77 18 147/91 95 %      Temp src Heart Rate Source Patient Position BP Location FiO2 (%)   Tympanic Monitor Sitting Right arm --       Physical Exam  GENERAL: alert, no acute distress  SKIN: Warm, dry  HENT: Normocephalic, atraumatic  EYES: no scleral icterus  CV: regular rhythm, regular rate  RESPIRATORY: normal effort, lungs clear  ABDOMEN: soft, nontender, nondistended  MUSCULOSKELETAL: no deformity evident.  There is moderate tenderness throughout the right ankle and right foot areas with slight soft tissue  swelling present.  The foot is warm and well-perfused.  Distal neurovascular exam is normal at the toes.  There is no tenderness to the proximal tibia or fibula.  NEURO: alert, moves all extremities, follows commands            MEDICATIONS GIVEN IN ER  Medications   oxyCODONE-acetaminophen (PERCOCET) 7.5-325 MG per tablet 1 tablet (1 tablet Oral Given 4/13/24 2046)         ORDERS PLACED DURING THIS VISIT:  Orders Placed This Encounter   Procedures    [facility] 08.  CAM Boot; Yes; Yes; pain/injury; Yes    XR Foot 3+ View Right    XR Ankle 3+ View Right         PROCEDURES  Procedures            PROGRESS, DATA ANALYSIS, CONSULTS, AND MEDICAL DECISION MAKING  All labs have been independently interpreted by me.  All radiology studies have been reviewed by me. All EKG's have been independently viewed and interpreted by me.  Discussion below represents my analysis of pertinent findings related to patient's condition, differential diagnosis, treatment plan and final disposition.    Differential diagnosis includes but is not limited to foot fracture, foot sprain, ankle fracture, ankle sprain.    Clinical Scores:                   ED Course as of 04/15/24 2357   Sat Apr 13, 2024 2045 I discussed the case with Dr. Castaneda and they agree to evaluate the patient at the bedside.    [AR]   2120 I reviewed the right foot and right ankle x-rays on PACS.  My independent interpretation is no dislocation, no fracture. [AR]   2210 Roman reviewed, no injuries found from April 14, 2023 to today. [AR]   2211 The patient was reexamined.  They have had symptomatic improvement during their ED stay.  I discussed today's findings with the patient, explaining the pertinent positives and negatives from today's visit, and the plan of care.  Discussed plan for discharge as there is no emergent indication for admission.  Discussed limitation of the ED work-up and that this is to rule out life-threatening emergencies but that they could  "require further testing as determined by their primary care and or any referred specialist patient is agreeable and understands need for follow-up and repeat exam/testing.  Patient is aware that discharge does not mean there is nothing wrong, indicates no emergency is present, and that they must continue their care with their primary care physician and/or any referred specialist.  They were given appropriate follow-up with their primary care physician and/or specialist.  I had an extensive discussion on the expected clinical course and return precautions.  Patient understands to return to the emergency department for continuation, worsening, or new symptoms.  I answered any of the patient's questions. Patient was discharged home in a stable condition.     [AR]      ED Course User Index  [AR] Stacie Marcus APRN       MDM:   I independently interpreted the x-ray of the right foot and my findings are: No fracture or dislocation  I independently interpreted the x-ray of the right ankle and my findings are: No fracture or dislocation    No acute osseous injuries identified on imaging today.  I suspect she has soft tissue injury such as sprain or perhaps tenderness or ligamentous tear.  Plan to place patient in a cam boot for comfort and immobilization so that she may weight-bear as tolerated.  Will encourage follow-up with podiatrist for further evaluation and outpatient imaging studies as appropriate.  Will discuss with her the usual \"return to ER\" instructions for any worsening signs or symptoms.    COMPLEXITY OF CARE  Admission was considered but after careful review of the patient's presentation, physical examination, diagnostic results, and response to treatment the patient may be safely discharged with outpatient follow-up.    Please note that portions of this document were completed with a voice recognition program.    Note Disclaimer: At [facility], we believe that sharing information builds trust and " better relationships. You are receiving this note because you recently visited [facility]. It is possible you will see health information before a provider has talked with you about it. This kind of information can be easy to misunderstand. To help you fully understand what it means for your health, we urge you to discuss this note with your provider.         Mike Castaneda MD  04/13/24 0047       Mike Castaneda MD  04/15/24 [id number]

## 2024-04-14 NOTE — DISCHARGE INSTRUCTIONS
You have been given emergency department evaluation.  This evaluation is intended to rule out life-threatening conditions.  Is not a complete evaluation.  You could require further testing as determined by your primary care physician or any referred specialist.  Please follow-up with all doctors that you are referred to.  Please be sure to take your prescribed medications and follow any specific instructions in the discharge instructions.  Please follow-up with your primary care physician within 48 hours.  Please have your primary care provider recheck your blood pressure.  Rest, Ice and elevate your foot/ankle as much as possible.  Do not put ice directly on your skin.   Please follow up with Dr. Toussaint with podiatry for further evaluation and management of foot/ankle pain.  Wear CAM boot when up and walking, you do not have to wear while sleeping.  Do not drive, work, operate heavy machinery, or otherwise engage in any risky behavior while on narcotic pain medications or other sedating drugs. Do not sign any legal paperwork within 24 hours of taking narcotic pain medications or other sedating drugs. These medications may impair judgement and/or motor capabilities.  Narcotic Medication Notice:    Today you are receiving a medication with properties that cause sedation and/or drowsiness. Please use caution when taking this medication to avoid situations where decreased concentration or sleepiness may affect your performance of the task at hand. This includes driving vehicles, operating machinery, or any other task where your personal safety or the safety of others can be a risk. I do not recommend continuing any these activities while this medication until you know how it can affect you.     Also please be aware that these medications have been known to be habit forming. This means that even when taken as prescribed they can be addictive. Please pay attention to how and when you take this medication and be  sensitive to the fact you may become addicted. Take only as directed. Do not change the manner in which you take this medication until you discuss with a licensed physician. Should addiction occur I recommend discussing with your primary care doctor or return to the emergency department.    Lastly, the emergency department typically does not refill pain medication prescriptions due to limitations set out in [facility] laws. Any necessary refills need to be discussed with your primary care provider and/or pain management provider.    It is a [place] state law that you take your narcotic medication as prescribed and do not take any excessive pills. It is illegal to obtain prescriptions for any narcotic medications from multiple providers at the same time.    Please return to the emergency department if you experience chest pain, shortness of breath, abdominal pain, fever greater than 102, intractable vomiting.  Please return to the emergency department if your symptoms continue or worsen, or if you begin to experience any other concerning symptom.
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
## 2024-05-06 ENCOUNTER — TELEMEDICINE (OUTPATIENT)
Dept: PSYCHIATRY | Facility: CLINIC | Age: 54
End: 2024-05-06
Payer: COMMERCIAL

## 2024-05-06 DIAGNOSIS — F43.10 POST TRAUMATIC STRESS DISORDER (PTSD): ICD-10-CM

## 2024-05-06 DIAGNOSIS — F33.1 MAJOR DEPRESSIVE DISORDER, RECURRENT EPISODE, MODERATE: Primary | ICD-10-CM

## 2024-05-06 PROCEDURE — 99214 OFFICE O/P EST MOD 30 MIN: CPT | Performed by: NURSE PRACTITIONER

## 2024-05-06 RX ORDER — CITALOPRAM 20 MG/1
30 TABLET ORAL DAILY
Qty: 135 TABLET | Refills: 0 | Status: SHIPPED | OUTPATIENT
Start: 2024-06-16 | End: 2024-09-14

## 2024-05-07 ENCOUNTER — TELEMEDICINE (OUTPATIENT)
Dept: PSYCHIATRY | Facility: CLINIC | Age: 54
End: 2024-05-07
Payer: COMMERCIAL

## 2024-05-07 DIAGNOSIS — F33.1 MAJOR DEPRESSIVE DISORDER, RECURRENT EPISODE, MODERATE: Primary | ICD-10-CM

## 2024-05-07 DIAGNOSIS — F41.1 GENERALIZED ANXIETY DISORDER: ICD-10-CM

## 2024-05-07 PROCEDURE — 90837 PSYTX W PT 60 MINUTES: CPT | Performed by: COUNSELOR

## 2024-05-10 ENCOUNTER — HOSPITAL ENCOUNTER (OUTPATIENT)
Dept: GENERAL RADIOLOGY | Facility: HOSPITAL | Age: 54
Discharge: HOME | End: 2024-05-10
Admitting: FAMILY MEDICINE
Payer: COMMERCIAL

## 2024-05-10 DIAGNOSIS — R05.9 COUGH, UNSPECIFIED TYPE: ICD-10-CM

## 2024-05-10 PROCEDURE — 71046 X-RAY EXAM CHEST 2 VIEWS: CPT

## 2024-05-13 RX ORDER — METOPROLOL SUCCINATE 25 MG/1
25 TABLET, EXTENDED RELEASE ORAL DAILY
Qty: 30 TABLET | Refills: 4 | Status: SHIPPED | OUTPATIENT
Start: 2024-05-13

## 2024-05-14 ENCOUNTER — TELEMEDICINE (OUTPATIENT)
Dept: PSYCHIATRY | Facility: CLINIC | Age: 54
End: 2024-05-14
Payer: COMMERCIAL

## 2024-05-14 DIAGNOSIS — F41.1 GENERALIZED ANXIETY DISORDER: Primary | ICD-10-CM

## 2024-05-14 PROCEDURE — 90837 PSYTX W PT 60 MINUTES: CPT | Performed by: COUNSELOR

## 2024-05-14 NOTE — PROGRESS NOTES
Date: May 14, 2024  Time In: 10:06  Time out: 11;07Prior to or during assessment Pt began to verbally process feelings over recent events, and Counselor allowed Pt to continue processing without interruption. Pt denies recent or current presence of thoughts of wanting to harm self or end their life.      This provider is located at the [facility] (through [facility]), [address], [place] [address] using a secure [employer] Video Visit through [employer]. Patient is being seen remotely via telehealth at home address in [place] and stated they are in a secure environment for this session. The patient's condition being diagnosed/treated is appropriate for telemedicine. The provider identified herself as well as her credentials. The patient, and/or patients guardian, consent to be seen remotely, and when consent is given they understand that the consent allows for patient identifiable information to be sent to a third party as needed. They may refuse to be seen remotely at any time. The electronic data is encrypted and password protected, and the patient and/or guardian has been advised of the potential risks to privacy not withstanding such measures.     You have chosen to receive care through a telehealth visit.  Do you consent to use a video/audio connection for your medical care today? Yes    Subjective   Amirah Godoy is a 54 y.o. female who presents today for follow up appointment.  Chief Complaint:   Chief Complaint   Patient presents with    Anxiety    Depression        History of Present Illness: Rapport was established through conversation and unconditional positive regard. Recent events were discussed and how these events impact Pt's emotional health.   Pt reports successful use of learned coping skills 7 out of 10 times since last report.  Pt reports continuation of symptoms and states the intensity and duration of symptoms has remained unchanged since last  "report. Pt rates anxiety at 5/10 and depression at 0/10.     Sleep: Pt reports sleep has remained unchanged since last report. Healthy sleep habits were discussed such as maintaining a schedule, routine, avoiding caffeine, and limiting screen time before bed.    Appetite:  Pt reports appetite has been good since last report.  Discussed the importance of hydration and eating a healthy diet for overall and mental health.    Medication compliance: Pt reports medications are being taken daily as prescribed. Discussed the importance of compliance with prescriber's directions and Pt was instructed to report questions/concerns, as well as missed doses or discontinuation of medication by Pt.     Safety Plan in Place: No Pt denies SI/HI/SIB recent or current    Daily Functioning:  Since last report, Pt states symptoms are causing Mild difficulty in daily functioning.      Subjective Report:  Pt states she has a good Mother's Day and that was healthy and beneficial for her.  States it was a \"low key day.\"  States she used SMART goals and setting a specific time to do tasks. States when she made a goal of doing specific tasks during a period of time set aside for this, she had some \"wins\" and this was good.  States she likes having specific goals for each day and refraining from doing too much. \"It's huge for me\"  States she is working on not comparing her \"old self\" to her self now. States she is getting ready to go to [facility] and she is getting nervous. Pt states she was able to effectively communicate her boundaries to son and DIL about Pt going on vacation around the time their child is due. Pt states she will not allow their statements and guilt trips to influence her decisions and this was good.  States she will talk to her  about wanting him to advocate for her more, especially with the children. States returning from her trip will be a good starting point to implement her plan to discuss expectations of she and "  being each other's advocates in their marriage. Pt states she is in a good place not and feels she has reach some goals and is moving forward in other goals.    .  Reviewed coping skills and encouraged Pt to continue to practicing coping skills daily when not under distress. Pt was praised for their attempts to decrease symptoms and mitigate activating events.    Clinical Maneuvering/Intervention:  CBT was utilized to encourage Pt to identify maladaptive thoughts and behaviors and replace with more affirming and positive.Pt encouraged to set and maintain appropriate and healthy boundaries with others. Pt was instructed to practice daily using appropriate and specific words to communicate feelings to others.  Motivational interviewing used to encourage Pt to identify strengths which can be utilized in working toward treatment goals. Pt encouraged to practice daily learned skills such as controlled breathing, grounding, and mindfulness. Pt was encouraged to ask for help from support persons to assist them in maintaining stability and alleviate symptoms. Discussed the importance of being one's own mental health advocate and to refrain from seeing the need to ask for help as a weakness. Pt was encouraged to formulate a plan of action to be more proactive in managing stressors and refrain from using reactive and automatic heightened emotional responses.     Solution-focused therapy employed to identify how Pt would like their life to be if they were to make positive changes. Pt encouraged to identify effective coping skills and strengths they can use to continue utilizing those skills. Pt encouraged to discontinue utilizing non-effective coping mechanisms. Pt provided with feedback to highlight achievements and personal and other resources. Encouraged use of SMART goals    Assisted patient in processing above session content; acknowledged and normalized patient’s thoughts, feelings, and concerns.  Rationalized  patient thought process regarding concerns presented at session.  Discussed triggers associated with patient's  anxiety  and depression  Also discussed coping skills for patient to implement such as grounding , self care , and setting boundaries.    Allowed patient to freely discuss issues without interruption or judgment. Provided safe, confidential environment to facilitate the development of positive therapeutic relationship and encourage open, honest communication. Assisted patient in identifying risk factors which would indicate the need for higher level of care including thoughts to harm self or others and/or self-harming behavior and encouraged patient to contact this office, call 911, or present to the nearest emergency room should any of these events occur. Discussed crisis intervention services and means to access. Patient adamantly and convincingly denies current suicidal or homicidal ideation or perceptual disturbance.    Assessment:     Patient appears to maintain relative stability as compared to their baseline.  However, patient persistently struggles with symptoms which continue to cause impairment in important areas of functioning.  As a result, they can be reasonably expected to continue to benefit from treatment and would likely be at increased risk for decompensation otherwise.      PHQ-Score Total:  PHQ-9 Total Score:      OTTONIEL-7 Score Total:         Mental Status Exam:   Hygiene:   good  Cooperation:  Cooperative  Eye Contact:  good   Psychomotor Behavior:  Appropriate  Affect:  Full range  Mood: normal  Speech:  Normal  Thought Process:  Goal directed  Thought Content:  Normal  Suicidal:  None  Homicidal: None  Hallucinations:  None  Delusion: None  Memory:  Intact  Orientation:  Grossly Intact  Reliability:  good  Insight:  Good  Judgement:  Good  Impulse Control:  Good  Physical/Medical Issues:  No        Functional Status: Mild impairment     Progress toward goal: Not at goal    Prognosis:  Good with continued therapeutic intervention        Plan:    Patient will continue in individual outpatient therapy with focus on improved functioning and coping skills, maintaining stability, and avoiding decompensation and the need for higher level of care.    Patient will adhere to medication regimen as prescribed and report any side effects. Patient will contact this office, call 911 or present to the nearest emergency room should suicidal or homicidal ideations occur. Provide Cognitive Behavioral Therapy and Solution Focused Therapy to improve functioning, maintain stability, and avoid decompensation and the need for higher level of care.     Return in about 3 weeks (around 6/4/2024).      VISIT DIAGNOSIS:    Diagnosis Plan   1. Generalized anxiety disorder         10:06 EDT       This document has been electronically signed by ISRA Liang  May 14, 2024      Part of this note may be an electronic transcription/translation of spoken language to printed text using the Dragon Dictation System.

## 2024-06-19 NOTE — PROGRESS NOTES
Subjective:        Amirah Godoy is a 49 y.o. female who here for follow up    Chief Complaint   Patient presents with   • Results     LAB RESULTS       HPI   Amirah Godoy is a 49-year-old female, who is new to me.  She is here to go over lab work.  He has a new problem with elevated liver enzymes. She has a history of palpitations, hypertension, proximal atrial fibrillation, and hyperlipidemia.     Loop recorder checked on 2/19/2019 was within normal limits no atrial fibrillation shown.  She denies chest pain, and palpitations.  She says she has shortness of breath at times with her asthma and a cough due to getting over bronchitis.    The following portions of the patient's history were reviewed and updated as appropriate: allergies, current medications, past family history, past medical history, past social history, past surgical history and problem list.    Past Medical History:   Diagnosis Date   • Allergic    • Asthma    • Depression    • History of atrial fibrillation 2011    NO CURRENT MEDICATION   • Hyperlipidemia    • PVC (premature ventricular contraction) 2015   • Reflux esophagitis    • Seasonal allergies    • Sinus infection     STARTED ON ANTIBIOTICS ON 10/30/17   • SVT (supraventricular tachycardia) (CMS/HCC) 2011    NO CURRENT MEDICATIONS         reports that  has never smoked. she has never used smokeless tobacco. She reports that she does not drink alcohol or use drugs.     Family History   Problem Relation Age of Onset   • Depression Mother    • Diabetes Mother    • Heart disease Mother         +of mi at 68   • Hyperlipidemia Mother    • Hypertension Mother    • Heart disease Father         ptca x 5 and mi at 59   • Hyperlipidemia Father    • Hypertension Father    • Kidney disease Brother    • Birth defects Maternal Grandmother    • Heart disease Maternal Grandmother         multiple mi   • Birth defects Maternal Grandfather    • Heart disease Maternal Grandfather         + mi at 68   •  Colon cancer Maternal Grandfather    • Heart disease Paternal Grandmother         weak heart   • Asthma Paternal Grandmother    • COPD Paternal Grandmother    • Kidney failure Paternal Grandfather    • Migraines Sister    • Malig Hyperthermia Neg Hx        ROS     Review of Systems  Constitutional: No wt loss, fever, fatigue  Gastrointestinal: No nausea, abdominal pain  Behavioral/Psych: No insomnia or anxiety  Cardiovascular: denies chest pain, pressure      Objective:           Physical Exam   Constitutional: She is oriented to person, place, and time. She appears well-developed and well-nourished.   HENT:   Head: Normocephalic.   Right Ear: External ear normal.   Left Ear: External ear normal.   Eyes: EOM are normal.   Neck: Normal range of motion. No JVD present.   Cardiovascular: Normal rate, regular rhythm, normal heart sounds and intact distal pulses. Exam reveals no gallop and no friction rub.   No murmur heard.  Pulmonary/Chest: Effort normal and breath sounds normal. No stridor. No respiratory distress. She has no rales.   Abdominal: Soft. Bowel sounds are normal. She exhibits no distension. There is no tenderness. There is no guarding.   Musculoskeletal: Normal range of motion. She exhibits no edema or tenderness.   Neurological: She is alert and oriented to person, place, and time. She has normal reflexes.   Skin: Skin is warm.   Psychiatric: She has a normal mood and affect. Judgment normal.   Nursing note and vitals reviewed.        ECG 12 Lead  Date/Time: 3/1/2019 10:35 AM  Performed by: Catalina Ferrell APRN  Authorized by: Catalina Ferrell APRN   Comparison: compared with previous ECG   Similar to previous ECG  Rhythm: sinus tachycardia               Current Outpatient Medications:   •  ARIPiprazole (ABILIFY) 5 MG tablet, Take 5 mg by mouth Daily., Disp: , Rfl:   •  aspirin 325 MG tablet, Take 325 mg by mouth Daily., Disp: , Rfl:   •  cetirizine (ZyrTEC) 10 MG tablet, Take 10 mg by mouth  daily., Disp: , Rfl:   •  citalopram (CeleXA) 20 MG tablet, Take 40 mg by mouth Daily., Disp: , Rfl:   •  Fluticasone-Umeclidin-Vilant (TRELEGY ELLIPTA IN), Inhale 1 inhaler Every Morning., Disp: , Rfl:   •  montelukast (SINGULAIR) 10 MG tablet, TAKE ONE TABLET BY MOUTH DAILY, Disp: 30 tablet, Rfl: 4  •  omeprazole (priLOSEC) 40 MG capsule, Take 40 mg by mouth Daily., Disp: , Rfl:   •  rosuvastatin (CRESTOR) 10 MG tablet, Take 1 tablet by mouth Daily., Disp: 30 tablet, Rfl: 11  •  Topiramate ER (TROKENDI XR) 50 MG capsule sustained-release 24 hr, Take 1 capsule by mouth Every Night., Disp: 30 capsule, Rfl: 4  •  zolpidem (AMBIEN) 5 MG tablet, Take 5 mg by mouth At Night As Needed for Sleep., Disp: , Rfl:   •  nitroglycerin (NITROSTAT) 0.4 MG SL tablet, 1 under the tongue as needed for angina, may repeat q5mins for up three doses, Disp: 100 tablet, Rfl: 11     Assessment:        Patient Active Problem List   Diagnosis   • Chest pain   • Reactive depression   • Seasonal allergic rhinitis due to pollen   • Right ovarian cyst   • History of atrial fibrillation   • Asthma with exacerbation   • Cough   • Weakness   • Tachycardia   • Influenza A   • Palpitations   • Swelling   • Essential hypertension   • Paroxysmal atrial fibrillation (CMS/HCC)   • Hyperlipidemia   • Precordial pain   • Visual loss   • Status post device closure of ASD   • Status post placement of implantable loop recorder               Plan:   1.  Tachycardia:    Treat medically        2.  Paroxysmal atrial fibrillation  Watch carefully   2/19/19 loop checked WNL     3. Palpitations   None currently     4. Essential hypertension  Controlled    5.  Hyperlipidemia   Will stop Crestor due to elevated liver enzymes.  Will see about getting approved for Repatha.  Follow-up in 1 month and re-do a CMP after she starts Rapatha.       No diagnosis found.    There are no diagnoses linked to this encounter.    COUNSELING:    Amirah Kevin was given to  patient for the following topics: diagnostic results, risk factor reductions, impressions, risks and benefits of treatment options and importance of treatment compliance .       SMOKING COUNSELING:    Counseling given: Not Answered    Will stop crestor and see about getting approved for Rapatha. Will see in one month after starting Rapatha. Will do cmp and lipid.    Sincerely,   YORDAN Mccloud  [facility]  03/01/19  10:34 AM   No

## 2024-07-02 DIAGNOSIS — F33.1 MAJOR DEPRESSIVE DISORDER, RECURRENT EPISODE, MODERATE: ICD-10-CM

## 2024-07-02 RX ORDER — CITALOPRAM 20 MG/1
30 TABLET ORAL DAILY
Qty: 30 TABLET | Refills: 0 | Status: SHIPPED | OUTPATIENT
Start: 2024-07-02

## 2024-07-02 NOTE — TELEPHONE ENCOUNTER
Patient has an appointment with provider Jacki Pablo on 07/23/2024.  Patient called stating the pharmacy will not refill her celexa because Jignesh Elias's NPI is no longer good in [place].  Patient is out of medication and would like to know if provider can send a refill in until she can be seen on 07/23/2024. Please advise.
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
## 2024-07-23 ENCOUNTER — TELEMEDICINE (OUTPATIENT)
Dept: PSYCHIATRY | Facility: CLINIC | Age: 54
End: 2024-07-23
Payer: COMMERCIAL

## 2024-07-23 DIAGNOSIS — F33.1 MAJOR DEPRESSIVE DISORDER, RECURRENT EPISODE, MODERATE: Primary | ICD-10-CM

## 2024-07-23 DIAGNOSIS — F41.1 GENERALIZED ANXIETY DISORDER: ICD-10-CM

## 2024-07-23 DIAGNOSIS — F43.10 POST TRAUMATIC STRESS DISORDER (PTSD): ICD-10-CM
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
## 2024-07-25 RX ORDER — CITALOPRAM 20 MG/1
30 TABLET ORAL DAILY
Qty: 135 TABLET | Refills: 1 | Status: SHIPPED | OUTPATIENT
Start: 2024-07-25

## 2024-07-30 ENCOUNTER — OFFICE VISIT (OUTPATIENT)
Dept: NEUROLOGY | Facility: CLINIC | Age: 54
End: 2024-07-30
Payer: COMMERCIAL

## 2024-07-30 VITALS
WEIGHT: 230.9 LBS | DIASTOLIC BLOOD PRESSURE: 74 MMHG | OXYGEN SATURATION: 95 % | HEIGHT: 67 IN | BODY MASS INDEX: 36.24 KG/M2 | SYSTOLIC BLOOD PRESSURE: 118 MMHG | HEART RATE: 84 BPM

## 2024-07-30 DIAGNOSIS — G43.009 MIGRAINE WITHOUT AURA AND WITHOUT STATUS MIGRAINOSUS, NOT INTRACTABLE: Primary | ICD-10-CM

## 2024-07-30 PROCEDURE — 99212 OFFICE O/P EST SF 10 MIN: CPT | Performed by: NURSE PRACTITIONER

## 2024-07-30 RX ORDER — TRIAMCINOLONE ACETONIDE 1 MG/G
CREAM TOPICAL
COMMUNITY
Start: 2024-07-30 | End: 2024-07-30

## 2024-07-30 RX ORDER — RIMEGEPANT SULFATE 75 MG/75MG
75 TABLET, ORALLY DISINTEGRATING ORAL DAILY PRN
Qty: 15 TABLET | Refills: 5 | Status: SHIPPED | OUTPATIENT
Start: 2024-07-30
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
## 2024-08-06 ENCOUNTER — OFFICE VISIT (OUTPATIENT)
Dept: CARDIOLOGY | Facility: CLINIC | Age: 54
End: 2024-08-06
Payer: COMMERCIAL

## 2024-08-06 VITALS
HEART RATE: 78 BPM | BODY MASS INDEX: 34.86 KG/M2 | WEIGHT: 230 LBS | HEIGHT: 68 IN | DIASTOLIC BLOOD PRESSURE: 76 MMHG | SYSTOLIC BLOOD PRESSURE: 120 MMHG

## 2024-08-06 DIAGNOSIS — E78.5 HYPERLIPIDEMIA, UNSPECIFIED HYPERLIPIDEMIA TYPE: Primary | ICD-10-CM

## 2024-08-06 DIAGNOSIS — Z95.818 STATUS POST PLACEMENT OF IMPLANTABLE LOOP RECORDER: ICD-10-CM

## 2024-08-06 DIAGNOSIS — I10 PRIMARY HYPERTENSION: ICD-10-CM

## 2024-08-06 PROCEDURE — 99214 OFFICE O/P EST MOD 30 MIN: CPT

## 2024-08-06 PROCEDURE — 93000 ELECTROCARDIOGRAM COMPLETE: CPT
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
## 2024-08-09 ENCOUNTER — HOSPITAL ENCOUNTER (OUTPATIENT)
Dept: CARDIOLOGY | Facility: HOSPITAL | Age: 54
Discharge: HOME | End: 2024-08-09
Payer: COMMERCIAL

## 2024-08-09 DIAGNOSIS — E78.5 HYPERLIPIDEMIA, UNSPECIFIED HYPERLIPIDEMIA TYPE: ICD-10-CM

## 2024-08-09 LAB
ALBUMIN SERPL-MCNC: 4.1 G/DL (ref 3.5–5.2)
ALBUMIN/GLOB SERPL: 1.5 G/DL
ALP SERPL-CCNC: 89 U/L (ref 39–117)
ALT SERPL W P-5'-P-CCNC: 26 U/L (ref 1–33)
ANION GAP SERPL CALCULATED.3IONS-SCNC: 7.2 MMOL/L (ref 5–15)
AST SERPL-CCNC: 21 U/L (ref 1–32)
BILIRUB SERPL-MCNC: 0.4 MG/DL (ref 0–1.2)
BUN SERPL-MCNC: 16 MG/DL (ref 6–20)
BUN/CREAT SERPL: 18.4 (ref 7–25)
CALCIUM SPEC-SCNC: 9.7 MG/DL (ref 8.6–10.5)
CHLORIDE SERPL-SCNC: 101 MMOL/L (ref 98–107)
CHOLEST SERPL-MCNC: 187 MG/DL (ref 0–200)
CO2 SERPL-SCNC: 26.8 MMOL/L (ref 22–29)
CREAT SERPL-MCNC: 0.87 MG/DL (ref 0.57–1)
EGFRCR SERPLBLD CKD-EPI 2021: 78.3 ML/MIN/1.73
GLOBULIN UR ELPH-MCNC: 2.8 GM/DL
GLUCOSE SERPL-MCNC: 114 MG/DL (ref 65–99)
HDLC SERPL-MCNC: 39 MG/DL (ref 40–60)
LDLC SERPL CALC-MCNC: 134 MG/DL (ref 0–100)
LDLC/HDLC SERPL: 3.4 {RATIO}
POTASSIUM SERPL-SCNC: 4.8 MMOL/L (ref 3.5–5.2)
PROT SERPL-MCNC: 6.9 G/DL (ref 6–8.5)
SODIUM SERPL-SCNC: 135 MMOL/L (ref 136–145)
TRIGL SERPL-MCNC: 77 MG/DL (ref 0–150)
VLDLC SERPL-MCNC: 14 MG/DL (ref 5–40)

## 2024-08-09 PROCEDURE — 36415 COLL VENOUS BLD VENIPUNCTURE: CPT

## 2024-08-09 PROCEDURE — 80061 LIPID PANEL: CPT

## 2024-08-09 PROCEDURE — 80053 COMPREHEN METABOLIC PANEL: CPT

## 2024-08-19 ENCOUNTER — TELEMEDICINE (OUTPATIENT)
Dept: PSYCHIATRY | Facility: CLINIC | Age: 54
End: 2024-08-19
Payer: COMMERCIAL

## 2024-08-19 DIAGNOSIS — F33.1 MAJOR DEPRESSIVE DISORDER, RECURRENT EPISODE, MODERATE: Primary | ICD-10-CM

## 2024-08-19 DIAGNOSIS — F43.10 POST TRAUMATIC STRESS DISORDER (PTSD): ICD-10-CM

## 2024-08-19 DIAGNOSIS — F41.1 GENERALIZED ANXIETY DISORDER: ICD-10-CM

## 2024-08-19 PROCEDURE — 90837 PSYTX W PT 60 MINUTES: CPT | Performed by: COUNSELOR
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
## 2024-08-27 ENCOUNTER — TELEPHONE (OUTPATIENT)
Dept: CARDIOLOGY | Facility: CLINIC | Age: 54
End: 2024-08-27
Payer: COMMERCIAL

## 2024-08-27 NOTE — TELEPHONE ENCOUNTER
----- Message from Vijaya Gabby sent at 8/19/2024  1:15 PM EDT -----  Please let her know cholesterol level is elevated, follow low cholesterol diet, increase exercise-goal is 30min per day 5 days per week. Chemistry panel looks good, follow up with PCP on slightly elevated blood sugar.

## 2024-10-01 ENCOUNTER — TELEMEDICINE (OUTPATIENT)
Dept: PSYCHIATRY | Facility: CLINIC | Age: 54
End: 2024-10-01
Payer: COMMERCIAL

## 2024-10-01 DIAGNOSIS — F33.1 MAJOR DEPRESSIVE DISORDER, RECURRENT EPISODE, MODERATE: Primary | ICD-10-CM

## 2024-10-01 DIAGNOSIS — F41.1 GENERALIZED ANXIETY DISORDER: ICD-10-CM

## 2024-10-01 DIAGNOSIS — F43.10 POST TRAUMATIC STRESS DISORDER (PTSD): ICD-10-CM

## 2024-10-01 PROCEDURE — 90837 PSYTX W PT 60 MINUTES: CPT | Performed by: COUNSELOR
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
## 2024-10-08 NOTE — ANESTHESIA PREPROCEDURE EVALUATION
Anesthesia Evaluation     Patient summary reviewed and Nursing notes reviewed   no history of anesthetic complications:         Airway   Mallampati: II  TM distance: >3 FB  Neck ROM: full  no difficulty expected  Dental - normal exam     Pulmonary     breath sounds clear to auscultation  (+) pneumonia , asthma,   (-) shortness of breath, sleep apnea, decreased breath sounds, wheezes  Cardiovascular - normal exam  Exercise tolerance: good (4-7 METS)    Rhythm: regular  Rate: normal    (+) dysrhythmias Atrial Fib,   (-) past MI, angina, CHF, orthopnea, PND, CHAUHAN, PVD      Neuro/Psych  (+) weakness, psychiatric history Anxiety,     (-) seizures, neuromuscular disease, TIA, CVA, dizziness/light headedness, numbness  GI/Hepatic/Renal/Endo    (+) obesity,    (-) liver disease, diabetes    Musculoskeletal (-) negative ROS    Abdominal  - normal exam   Substance History - negative use  (-) alcohol use, drug use     OB/GYN negative ob/gyn ROS         Other - negative ROS                                               Anesthesia Plan    ASA 3     MAC     intravenous induction   Anesthetic plan and risks discussed with patient.    Plan discussed with CRNA.       .

## 2024-10-14 ENCOUNTER — TELEMEDICINE (OUTPATIENT)
Dept: PSYCHIATRY | Facility: CLINIC | Age: 54
End: 2024-10-14
Payer: COMMERCIAL

## 2024-10-14 DIAGNOSIS — F43.10 POST TRAUMATIC STRESS DISORDER (PTSD): Primary | ICD-10-CM

## 2024-10-14 DIAGNOSIS — F33.1 MAJOR DEPRESSIVE DISORDER, RECURRENT EPISODE, MODERATE: ICD-10-CM

## 2024-10-14 DIAGNOSIS — F41.1 GENERALIZED ANXIETY DISORDER: ICD-10-CM

## 2024-10-14 PROCEDURE — 96127 BRIEF EMOTIONAL/BEHAV ASSMT: CPT | Performed by: NURSE PRACTITIONER

## 2024-10-14 PROCEDURE — 99214 OFFICE O/P EST MOD 30 MIN: CPT | Performed by: NURSE PRACTITIONER

## 2024-10-14 RX ORDER — CITALOPRAM HYDROBROMIDE 20 MG/1
30 TABLET ORAL DAILY
Qty: 135 TABLET | Refills: 1 | Status: SHIPPED | OUTPATIENT
Start: 2024-10-14

## 2024-10-14 RX ORDER — METOPROLOL SUCCINATE 25 MG/1
25 TABLET, EXTENDED RELEASE ORAL DAILY
Qty: 30 TABLET | Refills: 11 | Status: SHIPPED | OUTPATIENT
Start: 2024-10-14

## 2024-10-14 NOTE — PATIENT INSTRUCTIONS
For concerns or needing assistance call the [facility] at [phone]  Medication refills:- Refill requests are addressed M-Th. No refills will be sent in on Fridays, weekends or federal holidays.   Please be aware of your need for refills and call one week before needing medication refilled so it can be filled in a timely manner.   Continue citalopram 30 mg total a day.
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
## 2024-10-15 ENCOUNTER — TELEMEDICINE (OUTPATIENT)
Dept: PSYCHIATRY | Facility: CLINIC | Age: 54
End: 2024-10-15
Payer: COMMERCIAL

## 2024-10-15 DIAGNOSIS — F33.1 MAJOR DEPRESSIVE DISORDER, RECURRENT EPISODE, MODERATE: ICD-10-CM

## 2024-10-15 DIAGNOSIS — F43.10 POST TRAUMATIC STRESS DISORDER (PTSD): Primary | ICD-10-CM

## 2024-10-15 DIAGNOSIS — F41.1 GENERALIZED ANXIETY DISORDER: ICD-10-CM

## 2024-10-15 NOTE — PROGRESS NOTES
Date: October 16, 2024  Time In: 11:07  Time out: 12:06      This provider is located at the [facility] (through [facility]), [address], [place] [address] using a secure [employer] Video Visit through [employer]. Patient is being seen remotely via telehealth at home address in [place] and stated they are in a secure environment for this session. The patient's condition being diagnosed/treated is appropriate for telemedicine. The provider identified herself as well as her credentials. The patient, and/or patients guardian, consent to be seen remotely, and when consent is given they understand that the consent allows for patient identifiable information to be sent to a third party as needed. They may refuse to be seen remotely at any time. The electronic data is encrypted and password protected, and the patient and/or guardian has been advised of the potential risks to privacy not withstanding such measures.     You have chosen to receive care through a telehealth visit.  Do you consent to use a video/audio connection for your medical care today? Yes    Subjective   Amirah Godoy is a 54 y.o. female who presents today fully oriented, appropriately dressed and groomed, and open to communication for follow up appointment.    Chief Complaint:   Chief Complaint   Patient presents with    Anxiety    Depression    PTSD        History of Present Illness: Rapport was established through conversation and unconditional positive regard. Recent events were discussed and how these events impact Pt's emotional health.   Pt reports successful use of learned coping skills 6 out of 10 times since last report.  Pt reports continuation of symptoms and states the intensity and duration of symptoms has improved since last report. Pt rates anxiety at 4/10 and depression at 3/10.     Sleep: Pt reports sleep has remained unchanged since last report. Healthy sleep habits were discussed such as  maintaining a schedule, routine, avoiding caffeine, and limiting screen time before bed.    Appetite:  Pt reports appetite has been good since last report.  Discussed the importance of hydration and eating a healthy diet for overall and mental health.    Medication compliance: Pt reports medications are being taken daily as prescribed. Discussed the importance of compliance with prescriber's directions and Pt was instructed to report questions/concerns, as well as missed doses or discontinuation of medication by Pt.     Safety Plan in Place: No Pt denies SI/HI/SIB recent or current    Daily Functioning:  Since last report, Pt states symptoms are causing Moderate difficulty in daily functioning.      Content Discussion:   Pt reports life updates since previous session. Pt identified current stressors. Pt acknowledged stressors within and outside of one's control. Pt identified successes and issues with utilizing coping mechanisms.  Pt states she got caught up on sleep and feels much better.  States she has been keeping granddaughter a lot and this is good but also contributing to pt's fatigue.  States her  hussband has been seeing how much Pt is doing and recognizing her effort.  Pt is going back to her exercise class. Pt states she continues to feel the need to over-plan activities such as arriving early to exercise class in case she has to stop or change routes on the way due to anxiety.  Pt was praised for her effort in accommodating her needs to participate in the activity.  Pt's feelings were  normalized and validated.    Counselor supported Pt in continued exploration of underlying belief systems which contribute to difficulty in connecting/vulnerability.  Through discussion, Pt identifies themes of preservation and overt emotional and physical reactivity when physical and/or emotional statis is in question, even in low or perceived threatening situations. Counselor supported Pt in identifying past experiences  and underlying beliefs which contribute to these feelings and reactions.  Pt was supported and encouraged in processing emotions related to frustrations with the expectations of self and others.  Pt was encouraged to identify cultural and nuclear familial contexts which contribute to these concerns.  Pt was receptive and engaged in conversation, and Pt reports this was beneficial and applicable to their situation.      Reviewed coping skills and encouraged Pt to continue to practicing coping skills daily when not under distress. Pt was praised for their attempts to decrease symptoms and mitigate activating events.    Clinical Maneuvering/Intervention:  CBT was utilized to encourage Pt to identify maladaptive thoughts and behaviors and replace with more affirming and positive.Pt encouraged to set and maintain appropriate and healthy boundaries with others. Pt was instructed to practice daily using appropriate and specific words to communicate feelings to others.  Motivational interviewing used to encourage Pt to identify strengths which can be utilized in working toward treatment goals. Pt encouraged to practice daily learned skills such as controlled breathing, grounding, and mindfulness. Pt was encouraged to ask for help from support persons to assist them in maintaining stability and alleviate symptoms. Discussed the importance of being one's own mental health advocate and to refrain from seeing the need to ask for help as a weakness. Pt was encouraged to formulate a plan of action to be more proactive in managing stressors and refrain from using reactive and automatic heightened emotional responses.     Solution-focused therapy employed to identify how Pt would like their life to be if they were to make positive changes. Pt encouraged to identify effective coping skills and strengths they can use to continue utilizing those skills. Pt encouraged to discontinue utilizing non-effective coping mechanisms. Pt  provided with feedback to highlight achievements and personal and other resources. Encouraged use of SMART goals    Assisted patient in processing above session content; acknowledged and normalized patient’s thoughts, feelings, and concerns.  Rationalized patient thought process regarding concerns presented at session.  Discussed triggers associated with patient's  anxiety , depression , and PTSD Also discussed coping skills for patient to implement such as grounding , increasing activity , and positive self talk     Allowed patient to freely discuss issues without interruption or judgment. Provided safe, confidential environment to facilitate the development of positive therapeutic relationship and encourage open, honest communication. Assisted patient in identifying risk factors which would indicate the need for higher level of care including thoughts to harm self or others and/or self-harming behavior and encouraged patient to contact this office, call 911, or present to the nearest emergency room should any of these events occur. Discussed crisis intervention services and means to access. Patient adamantly and convincingly denies current suicidal or homicidal ideation or perceptual disturbance.    Assessment:     Patient appears to maintain relative stability as compared to their baseline.  However, patient persistently struggles with symptoms which continue to cause impairment in important areas of functioning.  As a result, they can be reasonably expected to continue to benefit from treatment and would likely be at increased risk for decompensation otherwise.      PHQ-Score Total:  PHQ-9 Total Score:       OTTONIEL-7 Score Total:         Mental Status Exam:   Hygiene:   good  Cooperation:  Cooperative  Eye Contact:  Good  Psychomotor Behavior:  Appropriate  Affect:  Full range  Mood: depressed and anxious  Speech:  Normal  Thought Process:  Goal directed  Thought Content:  Normal  Suicidal:  None  Homicidal:  None  Hallucinations:  None  Delusion: None  Memory:  Intact  Orientation:  Grossly Intact  Reliability:  good  Insight:  Good  Judgement:  Good  Impulse Control:  Good  Physical/Medical Issues:  No        Functional Status: Moderate impairment     Progress toward goal: Not at goal    Prognosis: Good with continued therapeutic intervention        Plan:    Patient will continue in individual outpatient therapy with focus on improved functioning and coping skills, maintaining stability, and avoiding decompensation and the need for higher level of care.    Patient will adhere to medication regimen as prescribed and report any side effects. Patient will contact this office, call 911 or present to the nearest emergency room should suicidal or homicidal ideations occur. Provide Cognitive Behavioral Therapy and Solution Focused Therapy to improve functioning, maintain stability, and avoid decompensation and the need for higher level of care.     Return in about 2 weeks (around 10/29/2024).      VISIT DIAGNOSIS:    Diagnosis Plan   1. Post traumatic stress disorder (PTSD)        2. Generalized anxiety disorder        3. Major depressive disorder, recurrent episode, moderate         11:04 EDT       This document has been electronically signed by ISRA Liagn  October 16, 2024      Part of this note may be an electronic transcription/translation of spoken language to printed text using the Dragon Dictation System.

## 2024-10-16 NOTE — TREATMENT PLAN
10/15/2024  Reviewed goals and objectives    Pt rates anxiety 5/10 and depression 4/10    Reports symptoms have improved in intensity and duration    Continue with current goals and objectives        I have discussed and reviewed this treatment plan with the patient.

## 2024-11-06 ENCOUNTER — TELEMEDICINE (OUTPATIENT)
Dept: PSYCHIATRY | Facility: CLINIC | Age: 54
End: 2024-11-06
Payer: COMMERCIAL

## 2024-11-06 DIAGNOSIS — F41.1 GENERALIZED ANXIETY DISORDER: ICD-10-CM

## 2024-11-06 DIAGNOSIS — F33.1 MAJOR DEPRESSIVE DISORDER, RECURRENT EPISODE, MODERATE: ICD-10-CM

## 2024-11-06 DIAGNOSIS — F43.10 POST TRAUMATIC STRESS DISORDER (PTSD): Primary | ICD-10-CM

## 2024-11-06 PROCEDURE — 93298 REM INTERROG DEV EVAL SCRMS: CPT | Performed by: INTERNAL MEDICINE
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
## 2024-11-20 ENCOUNTER — TELEMEDICINE (OUTPATIENT)
Dept: PSYCHIATRY | Facility: CLINIC | Age: 54
End: 2024-11-20
Payer: COMMERCIAL

## 2024-11-20 DIAGNOSIS — F43.10 POST TRAUMATIC STRESS DISORDER (PTSD): Primary | ICD-10-CM

## 2024-11-20 DIAGNOSIS — F41.1 GENERALIZED ANXIETY DISORDER: ICD-10-CM

## 2024-11-20 DIAGNOSIS — F33.1 MAJOR DEPRESSIVE DISORDER, RECURRENT EPISODE, MODERATE: ICD-10-CM
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
## 2024-11-25 ENCOUNTER — TELEPHONE (OUTPATIENT)
Dept: PSYCHIATRY | Facility: CLINIC | Age: 54
End: 2024-11-25
Payer: COMMERCIAL

## 2024-11-25 RX ORDER — ROSUVASTATIN CALCIUM 5 MG/1
5 TABLET, COATED ORAL DAILY
Qty: 30 TABLET | Refills: 5 | Status: SHIPPED | OUTPATIENT
Start: 2024-11-25
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
## 2024-12-16 ENCOUNTER — TELEPHONE (OUTPATIENT)
Dept: CARDIOLOGY | Facility: CLINIC | Age: 54
End: 2024-12-16

## 2024-12-16 NOTE — TELEPHONE ENCOUNTER
Caller: Amirah Godoy    Relationship: Self    Best call back number:  [phone]    PATIENT HAS BEEN EXPERIENCING CHEST PAIN THE LAST FEW DAYS. SHE STATES IS STARTS IN HER BACK AND COMES AROUND. IT COMES AND GOES, SHE DID EARLIER TODAY.      ALSO SOB AND NO ENERGY DURING WORKOUT SESSIONS. SHE

## 2024-12-17 ENCOUNTER — OFFICE VISIT (OUTPATIENT)
Dept: CARDIOLOGY | Facility: CLINIC | Age: 54
End: 2024-12-17
Payer: COMMERCIAL

## 2024-12-17 VITALS
HEART RATE: 81 BPM | DIASTOLIC BLOOD PRESSURE: 75 MMHG | SYSTOLIC BLOOD PRESSURE: 122 MMHG | WEIGHT: 234 LBS | HEIGHT: 68 IN | BODY MASS INDEX: 35.46 KG/M2

## 2024-12-17 DIAGNOSIS — Z95.818 STATUS POST PLACEMENT OF IMPLANTABLE LOOP RECORDER: ICD-10-CM

## 2024-12-17 DIAGNOSIS — I10 PRIMARY HYPERTENSION: ICD-10-CM

## 2024-12-17 DIAGNOSIS — R06.02 BREATH SHORTNESS: ICD-10-CM

## 2024-12-17 DIAGNOSIS — R07.9 CHEST PAIN, UNSPECIFIED TYPE: Primary | ICD-10-CM

## 2024-12-17 DIAGNOSIS — E78.5 HYPERLIPIDEMIA, UNSPECIFIED HYPERLIPIDEMIA TYPE: ICD-10-CM

## 2024-12-17 PROCEDURE — 99214 OFFICE O/P EST MOD 30 MIN: CPT

## 2024-12-17 PROCEDURE — 93000 ELECTROCARDIOGRAM COMPLETE: CPT
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
## 2024-12-18 ENCOUNTER — HOSPITAL ENCOUNTER (OUTPATIENT)
Dept: CARDIOLOGY | Facility: HOSPITAL | Age: 54
Discharge: HOME | End: 2024-12-18
Payer: COMMERCIAL

## 2024-12-18 VITALS
DIASTOLIC BLOOD PRESSURE: 66 MMHG | BODY MASS INDEX: 34.4 KG/M2 | WEIGHT: 227 LBS | HEIGHT: 68 IN | SYSTOLIC BLOOD PRESSURE: 170 MMHG | OXYGEN SATURATION: 98 % | HEART RATE: 89 BPM

## 2024-12-18 DIAGNOSIS — R94.39 ABNORMAL NUCLEAR STRESS TEST: Primary | ICD-10-CM

## 2024-12-18 DIAGNOSIS — I48.0 PAROXYSMAL ATRIAL FIBRILLATION: ICD-10-CM

## 2024-12-18 DIAGNOSIS — R07.2 PRECORDIAL PAIN: ICD-10-CM

## 2024-12-18 PROCEDURE — 93017 CV STRESS TEST TRACING ONLY: CPT

## 2024-12-18 PROCEDURE — 93306 TTE W/DOPPLER COMPLETE: CPT | Performed by: INTERNAL MEDICINE

## 2024-12-18 PROCEDURE — 93306 TTE W/DOPPLER COMPLETE: CPT

## 2024-12-18 RX ORDER — NITROGLYCERIN 0.4 MG/1
0.4 TABLET SUBLINGUAL
Status: DISCONTINUED | OUTPATIENT
Start: 2024-12-18 | End: 2024-12-19 | Stop reason: HOSPADM

## 2024-12-18 RX ADMIN — NITROGLYCERIN 0.4 MG: 0.4 TABLET SUBLINGUAL at 14:21

## 2024-12-19 ENCOUNTER — TELEMEDICINE (OUTPATIENT)
Dept: PSYCHIATRY | Facility: CLINIC | Age: 54
End: 2024-12-19
Payer: COMMERCIAL

## 2024-12-19 DIAGNOSIS — F41.1 GENERALIZED ANXIETY DISORDER: ICD-10-CM

## 2024-12-19 DIAGNOSIS — F33.1 MAJOR DEPRESSIVE DISORDER, RECURRENT EPISODE, MODERATE: Primary | ICD-10-CM

## 2024-12-19 DIAGNOSIS — F43.10 POST TRAUMATIC STRESS DISORDER (PTSD): ICD-10-CM

## 2024-12-19 LAB
AORTIC DIMENSIONLESS INDEX: 0.9 (DI)
ASCENDING AORTA: 2.6 CM
BH CV ECHO MEAS - ACS: 1.56 CM
BH CV ECHO MEAS - AO MAX PG: 10.5 MMHG
BH CV ECHO MEAS - AO MEAN PG: 5.2 MMHG
BH CV ECHO MEAS - AO V2 MAX: 161.9 CM/SEC
BH CV ECHO MEAS - AO V2 VTI: 33.4 CM
BH CV ECHO MEAS - AVA(I,D): 2.6 CM2
BH CV ECHO MEAS - EDV(CUBED): 64.5 ML
BH CV ECHO MEAS - EDV(MOD-SP2): 41 ML
BH CV ECHO MEAS - EDV(MOD-SP4): 47 ML
BH CV ECHO MEAS - EF(MOD-BP): 70.9 %
BH CV ECHO MEAS - EF(MOD-SP2): 68.3 %
BH CV ECHO MEAS - EF(MOD-SP4): 70.2 %
BH CV ECHO MEAS - ESV(CUBED): 10.5 ML
BH CV ECHO MEAS - ESV(MOD-SP2): 13 ML
BH CV ECHO MEAS - ESV(MOD-SP4): 14 ML
BH CV ECHO MEAS - FS: 45.3 %
BH CV ECHO MEAS - IVS/LVPW: 1.04 CM
BH CV ECHO MEAS - IVSD: 1 CM
BH CV ECHO MEAS - LAT PEAK E' VEL: 9.5 CM/SEC
BH CV ECHO MEAS - LV MASS(C)D: 125 GRAMS
BH CV ECHO MEAS - LV MAX PG: 7.2 MMHG
BH CV ECHO MEAS - LV MEAN PG: 3.7 MMHG
BH CV ECHO MEAS - LV V1 MAX: 134 CM/SEC
BH CV ECHO MEAS - LV V1 VTI: 29 CM
BH CV ECHO MEAS - LVIDD: 4 CM
BH CV ECHO MEAS - LVIDS: 2.19 CM
BH CV ECHO MEAS - LVOT AREA: 3 CM2
BH CV ECHO MEAS - LVOT DIAM: 1.97 CM
BH CV ECHO MEAS - LVPWD: 0.97 CM
BH CV ECHO MEAS - MED PEAK E' VEL: 10.3 CM/SEC
BH CV ECHO MEAS - MV A DUR: 0.13 SEC
BH CV ECHO MEAS - MV A MAX VEL: 100.7 CM/SEC
BH CV ECHO MEAS - MV DEC SLOPE: 334.3 CM/SEC2
BH CV ECHO MEAS - MV DEC TIME: 0.17 SEC
BH CV ECHO MEAS - MV E MAX VEL: 63.8 CM/SEC
BH CV ECHO MEAS - MV E/A: 0.63
BH CV ECHO MEAS - MV MAX PG: 2.8 MMHG
BH CV ECHO MEAS - MV MEAN PG: 1.24 MMHG
BH CV ECHO MEAS - MV P1/2T: 71.4 MSEC
BH CV ECHO MEAS - MV V2 VTI: 27.8 CM
BH CV ECHO MEAS - MVA(P1/2T): 3.1 CM2
BH CV ECHO MEAS - MVA(VTI): 3.2 CM2
BH CV ECHO MEAS - PA ACC TIME: 0.1 SEC
BH CV ECHO MEAS - PA V2 MAX: 92.1 CM/SEC
BH CV ECHO MEAS - PULM A REVS DUR: 0.1 SEC
BH CV ECHO MEAS - PULM A REVS VEL: 33 CM/SEC
BH CV ECHO MEAS - PULM DIAS VEL: 39.8 CM/SEC
BH CV ECHO MEAS - PULM S/D: 1.13
BH CV ECHO MEAS - PULM SYS VEL: 45 CM/SEC
BH CV ECHO MEAS - QP/QS: 0.5
BH CV ECHO MEAS - RV MAX PG: 1.61 MMHG
BH CV ECHO MEAS - RV V1 MAX: 63.4 CM/SEC
BH CV ECHO MEAS - RV V1 VTI: 12.3 CM
BH CV ECHO MEAS - RVOT DIAM: 2.13 CM
BH CV ECHO MEAS - SV(LVOT): 87.9 ML
BH CV ECHO MEAS - SV(MOD-SP2): 28 ML
BH CV ECHO MEAS - SV(MOD-SP4): 33 ML
BH CV ECHO MEAS - SV(RVOT): 43.6 ML
BH CV ECHO MEAS - TAPSE (>1.6): 2.05 CM
BH CV ECHO MEAS - TR MAX PG: 23.9 MMHG
BH CV ECHO MEAS - TR MAX VEL: 244.3 CM/SEC
BH CV ECHO MEASUREMENTS AVERAGE E/E' RATIO: 6.44
BH CV XLRA - RV BASE: 3.2 CM
BH CV XLRA - RV LENGTH: 6.9 CM
BH CV XLRA - RV MID: 2.3 CM
BH CV XLRA - TDI S': 10.6 CM/SEC
LEFT ATRIUM VOLUME INDEX: 21.1 ML/M2
SINUS: 2.7 CM
STJ: 2.28 CM
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
## 2024-12-20 ENCOUNTER — HOSPITAL ENCOUNTER (OUTPATIENT)
Dept: CARDIOLOGY | Facility: HOSPITAL | Age: 54
Discharge: HOME | End: 2024-12-20
Admitting: INTERNAL MEDICINE
Payer: COMMERCIAL

## 2024-12-20 DIAGNOSIS — R94.39 ABNORMAL NUCLEAR STRESS TEST: ICD-10-CM

## 2024-12-20 DIAGNOSIS — I48.0 PAROXYSMAL ATRIAL FIBRILLATION: ICD-10-CM

## 2024-12-20 DIAGNOSIS — R07.2 PRECORDIAL PAIN: ICD-10-CM

## 2024-12-20 LAB
ANION GAP SERPL CALCULATED.3IONS-SCNC: 10 MMOL/L (ref 5–15)
APTT PPP: 26.2 SECONDS (ref 22.7–35.4)
BASOPHILS # BLD AUTO: 0.04 10*3/MM3 (ref 0–0.2)
BASOPHILS NFR BLD AUTO: 0.6 % (ref 0–1.5)
BH CV IMMEDIATE POST RECOVERY TECH DATA SYMPTOMS: NORMAL
BH CV IMMEDIATE POST TECH DATA BLOOD PRESSURE: NORMAL MMHG
BH CV IMMEDIATE POST TECH DATA HEART RATE: 89 BPM
BH CV IMMEDIATE POST TECH DATA OXYGEN SATS: 97 %
BH CV NINE MINUTE RECOVERY TECH DATA BLOOD PRESSURE: NORMAL MMHG
BH CV NINE MINUTE RECOVERY TECH DATA HEART RATE: 90 BPM
BH CV NINE MINUTE RECOVERY TECH DATA OXYGEN SATURATION: 97 %
BH CV NINE MINUTE RECOVERY TECH DATA SYMPTOMS: NORMAL
BH CV SIX MINUTE RECOVERY TECH DATA BLOOD PRESSURE: NORMAL
BH CV SIX MINUTE RECOVERY TECH DATA HEART RATE: 92 BPM
BH CV SIX MINUTE RECOVERY TECH DATA OXYGEN SATURATION: 97 %
BH CV SIX MINUTE RECOVERY TECH DATA SYMPTOMS: NORMAL
BH CV STRESS BP STAGE 1: NORMAL
BH CV STRESS DURATION MIN STAGE 1: 3
BH CV STRESS DURATION SEC STAGE 1: 25
BH CV STRESS GRADE STAGE 1: 10
BH CV STRESS HR STAGE 1: 129
BH CV STRESS METS STAGE 1: 5.1
BH CV STRESS PROTOCOL 1: NORMAL
BH CV STRESS RECOVERY BP: NORMAL MMHG
BH CV STRESS RECOVERY HR: 78 BPM
BH CV STRESS SPEED STAGE 1: 1.7
BH CV STRESS STAGE 1: 1
BH CV THREE MINUTE POST TECH DATA BLOOD PRESSURE: NORMAL MMHG
BH CV THREE MINUTE POST TECH DATA HEART RATE: 83 BPM
BH CV THREE MINUTE RECOVERY TECH DATA SYMPTOM: NORMAL
BH CV TWELVE MINUTE RECOVERY TECH DATA BLOOD PRESSURE: NORMAL MMHG
BH CV TWELVE MINUTE RECOVERY TECH DATA HEART RATE: 80 BPM
BH CV TWELVE MINUTE RECOVERY TECH DATA OXYGEN SATURATION: 97 %
BH CV TWELVE MINUTE RECOVERY TECH DATA SYMPTOMS: NORMAL
BUN SERPL-MCNC: 21 MG/DL (ref 6–20)
BUN/CREAT SERPL: 25.6 (ref 7–25)
CALCIUM SPEC-SCNC: 9.3 MG/DL (ref 8.6–10.5)
CHLORIDE SERPL-SCNC: 105 MMOL/L (ref 98–107)
CO2 SERPL-SCNC: 23 MMOL/L (ref 22–29)
CREAT SERPL-MCNC: 0.82 MG/DL (ref 0.57–1)
DEPRECATED RDW RBC AUTO: 41.7 FL (ref 37–54)
EGFRCR SERPLBLD CKD-EPI 2021: 84.1 ML/MIN/1.73
EOSINOPHIL # BLD AUTO: 0.01 10*3/MM3 (ref 0–0.4)
EOSINOPHIL NFR BLD AUTO: 0.1 % (ref 0.3–6.2)
ERYTHROCYTE [DISTWIDTH] IN BLOOD BY AUTOMATED COUNT: 12.7 % (ref 12.3–15.4)
GLUCOSE SERPL-MCNC: 119 MG/DL (ref 65–99)
HCT VFR BLD AUTO: 42.4 % (ref 34–46.6)
HGB BLD-MCNC: 14.3 G/DL (ref 12–15.9)
IMM GRANULOCYTES # BLD AUTO: 0.05 10*3/MM3 (ref 0–0.05)
IMM GRANULOCYTES NFR BLD AUTO: 0.7 % (ref 0–0.5)
INR PPP: 1.01 (ref 0.9–1.1)
LYMPHOCYTES # BLD AUTO: 1.39 10*3/MM3 (ref 0.7–3.1)
LYMPHOCYTES NFR BLD AUTO: 19.8 % (ref 19.6–45.3)
MAXIMAL PREDICTED HEART RATE: 164 BPM
MCH RBC QN AUTO: 30.1 PG (ref 26.6–33)
MCHC RBC AUTO-ENTMCNC: 33.7 G/DL (ref 31.5–35.7)
MCV RBC AUTO: 89.3 FL (ref 79–97)
MONOCYTES # BLD AUTO: 0.63 10*3/MM3 (ref 0.1–0.9)
MONOCYTES NFR BLD AUTO: 9 % (ref 5–12)
NEUTROPHILS NFR BLD AUTO: 4.9 10*3/MM3 (ref 1.7–7)
NEUTROPHILS NFR BLD AUTO: 69.8 % (ref 42.7–76)
PERCENT MAX PREDICTED HR: 78.66 %
PLAT MORPH BLD: NORMAL
PLATELET # BLD AUTO: 327 10*3/MM3 (ref 140–450)
PMV BLD AUTO: 9.4 FL (ref 6–12)
POTASSIUM SERPL-SCNC: 4.6 MMOL/L (ref 3.5–5.2)
PROTHROMBIN TIME: 13.5 SECONDS (ref 11.7–14.2)
RBC # BLD AUTO: 4.75 10*6/MM3 (ref 3.77–5.28)
RBC MORPH BLD: NORMAL
SODIUM SERPL-SCNC: 138 MMOL/L (ref 136–145)
STRESS BASELINE BP: NORMAL MMHG
STRESS BASELINE HR: 76 BPM
STRESS O2 SAT REST: 98 %
STRESS PERCENT HR: 93 %
STRESS POST ESTIMATED WORKLOAD: 5.1 METS
STRESS POST EXERCISE DUR MIN: 3 MIN
STRESS POST EXERCISE DUR SEC: 25 SEC
STRESS POST PEAK BP: NORMAL MMHG
STRESS POST PEAK HR: 129 BPM
STRESS TARGET HR: 139 BPM
WBC MORPH BLD: NORMAL
WBC NRBC COR # BLD AUTO: 7.02 10*3/MM3 (ref 3.4–10.8)

## 2024-12-20 PROCEDURE — 85025 COMPLETE CBC W/AUTO DIFF WBC: CPT | Performed by: INTERNAL MEDICINE

## 2024-12-20 PROCEDURE — 80048 BASIC METABOLIC PNL TOTAL CA: CPT | Performed by: INTERNAL MEDICINE

## 2024-12-20 PROCEDURE — 36415 COLL VENOUS BLD VENIPUNCTURE: CPT | Performed by: INTERNAL MEDICINE

## 2024-12-20 PROCEDURE — 85007 BL SMEAR W/DIFF WBC COUNT: CPT | Performed by: INTERNAL MEDICINE

## 2024-12-20 PROCEDURE — 85730 THROMBOPLASTIN TIME PARTIAL: CPT | Performed by: INTERNAL MEDICINE

## 2024-12-20 PROCEDURE — 85610 PROTHROMBIN TIME: CPT | Performed by: INTERNAL MEDICINE

## 2024-12-27 ENCOUNTER — HOSPITAL ENCOUNTER (OUTPATIENT)
Facility: HOSPITAL | Age: 54
Setting detail: OBSERVATION
Discharge: HOME | End: 2024-12-28
Attending: INTERNAL MEDICINE | Admitting: INTERNAL MEDICINE
Payer: COMMERCIAL

## 2024-12-27 DIAGNOSIS — R94.39 ABNORMAL NUCLEAR STRESS TEST: ICD-10-CM

## 2024-12-27 DIAGNOSIS — R07.2 PRECORDIAL PAIN: ICD-10-CM

## 2024-12-27 DIAGNOSIS — R07.9 CHEST PAIN DUE TO GERD: Primary | ICD-10-CM

## 2024-12-27 DIAGNOSIS — K21.9 CHEST PAIN DUE TO GERD: Primary | ICD-10-CM

## 2024-12-27 DIAGNOSIS — I48.0 PAROXYSMAL ATRIAL FIBRILLATION: ICD-10-CM

## 2024-12-27 PROCEDURE — G0378 HOSPITAL OBSERVATION PER HR: HCPCS

## 2024-12-27 PROCEDURE — 25810000003 SODIUM CHLORIDE 0.9 % SOLUTION: Performed by: INTERNAL MEDICINE

## 2024-12-27 PROCEDURE — C1769 GUIDE WIRE: HCPCS | Performed by: INTERNAL MEDICINE

## 2024-12-27 PROCEDURE — C1894 INTRO/SHEATH, NON-LASER: HCPCS | Performed by: INTERNAL MEDICINE

## 2024-12-27 PROCEDURE — 93458 L HRT ARTERY/VENTRICLE ANGIO: CPT | Performed by: INTERNAL MEDICINE

## 2024-12-27 PROCEDURE — 25010000002 FENTANYL CITRATE (PF) 50 MCG/ML SOLUTION: Performed by: INTERNAL MEDICINE

## 2024-12-27 PROCEDURE — 25510000001 IOPAMIDOL PER 1 ML: Performed by: INTERNAL MEDICINE

## 2024-12-27 PROCEDURE — 25010000002 MIDAZOLAM PER 1 MG: Performed by: INTERNAL MEDICINE

## 2024-12-27 PROCEDURE — C1760 CLOSURE DEV, VASC: HCPCS | Performed by: INTERNAL MEDICINE

## 2024-12-27 PROCEDURE — 25010000002 LIDOCAINE 2% SOLUTION: Performed by: INTERNAL MEDICINE

## 2024-12-27 RX ORDER — SODIUM CHLORIDE 9 MG/ML
75 INJECTION, SOLUTION INTRAVENOUS CONTINUOUS
Status: ACTIVE | OUTPATIENT
Start: 2024-12-27 | End: 2024-12-27

## 2024-12-27 RX ORDER — MIDAZOLAM HYDROCHLORIDE 1 MG/ML
INJECTION, SOLUTION INTRAMUSCULAR; INTRAVENOUS
Status: DISCONTINUED | OUTPATIENT
Start: 2024-12-27 | End: 2024-12-27 | Stop reason: HOSPADM

## 2024-12-27 RX ORDER — VALSARTAN 160 MG/1
160 TABLET ORAL
Status: DISCONTINUED | OUTPATIENT
Start: 2024-12-27 | End: 2024-12-28 | Stop reason: HOSPADM

## 2024-12-27 RX ORDER — SODIUM CHLORIDE 9 MG/ML
100 INJECTION, SOLUTION INTRAVENOUS ONCE AS NEEDED
Status: ACTIVE | OUTPATIENT
Start: 2024-12-27 | End: 2024-12-27

## 2024-12-27 RX ORDER — ACETAMINOPHEN 325 MG/1
650 TABLET ORAL EVERY 4 HOURS PRN
Status: DISCONTINUED | OUTPATIENT
Start: 2024-12-27 | End: 2024-12-28 | Stop reason: HOSPADM

## 2024-12-27 RX ORDER — ROSUVASTATIN CALCIUM 5 MG/1
5 TABLET, COATED ORAL DAILY
Status: DISCONTINUED | OUTPATIENT
Start: 2024-12-27 | End: 2024-12-28 | Stop reason: HOSPADM

## 2024-12-27 RX ORDER — SODIUM CHLORIDE 0.9 % (FLUSH) 0.9 %
10 SYRINGE (ML) INJECTION AS NEEDED
Status: DISCONTINUED | OUTPATIENT
Start: 2024-12-27 | End: 2024-12-27 | Stop reason: HOSPADM

## 2024-12-27 RX ORDER — ASPIRIN 325 MG
325 TABLET ORAL DAILY
Status: DISCONTINUED | OUTPATIENT
Start: 2024-12-27 | End: 2024-12-28 | Stop reason: HOSPADM

## 2024-12-27 RX ORDER — METOPROLOL SUCCINATE 25 MG/1
25 TABLET, EXTENDED RELEASE ORAL DAILY
Status: DISCONTINUED | OUTPATIENT
Start: 2024-12-27 | End: 2024-12-28 | Stop reason: HOSPADM

## 2024-12-27 RX ORDER — SODIUM CHLORIDE 0.9 % (FLUSH) 0.9 %
10 SYRINGE (ML) INJECTION EVERY 12 HOURS SCHEDULED
Status: DISCONTINUED | OUTPATIENT
Start: 2024-12-27 | End: 2024-12-27 | Stop reason: HOSPADM

## 2024-12-27 RX ORDER — FENTANYL CITRATE 50 UG/ML
INJECTION, SOLUTION INTRAMUSCULAR; INTRAVENOUS
Status: DISCONTINUED | OUTPATIENT
Start: 2024-12-27 | End: 2024-12-27 | Stop reason: HOSPADM

## 2024-12-27 RX ORDER — LIDOCAINE HYDROCHLORIDE 20 MG/ML
INJECTION, SOLUTION INFILTRATION; PERINEURAL
Status: DISCONTINUED | OUTPATIENT
Start: 2024-12-27 | End: 2024-12-27 | Stop reason: HOSPADM

## 2024-12-27 RX ORDER — CITALOPRAM HYDROBROMIDE 20 MG/1
30 TABLET ORAL DAILY
Status: DISCONTINUED | OUTPATIENT
Start: 2024-12-27 | End: 2024-12-28 | Stop reason: HOSPADM

## 2024-12-27 RX ORDER — IOPAMIDOL 755 MG/ML
INJECTION, SOLUTION INTRAVASCULAR
Status: DISCONTINUED | OUTPATIENT
Start: 2024-12-27 | End: 2024-12-27 | Stop reason: HOSPADM

## 2024-12-27 RX ORDER — NITROGLYCERIN 0.4 MG/1
0.4 TABLET SUBLINGUAL
Status: DISCONTINUED | OUTPATIENT
Start: 2024-12-27 | End: 2024-12-28 | Stop reason: HOSPADM

## 2024-12-27 RX ADMIN — ROSUVASTATIN CALCIUM 5 MG: 5 TABLET, FILM COATED ORAL at 14:45

## 2024-12-27 RX ADMIN — VALSARTAN 160 MG: 160 TABLET, FILM COATED ORAL at 14:45

## 2024-12-27 RX ADMIN — ASPIRIN 325 MG: 325 TABLET ORAL at 14:45

## 2024-12-27 RX ADMIN — CITALOPRAM HYDROBROMIDE 30 MG: 20 TABLET ORAL at 14:45

## 2024-12-27 RX ADMIN — SODIUM CHLORIDE 75 ML/HR: 9 INJECTION, SOLUTION INTRAVENOUS at 07:35

## 2024-12-27 RX ADMIN — METOPROLOL SUCCINATE 25 MG: 25 TABLET, EXTENDED RELEASE ORAL at 14:45

## 2024-12-28 ENCOUNTER — APPOINTMENT (OUTPATIENT)
Dept: CT IMAGING | Facility: HOSPITAL | Age: 54
End: 2024-12-28
Payer: COMMERCIAL

## 2024-12-28 VITALS
RESPIRATION RATE: 16 BRPM | OXYGEN SATURATION: 96 % | WEIGHT: 228 LBS | SYSTOLIC BLOOD PRESSURE: 135 MMHG | HEART RATE: 76 BPM | HEIGHT: 68 IN | TEMPERATURE: 98 F | DIASTOLIC BLOOD PRESSURE: 72 MMHG | BODY MASS INDEX: 34.56 KG/M2

## 2024-12-28 LAB
ALBUMIN SERPL-MCNC: 3.6 G/DL (ref 3.5–5.2)
ALBUMIN/GLOB SERPL: 1.6 G/DL
ALP SERPL-CCNC: 89 U/L (ref 39–117)
ALT SERPL W P-5'-P-CCNC: 23 U/L (ref 1–33)
ANION GAP SERPL CALCULATED.3IONS-SCNC: 8 MMOL/L (ref 5–15)
AST SERPL-CCNC: 19 U/L (ref 1–32)
BASOPHILS # BLD AUTO: 0.02 10*3/MM3 (ref 0–0.2)
BASOPHILS NFR BLD AUTO: 0.3 % (ref 0–1.5)
BILIRUB SERPL-MCNC: 0.2 MG/DL (ref 0–1.2)
BUN SERPL-MCNC: 16 MG/DL (ref 6–20)
BUN/CREAT SERPL: 21.6 (ref 7–25)
CALCIUM SPEC-SCNC: 8.4 MG/DL (ref 8.6–10.5)
CHLORIDE SERPL-SCNC: 108 MMOL/L (ref 98–107)
CO2 SERPL-SCNC: 24 MMOL/L (ref 22–29)
CREAT SERPL-MCNC: 0.74 MG/DL (ref 0.57–1)
DEPRECATED RDW RBC AUTO: 41.5 FL (ref 37–54)
EGFRCR SERPLBLD CKD-EPI 2021: 95.1 ML/MIN/1.73
EOSINOPHIL # BLD AUTO: 0 10*3/MM3 (ref 0–0.4)
EOSINOPHIL NFR BLD AUTO: 0 % (ref 0.3–6.2)
ERYTHROCYTE [DISTWIDTH] IN BLOOD BY AUTOMATED COUNT: 12.6 % (ref 12.3–15.4)
GLOBULIN UR ELPH-MCNC: 2.3 GM/DL
GLUCOSE SERPL-MCNC: 106 MG/DL (ref 65–99)
HCT VFR BLD AUTO: 39.4 % (ref 34–46.6)
HGB BLD-MCNC: 13.1 G/DL (ref 12–15.9)
IMM GRANULOCYTES # BLD AUTO: 0.02 10*3/MM3 (ref 0–0.05)
IMM GRANULOCYTES NFR BLD AUTO: 0.3 % (ref 0–0.5)
LYMPHOCYTES # BLD AUTO: 2 10*3/MM3 (ref 0.7–3.1)
LYMPHOCYTES NFR BLD AUTO: 27.1 % (ref 19.6–45.3)
MCH RBC QN AUTO: 29.9 PG (ref 26.6–33)
MCHC RBC AUTO-ENTMCNC: 33.2 G/DL (ref 31.5–35.7)
MCV RBC AUTO: 90 FL (ref 79–97)
MONOCYTES # BLD AUTO: 0.8 10*3/MM3 (ref 0.1–0.9)
MONOCYTES NFR BLD AUTO: 10.8 % (ref 5–12)
NEUTROPHILS NFR BLD AUTO: 4.54 10*3/MM3 (ref 1.7–7)
NEUTROPHILS NFR BLD AUTO: 61.5 % (ref 42.7–76)
NRBC BLD AUTO-RTO: 0 /100 WBC (ref 0–0.2)
PLATELET # BLD AUTO: 288 10*3/MM3 (ref 140–450)
PMV BLD AUTO: 9.1 FL (ref 6–12)
POTASSIUM SERPL-SCNC: 4 MMOL/L (ref 3.5–5.2)
PROT SERPL-MCNC: 5.9 G/DL (ref 6–8.5)
RBC # BLD AUTO: 4.38 10*6/MM3 (ref 3.77–5.28)
SODIUM SERPL-SCNC: 140 MMOL/L (ref 136–145)
WBC NRBC COR # BLD AUTO: 7.38 10*3/MM3 (ref 3.4–10.8)

## 2024-12-28 PROCEDURE — 80053 COMPREHEN METABOLIC PANEL: CPT | Performed by: NURSE PRACTITIONER

## 2024-12-28 PROCEDURE — 71275 CT ANGIOGRAPHY CHEST: CPT

## 2024-12-28 PROCEDURE — 25510000001 IOPAMIDOL PER 1 ML: Performed by: INTERNAL MEDICINE

## 2024-12-28 PROCEDURE — 85025 COMPLETE CBC W/AUTO DIFF WBC: CPT | Performed by: NURSE PRACTITIONER

## 2024-12-28 PROCEDURE — G0378 HOSPITAL OBSERVATION PER HR: HCPCS

## 2024-12-28 RX ORDER — IOPAMIDOL 755 MG/ML
95 INJECTION, SOLUTION INTRAVASCULAR
Status: COMPLETED | OUTPATIENT
Start: 2024-12-28 | End: 2024-12-28

## 2024-12-28 RX ADMIN — METOPROLOL SUCCINATE 25 MG: 25 TABLET, EXTENDED RELEASE ORAL at 13:11

## 2024-12-28 RX ADMIN — IOPAMIDOL 95 ML: 755 INJECTION, SOLUTION INTRAVENOUS at 11:38

## 2024-12-28 RX ADMIN — CITALOPRAM HYDROBROMIDE 30 MG: 20 TABLET ORAL at 13:09

## 2024-12-28 RX ADMIN — ROSUVASTATIN CALCIUM 5 MG: 5 TABLET, FILM COATED ORAL at 13:09

## 2024-12-28 RX ADMIN — VALSARTAN 160 MG: 160 TABLET, FILM COATED ORAL at 13:09

## 2024-12-28 RX ADMIN — ASPIRIN 325 MG: 325 TABLET ORAL at 13:09

## 2024-12-28 NOTE — PROGRESS NOTES
Hospital Follow Up    LOS:  LOS: 0 days   Patient Name: Amirah Godoy  Age/Sex: 54 y.o. female  : 1970  MRN: [id number]    Day of Service: 24   Length of Stay: 0  Encounter Provider: YORDAN Jeff  Place of Service: [facility] CARDIOLOGY  Patient Care Team:  Dejuan Krause Jr., MD as PCP - General (Family Medicine)  Niles Koch MD as Consulting Physician (Cardiology)  Jacki Pablo APRN as Nurse Practitioner (Behavioral Health)  Lory Ribeiro LPCC as  (Behavioral Health)    Subjective:     Chief Complaint: Chest pain, abnormal stress test    Interval History: had chest pain through the night and this morning. Complains that it is a weight on the chest radiating to the back.     Objective:     Objective:  Temp:  [97.6 °F (36.4 °C)-98.4 °F (36.9 °C)] 98 °F (36.7 °C)  Heart Rate:  [61-98] 66  Resp:  [16] 16  BP: (111-150)/(63-75) 120/74     Intake/Output Summary (Last 24 hours) at 2024 1045  Last data filed at 2024 0758  Gross per 24 hour   Intake 460 ml   Output --   Net 460 ml     Body mass index is 34.67 kg/m².      24  0728   Weight: 103 kg (228 lb)     Weight change:     Physical Exam:   General Appearance:    Awake alert and oriented in no acute distress.   Color:  Skin:  Neuro:  HEENT:    Lungs:     Pink  Warm and dry  No focal, motor or sensory deficits  Neck supple, pupils equal, round and reactive. No JVD, No Bruit  Clear to auscultation,respirations regular, even and                  unlabored    Heart:    Regular rate and rhythm, S1 and S2, no murmur, no gallop, no rub. No edema, DP/PT pulses are 2+   Chest Wall:    No abnormalities observed   Abdomen:     Normal bowel sounds, no masses, no organomegaly, soft        non-tender, non-distended, no guarding, no ascites noted   Extremities:   Moves all extremities well, no edema, no cyanosis, no redness. Right groin site stable. Dressing removed  "      Lab Review:   Results from last 7 days   Lab Units 12/28/24  0303   SODIUM mmol/L 140   POTASSIUM mmol/L 4.0   CHLORIDE mmol/L 108*   CO2 mmol/L 24.0   BUN mg/dL 16   CREATININE mg/dL 0.74   GLUCOSE mg/dL 106*   CALCIUM mg/dL 8.4*   AST (SGOT) U/L 19   ALT (SGPT) U/L 23         Results from last 7 days   Lab Units 12/28/24  0303   WBC 10*3/mm3 7.38   HEMOGLOBIN g/dL 13.1   HEMATOCRIT % 39.4   PLATELETS 10*3/mm3 288                   Invalid input(s): \"LDLCALC\"          I reviewed the patient's new clinical results.  I personally viewed and interpreted the patient's EKG  Current Medications:   Scheduled Meds:aspirin, 325 mg, Oral, Daily  citalopram, 30 mg, Oral, Daily  metoprolol succinate XL, 25 mg, Oral, Daily  rosuvastatin, 5 mg, Oral, Daily  valsartan, 160 mg, Oral, Q24H      Continuous Infusions:     Allergies:  Allergies   Allergen Reactions    Demerol [Meperidine] Hives    Lisinopril Cough    Sulfa Antibiotics GI Intolerance       Assessment/Plan:        Chest pain- normal coronaries, will get a CT of the chest today.   CHAUHAN- no clear etiology. Echo earlier this month was normal. Also reports she has asthma  Essential HTN- controll on Valsartan and metoprolol  Hyperlipidemia- continue rosuvastatin 5mg nightly        YORDAN Jeff  12/28/24  10:45 EST  Electronically signed by YORDAN Jeff, 12/28/24, 10:45 AM EST.     "

## 2024-12-28 NOTE — PLAN OF CARE
Goal Outcome Evaluation:  Plan of Care Reviewed With: patient        Progress: improving  Outcome Evaluation: VSS. SR on monitor. Right groin with dressing, D/I, no hematoma or bleeding noted. Ambulated to bathroom without bleeding. Does have complaints of intermittent back pain between scapula's, same as before catheterization.

## 2024-12-28 NOTE — PLAN OF CARE
Goal Outcome Evaluation:  Plan of Care Reviewed With: patient        Progress: improving  Outcome Evaluation: Pt aox4, on RA, VSS, Sinus on monitor. Right femoral site, clean, dry, intact, soft, no signs of hematoma. Ambulating independently. Intermittent back pain between sholders continues, pt claims no difference from before cath, resolves quickly. Continue plan of care.

## 2024-12-28 NOTE — DISCHARGE SUMMARY
Patient Name: Amirah Godoy  :1970  54 y.o.    Date of Admit: 2024  Date of Discharge:  2024    Discharge Diagnosis:  1.  Chest pain-normal coronaries-negative CT of the chest  2.  Essential hypertension  3.  GERD  4.  Asthma    Hospital Course:     The patient is a 54-year-old female who was seen by the nurse practitioner Dr. Koch's office.  She underwent a Cardiolite stress test and echocardiogram she had an abnormal EKG during stress test and was referred for cardiac cath.  Coronary angiography was done via right groin approach.  She had normal coronaries.  Patient was kept overnight due to ongoing symptoms.  When I saw her this morning she said there was an elephant on her chest radiating to her back.  It occurs at rest and is intermittent lasting for few minutes at a time.  I did a stat CTA of the chest that was unremarkable except for some lymph nodes.  I discussed with her her instructions and restrictions.  Follow-up with primary care in regards to lymphadenopathy on CT and follow-up with Dr. Koch in a month.  Procedures Performed  Procedure(s):  Left Heart Cath  Coronary angiography  Left ventriculography       Consults       No orders found for last 30 day(s).            Pertinent Test Results:   Results from last 7 days   Lab Units 24  0303   SODIUM mmol/L 140   POTASSIUM mmol/L 4.0   CHLORIDE mmol/L 108*   CO2 mmol/L 24.0   BUN mg/dL 16   CREATININE mg/dL 0.74   CALCIUM mg/dL 8.4*   BILIRUBIN mg/dL 0.2   ALK PHOS U/L 89   ALT (SGPT) U/L 23   AST (SGOT) U/L 19   GLUCOSE mg/dL 106*         @LABRCNT(bnp)@  Results from last 7 days   Lab Units 24  0303   WBC 10*3/mm3 7.38   HEMOGLOBIN g/dL 13.1   HEMATOCRIT % 39.4   PLATELETS 10*3/mm3 288                   Condition on Discharge: stable    Discharge Medications     Discharge Medications        Changes to Medications        Instructions Start Date   rosuvastatin 5 MG tablet  Commonly known as: CRESTOR  What  changed:   when to take this  additional instructions   5 mg, Oral, Daily             Continue These Medications        Instructions Start Date   acetaminophen 325 MG tablet  Commonly known as: TYLENOL   650 mg, Oral, Every 4 Hours PRN      albuterol (2.5 MG/3ML) 0.083% nebulizer solution  Commonly known as: PROVENTIL   No dose, route, or frequency recorded.      aspirin 325 MG tablet   325 mg, Daily      cetirizine 10 MG tablet  Commonly known as: zyrTEC   10 mg, Daily      citalopram 20 MG tablet  Commonly known as: CeleXA   30 mg, Oral, Daily      CoQ10 400 MG capsule       Fasenra 30 MG/ML solution prefilled syringe  Generic drug: Benralizumab   Every 2 Months      metoprolol succinate XL 25 MG 24 hr tablet  Commonly known as: TOPROL-XL   25 mg, Oral, Daily      montelukast 10 MG tablet  Commonly known as: SINGULAIR   TAKE ONE TABLET BY MOUTH DAILY      Omeprazole 20 MG tablet delayed-release   20 mg, Daily      TRELEGY ELLIPTA IN   1 inhaler, Every Morning      valsartan 160 MG tablet  Commonly known as: DIOVAN              Stop These Medications      nitroglycerin 0.4 MG SL tablet  Commonly known as: NITROSTAT              Discharge Diet:     Activity at Discharge:     Discharge disposition: home    Follow-up Appointments  Future Appointments   Date Time Provider Department Center   1/6/2025 12:00 PM Niles Koch MD [facility] [place]   1/9/2025 10:00 AM Lory Ribeiro [facility]   1/20/2025 11:30 AM Jacki Pablo APRN [facility]   1/21/2025 11:30 AM Beatrice Berman APRN [facility] [place]   1/23/2025  9:00 AM Lory Ribeiro [facility]   2/6/2025  9:00 AM Lory Ribeiro [facility]   2/20/2025  9:00 AM Lory Ribeiro [facility]   3/6/2025  9:00 AM Lory Ribeiro [facility]   3/20/2025  9:00 AM Lory Ribeiro [facility]   7/25/2025  9:00 AM [place] [facility]  [place] [facility] [place]   8/4/2025 11:30 AM Niles Koch MD [facility] [place]         Test Results Pending at Discharge       YORDAN Jeff, [facility]  12/28/24  13:33 EST    Time: Discharge 35 min  Electronically signed by YORDAN Jeff, 12/28/24, 1:33 PM EST.

## 2024-12-29 NOTE — CASE MANAGEMENT/SOCIAL WORK
Case Management Discharge Note      Final Note: home         Selected Continued Care - Discharged on 12/28/2024 Admission date: 12/27/2024 - Discharge disposition: Home or Self Care      Destination    No services have been selected for the patient.                Durable Medical Equipment    No services have been selected for the patient.                Dialysis/Infusion    No services have been selected for the patient.                Home Medical Care    No services have been selected for the patient.                Therapy    No services have been selected for the patient.                Community Resources    No services have been selected for the patient.                Community & DME    No services have been selected for the patient.                         Final Discharge Disposition Code: 01 - home or self-care

## 2025-01-09 ENCOUNTER — TELEMEDICINE (OUTPATIENT)
Dept: PSYCHIATRY | Facility: CLINIC | Age: 55
End: 2025-01-09
Payer: COMMERCIAL

## 2025-01-09 DIAGNOSIS — F43.10 POST TRAUMATIC STRESS DISORDER (PTSD): Primary | ICD-10-CM

## 2025-01-09 DIAGNOSIS — F41.1 GENERALIZED ANXIETY DISORDER: ICD-10-CM

## 2025-01-09 DIAGNOSIS — F33.1 MAJOR DEPRESSIVE DISORDER, RECURRENT EPISODE, MODERATE: ICD-10-CM
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
## 2025-01-11 NOTE — TREATMENT PLAN
Multi-Disciplinary Problems (from Behavioral Health Treatment Plan)      Active Problems       Not on file                     I have discussed and reviewed this treatment plan with the patient.

## 2025-01-11 NOTE — PLAN OF CARE
Pt continues to have difficulty with anxiety and depression.  PTSD symptoms are improved aeb Pt's report.     Anxiety: 9/10   Depression 8/10    Pt admits recent events have increased anx and dep symptoms.    Pt would benefit from continued bi-weekly individual counseling.       Pt will set and maintain boundaries with  and family.    Pt will disallow others to diminish Pt's self worth with their comments and actions.
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
## 2025-01-20 ENCOUNTER — TELEMEDICINE (OUTPATIENT)
Dept: PSYCHIATRY | Facility: CLINIC | Age: 55
End: 2025-01-20
Payer: COMMERCIAL

## 2025-01-20 DIAGNOSIS — F43.10 POST TRAUMATIC STRESS DISORDER (PTSD): ICD-10-CM

## 2025-01-20 DIAGNOSIS — F41.1 GENERALIZED ANXIETY DISORDER: ICD-10-CM

## 2025-01-20 DIAGNOSIS — F33.1 MAJOR DEPRESSIVE DISORDER, RECURRENT EPISODE, MODERATE: Primary | ICD-10-CM

## 2025-01-20 PROCEDURE — 96127 BRIEF EMOTIONAL/BEHAV ASSMT: CPT | Performed by: NURSE PRACTITIONER

## 2025-01-20 PROCEDURE — 99214 OFFICE O/P EST MOD 30 MIN: CPT | Performed by: NURSE PRACTITIONER

## 2025-01-20 NOTE — PATIENT INSTRUCTIONS
For concerns or needing assistance call the [facility] at [phone]  Go to [place] to download copay assistance card  Take 20mg of citalopram every other day when starting Trintellix 10mg daily.   STOP citalopram after 10 days.
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
## 2025-01-21 ENCOUNTER — OFFICE VISIT (OUTPATIENT)
Dept: NEUROLOGY | Facility: CLINIC | Age: 55
End: 2025-01-21
Payer: COMMERCIAL

## 2025-01-21 VITALS
OXYGEN SATURATION: 96 % | HEART RATE: 72 BPM | BODY MASS INDEX: 35.19 KG/M2 | DIASTOLIC BLOOD PRESSURE: 72 MMHG | SYSTOLIC BLOOD PRESSURE: 120 MMHG | HEIGHT: 68 IN | WEIGHT: 232.2 LBS

## 2025-01-21 DIAGNOSIS — F07.81 POST CONCUSSIVE SYNDROME: Primary | ICD-10-CM

## 2025-01-21 PROCEDURE — 99213 OFFICE O/P EST LOW 20 MIN: CPT | Performed by: NURSE PRACTITIONER
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
## 2025-01-23 ENCOUNTER — TELEMEDICINE (OUTPATIENT)
Dept: PSYCHIATRY | Facility: CLINIC | Age: 55
End: 2025-01-23
Payer: COMMERCIAL

## 2025-01-23 DIAGNOSIS — F41.1 GENERALIZED ANXIETY DISORDER: ICD-10-CM

## 2025-01-23 DIAGNOSIS — F43.10 POST TRAUMATIC STRESS DISORDER (PTSD): ICD-10-CM

## 2025-01-23 DIAGNOSIS — F33.1 MAJOR DEPRESSIVE DISORDER, RECURRENT EPISODE, MODERATE: Primary | ICD-10-CM

## 2025-01-23 NOTE — PROGRESS NOTES
Date: January 25, 2025  Time In: 9:01  Time out: 10:04      This provider is located at the [facility] ([facility]), [address], [place] [address] using a secure [employer] Video Visit through [employer]. Patient is being seen remotely via telehealth, and they are in a secure environment for this session. The patient's condition being diagnosed/treated is appropriate for telemedicine. The provider identified herself as well as her credentials. The patient, and/or patients guardian, consent to be seen remotely, and when consent is given they understand that the consent allows for patient identifiable information to be sent to a third party as needed. They may refuse to be seen remotely at any time. The electronic data is encrypted and password protected, and the patient and/or guardian has been advised of the potential risks to privacy not withstanding such measures.     Mode of Visit: Video  Location of patient: home  Location of provider: Provider home  You have chosen to receive care through a telehealth visit.  The patient has signed the video visit consent form.  The visit included audio and video interaction. No technical issues occurred during this visit    Subjective   Amirah Godoy is a 55 y.o. female who presents today fully oriented, appropriately dressed and groomed, and open to communication for follow up appointment.    Chief Complaint:   Chief Complaint   Patient presents with    Anxiety    Depression    PTSD    Panic Attack    Sleeping Problem        History of Present Illness: Rapport was established through conversation and unconditional positive regard. Recent events were discussed and how these events impact Pt's emotional health.   Pt reports successful use of learned coping skills since last report.  Pt reports continuation of symptoms and states the intensity and duration of symptoms has remained unchanged since last report. Pt rates anxiety  at 8/10 and depression at 8/10.     Sleep: Pt reports sleep has remained unchanged since last report. Healthy sleep habits were discussed such as maintaining a schedule, routine, avoiding caffeine, and limiting screen time before bed.    Appetite:  Pt reports appetite has been good since last report.  Discussed the importance of hydration and eating a healthy diet for overall and mental health.    Medication compliance: Pt reports medications are being taken daily as prescribed. Discussed the importance of compliance with prescriber's directions and Pt was instructed to report questions/concerns, as well as missed doses or discontinuation of medication by Pt.     Safety Plan in Place: No Pt denies SI/HI/SIB recent or current    Daily Functioning:  Since last report, Pt states symptoms are causing Moderate difficulty in daily functioning.      Content Discussion:   Pt reports life updates since previous session. Pt identified current stressors. Pt acknowledged stressors within and outside of one's control. Pt identified successes and issues with utilizing coping mechanisms.  Pt states she saw neurologist and was told she is good for one year.   Pt saw CHEYENNE Pablo, Psych NP, and they discussed Pt's persistent depression.  Pt was placed on a new antidepressant, and she will wean off the Citalopram.  Pt states she feels good about getting help with the depression.  Pt reports there have been numerous stressors in her environment recently and this is contributing to Pt's mood and anxiety issues.  Pt states her adult son, DIL, and grandchild have been staying at their house a lot, and this is stressful especially because son and  work together and they are very like-minded.  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  Pt states she if often put in these positions and she feels she  gets very little understanding when this causes anxiety. Discussed the need to continue prioritizing her own self care.      Counselor supported Pt in continued exploration of underlying belief systems which contribute to difficulty in connecting/vulnerability.  Through discussion, Pt identifies themes of preservation and overt emotional and physical reactivity when physical and/or emotional statis is in question, even in low or perceived threatening situations. Counselor supported Pt in identifying past experiences and underlying beliefs which contribute to these feelings and reactions.  Pt was supported and encouraged in processing emotions related to frustrations with the expectations of self and others.  Pt was encouraged to identify cultural and nuclear familial contexts which contribute to these concerns.  Pt was receptive and engaged in conversation, and Pt reports this was beneficial and applicable to their situation.      Reviewed coping skills and encouraged Pt to continue to practicing coping skills daily when not under distress. Pt was praised for their attempts to decrease symptoms and mitigate activating events.    Clinical Maneuvering/Intervention:  CBT was utilized to encourage Pt to identify maladaptive thoughts and behaviors and replace with more affirming and positive.Pt encouraged to set and maintain appropriate and healthy boundaries with others. Pt was instructed to practice daily using appropriate and specific words to communicate feelings to others.  Motivational interviewing used to encourage Pt to identify strengths which can be utilized in working toward treatment goals. Pt encouraged to practice daily learned skills such as controlled breathing, grounding, and mindfulness. Pt was encouraged to ask for help from support persons to assist them in maintaining stability and alleviate symptoms. Discussed the importance of being one's own mental health advocate and to refrain from seeing the  need to ask for help as a weakness. Pt was encouraged to formulate a plan of action to be more proactive in managing stressors and refrain from using reactive and automatic heightened emotional responses.     Solution-focused therapy employed to identify how Pt would like their life to be if they were to make positive changes. Pt encouraged to identify effective coping skills and strengths they can use to continue utilizing those skills. Pt encouraged to discontinue utilizing non-effective coping mechanisms. Pt provided with feedback to highlight achievements and personal and other resources. Encouraged use of SMART goals    Assisted patient in processing above session content; acknowledged and normalized patient’s thoughts, feelings, and concerns.  Rationalized patient thought process regarding concerns presented at session.  Discussed triggers associated with patient's  anxiety  and depression  Also discussed coping skills for patient to implement such as grounding , 4:6 breathing , and setting and maintaining boundaries.    Allowed patient to freely discuss issues without interruption or judgment. Provided safe, confidential environment to facilitate the development of positive therapeutic relationship and encourage open, honest communication. Assisted patient in identifying risk factors which would indicate the need for higher level of care including thoughts to harm self or others and/or self-harming behavior and encouraged patient to contact this office, call 911, or present to the nearest emergency room should any of these events occur. Discussed crisis intervention services and means to access. Patient adamantly and convincingly denies current suicidal or homicidal ideation or perceptual disturbance.    Assessment:     Patient appears to maintain relative stability as compared to their baseline.  However, patient persistently struggles with symptoms which continue to cause impairment in important areas of  functioning.  As a result, they can be reasonably expected to continue to benefit from treatment and would likely be at increased risk for decompensation otherwise.        Mental Status Exam:   Hygiene:   good  Cooperation:  Cooperative  Eye Contact:  Good  Psychomotor Behavior:  Appropriate  Affect:  Full range  Mood: anxious  Speech:  Normal  Thought Process:  Goal directed  Thought Content:  Normal  Suicidal:  None  Homicidal: None  Hallucinations:  None  Delusion: None  Memory:  Intact  Orientation:  Grossly Intact  Reliability:  good  Insight:  Good  Judgement:  Good  Impulse Control:  Good  Physical/Medical Issues:  No        Functional Status: Moderate impairment     Progress toward goal: Not at goal    Prognosis: Good with continued therapeutic intervention        Plan:    Patient will continue in individual outpatient therapy with focus on improved functioning and coping skills, maintaining stability, and avoiding decompensation and the need for higher level of care.    Patient will adhere to medication regimen as prescribed and report any side effects. Patient will contact this office, call 911 or present to the nearest emergency room should suicidal or homicidal ideations occur. Provide Cognitive Behavioral Therapy and Solution Focused Therapy to improve functioning, maintain stability, and avoid decompensation and the need for higher level of care.     Return in about 2 weeks (around 2/6/2025).      VISIT DIAGNOSIS:    Diagnosis Plan   1. Major depressive disorder, recurrent episode, moderate        2. Post traumatic stress disorder (PTSD)        3. Generalized anxiety disorder         09:00 EST       This document has been electronically signed by ISRA Liang  January 25, 2025      Part of this note may be an electronic transcription/translation of spoken language to printed text using the Dragon Dictation System.

## 2025-01-28 ENCOUNTER — OFFICE VISIT (OUTPATIENT)
Dept: CARDIOLOGY | Facility: CLINIC | Age: 55
End: 2025-01-28
Payer: COMMERCIAL

## 2025-01-28 VITALS
HEIGHT: 68 IN | SYSTOLIC BLOOD PRESSURE: 135 MMHG | HEART RATE: 77 BPM | WEIGHT: 232 LBS | BODY MASS INDEX: 35.16 KG/M2 | DIASTOLIC BLOOD PRESSURE: 75 MMHG

## 2025-01-28 DIAGNOSIS — I10 PRIMARY HYPERTENSION: ICD-10-CM

## 2025-01-28 DIAGNOSIS — E78.5 HYPERLIPIDEMIA, UNSPECIFIED HYPERLIPIDEMIA TYPE: Primary | ICD-10-CM

## 2025-01-28 DIAGNOSIS — Z95.818 STATUS POST PLACEMENT OF IMPLANTABLE LOOP RECORDER: ICD-10-CM
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
## 2025-02-06 ENCOUNTER — TELEMEDICINE (OUTPATIENT)
Dept: PSYCHIATRY | Facility: CLINIC | Age: 55
End: 2025-02-06
Payer: COMMERCIAL

## 2025-02-06 DIAGNOSIS — F33.1 MAJOR DEPRESSIVE DISORDER, RECURRENT EPISODE, MODERATE: Primary | ICD-10-CM

## 2025-02-06 DIAGNOSIS — F41.1 GENERALIZED ANXIETY DISORDER: ICD-10-CM
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
## 2025-02-08 PROCEDURE — 93298 REM INTERROG DEV EVAL SCRMS: CPT | Performed by: INTERNAL MEDICINE

## 2025-02-13 ENCOUNTER — TELEPHONE (OUTPATIENT)
Dept: PSYCHIATRY | Facility: CLINIC | Age: 55
End: 2025-02-13
Payer: COMMERCIAL

## 2025-02-13 DIAGNOSIS — F33.1 MAJOR DEPRESSIVE DISORDER, RECURRENT EPISODE, MODERATE: Primary | ICD-10-CM

## 2025-02-13 RX ORDER — ESCITALOPRAM OXALATE 10 MG/1
TABLET ORAL
Qty: 30 TABLET | Refills: 2 | Status: SHIPPED | OUTPATIENT
Start: 2025-02-13

## 2025-02-13 NOTE — TELEPHONE ENCOUNTER
Patient said she cannot tolerate Trintellix, her mouth is constantly watering and she is severely nauseated. She said she tried hoping it would pass but its just not letting up.       Please Advise?        Thank You

## 2025-02-20 ENCOUNTER — TELEMEDICINE (OUTPATIENT)
Dept: PSYCHIATRY | Facility: CLINIC | Age: 55
End: 2025-02-20
Payer: COMMERCIAL

## 2025-02-20 DIAGNOSIS — F33.1 MAJOR DEPRESSIVE DISORDER, RECURRENT EPISODE, MODERATE: Primary | ICD-10-CM

## 2025-02-20 DIAGNOSIS — F41.1 GENERALIZED ANXIETY DISORDER: ICD-10-CM

## 2025-02-20 DIAGNOSIS — F43.10 POST TRAUMATIC STRESS DISORDER (PTSD): ICD-10-CM
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
## 2025-03-05 ENCOUNTER — TELEMEDICINE (OUTPATIENT)
Dept: PSYCHIATRY | Facility: CLINIC | Age: 55
End: 2025-03-05
Payer: COMMERCIAL

## 2025-03-05 DIAGNOSIS — F43.10 POST TRAUMATIC STRESS DISORDER (PTSD): ICD-10-CM

## 2025-03-05 DIAGNOSIS — F33.1 MAJOR DEPRESSIVE DISORDER, RECURRENT EPISODE, MODERATE: Primary | ICD-10-CM

## 2025-03-05 DIAGNOSIS — F41.1 GENERALIZED ANXIETY DISORDER: ICD-10-CM

## 2025-03-05 RX ORDER — ESCITALOPRAM OXALATE 10 MG/1
TABLET ORAL
Qty: 90 TABLET | Refills: 2 | Status: SHIPPED | OUTPATIENT
Start: 2025-03-05

## 2025-03-05 NOTE — PATIENT INSTRUCTIONS
For concerns or needing assistance call the [facility] at [phone]  Continue Lexapro 10 mg daily
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
## 2025-03-06 ENCOUNTER — TELEMEDICINE (OUTPATIENT)
Dept: PSYCHIATRY | Facility: CLINIC | Age: 55
End: 2025-03-06
Payer: COMMERCIAL

## 2025-03-06 DIAGNOSIS — F33.1 MAJOR DEPRESSIVE DISORDER, RECURRENT EPISODE, MODERATE: Primary | ICD-10-CM

## 2025-03-06 DIAGNOSIS — F43.10 POST TRAUMATIC STRESS DISORDER (PTSD): ICD-10-CM

## 2025-03-06 DIAGNOSIS — F41.1 GENERALIZED ANXIETY DISORDER: ICD-10-CM
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
## 2025-03-20 ENCOUNTER — TELEMEDICINE (OUTPATIENT)
Dept: PSYCHIATRY | Facility: CLINIC | Age: 55
End: 2025-03-20
Payer: COMMERCIAL

## 2025-03-20 DIAGNOSIS — F33.1 MAJOR DEPRESSIVE DISORDER, RECURRENT EPISODE, MODERATE: Primary | ICD-10-CM

## 2025-03-20 DIAGNOSIS — F41.1 GENERALIZED ANXIETY DISORDER: ICD-10-CM

## 2025-03-20 DIAGNOSIS — F43.10 POST TRAUMATIC STRESS DISORDER (PTSD): ICD-10-CM

## 2025-03-20 NOTE — PROGRESS NOTES
Date: March 20, 2025  Time In: 9:09  Time out: 10:05      This provider is located at the [facility] ([facility]), [address], [place] [address] using a secure [employer] Video Visit through [employer]. Patient is being seen remotely via telehealth, and they are in a secure environment for this session. The patient's condition being diagnosed/treated is appropriate for telemedicine. The provider identified herself as well as her credentials. The patient, and/or patients guardian, consent to be seen remotely, and when consent is given they understand that the consent allows for patient identifiable information to be sent to a third party as needed. They may refuse to be seen remotely at any time. The electronic data is encrypted and password protected, and the patient and/or guardian has been advised of the potential risks to privacy not withstanding such measures.     Mode of Visit: Video  Location of patient: home  Location of provider: Provider home  You have chosen to receive care through a telehealth visit.  The patient has signed the video visit consent form.  The visit included audio and video interaction. No technical issues occurred during this visit    Subjective   Amirah Godoy is a 55 y.o. female who presents today fully oriented, appropriately dressed and groomed, and open to communication for follow up appointment.    Chief Complaint:   Chief Complaint   Patient presents with    Anxiety    Depression    PTSD    Panic Attack        History of Present Illness: Rapport was established through conversation and unconditional positive regard. Recent events were discussed and how these events impact Pt's emotional health.   Pt reports successful use of learned coping skills since last report.  Pt reports continuation of symptoms and states the intensity and duration of symptoms has worsened since last report. Pt rates anxiety at 9/10 and depression at  "9/10.    Sleep: Pt reports sleep has worsened since last report. Healthy sleep habits were discussed such as maintaining a schedule, routine, avoiding caffeine, and limiting screen time before bed.    Appetite:  Pt reports appetite has been good since last report.  Discussed the importance of hydration and eating a healthy diet for overall and mental health.    Medication compliance: Pt reports medications are being taken daily as prescribed. Discussed the importance of compliance with prescriber's directions and Pt was instructed to report questions/concerns, as well as missed doses or discontinuation of medication by Pt.     Safety Plan in Place: No Pt denies SI/HI/SIB recent or current    Daily Functioning:  Since last report, Pt states symptoms are causing Severe difficulty in daily functioning.      Content Discussion:   Pt reports life updates since previous session. Pt identified current stressors. Pt acknowledged stressors within and outside of one's control. Pt identified successes and issues with utilizing coping mechanisms.  Deposition took about four hours.  Pt \"lost it\"  and \"fell apart\"   Pt's  was with Patient at the beginning of session and he states that he and the family have been concerned about Pt as she had a very difficult time during the deposition.  Pt reports she was very anxious and wanted to be sure she was accurate and answered questions appropriately, and this pressure greatly contributed to Pt's anxiety during and since that day.  Pt was encouraged to re-frame her viewpoint and see it as a necessary component of a lawsuit rather than a judgement of her competency or her character. Pt was encouraged to use grounding and mindfulness to put the events of that day in the square on the calendar and leave it there and move forward.  Pt states this visualization will be helpful.  Pt states she feels more proactive over the situation after session.    Counselor supported Pt in continued " exploration of underlying belief systems which contribute to difficulty in connecting/vulnerability.  Through discussion, Pt identifies themes of preservation and overt emotional and physical reactivity when physical and/or emotional statis is in question, even in low or perceived threatening situations. Counselor supported Pt in identifying past experiences and underlying beliefs which contribute to these feelings and reactions.  Pt was supported and encouraged in processing emotions related to frustrations with the expectations of self and others.  Pt was encouraged to identify cultural and nuclear familial contexts which contribute to these concerns.  Pt was receptive and engaged in conversation, and Pt reports this was beneficial and applicable to their situation.      Reviewed coping skills and encouraged Pt to continue to practicing coping skills daily when not under distress. Pt was praised for their attempts to decrease symptoms and mitigate activating events.    Clinical Maneuvering/Intervention:  CBT was utilized to encourage Pt to identify maladaptive thoughts and behaviors and replace with more affirming and positive.Pt encouraged to set and maintain appropriate and healthy boundaries with others. Pt was instructed to practice daily using appropriate and specific words to communicate feelings to others.  Motivational interviewing used to encourage Pt to identify strengths which can be utilized in working toward treatment goals. Pt encouraged to practice daily learned skills such as controlled breathing, grounding, and mindfulness. Pt was encouraged to ask for help from support persons to assist them in maintaining stability and alleviate symptoms. Discussed the importance of being one's own mental health advocate and to refrain from seeing the need to ask for help as a weakness. Pt was encouraged to formulate a plan of action to be more proactive in managing stressors and refrain from using reactive and  automatic heightened emotional responses.     Solution-focused therapy employed to identify how Pt would like their life to be if they were to make positive changes. Pt encouraged to identify effective coping skills and strengths they can use to continue utilizing those skills. Pt encouraged to discontinue utilizing non-effective coping mechanisms. Pt provided with feedback to highlight achievements and personal and other resources. Encouraged use of SMART goals    Assisted patient in processing above session content; acknowledged and normalized patient’s thoughts, feelings, and concerns.  Rationalized patient thought process regarding concerns presented at session.  Discussed triggers associated with patient's  anxiety , depression , and PTSD Also discussed coping skills for patient to implement such as grounding , self care , and positive self talk     Allowed patient to freely discuss issues without interruption or judgment. Provided safe, confidential environment to facilitate the development of positive therapeutic relationship and encourage open, honest communication. Assisted patient in identifying risk factors which would indicate the need for higher level of care including thoughts to harm self or others and/or self-harming behavior and encouraged patient to contact this office, call 911, or present to the nearest emergency room should any of these events occur. Discussed crisis intervention services and means to access. Patient adamantly and convincingly denies current suicidal or homicidal ideation or perceptual disturbance.    Assessment:     Patient appears to maintain relative stability as compared to their baseline.  However, patient persistently struggles with symptoms which continue to cause impairment in important areas of functioning.  As a result, they can be reasonably expected to continue to benefit from treatment and would likely be at increased risk for decompensation otherwise.    Mental  Status Exam:   Hygiene:   good  Cooperation:  Cooperative  Eye Contact:  Good  Psychomotor Behavior:  Aggitated  Affect:  Restricted and worried  Mood: depressed, anxious, and irritable  Speech:  Normal  Thought Process:  Goal directed  Thought Content:  Normal and Mood congruent  Suicidal:  None  Homicidal: None  Hallucinations:  None  Delusion: None  Memory:  Intact  Orientation:  Grossly Intact  Reliability:  good  Insight:  Good  Judgement:  Good  Impulse Control:  Good  Physical/Medical Issues:   Traumatic brain injury        Functional Status: Severe impairment    Progress toward goal: Not at goal    Prognosis: Good with continued therapeutic intervention        Plan:    Patient will continue in individual outpatient therapy with focus on improved functioning and coping skills, maintaining stability, and avoiding decompensation and the need for higher level of care.    Patient will adhere to medication regimen as prescribed and report any side effects. Patient will contact this office, call 911 or present to the nearest emergency room should suicidal or homicidal ideations occur. Provide Cognitive Behavioral Therapy and Solution Focused Therapy to improve functioning, maintain stability, and avoid decompensation and the need for higher level of care.     Return in about 2 weeks (around 4/3/2025).      VISIT DIAGNOSIS:    Diagnosis Plan   1. Major depressive disorder, recurrent episode, moderate        2. Generalized anxiety disorder        3. Post traumatic stress disorder (PTSD)         09:09 EDT       This document has been electronically signed by ISRA Liang  March 20, 2025      Part of this note may be an electronic transcription/translation of spoken language to printed text using the Dragon Dictation System.

## 2025-04-16 ENCOUNTER — TELEMEDICINE (OUTPATIENT)
Dept: PSYCHIATRY | Facility: CLINIC | Age: 55
End: 2025-04-16
Payer: COMMERCIAL

## 2025-04-16 DIAGNOSIS — F33.1 MAJOR DEPRESSIVE DISORDER, RECURRENT EPISODE, MODERATE: Primary | ICD-10-CM

## 2025-04-16 DIAGNOSIS — F41.1 GENERALIZED ANXIETY DISORDER: ICD-10-CM

## 2025-04-16 DIAGNOSIS — F43.12 CHRONIC POST-TRAUMATIC STRESS DISORDER (PTSD): ICD-10-CM
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
## 2025-05-12 ENCOUNTER — TELEMEDICINE (OUTPATIENT)
Dept: PSYCHIATRY | Facility: CLINIC | Age: 55
End: 2025-05-12
Payer: COMMERCIAL

## 2025-05-12 DIAGNOSIS — F33.1 MAJOR DEPRESSIVE DISORDER, RECURRENT EPISODE, MODERATE: ICD-10-CM

## 2025-05-12 DIAGNOSIS — F43.12 CHRONIC POST-TRAUMATIC STRESS DISORDER (PTSD): Primary | ICD-10-CM

## 2025-05-12 DIAGNOSIS — F41.1 GENERALIZED ANXIETY DISORDER: ICD-10-CM

## 2025-05-12 PROCEDURE — 96127 BRIEF EMOTIONAL/BEHAV ASSMT: CPT | Performed by: NURSE PRACTITIONER

## 2025-05-12 PROCEDURE — 99214 OFFICE O/P EST MOD 30 MIN: CPT | Performed by: NURSE PRACTITIONER
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
## 2025-05-12 NOTE — PATIENT INSTRUCTIONS
For concerns or needing assistance call the [facility] at [phone]  Continue with Lexapro (escitalopram) 10mg daily

## 2025-05-15 ENCOUNTER — TELEMEDICINE (OUTPATIENT)
Dept: PSYCHIATRY | Facility: CLINIC | Age: 55
End: 2025-05-15
Payer: COMMERCIAL

## 2025-05-15 DIAGNOSIS — F33.1 MAJOR DEPRESSIVE DISORDER, RECURRENT EPISODE, MODERATE: ICD-10-CM

## 2025-05-15 DIAGNOSIS — F43.12 CHRONIC POST-TRAUMATIC STRESS DISORDER (PTSD): Primary | ICD-10-CM

## 2025-05-15 DIAGNOSIS — F41.1 GENERALIZED ANXIETY DISORDER: ICD-10-CM

## 2025-05-15 NOTE — PROGRESS NOTES
Date: May 15, 2025  Time In: 11:00  Time out: 11:54      This provider is located at the [facility]through [facility]), [address], [place] [address] using a secure KRAFTWERKt Video Visit through [email or web address]. Patient is being seen remotely via telehealth, and they are in a secure environment for this session. The patient's condition being diagnosed/treated is appropriate for telemedicine. The provider identified herself as well as her credentials. The patient, and/or patients guardian, consent to be seen remotely, and when consent is given they understand that the consent allows for patient identifiable information to be sent to a third party as needed. They may refuse to be seen remotely at any time. The electronic data is encrypted and password protected, and the patient and/or guardian has been advised of the potential risks to privacy not withstanding such measures.     Mode of Visit: Video  Location of patient: home   Location of provider: Provider home  You have chosen to receive care through a telehealth visit.  The patient has signed the video visit consent form.  The visit included audio and video interaction. No technical issues occurred during this visit    Subjective   Amirah Godoy is a 55 y.o. female who presents today fully oriented, appropriately dressed and groomed, and open to communication for follow up appointment.    Chief Complaint:   Chief Complaint   Patient presents with    Anxiety    Depression    PTSD        History of Present Illness: Rapport was established through conversation and unconditional positive regard. Recent events were discussed and how these events impact Pt's emotional health.   Pt reports successful use of learned coping skills since last report.  Pt reports continuation of symptoms and states the intensity and duration of symptoms has remained unchanged since last report. Pt rates anxiety at 7/10 and depression at 6/10.  "    Sleep: Pt reports sleep has remained unchanged since last report. Healthy sleep habits were discussed such as maintaining a schedule, routine, avoiding caffeine, and limiting screen time before bed.    Appetite:  Pt reports appetite has been good since last report.  Discussed the importance of hydration and eating a healthy diet for overall and mental health.    Medication compliance: Pt reports medications are being taken daily as prescribed. Discussed the importance of compliance with prescriber's directions and Pt was instructed to report questions/concerns, as well as missed doses or discontinuation of medication by Pt.     Safety Plan in Place: No Pt denies SI/HI/SIB recent or current    Daily Functioning:  Since last report, Pt states symptoms are causing Moderate difficulty in daily functioning.      Content Discussion:   Pt reports life updates since previous session. Pt identified current stressors. Pt acknowledged stressors within and outside of one's control. Pt identified successes and issues with utilizing coping mechanisms.  Pt states they had a sudden death of a  who works for the family transportation business.  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  Pt states she went to the hospital and assisted the  and was able to use calming and \"my nursing skills\" to help with the situation.  Pt states her son and other noticed that Pt was managing things well. Discussed Pt's successful use of her coping skills \"I used my tools in my bag\" and Pt states \"I'm so proud.\"  Pt states through the situations she was able to see how far she has come in her therapeutic journey.  Pt was praised and congratulated for her effort and her own recognition of the hard work she had done.  Pt states she sees obstacles as a black brick wall and she feels she has appropriate tools to break down or move the wall.  Pt states she is " "also working on not \"borrowing other people's walls.\"         Counselor supported Pt in continued exploration of underlying belief systems which contribute to difficulty in connecting/vulnerability.  Through discussion, Pt identifies themes of preservation and overt emotional and physical reactivity when physical and/or emotional statis is in question, even in low or perceived threatening situations. Counselor supported Pt in identifying past experiences and underlying beliefs which contribute to these feelings and reactions.  Pt was supported and encouraged in processing emotions related to frustrations with the expectations of self and others.  Pt was encouraged to identify cultural and nuclear familial contexts which contribute to these concerns.  Pt was receptive and engaged in conversation, and Pt reports this was beneficial and applicable to their situation.      Reviewed coping skills and encouraged Pt to continue to practicing coping skills daily when not under distress. Pt was praised for their attempts to decrease symptoms and mitigate activating events.    Clinical Maneuvering/Intervention:  CBT was utilized to encourage Pt to identify maladaptive thoughts and behaviors and replace with more affirming and positive.Pt encouraged to set and maintain appropriate and healthy boundaries with others. Pt was instructed to practice daily using appropriate and specific words to communicate feelings to others.  Motivational interviewing used to encourage Pt to identify strengths which can be utilized in working toward treatment goals. Pt encouraged to practice daily learned skills such as controlled breathing, grounding, and mindfulness. Pt was encouraged to ask for help from support persons to assist them in maintaining stability and alleviate symptoms. Discussed the importance of being one's own mental health advocate and to refrain from seeing the need to ask for help as a weakness. Pt was encouraged to " formulate a plan of action to be more proactive in managing stressors and refrain from using reactive and automatic heightened emotional responses.     Solution-focused therapy employed to identify how Pt would like their life to be if they were to make positive changes. Pt encouraged to identify effective coping skills and strengths they can use to continue utilizing those skills. Pt encouraged to discontinue utilizing non-effective coping mechanisms. Pt provided with feedback to highlight achievements and personal and other resources. Encouraged use of SMART goals    Assisted patient in processing above session content; acknowledged and normalized patient’s thoughts, feelings, and concerns.  Rationalized patient thought process regarding concerns presented at session.  Discussed triggers associated with patient's  anxiety , depression , and PTSD Also discussed coping skills for patient to implement such as controlled breathing , self care , and positive self talk     Allowed patient to freely discuss issues without interruption or judgment. Provided safe, confidential environment to facilitate the development of positive therapeutic relationship and encourage open, honest communication. Assisted patient in identifying risk factors which would indicate the need for higher level of care including thoughts to harm self or others and/or self-harming behavior and encouraged patient to contact this office, call 911, or present to the nearest emergency room should any of these events occur. Discussed crisis intervention services and means to access. Patient adamantly and convincingly denies current suicidal or homicidal ideation or perceptual disturbance.    Assessment:     Patient appears to maintain relative stability as compared to their baseline.  However, patient persistently struggles with symptoms which continue to cause impairment in important areas of functioning.  As a result, they can be reasonably expected to  continue to benefit from treatment and would likely be at increased risk for decompensation otherwise.      PHQ-Score Total:  PHQ-9 Total Score: PHQ-9 Depression Screening  Little interest or pleasure in doing things?  1   Feeling down, depressed, or hopeless?  1   PHQ-2 Total Score     Trouble falling or staying asleep, or sleeping too much?  2   Feeling tired or having little energy?  1   Poor appetite or overeating?  2   Feeling bad about yourself - or that you are a failure or have let yourself or your family down?  1   Trouble concentrating on things, such as reading the newspaper or watching television?  2   Moving or speaking so slowly that other people could have noticed? Or the opposite - being so fidgety or restless that you have been moving around a lot more than usual?  2   Thoughts that you would be better off dead, or of hurting yourself in some way?  0   PHQ-9 Total Score  12   If you checked off any problems, how difficult have these problems made it for you to do your work, take care of things at home, or get along with other people?  very      Over the last two weeks, how often have you been bothered by the following problems?  Feeling nervous, anxious or on edge: More than half the days  Not being able to stop or control worrying: Nearly every day  Worrying too much about different things: More than half the days  Trouble Relaxing: More than half the days  Being so restless that it is hard to sit still: More than half the days  Becoming easily annoyed or irritable: Nearly every day  Feeling afraid as if something awful might happen: More than half the days  OTTONIEL 7 Total Score: 16  If you checked any problems, how difficult have these problems made it for you to do your work, take care of things at home, or get along with other people: Very difficult      Mental Status Exam:   Hygiene:   good  Cooperation:  Cooperative  Eye Contact:  Good  Psychomotor Behavior:  Appropriate  Affect:  Full range   smiling, proud  Mood: normal  Speech:  Normal  Thought Process:  Goal directed  Thought Content:  Normal  Suicidal:  None  Homicidal: None  Hallucinations:  None  Delusion: None  Memory:  Intact  Orientation:  Grossly Intact  Reliability:  good  Insight:  Good  Judgement:  Good  Impulse Control:  Good  Physical/Medical Issues:  No        Functional Status: moderate impairment    Progress toward goal: Not at goal    Prognosis: Good with continued therapeutic intervention        Plan:    Patient will continue in individual outpatient therapy with focus on improved functioning and coping skills, maintaining stability, and avoiding decompensation and the need for higher level of care.    Patient will adhere to medication regimen as prescribed and report any side effects. Patient will contact this office, call 911 or present to the nearest emergency room should suicidal or homicidal ideations occur. Provide Cognitive Behavioral Therapy and Solution Focused Therapy to improve functioning, maintain stability, and avoid decompensation and the need for higher level of care.     Return in about 2 weeks (around 5/29/2025).      VISIT DIAGNOSIS:    Diagnosis Plan   1. Chronic post-traumatic stress disorder (PTSD)        2. Major depressive disorder, recurrent episode, moderate        3. Generalized anxiety disorder         11:00 EDT       This document has been electronically signed by ISRA Liang  May 15, 2025      Part of this note may be an electronic transcription/translation of spoken language to printed text using the Dragon Dictation System.

## 2025-06-06 ENCOUNTER — TELEPHONE (OUTPATIENT)
Dept: PSYCHIATRY | Facility: CLINIC | Age: 55
End: 2025-06-06

## 2025-06-15 PROCEDURE — 93298 REM INTERROG DEV EVAL SCRMS: CPT | Performed by: INTERNAL MEDICINE

## 2025-06-18 LAB
MC_CV_MDC_IDC_RATE_1: 150
MC_CV_MDC_IDC_RATE_1: 3
MC_CV_MDC_IDC_RATE_1: 40
MC_CV_MDC_IDC_ZONE_ID: 1
MC_CV_MDC_IDC_ZONE_ID: 2
MC_CV_MDC_IDC_ZONE_ID: 3
MC_CV_MDC_IDC_ZONE_ID: 4
MC_CV_MDC_IDC_ZONE_ID: 5
MDC_IDC_MSMT_BATTERY_STATUS: NORMAL
MDC_IDC_PG_IMPLANT_DTM: NORMAL
MDC_IDC_PG_MFG: NORMAL
MDC_IDC_PG_MODEL: NORMAL
MDC_IDC_PG_SERIAL: NORMAL
MDC_IDC_PG_TYPE: NORMAL
MDC_IDC_SESS_DTM: NORMAL
MDC_IDC_SESS_TYPE: NORMAL
MDC_IDC_SET_ZONE_DETECTION_DETAILS: 16
MDC_IDC_SET_ZONE_STATUS: NORMAL
MDC_IDC_SET_ZONE_TYPE: NORMAL
MDC_IDC_STAT_AT_BURDEN_PERCENT: 0

## 2025-06-23 ENCOUNTER — TELEMEDICINE (OUTPATIENT)
Dept: PSYCHIATRY | Facility: CLINIC | Age: 55
End: 2025-06-23
Payer: COMMERCIAL

## 2025-06-23 DIAGNOSIS — F33.1 MAJOR DEPRESSIVE DISORDER, RECURRENT EPISODE, MODERATE: ICD-10-CM

## 2025-06-23 DIAGNOSIS — F41.1 GENERALIZED ANXIETY DISORDER: ICD-10-CM

## 2025-06-23 DIAGNOSIS — F43.12 CHRONIC POST-TRAUMATIC STRESS DISORDER (PTSD): Primary | ICD-10-CM

## 2025-06-23 PROCEDURE — 90834 PSYTX W PT 45 MINUTES: CPT | Performed by: COUNSELOR

## 2025-06-23 NOTE — PROGRESS NOTES
Date: June 23, 2025  Time In: 11:25  Time out: 12:03      This provider is located at the [facility] ([facility]), [address], [place] [address] using a secure [employer] Video Visit through [employer]. Patient is being seen remotely via telehealth, and they are in a secure environment for this session. The patient's condition being diagnosed/treated is appropriate for telemedicine. The provider identified herself as well as her credentials. The patient, and/or patients guardian, consent to be seen remotely, and when consent is given they understand that the consent allows for patient identifiable information to be sent to a third party as needed. They may refuse to be seen remotely at any time. The electronic data is encrypted and password protected, and the patient and/or guardian has been advised of the potential risks to privacy not withstanding such measures.     Mode of Visit: Video  Location of patient: work  Location of provider: Provider home  You have chosen to receive care through a telehealth visit.  The patient has signed the video visit consent form.  The visit included audio and video interaction. No technical issues occurred during this visit    Subjective   Amirah Godoy is a 55 y.o. female who presents today fully oriented, appropriately dressed and groomed, and open to communication for follow up appointment.    Chief Complaint:   Chief Complaint   Patient presents with    Anxiety    Depression        History of Present Illness: Rapport was established through conversation and unconditional positive regard. Recent events were discussed and how these events impact Pt's emotional health.   Pt reports successful use of learned coping skills since last report.  Pt reports continuation of symptoms and states the intensity and duration of symptoms has remained unchanged since last report. Pt rates anxiety at 7/10 and depression at 6/10.     Sleep: Pt  "reports sleep has remained unchanged since last report. Healthy sleep habits were discussed such as maintaining a schedule, routine, avoiding caffeine, and limiting screen time before bed.    Appetite:  Pt reports appetite has been good since last report.  Discussed the importance of hydration and eating a healthy diet for overall and mental health.    Medication compliance: Pt reports medications are being taken daily as prescribed. Discussed the importance of compliance with prescriber's directions and Pt was instructed to report questions/concerns, as well as missed doses or discontinuation of medication by Pt.     Safety Plan in Place: No Pt denies SI/HI/SIB recent or current    Daily Functioning:  Since last report, Pt states symptoms are causing Moderate difficulty in daily functioning.      Content Discussion:   Pt reports life updates since previous session. Pt identified current stressors. Pt acknowledged stressors within and outside of one's control. Pt identified successes and issues with utilizing coping mechanisms.  Pt states she had a hard time at exercise class on Friday when she had a dizziness episode. Pt states her friends checked on her and Pt recovered within a few minutes.  Pt states she drank fluids and was able to finish the class and drive home. Pt reports she has been informed that these episodes are part of the brain injury, and Pt has learned to cope and move on.  Pt states she has been doing small tasks and recognizing when she completes these tasks in increments.  Daughter is moved out and is now full time in her apartment.  Pt reports this is a much healthier situation for the family and for daughter.  Pt states son and DIL and granddaughter have not been at Pt's home in a while and Pt has enjoyed the quiet and calm house.  Pt states father's day was \"kind of hurtful: in that her  did not want to go to dinner and made a hurtful comment when Pt gave him his gift.  Pt states she "  the gift and returned it to the store. Pt went to dinner with  and father on Father's Day, and Pt and her father had an enjoyable meal.  Pt states she no longer has anxiety about doing things like going to eat, and anxiety when Pt is  driving is manageable.  States she is setting firm boundaries with  and she no longer allows his off-handed comments to negatively affect her.  Pt states she used to question whether it was her cognitive issues from the TBI that created tensions when she and  were communicating, but she has realized that she is not deserving of blame for their communication issues.     Counselor supported Pt in continued exploration of underlying belief systems which contribute to difficulty in connecting/vulnerability.  Through discussion, Pt identifies themes of preservation and overt emotional and physical reactivity when physical and/or emotional statis is in question, even in low or perceived threatening situations. Counselor supported Pt in identifying past experiences and underlying beliefs which contribute to these feelings and reactions.  Pt was supported and encouraged in processing emotions related to frustrations with the expectations of self and others.  Pt was encouraged to identify cultural and nuclear familial contexts which contribute to these concerns.  Pt was receptive and engaged in conversation, and Pt reports this was beneficial and applicable to their situation.      Reviewed coping skills and encouraged Pt to continue to practicing coping skills daily when not under distress. Pt was praised for their attempts to decrease symptoms and mitigate activating events.    Clinical Maneuvering/Intervention:  CBT was utilized to encourage Pt to identify maladaptive thoughts and behaviors and replace with more affirming and positive.Pt encouraged to set and maintain appropriate and healthy boundaries with others. Pt was instructed to practice daily using  appropriate and specific words to communicate feelings to others.  Motivational interviewing used to encourage Pt to identify strengths which can be utilized in working toward treatment goals. Pt encouraged to practice daily learned skills such as controlled breathing, grounding, and mindfulness. Pt was encouraged to ask for help from support persons to assist them in maintaining stability and alleviate symptoms. Discussed the importance of being one's own mental health advocate and to refrain from seeing the need to ask for help as a weakness. Pt was encouraged to formulate a plan of action to be more proactive in managing stressors and refrain from using reactive and automatic heightened emotional responses.     Solution-focused therapy employed to identify how Pt would like their life to be if they were to make positive changes. Pt encouraged to identify effective coping skills and strengths they can use to continue utilizing those skills. Pt encouraged to discontinue utilizing non-effective coping mechanisms. Pt provided with feedback to highlight achievements and personal and other resources. Encouraged use of SMART goals    Assisted patient in processing above session content; acknowledged and normalized patient’s thoughts, feelings, and concerns.  Rationalized patient thought process regarding concerns presented at session.  Discussed triggers associated with patient's  anxiety , depression , and PTSD Also discussed coping skills for patient to implement such as grounding , self care , and positive self talk     Allowed patient to freely discuss issues without interruption or judgment. Provided safe, confidential environment to facilitate the development of positive therapeutic relationship and encourage open, honest communication. Assisted patient in identifying risk factors which would indicate the need for higher level of care including thoughts to harm self or others and/or self-harming behavior and  encouraged patient to contact this office, call 911, or present to the nearest emergency room should any of these events occur. Discussed crisis intervention services and means to access. Patient adamantly and convincingly denies current suicidal or homicidal ideation or perceptual disturbance.    Assessment:     Patient appears to maintain relative stability as compared to their baseline.  However, patient persistently struggles with symptoms which continue to cause impairment in important areas of functioning.  As a result, they can be reasonably expected to continue to benefit from treatment and would likely be at increased risk for decompensation otherwise.        Mental Status Exam:   Hygiene:   good  Cooperation:  Cooperative  Eye Contact:  Good  Psychomotor Behavior:  Appropriate  Affect:  Full range  Mood: normal  Speech:  Normal  Thought Process:  Goal directed  Thought Content:  Normal  Suicidal:  None  Homicidal: None  Hallucinations:  None  Delusion: None  Memory:  Intact  Orientation:  Grossly Intact  Reliability:  good  Insight:  Good  Judgement:  Good  Impulse Control:  Good  Physical/Medical Issues:  History of TBI       Functional Status: Mild impairment     Progress toward goal: Not at goal    Prognosis: Good with continued therapeutic intervention        Plan:    Patient will continue in individual outpatient therapy with focus on improved functioning and coping skills, maintaining stability, and avoiding decompensation and the need for higher level of care.    Patient will adhere to medication regimen as prescribed and report any side effects. Patient will contact this office, call 911 or present to the nearest emergency room should suicidal or homicidal ideations occur. Provide Cognitive Behavioral Therapy and Solution Focused Therapy to improve functioning, maintain stability, and avoid decompensation and the need for higher level of care.     Return in about 2 weeks (around  7/7/2025).      VISIT DIAGNOSIS:    Diagnosis Plan   1. Chronic post-traumatic stress disorder (PTSD)        2. Generalized anxiety disorder        3. Major depressive disorder, recurrent episode, moderate         11:24 EDT       This document has been electronically signed by ISRA Liang  June 23, 2025      Part of this note may be an electronic transcription/translation of spoken language to printed text using the Dragon Dictation System.

## 2025-07-25 ENCOUNTER — HOSPITAL ENCOUNTER (OUTPATIENT)
Dept: CARDIOLOGY | Facility: HOSPITAL | Age: 55
Discharge: HOME | End: 2025-07-25
Payer: COMMERCIAL

## 2025-07-25 DIAGNOSIS — E78.5 HYPERLIPIDEMIA, UNSPECIFIED HYPERLIPIDEMIA TYPE: ICD-10-CM

## 2025-07-25 LAB
ALBUMIN SERPL-MCNC: 4.1 G/DL (ref 3.5–5.2)
ALBUMIN/GLOB SERPL: 1.3 G/DL
ALP SERPL-CCNC: 110 U/L (ref 39–117)
ALT SERPL W P-5'-P-CCNC: 18 U/L (ref 1–33)
ANION GAP SERPL CALCULATED.3IONS-SCNC: 12.1 MMOL/L (ref 5–15)
AST SERPL-CCNC: 19 U/L (ref 1–32)
BILIRUB SERPL-MCNC: 0.4 MG/DL (ref 0–1.2)
BUN SERPL-MCNC: 19 MG/DL (ref 6–20)
BUN/CREAT SERPL: 15.6 (ref 7–25)
CALCIUM SPEC-SCNC: 9.3 MG/DL (ref 8.6–10.5)
CHLORIDE SERPL-SCNC: 104 MMOL/L (ref 98–107)
CHOLEST SERPL-MCNC: 235 MG/DL (ref 0–200)
CO2 SERPL-SCNC: 23.9 MMOL/L (ref 22–29)
CREAT SERPL-MCNC: 1.22 MG/DL (ref 0.57–1)
EGFRCR SERPLBLD CKD-EPI 2021: 51.9 ML/MIN/1.73
GLOBULIN UR ELPH-MCNC: 3.1 GM/DL
GLUCOSE SERPL-MCNC: 91 MG/DL (ref 65–99)
HDLC SERPL-MCNC: 41 MG/DL (ref 40–60)
LDLC SERPL CALC-MCNC: 176 MG/DL (ref 0–100)
LDLC/HDLC SERPL: 4.25 {RATIO}
POTASSIUM SERPL-SCNC: 4.6 MMOL/L (ref 3.5–5.2)
PROT SERPL-MCNC: 7.2 G/DL (ref 6–8.5)
SODIUM SERPL-SCNC: 140 MMOL/L (ref 136–145)
TRIGL SERPL-MCNC: 98 MG/DL (ref 0–150)
VLDLC SERPL-MCNC: 18 MG/DL (ref 5–40)

## 2025-07-25 PROCEDURE — 80053 COMPREHEN METABOLIC PANEL: CPT

## 2025-07-25 PROCEDURE — 80061 LIPID PANEL: CPT

## 2025-08-04 ENCOUNTER — OFFICE VISIT (OUTPATIENT)
Dept: CARDIOLOGY | Facility: CLINIC | Age: 55
End: 2025-08-04
Payer: COMMERCIAL

## 2025-08-04 VITALS
DIASTOLIC BLOOD PRESSURE: 80 MMHG | WEIGHT: 207 LBS | HEIGHT: 68 IN | HEART RATE: 72 BPM | BODY MASS INDEX: 31.37 KG/M2 | SYSTOLIC BLOOD PRESSURE: 127 MMHG

## 2025-08-04 DIAGNOSIS — I10 PRIMARY HYPERTENSION: ICD-10-CM

## 2025-08-04 DIAGNOSIS — E78.5 HYPERLIPIDEMIA, UNSPECIFIED HYPERLIPIDEMIA TYPE: Primary | ICD-10-CM

## 2025-08-04 DIAGNOSIS — R06.02 BREATH SHORTNESS: ICD-10-CM

## 2025-08-04 DIAGNOSIS — I48.0 PAROXYSMAL ATRIAL FIBRILLATION: ICD-10-CM

## 2025-08-04 PROCEDURE — 99214 OFFICE O/P EST MOD 30 MIN: CPT | Performed by: INTERNAL MEDICINE

## 2025-08-04 PROCEDURE — 93000 ELECTROCARDIOGRAM COMPLETE: CPT | Performed by: INTERNAL MEDICINE

## 2025-08-04 RX ORDER — ROSUVASTATIN CALCIUM 5 MG/1
5 TABLET, COATED ORAL DAILY
Qty: 90 TABLET | Refills: 1 | Status: SHIPPED | OUTPATIENT
Start: 2025-08-04

## 2025-08-12 ENCOUNTER — TELEMEDICINE (OUTPATIENT)
Dept: PSYCHIATRY | Facility: CLINIC | Age: 55
End: 2025-08-12
Payer: COMMERCIAL

## 2025-08-12 DIAGNOSIS — F43.12 CHRONIC POST-TRAUMATIC STRESS DISORDER (PTSD): Primary | ICD-10-CM

## 2025-08-12 DIAGNOSIS — F33.1 MAJOR DEPRESSIVE DISORDER, RECURRENT EPISODE, MODERATE: ICD-10-CM

## 2025-08-12 DIAGNOSIS — F41.1 GENERALIZED ANXIETY DISORDER: ICD-10-CM

## 2025-08-12 RX ORDER — ESCITALOPRAM OXALATE 10 MG/1
TABLET ORAL
Qty: 90 TABLET | Refills: 3 | Status: SHIPPED | OUTPATIENT
Start: 2025-08-12

## 2025-08-21 ENCOUNTER — TELEMEDICINE (OUTPATIENT)
Dept: PSYCHIATRY | Facility: CLINIC | Age: 55
End: 2025-08-21
Payer: COMMERCIAL

## 2025-08-21 DIAGNOSIS — F43.12 CHRONIC POST-TRAUMATIC STRESS DISORDER (PTSD): Primary | ICD-10-CM

## 2025-08-21 DIAGNOSIS — F41.1 GENERALIZED ANXIETY DISORDER: ICD-10-CM

## 2025-08-21 DIAGNOSIS — F33.1 MAJOR DEPRESSIVE DISORDER, RECURRENT EPISODE, MODERATE: ICD-10-CM

## 2025-08-28 LAB
MC_CV_MDC_IDC_RATE_1: 150
MC_CV_MDC_IDC_RATE_1: 3
MC_CV_MDC_IDC_RATE_1: 40
MC_CV_MDC_IDC_ZONE_ID: 1
MC_CV_MDC_IDC_ZONE_ID: 2
MC_CV_MDC_IDC_ZONE_ID: 3
MC_CV_MDC_IDC_ZONE_ID: 4
MC_CV_MDC_IDC_ZONE_ID: 5
MDC_IDC_MSMT_BATTERY_STATUS: NORMAL
MDC_IDC_PG_IMPLANT_DTM: NORMAL
MDC_IDC_PG_MFG: NORMAL
MDC_IDC_PG_MODEL: NORMAL
MDC_IDC_PG_SERIAL: NORMAL
MDC_IDC_PG_TYPE: NORMAL
MDC_IDC_SESS_DTM: NORMAL
MDC_IDC_SESS_TYPE: NORMAL
MDC_IDC_SET_ZONE_DETECTION_DETAILS: 16
MDC_IDC_SET_ZONE_STATUS: NORMAL
MDC_IDC_SET_ZONE_TYPE: NORMAL
MDC_IDC_STAT_AT_BURDEN_PERCENT: 0

## (undated) DEVICE — CATH DIAG IMPULSE FL3.5 5F 100CM

## (undated) DEVICE — GLV SURG BIOGEL LTX PF 6 1/2

## (undated) DEVICE — GW EMR FIX EXCHG J STD .035 3MM 260CM

## (undated) DEVICE — TRAP,MUCUS SPECIMEN, 80CC: Brand: MEDLINE

## (undated) DEVICE — CATH DIAG IMPULSE FR4 5F 100CM

## (undated) DEVICE — ENDOPATH XCEL UNIVERSAL TROCAR STABLILITY SLEEVES: Brand: ENDOPATH XCEL

## (undated) DEVICE — SINGLE USE BIOPSY VALVE MAJ-210: Brand: SINGLE USE BIOPSY VALVE (STERILE)

## (undated) DEVICE — THE TORRENT IRRIGATION SCOPE CONNECTOR IS USED WITH THE TORRENT IRRIGATION TUBING TO PROVIDE IRRIGATION FLUIDS SUCH AS STERILE WATER DURING GASTROINTESTINAL ENDOSCOPIC PROCEDURES WHEN USED IN CONJUNCTION WITH AN IRRIGATION PUMP (OR ELECTROSURGICAL UNIT).: Brand: TORRENT

## (undated) DEVICE — BITEBLOCK OMNI BLOC

## (undated) DEVICE — GLV SURG BIOGEL LTX PF 7 1/2

## (undated) DEVICE — SPNG DISECTOR KTNER XRAY COTN 1/4X9/16IN PK/5

## (undated) DEVICE — SUT SILK 4/0 TIES 18IN A183H

## (undated) DEVICE — 2, DISPOSABLE SUCTION/IRRIGATOR WITH DISPOSABLE TIP: Brand: STRYKEFLOW

## (undated) DEVICE — Device

## (undated) DEVICE — ENDOPOUCH RETRIEVER SPECIMEN RETRIEVAL BAGS: Brand: ENDOPOUCH RETRIEVER

## (undated) DEVICE — KT MANIFLD CARDIAC

## (undated) DEVICE — 3M™ STERI-STRIP™ COMPOUND BENZOIN TINCTURE 40 BAGS/CARTON 4 CARTONS/CASE C1544: Brand: 3M™ STERI-STRIP™

## (undated) DEVICE — CATH DIAG IMPULSE FL4 5F 100CM

## (undated) DEVICE — LN SMPL O2 NASL/ORL SMART/CAPNOLINE PLS A/

## (undated) DEVICE — Device: Brand: DEFENDO AIR/WATER/SUCTION AND BIOPSY VALVE

## (undated) DEVICE — TBG 02 CRUSH RESIST LF CLR 7FT

## (undated) DEVICE — PREMIUM WET SKIN PREP TRAY: Brand: MEDLINE INDUSTRIES, INC.

## (undated) DEVICE — GLIDESHEATH BASIC HYDROPHILIC COATED INTRODUCER SHEATH: Brand: GLIDESHEATH

## (undated) DEVICE — PK CATH CARD 40

## (undated) DEVICE — GLIDESHEATH SLENDER STAINLESS STEEL KIT: Brand: GLIDESHEATH SLENDER

## (undated) DEVICE — ADAPT SWVL FIBROPTIC BRONCH

## (undated) DEVICE — SUT SILK 2/0 TIES 18IN A185H

## (undated) DEVICE — PENCL E/S FTSWTCH HOLSTR SS 10FT

## (undated) DEVICE — PAD SANI MAXI W/ADHS SNG WRP 11IN

## (undated) DEVICE — PROXIMATE RH ROTATING HEAD SKIN STAPLERS (35 WIDE) CONTAINS 35 STAINLESS STEEL STAPLES: Brand: PROXIMATE

## (undated) DEVICE — SUT SILK 2/0 SH CR5 18IN C0125

## (undated) DEVICE — ADHS SKIN DERMABOND TOP ADVANCED

## (undated) DEVICE — SENSR O2 OXIMAX FNGR A/ 18IN NONSTR

## (undated) DEVICE — DGW .035 FC J3MM 260CM TEF: Brand: EMERALD

## (undated) DEVICE — MSK AIRWY LARYNG LMA UNIQUE STD PK SZ4

## (undated) DEVICE — PERCLOSE™ PROSTYLE™ SUTURE-MEDIATED CLOSURE AND REPAIR SYSTEM: Brand: PERCLOSE™ PROSTYLE™

## (undated) DEVICE — ANTIBACTERIAL UNDYED BRAIDED (POLYGLACTIN 910), SYNTHETIC ABSORBABLE SUTURE: Brand: COATED VICRYL

## (undated) DEVICE — ELECTRD BLD BOVIE WECK NO INSULATION

## (undated) DEVICE — SPONGE,LAP,12"X12",XR,ST,5/PK,40PK/CS: Brand: MEDLINE

## (undated) DEVICE — PINNACLE INTRODUCER SHEATH: Brand: PINNACLE

## (undated) DEVICE — CAUTERY TIP POLISHER: Brand: DEVON

## (undated) DEVICE — CANNULA,ADULT,SOFT-TOUCH,7'TUBE,UC: Brand: PENDING

## (undated) DEVICE — TUBING, SUCTION, 1/4" X 10', STRAIGHT: Brand: MEDLINE

## (undated) DEVICE — SINGLE USE SUCTION VALVE MAJ-209: Brand: SINGLE USE SUCTION VALVE (STERILE)

## (undated) DEVICE — PK ENT 40

## (undated) DEVICE — VITAL SIGNS™ JACKSON-REES CIRCUITS: Brand: VITAL SIGNS™

## (undated) DEVICE — CATH VENT MIV RADL PIG ST TIP 5F 110CM

## (undated) DEVICE — SUT VIC 0 TN 27IN DYED JTN0G

## (undated) DEVICE — SOL NACL 0.9PCT 1000ML

## (undated) DEVICE — 3M™ STERI-STRIP™ REINFORCED ADHESIVE SKIN CLOSURES, R1547, 1/2 IN X 4 IN (12 MM X 100 MM), 6 STRIPS/ENVELOPE: Brand: 3M™ STERI-STRIP™

## (undated) DEVICE — UNDYED BRAIDED (POLYGLACTIN 910), SYNTHETIC ABSORBABLE SUTURE: Brand: COATED VICRYL

## (undated) DEVICE — SUT VIC COAT 6/0 P3 18IN

## (undated) DEVICE — IRRIGATOR BULB ASEPTO 60CC STRL

## (undated) DEVICE — SUT SILK 3/0 TIES 18IN A184H

## (undated) DEVICE — CATH VENT MIV RADL PIG ST TIP 4F 110CM